# Patient Record
Sex: MALE | Race: WHITE | Employment: FULL TIME | URBAN - METROPOLITAN AREA
[De-identification: names, ages, dates, MRNs, and addresses within clinical notes are randomized per-mention and may not be internally consistent; named-entity substitution may affect disease eponyms.]

---

## 2017-10-18 ENCOUNTER — OFFICE VISIT (OUTPATIENT)
Dept: FAMILY MEDICINE CLINIC | Facility: CLINIC | Age: 57
End: 2017-10-18

## 2017-10-18 VITALS
SYSTOLIC BLOOD PRESSURE: 111 MMHG | DIASTOLIC BLOOD PRESSURE: 68 MMHG | HEART RATE: 76 BPM | TEMPERATURE: 98 F | HEIGHT: 68 IN | WEIGHT: 190 LBS | RESPIRATION RATE: 18 BRPM | BODY MASS INDEX: 28.79 KG/M2

## 2017-10-18 DIAGNOSIS — Z00.00 ROUTINE PHYSICAL EXAMINATION: ICD-10-CM

## 2017-10-18 DIAGNOSIS — K40.90 UNILATERAL INGUINAL HERNIA WITHOUT OBSTRUCTION OR GANGRENE, RECURRENCE NOT SPECIFIED: Primary | ICD-10-CM

## 2017-10-18 PROCEDURE — 99396 PREV VISIT EST AGE 40-64: CPT | Performed by: FAMILY MEDICINE

## 2017-10-18 RX ORDER — NAPROXEN 500 MG/1
500 TABLET ORAL 2 TIMES DAILY WITH MEALS
Qty: 30 TABLET | Refills: 2 | Status: SHIPPED | OUTPATIENT
Start: 2017-10-18 | End: 2018-10-30

## 2017-10-18 NOTE — PROGRESS NOTES
HPI:    Patient ID: Alejandro Hill is a 65year old male. Patient is here for routine physical exam. No acute issues. No significant chronic medical problems. Patient is requesting testing. Diet and exercise have been good.  Past medical history, family h normal.   Vitals reviewed. ASSESSMENT/PLAN:   Routine physical examination: colonoscopy done a few years ago:  - Exam is unremarkable. Screening tests were discussed, and after discussion, will check lab work as below.  Healthy diet, exercise, a

## 2017-10-20 ENCOUNTER — APPOINTMENT (OUTPATIENT)
Dept: LAB | Facility: HOSPITAL | Age: 57
End: 2017-10-20
Attending: FAMILY MEDICINE
Payer: COMMERCIAL

## 2017-10-20 DIAGNOSIS — Z00.00 ROUTINE PHYSICAL EXAMINATION: ICD-10-CM

## 2017-10-20 PROCEDURE — 85027 COMPLETE CBC AUTOMATED: CPT

## 2017-10-20 PROCEDURE — 80053 COMPREHEN METABOLIC PANEL: CPT

## 2017-10-20 PROCEDURE — 80061 LIPID PANEL: CPT

## 2017-10-20 PROCEDURE — 82306 VITAMIN D 25 HYDROXY: CPT

## 2017-10-20 PROCEDURE — 36415 COLL VENOUS BLD VENIPUNCTURE: CPT

## 2017-11-08 ENCOUNTER — TELEPHONE (OUTPATIENT)
Dept: FAMILY MEDICINE CLINIC | Facility: CLINIC | Age: 57
End: 2017-11-08

## 2017-11-08 NOTE — TELEPHONE ENCOUNTER
Patient needs health screening for healthy driven completed. Left form for dr. Monica Chavez at [facility] -red folder.  Needs asap

## 2017-11-20 ENCOUNTER — OFFICE VISIT (OUTPATIENT)
Dept: SURGERY | Facility: CLINIC | Age: 57
End: 2017-11-20

## 2017-11-20 VITALS — BODY MASS INDEX: 28.79 KG/M2 | WEIGHT: 190 LBS | HEIGHT: 68 IN

## 2017-11-20 DIAGNOSIS — K40.90 RIGHT INGUINAL HERNIA: Primary | ICD-10-CM

## 2017-11-20 PROCEDURE — 99244 OFF/OP CNSLTJ NEW/EST MOD 40: CPT | Performed by: SURGERY

## 2017-11-20 PROCEDURE — 99212 OFFICE O/P EST SF 10 MIN: CPT | Performed by: SURGERY

## 2017-11-20 NOTE — H&P
History and Physical      Mandi Silva is a 65year old male. HPI   Patient presents with:  Hernia: Pt referred by Dr. Cristiano Fowler regarding RIH x 1 month. Pt c/o intermittent pain to area with increased activity. Pt denies complications w/ b/m.       NSAID adenopathy  Respiratory: normal to inspection lungs are clear to auscultation bilaterally normal respiratory effort  Cardiovascular: regular rate and rhythm no murmurs, gallups, or rubs  Abdomen: soft non-tender non-distended no organomegaly noted no jolene

## 2017-11-24 ENCOUNTER — TELEPHONE (OUTPATIENT)
Dept: SURGERY | Facility: CLINIC | Age: 57
End: 2017-11-24

## 2017-11-24 NOTE — TELEPHONE ENCOUNTER
Spoke with patient's wife, patient scheduled for surgery on January 24, 2017. Verbal understanding received.

## 2017-12-29 ENCOUNTER — TELEPHONE (OUTPATIENT)
Dept: SURGERY | Facility: CLINIC | Age: 57
End: 2017-12-29

## 2017-12-29 NOTE — TELEPHONE ENCOUNTER
[place] documents to be completed. Please call when ready to be picked up at [facility] [employer] dept. Patient is employee. Stated that he will pay $25.00 fee when he picks up document.

## 2018-01-02 NOTE — TELEPHONE ENCOUNTER
Pt stopped@ [employer] - form not needed at this time, pt picked up form, scanned blank. No further action needed.  NK

## 2018-01-18 NOTE — TELEPHONE ENCOUNTER
Pt dropped off @[employer] - FMLA + Disab. forms for Dr. Carmen Metzger. Logged for processing. FCR+Signed release on file, pt paid $50, picked up receipt.  NK

## 2018-01-24 ENCOUNTER — ANESTHESIA (OUTPATIENT)
Dept: SURGERY | Facility: HOSPITAL | Age: 58
End: 2018-01-24

## 2018-01-24 ENCOUNTER — ANESTHESIA EVENT (OUTPATIENT)
Dept: SURGERY | Facility: HOSPITAL | Age: 58
End: 2018-01-24

## 2018-01-24 ENCOUNTER — SURGERY (OUTPATIENT)
Age: 58
End: 2018-01-24

## 2018-01-24 ENCOUNTER — HOSPITAL ENCOUNTER (OUTPATIENT)
Facility: HOSPITAL | Age: 58
Setting detail: HOSPITAL OUTPATIENT SURGERY
Discharge: HOME | End: 2018-01-24
Attending: SURGERY | Admitting: SURGERY
Payer: COMMERCIAL

## 2018-01-24 VITALS
DIASTOLIC BLOOD PRESSURE: 74 MMHG | BODY MASS INDEX: 27.4 KG/M2 | HEART RATE: 75 BPM | HEIGHT: 69 IN | OXYGEN SATURATION: 97 % | SYSTOLIC BLOOD PRESSURE: 130 MMHG | TEMPERATURE: 98 F | RESPIRATION RATE: 16 BRPM | WEIGHT: 185 LBS

## 2018-01-24 DIAGNOSIS — K40.90 RIGHT INGUINAL HERNIA: Primary | ICD-10-CM

## 2018-01-24 PROCEDURE — 0YU50JZ SUPPLEMENT RIGHT INGUINAL REGION WITH SYNTHETIC SUBSTITUTE, OPEN APPROACH: ICD-10-PCS | Performed by: SURGERY

## 2018-01-24 PROCEDURE — 49505 PRP I/HERN INIT REDUC >5 YR: CPT | Performed by: SURGERY

## 2018-01-24 DEVICE — POLYPROPLYLENE NONABSORBABLE SYNTHETIC SURGICAL MESH
Type: IMPLANTABLE DEVICE | Site: GROIN | Status: FUNCTIONAL
Brand: PROLENE

## 2018-01-24 RX ORDER — HYDROCODONE BITARTRATE AND ACETAMINOPHEN 5; 325 MG/1; MG/1
2 TABLET ORAL AS NEEDED
Status: DISCONTINUED | OUTPATIENT
Start: 2018-01-24 | End: 2018-01-24

## 2018-01-24 RX ORDER — BUPIVACAINE HYDROCHLORIDE AND EPINEPHRINE 5; 5 MG/ML; UG/ML
INJECTION, SOLUTION PERINEURAL AS NEEDED
Status: DISCONTINUED | OUTPATIENT
Start: 2018-01-24 | End: 2018-01-24 | Stop reason: HOSPADM

## 2018-01-24 RX ORDER — MORPHINE SULFATE 10 MG/ML
6 INJECTION, SOLUTION INTRAMUSCULAR; INTRAVENOUS EVERY 10 MIN PRN
Status: DISCONTINUED | OUTPATIENT
Start: 2018-01-24 | End: 2018-01-24

## 2018-01-24 RX ORDER — SODIUM CHLORIDE, SODIUM LACTATE, POTASSIUM CHLORIDE, CALCIUM CHLORIDE 600; 310; 30; 20 MG/100ML; MG/100ML; MG/100ML; MG/100ML
INJECTION, SOLUTION INTRAVENOUS CONTINUOUS
Status: DISCONTINUED | OUTPATIENT
Start: 2018-01-24 | End: 2018-01-24

## 2018-01-24 RX ORDER — NALOXONE HYDROCHLORIDE 0.4 MG/ML
80 INJECTION, SOLUTION INTRAMUSCULAR; INTRAVENOUS; SUBCUTANEOUS AS NEEDED
Status: DISCONTINUED | OUTPATIENT
Start: 2018-01-24 | End: 2018-01-24

## 2018-01-24 RX ORDER — MORPHINE SULFATE 4 MG/ML
4 INJECTION, SOLUTION INTRAMUSCULAR; INTRAVENOUS EVERY 10 MIN PRN
Status: DISCONTINUED | OUTPATIENT
Start: 2018-01-24 | End: 2018-01-24

## 2018-01-24 RX ORDER — ONDANSETRON 2 MG/ML
INJECTION INTRAMUSCULAR; INTRAVENOUS AS NEEDED
Status: DISCONTINUED | OUTPATIENT
Start: 2018-01-24 | End: 2018-01-24 | Stop reason: SURG

## 2018-01-24 RX ORDER — METOCLOPRAMIDE 10 MG/1
10 TABLET ORAL ONCE
Status: DISCONTINUED | OUTPATIENT
Start: 2018-01-24 | End: 2018-01-24 | Stop reason: HOSPADM

## 2018-01-24 RX ORDER — MORPHINE SULFATE 2 MG/ML
2 INJECTION, SOLUTION INTRAMUSCULAR; INTRAVENOUS EVERY 10 MIN PRN
Status: DISCONTINUED | OUTPATIENT
Start: 2018-01-24 | End: 2018-01-24

## 2018-01-24 RX ORDER — HYDROMORPHONE HYDROCHLORIDE 1 MG/ML
0.6 INJECTION, SOLUTION INTRAMUSCULAR; INTRAVENOUS; SUBCUTANEOUS EVERY 5 MIN PRN
Status: DISCONTINUED | OUTPATIENT
Start: 2018-01-24 | End: 2018-01-24

## 2018-01-24 RX ORDER — MIDAZOLAM HYDROCHLORIDE 1 MG/ML
INJECTION INTRAMUSCULAR; INTRAVENOUS AS NEEDED
Status: DISCONTINUED | OUTPATIENT
Start: 2018-01-24 | End: 2018-01-24 | Stop reason: SURG

## 2018-01-24 RX ORDER — HYDROCODONE BITARTRATE AND ACETAMINOPHEN 5; 325 MG/1; MG/1
1 TABLET ORAL AS NEEDED
Status: DISCONTINUED | OUTPATIENT
Start: 2018-01-24 | End: 2018-01-24

## 2018-01-24 RX ORDER — FAMOTIDINE 20 MG/1
20 TABLET ORAL ONCE
Status: DISCONTINUED | OUTPATIENT
Start: 2018-01-24 | End: 2018-01-24 | Stop reason: HOSPADM

## 2018-01-24 RX ORDER — HYDROMORPHONE HYDROCHLORIDE 1 MG/ML
0.4 INJECTION, SOLUTION INTRAMUSCULAR; INTRAVENOUS; SUBCUTANEOUS EVERY 5 MIN PRN
Status: DISCONTINUED | OUTPATIENT
Start: 2018-01-24 | End: 2018-01-24

## 2018-01-24 RX ORDER — ONDANSETRON 2 MG/ML
4 INJECTION INTRAMUSCULAR; INTRAVENOUS ONCE AS NEEDED
Status: DISCONTINUED | OUTPATIENT
Start: 2018-01-24 | End: 2018-01-24

## 2018-01-24 RX ORDER — HYDROCODONE BITARTRATE AND ACETAMINOPHEN 5; 325 MG/1; MG/1
1 TABLET ORAL EVERY 4 HOURS PRN
Qty: 20 TABLET | Refills: 0 | Status: SHIPPED | OUTPATIENT
Start: 2018-01-24 | End: 2018-10-30

## 2018-01-24 RX ORDER — LIDOCAINE HYDROCHLORIDE 10 MG/ML
INJECTION, SOLUTION EPIDURAL; INFILTRATION; INTRACAUDAL; PERINEURAL AS NEEDED
Status: DISCONTINUED | OUTPATIENT
Start: 2018-01-24 | End: 2018-01-24 | Stop reason: SURG

## 2018-01-24 RX ORDER — CEFAZOLIN SODIUM/WATER 2 G/20 ML
2 SYRINGE (ML) INTRAVENOUS ONCE
Status: COMPLETED | OUTPATIENT
Start: 2018-01-24 | End: 2018-01-24

## 2018-01-24 RX ORDER — HYDROMORPHONE HYDROCHLORIDE 1 MG/ML
0.2 INJECTION, SOLUTION INTRAMUSCULAR; INTRAVENOUS; SUBCUTANEOUS EVERY 5 MIN PRN
Status: DISCONTINUED | OUTPATIENT
Start: 2018-01-24 | End: 2018-01-24

## 2018-01-24 RX ORDER — ACETAMINOPHEN 500 MG
1000 TABLET ORAL ONCE
Status: COMPLETED | OUTPATIENT
Start: 2018-01-24 | End: 2018-01-24

## 2018-01-24 RX ORDER — HALOPERIDOL 5 MG/ML
0.25 INJECTION INTRAMUSCULAR ONCE AS NEEDED
Status: DISCONTINUED | OUTPATIENT
Start: 2018-01-24 | End: 2018-01-24

## 2018-01-24 RX ORDER — DEXAMETHASONE SODIUM PHOSPHATE 4 MG/ML
VIAL (ML) INJECTION AS NEEDED
Status: DISCONTINUED | OUTPATIENT
Start: 2018-01-24 | End: 2018-01-24 | Stop reason: SURG

## 2018-01-24 RX ADMIN — CEFAZOLIN SODIUM/WATER 2 G: 2 G/20 ML SYRINGE (ML) INTRAVENOUS at 07:48:00

## 2018-01-24 RX ADMIN — MIDAZOLAM HYDROCHLORIDE 2 MG: 1 INJECTION INTRAMUSCULAR; INTRAVENOUS at 07:35:00

## 2018-01-24 RX ADMIN — SODIUM CHLORIDE, SODIUM LACTATE, POTASSIUM CHLORIDE, CALCIUM CHLORIDE: 600; 310; 30; 20 INJECTION, SOLUTION INTRAVENOUS at 08:52:00

## 2018-01-24 RX ADMIN — LIDOCAINE HYDROCHLORIDE 50 MG: 10 INJECTION, SOLUTION EPIDURAL; INFILTRATION; INTRACAUDAL; PERINEURAL at 07:41:00

## 2018-01-24 RX ADMIN — SODIUM CHLORIDE, SODIUM LACTATE, POTASSIUM CHLORIDE, CALCIUM CHLORIDE: 600; 310; 30; 20 INJECTION, SOLUTION INTRAVENOUS at 07:37:00

## 2018-01-24 RX ADMIN — DEXAMETHASONE SODIUM PHOSPHATE 4 MG: 4 MG/ML VIAL (ML) INJECTION at 07:47:00

## 2018-01-24 RX ADMIN — ONDANSETRON 4 MG: 2 INJECTION INTRAMUSCULAR; INTRAVENOUS at 08:35:00

## 2018-01-24 NOTE — H&P
History and Physical        Janene Clinton is a 65year old male.        HPI   Patient presents with:  Hernia: Pt referred by Dr. Will Gordon regarding RIH x 1 month. Pt c/o intermittent pain to area with increased activity.   Pt denies complications w/ b/m.      without adenopathy  Respiratory: normal to inspection lungs are clear to auscultation bilaterally normal respiratory effort  Cardiovascular: regular rate and rhythm no murmurs, gallups, or rubs  Abdomen: soft non-tender non-distended no organomegaly noted

## 2018-01-24 NOTE — INTERVAL H&P NOTE
Pre-op Diagnosis: right inguinal hernia    The above referenced H&P was reviewed by Annabella Mcdonald MD on 1/24/2018, the patient was examined and no significant changes have occurred in the patient's condition since the H&P was performed.   I discussed with seng

## 2018-01-24 NOTE — ANESTHESIA POSTPROCEDURE EVALUATION
Patient: Alexandra Hale    Procedure Summary     Date:  01/24/18 Room / Location:  [address] [place] [address] MAIN OR 04 / [address] [facility] MAIN OR    Anesthesia Start:  1338 Anesthesia Stop:  1649    Procedure:   HERNIA INGUINAL REPAIR ADULT (Right Groin) Diagnosis:  (right inguinal hernia)

## 2018-01-24 NOTE — BRIEF OP NOTE
Pre-Operative Diagnosis: right inguinal hernia     Post-Operative Diagnosis: right inguinal hernia     Procedure Performed:   Procedure(s):  repair of right inguinal hernia with mesh    Surgeon(s) and Role:     Frederico Osgood, MD - Primary    Assistant

## 2018-01-24 NOTE — ANESTHESIA PREPROCEDURE EVALUATION
Anesthesia PreOp Note    HPI:     Nicole Matthew is a 63year old male who presents for preoperative consultation requested by: Arjun Villatoro MD    Date of Surgery: 1/24/2018    Procedure(s):   HERNIA INGUINAL REPAIR ADULT  Indication: right inguinal hernia is 133/83 and his pulse is 100. His respiration is 18 and oxygen saturation is 94%.     01/18/18  1647 01/24/18  0638   BP:  133/83   BP Location:  Right arm   Pulse:  100   Resp:  18   Temp:  98 °F (36.7 °C)   TempSrc:  Oral   SpO2:  94%   Weight: 81.6 kg

## 2018-01-25 NOTE — TELEPHONE ENCOUNTER
Dr. Irene Humphrey,    Please sign off on both forms:  -Highlight the patient and hit \"Chart\" button. -In Chart Review, w/in the Encounter tab - click 1 time on the Telephone call encounter for 12/29/17.  Scroll down the telephone encounter.  -Click \"scan on\"

## 2018-02-02 ENCOUNTER — TELEPHONE (OUTPATIENT)
Dept: SURGERY | Facility: CLINIC | Age: 58
End: 2018-02-02

## 2018-02-02 NOTE — TELEPHONE ENCOUNTER
\"No precert required\" per recording at [facility] for procedure on 01/24/2018, reference #[id number].

## 2018-02-08 ENCOUNTER — OFFICE VISIT (OUTPATIENT)
Dept: SURGERY | Facility: CLINIC | Age: 58
End: 2018-02-08

## 2018-02-08 DIAGNOSIS — K40.90 RIGHT INGUINAL HERNIA: Primary | ICD-10-CM

## 2018-02-08 PROCEDURE — 99212 OFFICE O/P EST SF 10 MIN: CPT | Performed by: SURGERY

## 2018-02-08 PROCEDURE — 99024 POSTOP FOLLOW-UP VISIT: CPT | Performed by: SURGERY

## 2018-02-13 NOTE — TELEPHONE ENCOUNTER
Pt dropped off RTW forms @ [employer] on 02/08/18. Logged for processing. MD letter faxed to [employer], scanned, forms to follow. Copy mailed to pt.  NK

## 2018-02-15 NOTE — TELEPHONE ENCOUNTER
Dr. Karen Dean,    Please sign off on both forms:  -Highlight the patient and hit \"Chart\" button. -In Chart Review, w/in the Encounter tab - click 1 time on the Telephone call encounter for 12/27/17.  Scroll down the telephone encounter.  -Click \"scan on\"

## 2018-02-21 NOTE — TELEPHONE ENCOUNTER
Forms faxed to [employer] 02/16, scanned X2, mailed to pt. Pt dropped off Mon. 02/19 FMLA+ Disab. Form- dupl? Iveth Camarena for processing.  NK

## 2018-03-15 NOTE — TELEPHONE ENCOUNTER
Isabella faxed revised form to [facility] 03/05, scanned.  NK

## 2018-07-02 ENCOUNTER — OFFICE VISIT (OUTPATIENT)
Dept: RHEUMATOLOGY | Facility: CLINIC | Age: 58
End: 2018-07-02

## 2018-07-02 VITALS
WEIGHT: 193 LBS | SYSTOLIC BLOOD PRESSURE: 122 MMHG | HEART RATE: 85 BPM | BODY MASS INDEX: 29.25 KG/M2 | HEIGHT: 68 IN | DIASTOLIC BLOOD PRESSURE: 81 MMHG

## 2018-07-02 DIAGNOSIS — M79.672 LEFT FOOT PAIN: Primary | ICD-10-CM

## 2018-07-02 PROCEDURE — 99212 OFFICE O/P EST SF 10 MIN: CPT | Performed by: INTERNAL MEDICINE

## 2018-07-02 PROCEDURE — 99214 OFFICE O/P EST MOD 30 MIN: CPT | Performed by: INTERNAL MEDICINE

## 2018-07-02 RX ORDER — IBUPROFEN 200 MG
200 TABLET ORAL EVERY 6 HOURS PRN
COMMUNITY
End: 2018-10-30

## 2018-07-02 RX ORDER — NAPROXEN 500 MG/1
500 TABLET ORAL 2 TIMES DAILY WITH MEALS
Qty: 60 TABLET | Refills: 1 | Status: SHIPPED | OUTPATIENT
Start: 2018-07-02 | End: 2018-11-05

## 2018-07-02 NOTE — PROGRESS NOTES
Kevin Kern is a 63year old male who presents for Patient presents with:  Joint Pain  Foot Pain: left  . HPI:     He has pain in his left figngers with swelling. This swelling is sthe same for 1 year. He gets numbness in his fingers at night.  He is he file.   Past Surgical History:  2013: COLONOSCOPY  01/24/2018: INGUINAL HERNIA REPAIR Right   Family History   Problem Relation Age of Onset   • Prostate Cancer Other    hx of [place] - 1 brother and 2 sisters - no joint pains in family  No hx of gout. , can close hands - but pips are tender with swelling   No other joints tender or swollen.    Tinels negative b/l wrists   No edema    Component      Latest Ref Rng 10/8/2015   Glucose      65 - 99 mg/dL 89   Sodium      136 - 144 mmol/L 140   Potassium Negative mg/dL Negative   GLUCOSE, URINE, QUAL. Negative mg/dL Negative   KETONES, URINE      Negative mg/dL Negative   BILIRUBIN, URINE, QUAL.       Negative Negative   BLOOD, URINE      Negative UL Negative   URINE NITRITE      Negative Negative   UR Epic

## 2018-07-03 ENCOUNTER — HOSPITAL ENCOUNTER (OUTPATIENT)
Dept: GENERAL RADIOLOGY | Facility: HOSPITAL | Age: 58
Discharge: HOME | End: 2018-07-03
Attending: INTERNAL MEDICINE
Payer: COMMERCIAL

## 2018-07-03 DIAGNOSIS — M79.672 LEFT FOOT PAIN: ICD-10-CM

## 2018-07-03 PROCEDURE — 73630 X-RAY EXAM OF FOOT: CPT | Performed by: INTERNAL MEDICINE

## 2018-10-30 ENCOUNTER — OFFICE VISIT (OUTPATIENT)
Dept: FAMILY MEDICINE CLINIC | Facility: CLINIC | Age: 58
End: 2018-10-30
Payer: COMMERCIAL

## 2018-10-30 VITALS
HEART RATE: 76 BPM | DIASTOLIC BLOOD PRESSURE: 70 MMHG | WEIGHT: 194 LBS | RESPIRATION RATE: 18 BRPM | BODY MASS INDEX: 29.4 KG/M2 | HEIGHT: 68 IN | SYSTOLIC BLOOD PRESSURE: 126 MMHG | TEMPERATURE: 98 F

## 2018-10-30 DIAGNOSIS — Z00.00 ROUTINE PHYSICAL EXAMINATION: ICD-10-CM

## 2018-10-30 PROCEDURE — 99396 PREV VISIT EST AGE 40-64: CPT | Performed by: FAMILY MEDICINE

## 2018-10-30 NOTE — PROGRESS NOTES
HPI:    Patient ID: Prosper Holguin is a 63year old male. Patient is here for routine physical exam and wellness exam. No acute issues. No significant chronic medical problems. Patient is requesting blood testing. Diet and exercise have been good.  Past m below. Healthy diet, exercise, and weight were discussed. To call if problems and follow up and further management after testing. Routine follow up. Will also fax wellness forms when labs received.         Orders Placed This Encounter      Lipid Panel [E]

## 2018-10-31 ENCOUNTER — TELEPHONE (OUTPATIENT)
Dept: FAMILY MEDICINE CLINIC | Facility: CLINIC | Age: 58
End: 2018-10-31

## 2018-10-31 ENCOUNTER — APPOINTMENT (OUTPATIENT)
Dept: LAB | Facility: HOSPITAL | Age: 58
End: 2018-10-31
Attending: FAMILY MEDICINE
Payer: COMMERCIAL

## 2018-10-31 DIAGNOSIS — Z00.00 ROUTINE PHYSICAL EXAMINATION: ICD-10-CM

## 2018-10-31 PROCEDURE — 80053 COMPREHEN METABOLIC PANEL: CPT

## 2018-10-31 PROCEDURE — 36415 COLL VENOUS BLD VENIPUNCTURE: CPT

## 2018-10-31 PROCEDURE — 80061 LIPID PANEL: CPT

## 2018-10-31 PROCEDURE — 85027 COMPLETE CBC AUTOMATED: CPT

## 2018-11-05 RX ORDER — NAPROXEN 500 MG/1
500 TABLET ORAL 2 TIMES DAILY WITH MEALS
Qty: 60 TABLET | Refills: 1 | Status: SHIPPED | OUTPATIENT
Start: 2018-11-05 | End: 2019-11-04

## 2018-11-05 NOTE — TELEPHONE ENCOUNTER
Per spouse medication Naproxen was not sen to the pharmacy. Requesting refill.    Refill Protocol Appointment Criteria  · Appointment scheduled in the past 6 months or in the next 3 months  Recent Outpatient Visits            6 days ago Routine physical exa

## 2019-02-22 ENCOUNTER — NURSE TRIAGE (OUTPATIENT)
Dept: OTHER | Age: 59
End: 2019-02-22

## 2019-02-22 NOTE — TELEPHONE ENCOUNTER
Action Requested: Summary for Provider     []  Critical Lab, Recommendations Needed  [] Need Additional Advice  []   FYI    []   Need Orders  [] Need Medications Sent to Pharmacy  []  Other     SUMMARY:   Appointment made for tomorrow 2/22/19 per patient r

## 2019-02-23 ENCOUNTER — OFFICE VISIT (OUTPATIENT)
Dept: FAMILY MEDICINE CLINIC | Facility: CLINIC | Age: 59
End: 2019-02-23
Payer: COMMERCIAL

## 2019-02-23 VITALS
BODY MASS INDEX: 29.1 KG/M2 | HEIGHT: 68 IN | SYSTOLIC BLOOD PRESSURE: 126 MMHG | WEIGHT: 192 LBS | DIASTOLIC BLOOD PRESSURE: 78 MMHG | TEMPERATURE: 98 F | HEART RATE: 71 BPM | RESPIRATION RATE: 18 BRPM

## 2019-02-23 DIAGNOSIS — M79.601 RIGHT ARM PAIN: ICD-10-CM

## 2019-02-23 PROCEDURE — 99212 OFFICE O/P EST SF 10 MIN: CPT | Performed by: FAMILY MEDICINE

## 2019-02-23 PROCEDURE — 99213 OFFICE O/P EST LOW 20 MIN: CPT | Performed by: FAMILY MEDICINE

## 2019-02-23 RX ORDER — TRAMADOL HYDROCHLORIDE 50 MG/1
50 TABLET ORAL EVERY 6 HOURS PRN
Qty: 45 TABLET | Refills: 0 | Status: SHIPPED | OUTPATIENT
Start: 2019-02-23 | End: 2019-11-04

## 2019-02-23 NOTE — PROGRESS NOTES
HPI:    Patient ID: Leena Jarvis is a 63year old male. Pt presents with intermittent pain of the right upper arm with some sharp pains at times. Pt has been taking naprosyn with some relief.  Pt denies any trauma or specific injury but may have slept o Referrals:  ORTHOPEDIC - INTERNAL       IJ#[id number]

## 2019-05-13 ENCOUNTER — OFFICE VISIT (OUTPATIENT)
Dept: FAMILY MEDICINE CLINIC | Facility: CLINIC | Age: 59
End: 2019-05-13
Payer: COMMERCIAL

## 2019-05-13 VITALS
WEIGHT: 181 LBS | HEART RATE: 78 BPM | SYSTOLIC BLOOD PRESSURE: 111 MMHG | HEIGHT: 68 IN | TEMPERATURE: 97 F | BODY MASS INDEX: 27.43 KG/M2 | DIASTOLIC BLOOD PRESSURE: 68 MMHG

## 2019-05-13 DIAGNOSIS — R05.9 COUGH: ICD-10-CM

## 2019-05-13 DIAGNOSIS — J06.9 ACUTE URI: ICD-10-CM

## 2019-05-13 PROCEDURE — 99212 OFFICE O/P EST SF 10 MIN: CPT | Performed by: FAMILY MEDICINE

## 2019-05-13 PROCEDURE — 99213 OFFICE O/P EST LOW 20 MIN: CPT | Performed by: FAMILY MEDICINE

## 2019-05-13 RX ORDER — CODEINE PHOSPHATE AND GUAIFENESIN 10; 100 MG/5ML; MG/5ML
5 SOLUTION ORAL EVERY 6 HOURS PRN
Qty: 140 ML | Refills: 0 | Status: SHIPPED | OUTPATIENT
Start: 2019-05-13 | End: 2019-11-04

## 2019-05-13 RX ORDER — AZITHROMYCIN 250 MG/1
TABLET, FILM COATED ORAL
Qty: 6 TABLET | Refills: 0 | Status: SHIPPED | OUTPATIENT
Start: 2019-05-13 | End: 2019-11-04

## 2019-05-13 NOTE — PROGRESS NOTES
HPI:    Patient ID: Natalia Sanches is a 63year old male. Pt presents with cold symptoms for 7 days. Pt has had cough, sore throat. Pt has tried otc remedies without consistent relief. Pt states no sick contacts. Pt has had chills and fevers.       Rev call if worse or not better; Follow up in one week if not resolved or as needed if worse. No orders of the defined types were placed in this encounter.       Meds This Visit:  Requested Prescriptions     Signed Prescriptions Disp Refills   • azithrom

## 2019-11-04 ENCOUNTER — OFFICE VISIT (OUTPATIENT)
Dept: FAMILY MEDICINE CLINIC | Facility: CLINIC | Age: 59
End: 2019-11-04
Payer: COMMERCIAL

## 2019-11-04 VITALS
DIASTOLIC BLOOD PRESSURE: 77 MMHG | HEIGHT: 68.6 IN | WEIGHT: 185 LBS | TEMPERATURE: 98 F | BODY MASS INDEX: 27.72 KG/M2 | RESPIRATION RATE: 20 BRPM | SYSTOLIC BLOOD PRESSURE: 131 MMHG | HEART RATE: 108 BPM

## 2019-11-04 DIAGNOSIS — Z00.00 ROUTINE PHYSICAL EXAMINATION: ICD-10-CM

## 2019-11-04 PROCEDURE — 99396 PREV VISIT EST AGE 40-64: CPT | Performed by: FAMILY MEDICINE

## 2019-11-04 NOTE — PROGRESS NOTES
HPI:    Patient ID: Janene Clinton is a 63year old male. Patient is here for routine physical exam and wellness exam for work. No acute issues. No significant chronic medical problems. Patient is requesting blood testing.  Diet and exercise have been pre check lab work as below. Healthy diet, exercise, and weight were discussed. To call if problems and follow up and further management after testing. Routine follow up. Will also fax wellness forms when labs received.         No orders of the defined types we

## 2019-11-05 ENCOUNTER — APPOINTMENT (OUTPATIENT)
Dept: LAB | Facility: HOSPITAL | Age: 59
End: 2019-11-05
Attending: FAMILY MEDICINE
Payer: COMMERCIAL

## 2019-11-05 DIAGNOSIS — Z00.00 ROUTINE PHYSICAL EXAMINATION: ICD-10-CM

## 2019-11-05 PROCEDURE — 80053 COMPREHEN METABOLIC PANEL: CPT

## 2019-11-05 PROCEDURE — 80061 LIPID PANEL: CPT

## 2019-11-05 PROCEDURE — 36415 COLL VENOUS BLD VENIPUNCTURE: CPT

## 2019-11-05 PROCEDURE — 85027 COMPLETE CBC AUTOMATED: CPT

## 2019-11-12 ENCOUNTER — TELEPHONE (OUTPATIENT)
Dept: OTHER | Age: 59
End: 2019-11-12

## 2019-11-12 NOTE — TELEPHONE ENCOUNTER
Pt notified that form was fax to [phone]. Per pt will  original copy. [facility]

## 2020-01-08 RX ORDER — NAPROXEN 500 MG/1
500 TABLET ORAL 2 TIMES DAILY WITH MEALS
Qty: 60 TABLET | Refills: 0 | Status: SHIPPED | OUTPATIENT
Start: 2020-01-08 | End: 2020-07-29

## 2020-01-08 NOTE — TELEPHONE ENCOUNTER
Patient requesting a refill of her Naproxen 500 mg for her shoulder pain. Med pended. Please advise.

## 2020-05-12 ENCOUNTER — TELEPHONE (OUTPATIENT)
Dept: FAMILY MEDICINE CLINIC | Facility: CLINIC | Age: 60
End: 2020-05-12

## 2020-05-12 NOTE — TELEPHONE ENCOUNTER
Patient is requesting an in office appointment. Patient states he has red/brown spots on his chest that come and go. Please confirm if we are able to schedule an appointment. Patient declined video/phone appointment.

## 2020-05-14 ENCOUNTER — OFFICE VISIT (OUTPATIENT)
Dept: FAMILY MEDICINE CLINIC | Facility: CLINIC | Age: 60
End: 2020-05-14
Payer: COMMERCIAL

## 2020-05-14 VITALS
TEMPERATURE: 97 F | SYSTOLIC BLOOD PRESSURE: 143 MMHG | HEIGHT: 68 IN | DIASTOLIC BLOOD PRESSURE: 83 MMHG | HEART RATE: 102 BPM | WEIGHT: 186 LBS | BODY MASS INDEX: 28.19 KG/M2

## 2020-05-14 DIAGNOSIS — R21 RASH AND NONSPECIFIC SKIN ERUPTION: Primary | ICD-10-CM

## 2020-05-14 DIAGNOSIS — L98.9 SKIN LESION: ICD-10-CM

## 2020-05-14 PROCEDURE — 99213 OFFICE O/P EST LOW 20 MIN: CPT | Performed by: FAMILY MEDICINE

## 2020-05-14 RX ORDER — CLOTRIMAZOLE AND BETAMETHASONE DIPROPIONATE 10; .64 MG/G; MG/G
CREAM TOPICAL
Qty: 45 G | Refills: 1 | Status: SHIPPED | OUTPATIENT
Start: 2020-05-14 | End: 2020-11-09

## 2020-05-14 NOTE — PROGRESS NOTES
HPI:    Patient ID: Alexandra Hale is a 62year old male. Pt presents with a rash that he has rash on his chest for 3-4 years. No change. Pt works in a hot/ [place] environment. No itching or pains/ symptoms.    Pt has had some aches of the arms but fine a External Cream 45 g 1     Sig: Apply a small dab to the affected area of the body up to twice per day       Imaging & Referrals:  DERM - INTERNAL       QA#[id number]

## 2020-07-29 ENCOUNTER — TELEPHONE (OUTPATIENT)
Dept: FAMILY MEDICINE CLINIC | Facility: CLINIC | Age: 60
End: 2020-07-29

## 2020-07-29 RX ORDER — NAPROXEN 500 MG/1
500 TABLET ORAL 2 TIMES DAILY WITH MEALS
Qty: 60 TABLET | Refills: 0 | Status: SHIPPED | OUTPATIENT
Start: 2020-07-29 | End: 2020-11-09

## 2020-07-29 NOTE — TELEPHONE ENCOUNTER
Dr. Del Sharpe , Pt wife as me to send a communication to you for her  for refill on his Naproxen 500 mg. Last Ov was 5/14/20.

## 2020-07-29 NOTE — TELEPHONE ENCOUNTER
Message noted: Chart reviewed and may refill medication as requested times one. Prescription sent to listed pharmacy. Pharmacy to notify patient.    Pt notified through [facility]

## 2020-11-09 ENCOUNTER — OFFICE VISIT (OUTPATIENT)
Dept: FAMILY MEDICINE CLINIC | Facility: CLINIC | Age: 60
End: 2020-11-09
Payer: COMMERCIAL

## 2020-11-09 ENCOUNTER — LAB ENCOUNTER (OUTPATIENT)
Dept: LAB | Age: 60
End: 2020-11-09
Attending: FAMILY MEDICINE
Payer: COMMERCIAL

## 2020-11-09 VITALS
SYSTOLIC BLOOD PRESSURE: 137 MMHG | HEART RATE: 92 BPM | TEMPERATURE: 97 F | WEIGHT: 192 LBS | DIASTOLIC BLOOD PRESSURE: 74 MMHG | HEIGHT: 68 IN | BODY MASS INDEX: 29.1 KG/M2

## 2020-11-09 DIAGNOSIS — Z00.00 ROUTINE PHYSICAL EXAMINATION: ICD-10-CM

## 2020-11-09 PROCEDURE — 80061 LIPID PANEL: CPT

## 2020-11-09 PROCEDURE — 3008F BODY MASS INDEX DOCD: CPT | Performed by: FAMILY MEDICINE

## 2020-11-09 PROCEDURE — 36415 COLL VENOUS BLD VENIPUNCTURE: CPT

## 2020-11-09 PROCEDURE — 84443 ASSAY THYROID STIM HORMONE: CPT

## 2020-11-09 PROCEDURE — 85027 COMPLETE CBC AUTOMATED: CPT

## 2020-11-09 PROCEDURE — 3075F SYST BP GE 130 - 139MM HG: CPT | Performed by: FAMILY MEDICINE

## 2020-11-09 PROCEDURE — 80053 COMPREHEN METABOLIC PANEL: CPT

## 2020-11-09 PROCEDURE — 99396 PREV VISIT EST AGE 40-64: CPT | Performed by: FAMILY MEDICINE

## 2020-11-09 PROCEDURE — 82306 VITAMIN D 25 HYDROXY: CPT

## 2020-11-09 PROCEDURE — 3078F DIAST BP <80 MM HG: CPT | Performed by: FAMILY MEDICINE

## 2020-11-09 RX ORDER — NAPROXEN 500 MG/1
500 TABLET ORAL 2 TIMES DAILY WITH MEALS
Qty: 60 TABLET | Refills: 0 | Status: SHIPPED | OUTPATIENT
Start: 2020-11-09 | End: 2021-05-24

## 2020-11-09 NOTE — PROGRESS NOTES
HPI:    Patient ID: Alejandro Hill is a 62year old male. Patient is here for routine physical exam and wellness exam. No acute issues. No significant chronic medical problems. Patient is requesting testing. Diet and exercise have been fair.  Pt is trying round, and reactive to light. Conjunctivae and EOM are normal.   Neck: Normal range of motion. Neck supple. No thyromegaly present. Cardiovascular: Normal rate, regular rhythm, normal heart sounds and intact distal pulses.    Pulmonary/Chest: Effort marcus

## 2020-11-10 ENCOUNTER — TELEPHONE (OUTPATIENT)
Dept: FAMILY MEDICINE CLINIC | Facility: CLINIC | Age: 60
End: 2020-11-10

## 2020-11-10 NOTE — TELEPHONE ENCOUNTER
Pt contacted and notified to  Health Provider Screening form is ready at the [employer].

## 2021-02-12 ENCOUNTER — IMMUNIZATION (OUTPATIENT)
Dept: LAB | Facility: HOSPITAL | Age: 61
End: 2021-02-12
Attending: EMERGENCY MEDICINE

## 2021-02-12 DIAGNOSIS — Z23 NEED FOR VACCINATION: Primary | ICD-10-CM

## 2021-02-12 PROCEDURE — 0011A SARSCOV2 VAC 100MCG/0.5ML IM: CPT

## 2021-03-12 ENCOUNTER — IMMUNIZATION (OUTPATIENT)
Dept: LAB | Facility: HOSPITAL | Age: 61
End: 2021-03-12
Attending: EMERGENCY MEDICINE

## 2021-03-12 DIAGNOSIS — Z23 NEED FOR VACCINATION: Primary | ICD-10-CM

## 2021-03-12 PROCEDURE — 0012A SARSCOV2 VAC 100MCG/0.5ML IM: CPT

## 2021-05-24 RX ORDER — NAPROXEN 500 MG/1
500 TABLET ORAL 2 TIMES DAILY WITH MEALS
Qty: 60 TABLET | Refills: 0 | Status: SHIPPED | OUTPATIENT
Start: 2021-05-24 | End: 2021-10-15

## 2021-05-24 NOTE — TELEPHONE ENCOUNTER
Message noted: Chart reviewed and may refill medication as requested times one. Prescription sent to listed pharmacy. Pharmacy to notify patient.    Pt notified through [facility]

## 2021-08-10 ENCOUNTER — OFFICE VISIT (OUTPATIENT)
Dept: FAMILY MEDICINE CLINIC | Facility: CLINIC | Age: 61
End: 2021-08-10
Payer: COMMERCIAL

## 2021-08-10 VITALS
HEART RATE: 106 BPM | SYSTOLIC BLOOD PRESSURE: 135 MMHG | HEIGHT: 68 IN | OXYGEN SATURATION: 96 % | WEIGHT: 180 LBS | DIASTOLIC BLOOD PRESSURE: 75 MMHG | BODY MASS INDEX: 27.28 KG/M2 | RESPIRATION RATE: 18 BRPM

## 2021-08-10 DIAGNOSIS — M54.50 ACUTE LEFT-SIDED LOW BACK PAIN WITHOUT SCIATICA: Primary | ICD-10-CM

## 2021-08-10 PROCEDURE — 99213 OFFICE O/P EST LOW 20 MIN: CPT | Performed by: PHYSICIAN ASSISTANT

## 2021-08-10 PROCEDURE — 3008F BODY MASS INDEX DOCD: CPT | Performed by: PHYSICIAN ASSISTANT

## 2021-08-10 PROCEDURE — 3075F SYST BP GE 130 - 139MM HG: CPT | Performed by: PHYSICIAN ASSISTANT

## 2021-08-10 PROCEDURE — 3078F DIAST BP <80 MM HG: CPT | Performed by: PHYSICIAN ASSISTANT

## 2021-08-10 RX ORDER — CYCLOBENZAPRINE HCL 10 MG
10 TABLET ORAL NIGHTLY
Qty: 15 TABLET | Refills: 0 | Status: SHIPPED | OUTPATIENT
Start: 2021-08-10 | End: 2021-11-10

## 2021-08-10 NOTE — PROGRESS NOTES
HPI:    Patient ID: Lisy Julien is a 62year old male. Patient presents for back pain for the past 6 days. Pain is located in the lower left side. He was lifting object up and down the stairs. Has trouble standing from sitting.  No numbness or weakness place, and time. ASSESSMENT/PLAN:   1.  Acute left-sided low back pain without sciatica  -After discussion with patient, advised the following:  -Told to take naprosyn   -Added flexeril  -Educated pt on how to take the medication  -not improv

## 2021-10-15 RX ORDER — NAPROXEN 500 MG/1
500 TABLET ORAL 2 TIMES DAILY WITH MEALS
Qty: 60 TABLET | Refills: 0 | Status: SHIPPED | OUTPATIENT
Start: 2021-10-15

## 2021-11-10 ENCOUNTER — TELEPHONE (OUTPATIENT)
Dept: FAMILY MEDICINE CLINIC | Facility: CLINIC | Age: 61
End: 2021-11-10

## 2021-11-10 ENCOUNTER — LAB ENCOUNTER (OUTPATIENT)
Dept: LAB | Age: 61
End: 2021-11-10
Attending: FAMILY MEDICINE
Payer: COMMERCIAL

## 2021-11-10 ENCOUNTER — OFFICE VISIT (OUTPATIENT)
Dept: FAMILY MEDICINE CLINIC | Facility: CLINIC | Age: 61
End: 2021-11-10
Payer: COMMERCIAL

## 2021-11-10 VITALS
BODY MASS INDEX: 28.73 KG/M2 | HEIGHT: 68.8 IN | WEIGHT: 194 LBS | RESPIRATION RATE: 18 BRPM | DIASTOLIC BLOOD PRESSURE: 77 MMHG | TEMPERATURE: 98 F | HEART RATE: 81 BPM | SYSTOLIC BLOOD PRESSURE: 128 MMHG

## 2021-11-10 DIAGNOSIS — Z00.00 ROUTINE PHYSICAL EXAMINATION: ICD-10-CM

## 2021-11-10 PROCEDURE — 99396 PREV VISIT EST AGE 40-64: CPT | Performed by: FAMILY MEDICINE

## 2021-11-10 PROCEDURE — 85027 COMPLETE CBC AUTOMATED: CPT

## 2021-11-10 PROCEDURE — 36415 COLL VENOUS BLD VENIPUNCTURE: CPT

## 2021-11-10 PROCEDURE — 3008F BODY MASS INDEX DOCD: CPT | Performed by: FAMILY MEDICINE

## 2021-11-10 PROCEDURE — 80053 COMPREHEN METABOLIC PANEL: CPT

## 2021-11-10 PROCEDURE — 82306 VITAMIN D 25 HYDROXY: CPT

## 2021-11-10 PROCEDURE — 3074F SYST BP LT 130 MM HG: CPT | Performed by: FAMILY MEDICINE

## 2021-11-10 PROCEDURE — 80061 LIPID PANEL: CPT

## 2021-11-10 PROCEDURE — 3078F DIAST BP <80 MM HG: CPT | Performed by: FAMILY MEDICINE

## 2021-11-10 NOTE — PROGRESS NOTES
Subjective:   Patient ID: Elvi Gray is a 62year old male. Patient is here for routine physical exam and wellness exam. No acute issues. No significant chronic medical problems. Patient is requesting testing. Diet and exercise have been fair.  Past m erythema. Eyes:      Conjunctiva/sclera: Conjunctivae normal.   Cardiovascular:      Rate and Rhythm: Normal rate and regular rhythm. Heart sounds: Normal heart sounds. Pulmonary:      Effort: Pulmonary effort is normal. No respiratory distress.

## 2022-01-15 ENCOUNTER — IMMUNIZATION (OUTPATIENT)
Dept: LAB | Facility: HOSPITAL | Age: 62
End: 2022-01-15
Attending: EMERGENCY MEDICINE
Payer: COMMERCIAL

## 2022-01-15 DIAGNOSIS — Z23 NEED FOR VACCINATION: Primary | ICD-10-CM

## 2022-01-15 PROCEDURE — 0064A SARSCOV2 VAC 50MCG/0.25ML IM: CPT

## 2022-02-16 RX ORDER — NAPROXEN 500 MG/1
500 TABLET ORAL 2 TIMES DAILY WITH MEALS
Qty: 60 TABLET | Refills: 0 | Status: SHIPPED | OUTPATIENT
Start: 2022-02-16

## 2022-02-16 NOTE — TELEPHONE ENCOUNTER
Message noted: Chart reviewed and may refill medication as requested. Prescription sent to listed pharmacy. Pharmacy to notify patient.  Pt notified through [facility]

## 2022-06-24 RX ORDER — NAPROXEN 500 MG/1
500 TABLET ORAL 2 TIMES DAILY WITH MEALS
Qty: 60 TABLET | Refills: 0 | Status: SHIPPED | OUTPATIENT
Start: 2022-06-24

## 2022-06-25 NOTE — TELEPHONE ENCOUNTER
Message noted: Chart reviewed and may refill medication as requested. Script sent to listed pharmacy by secure method.     Pt notified through [facility]

## 2022-08-12 NOTE — TELEPHONE ENCOUNTER
[address] is calling to advise PCP they faxed the following prescription refill for patient , patient at site requesting refill as he is leaving [facility].   Please advise    NAPROXEN

## 2022-08-13 RX ORDER — NAPROXEN 500 MG/1
500 TABLET ORAL 2 TIMES DAILY WITH MEALS
Qty: 60 TABLET | Refills: 0 | Status: SHIPPED | OUTPATIENT
Start: 2022-08-13

## 2022-08-13 NOTE — TELEPHONE ENCOUNTER
Message noted: Chart reviewed and may refill medication as requested. Prescription sent to listed pharmacy. Pharmacy to notify patient.  Pt notified through [facility]

## 2022-10-11 ENCOUNTER — OFFICE VISIT (OUTPATIENT)
Dept: FAMILY MEDICINE CLINIC | Facility: CLINIC | Age: 62
End: 2022-10-11
Payer: COMMERCIAL

## 2022-10-11 VITALS
BODY MASS INDEX: 28.58 KG/M2 | TEMPERATURE: 97 F | DIASTOLIC BLOOD PRESSURE: 86 MMHG | WEIGHT: 193 LBS | HEART RATE: 95 BPM | HEIGHT: 68.8 IN | SYSTOLIC BLOOD PRESSURE: 133 MMHG | OXYGEN SATURATION: 95 %

## 2022-10-11 DIAGNOSIS — J06.9 VIRAL UPPER RESPIRATORY TRACT INFECTION: Primary | ICD-10-CM

## 2022-10-11 DIAGNOSIS — R05.1 ACUTE COUGH: ICD-10-CM

## 2022-10-11 PROCEDURE — 3075F SYST BP GE 130 - 139MM HG: CPT | Performed by: FAMILY MEDICINE

## 2022-10-11 PROCEDURE — 3079F DIAST BP 80-89 MM HG: CPT | Performed by: FAMILY MEDICINE

## 2022-10-11 PROCEDURE — 3008F BODY MASS INDEX DOCD: CPT | Performed by: FAMILY MEDICINE

## 2022-10-11 PROCEDURE — 99213 OFFICE O/P EST LOW 20 MIN: CPT | Performed by: FAMILY MEDICINE

## 2022-10-11 RX ORDER — BENZONATATE 200 MG/1
200 CAPSULE ORAL 3 TIMES DAILY PRN
Qty: 30 CAPSULE | Refills: 0 | Status: SHIPPED | OUTPATIENT
Start: 2022-10-11

## 2022-10-11 RX ORDER — CODEINE PHOSPHATE AND GUAIFENESIN 10; 100 MG/5ML; MG/5ML
10 SOLUTION ORAL EVERY 6 HOURS PRN
Qty: 140 ML | Refills: 0 | Status: SHIPPED | OUTPATIENT
Start: 2022-10-11

## 2022-10-12 ENCOUNTER — TELEPHONE (OUTPATIENT)
Dept: FAMILY MEDICINE CLINIC | Facility: CLINIC | Age: 62
End: 2022-10-12

## 2022-10-12 NOTE — TELEPHONE ENCOUNTER
Advised patient that the test is still being processed. Results will post immediately to his [employer] account. Patient advised not to return to work if having symptoms. Patient thanked me for the call.

## 2022-10-12 NOTE — TELEPHONE ENCOUNTER
Spouse, would like a call back with the patient's Covid test results today. Spouse, stated that it was done yesterday.

## 2022-10-13 LAB
FLUAV + FLUBV RNA SPEC NAA+PROBE: NOT DETECTED
FLUAV + FLUBV RNA SPEC NAA+PROBE: NOT DETECTED
RSV RNA SPEC NAA+PROBE: NOT DETECTED
SARS-COV-2 RNA RESP QL NAA+PROBE: DETECTED

## 2022-11-12 ENCOUNTER — LAB ENCOUNTER (OUTPATIENT)
Dept: LAB | Age: 62
End: 2022-11-12
Attending: FAMILY MEDICINE
Payer: COMMERCIAL

## 2022-11-12 ENCOUNTER — OFFICE VISIT (OUTPATIENT)
Dept: FAMILY MEDICINE CLINIC | Facility: CLINIC | Age: 62
End: 2022-11-12
Payer: COMMERCIAL

## 2022-11-12 VITALS
DIASTOLIC BLOOD PRESSURE: 73 MMHG | TEMPERATURE: 98 F | HEIGHT: 68.5 IN | BODY MASS INDEX: 28.46 KG/M2 | RESPIRATION RATE: 16 BRPM | WEIGHT: 190 LBS | SYSTOLIC BLOOD PRESSURE: 119 MMHG | HEART RATE: 76 BPM

## 2022-11-12 DIAGNOSIS — Z00.00 ROUTINE PHYSICAL EXAMINATION: Primary | ICD-10-CM

## 2022-11-12 DIAGNOSIS — Z00.00 ROUTINE PHYSICAL EXAMINATION: ICD-10-CM

## 2022-11-12 LAB
ALBUMIN SERPL-MCNC: 3.8 G/DL (ref 3.4–5)
ALBUMIN/GLOB SERPL: 1.2 {RATIO} (ref 1–2)
ALP LIVER SERPL-CCNC: 95 U/L
ALT SERPL-CCNC: 21 U/L
ANION GAP SERPL CALC-SCNC: 4 MMOL/L (ref 0–18)
AST SERPL-CCNC: 12 U/L (ref 15–37)
BILIRUB SERPL-MCNC: 1 MG/DL (ref 0.1–2)
BUN BLD-MCNC: 13 MG/DL (ref 7–18)
BUN/CREAT SERPL: 14.4 (ref 10–20)
CALCIUM BLD-MCNC: 9 MG/DL (ref 8.5–10.1)
CHLORIDE SERPL-SCNC: 109 MMOL/L (ref 98–112)
CHOLEST SERPL-MCNC: 169 MG/DL (ref ?–200)
CO2 SERPL-SCNC: 28 MMOL/L (ref 21–32)
COMPLEXED PSA SERPL-MCNC: 0.87 NG/ML (ref ?–4)
CREAT BLD-MCNC: 0.9 MG/DL
DEPRECATED RDW RBC AUTO: 43.6 FL (ref 35.1–46.3)
ERYTHROCYTE [DISTWIDTH] IN BLOOD BY AUTOMATED COUNT: 13 % (ref 11–15)
FASTING PATIENT LIPID ANSWER: YES
FASTING STATUS PATIENT QL REPORTED: YES
GFR SERPLBLD BASED ON 1.73 SQ M-ARVRAT: 97 ML/MIN/1.73M2 (ref 60–?)
GLOBULIN PLAS-MCNC: 3.3 G/DL (ref 2.8–4.4)
GLUCOSE BLD-MCNC: 85 MG/DL (ref 70–99)
HCT VFR BLD AUTO: 42.7 %
HDLC SERPL-MCNC: 45 MG/DL (ref 40–59)
HGB BLD-MCNC: 13.9 G/DL
LDLC SERPL CALC-MCNC: 110 MG/DL (ref ?–100)
MCH RBC QN AUTO: 29.9 PG (ref 26–34)
MCHC RBC AUTO-ENTMCNC: 32.6 G/DL (ref 31–37)
MCV RBC AUTO: 91.8 FL
NONHDLC SERPL-MCNC: 124 MG/DL (ref ?–130)
OSMOLALITY SERPL CALC.SUM OF ELEC: 291 MOSM/KG (ref 275–295)
PLATELET # BLD AUTO: 207 10(3)UL (ref 150–450)
POTASSIUM SERPL-SCNC: 4.6 MMOL/L (ref 3.5–5.1)
PROT SERPL-MCNC: 7.1 G/DL (ref 6.4–8.2)
RBC # BLD AUTO: 4.65 X10(6)UL
SODIUM SERPL-SCNC: 141 MMOL/L (ref 136–145)
TRIGL SERPL-MCNC: 72 MG/DL (ref 30–149)
VLDLC SERPL CALC-MCNC: 12 MG/DL (ref 0–30)
WBC # BLD AUTO: 7.7 X10(3) UL (ref 4–11)

## 2022-11-12 PROCEDURE — 80061 LIPID PANEL: CPT

## 2022-11-12 PROCEDURE — 3078F DIAST BP <80 MM HG: CPT | Performed by: FAMILY MEDICINE

## 2022-11-12 PROCEDURE — 3074F SYST BP LT 130 MM HG: CPT | Performed by: FAMILY MEDICINE

## 2022-11-12 PROCEDURE — 36415 COLL VENOUS BLD VENIPUNCTURE: CPT

## 2022-11-12 PROCEDURE — 80053 COMPREHEN METABOLIC PANEL: CPT

## 2022-11-12 PROCEDURE — 3008F BODY MASS INDEX DOCD: CPT | Performed by: FAMILY MEDICINE

## 2022-11-12 PROCEDURE — 90471 IMMUNIZATION ADMIN: CPT | Performed by: FAMILY MEDICINE

## 2022-11-12 PROCEDURE — 85027 COMPLETE CBC AUTOMATED: CPT

## 2022-11-12 PROCEDURE — 99396 PREV VISIT EST AGE 40-64: CPT | Performed by: FAMILY MEDICINE

## 2022-11-12 PROCEDURE — 90686 IIV4 VACC NO PRSV 0.5 ML IM: CPT | Performed by: FAMILY MEDICINE

## 2022-11-14 ENCOUNTER — TELEPHONE (OUTPATIENT)
Dept: FAMILY MEDICINE CLINIC | Facility: CLINIC | Age: 62
End: 2022-11-14

## 2022-11-14 NOTE — TELEPHONE ENCOUNTER
Patient was notifed of that his Health physician form was fax to .  The original was placed at the [facility]  ready for

## 2023-01-06 RX ORDER — NAPROXEN 500 MG/1
500 TABLET ORAL 2 TIMES DAILY WITH MEALS
Qty: 60 TABLET | Refills: 0 | Status: SHIPPED | OUTPATIENT
Start: 2023-01-06

## 2023-01-06 NOTE — TELEPHONE ENCOUNTER
Refill passed per [facility] protocol   Requested Prescriptions   Pending Prescriptions Disp Refills    NAPROXEN 500 MG Oral Tab [Pharmacy Med Name: Naproxen 500 Mg Tab Brandon] 60 tablet 0     Sig: TAKE ONE TABLET BY MOUTH TWICE DAILY WITH MEALS       Non-Narcotic Pain Medication Protocol Passed - 1/6/2023 10:10 AM        Passed - In person appointment or virtual visit in the past 6 mos or appointment in next 3 mos     Recent Outpatient Visits              1 month ago Routine physical examination    Althea Iniguez MD    Office Visit    2 months ago Viral upper respiratory tract infection    [address], [address] [facility], [address] [facility] [address], [place],     Office Visit    1 year ago Routine physical examination    Althea Iniguez MD    Office Visit    1 year ago Acute left-sided low back pain without sciatica    [address], [address] [facility], [facility], [facility], [place] [employer]    Office Visit    2 years ago Routine physical examination    Althea Iniguez MD    Office Visit          Future Appointments         Provider Department Appt Notes    In 6 days Chaparrita Hurley MD [address], [address] [facility], [place] pain in upper right arm, possible arthiritis, appt made by wife

## 2023-01-12 ENCOUNTER — HOSPITAL ENCOUNTER (OUTPATIENT)
Dept: GENERAL RADIOLOGY | Age: 63
Discharge: HOME | End: 2023-01-12
Attending: INTERNAL MEDICINE
Payer: COMMERCIAL

## 2023-01-12 ENCOUNTER — OFFICE VISIT (OUTPATIENT)
Dept: RHEUMATOLOGY | Facility: CLINIC | Age: 63
End: 2023-01-12

## 2023-01-12 VITALS
DIASTOLIC BLOOD PRESSURE: 73 MMHG | SYSTOLIC BLOOD PRESSURE: 133 MMHG | WEIGHT: 193 LBS | HEART RATE: 88 BPM | HEIGHT: 68.5 IN | BODY MASS INDEX: 28.91 KG/M2 | RESPIRATION RATE: 16 BRPM

## 2023-01-12 DIAGNOSIS — M75.81 TENDINITIS OF RIGHT ROTATOR CUFF: Primary | ICD-10-CM

## 2023-01-12 DIAGNOSIS — M75.21 BICEPS TENDINITIS OF RIGHT UPPER EXTREMITY: ICD-10-CM

## 2023-01-12 DIAGNOSIS — M75.81 TENDINITIS OF RIGHT ROTATOR CUFF: ICD-10-CM

## 2023-01-12 PROCEDURE — 99244 OFF/OP CNSLTJ NEW/EST MOD 40: CPT | Performed by: INTERNAL MEDICINE

## 2023-01-12 PROCEDURE — 3075F SYST BP GE 130 - 139MM HG: CPT | Performed by: INTERNAL MEDICINE

## 2023-01-12 PROCEDURE — 3078F DIAST BP <80 MM HG: CPT | Performed by: INTERNAL MEDICINE

## 2023-01-12 PROCEDURE — 3008F BODY MASS INDEX DOCD: CPT | Performed by: INTERNAL MEDICINE

## 2023-01-12 PROCEDURE — 73030 X-RAY EXAM OF SHOULDER: CPT | Performed by: INTERNAL MEDICINE

## 2023-01-12 RX ORDER — NABUMETONE 750 MG/1
750 TABLET, FILM COATED ORAL 2 TIMES DAILY
Qty: 60 TABLET | Refills: 1 | Status: SHIPPED | OUTPATIENT
Start: 2023-01-12

## 2023-01-12 NOTE — PATIENT INSTRUCTIONS
Summary:  1. Check right shoulder xray   2. Try physical therapy exercises - go 1-2 times to learn exercises to do at home   3. Stop naproxen   4. Try nabumetone 750mg twice a day   5. Consider steroid injection in the future   6.  Info on rotator cuff tendonitis

## 2023-04-12 NOTE — PROGRESS NOTES
Consult note reviewed and noted.
Postoperative Patient Follow-up      2/8/2018    Bayronalexander Castrokipjade 63year old      HPI  Patient presents with:  Post-Op: First post op. S/P Right inguinal hernia repair with mesh on 01/24/2018. Patient experiencing some discomfort with movement. Denies fever.
n/a

## 2023-11-13 ENCOUNTER — LAB ENCOUNTER (OUTPATIENT)
Dept: LAB | Age: 63
End: 2023-11-13
Attending: FAMILY MEDICINE
Payer: COMMERCIAL

## 2023-11-13 ENCOUNTER — OFFICE VISIT (OUTPATIENT)
Dept: FAMILY MEDICINE CLINIC | Facility: CLINIC | Age: 63
End: 2023-11-13
Payer: COMMERCIAL

## 2023-11-13 VITALS — HEIGHT: 68.3 IN | BODY MASS INDEX: 29.4 KG/M2 | WEIGHT: 194 LBS

## 2023-11-13 DIAGNOSIS — Z00.00 ROUTINE PHYSICAL EXAMINATION: Primary | ICD-10-CM

## 2023-11-13 DIAGNOSIS — Z00.00 ROUTINE PHYSICAL EXAMINATION: ICD-10-CM

## 2023-11-13 DIAGNOSIS — K14.6 TONGUE BURNING SENSATION: ICD-10-CM

## 2023-11-13 DIAGNOSIS — Z12.11 COLON CANCER SCREENING: ICD-10-CM

## 2023-11-13 LAB
ALBUMIN SERPL-MCNC: 4.4 G/DL (ref 3.2–4.8)
ALBUMIN/GLOB SERPL: 1.4 {RATIO} (ref 1–2)
ALP LIVER SERPL-CCNC: 88 U/L
ALT SERPL-CCNC: 21 U/L
ANION GAP SERPL CALC-SCNC: 7 MMOL/L (ref 0–18)
AST SERPL-CCNC: 17 U/L (ref ?–34)
BILIRUB SERPL-MCNC: 0.9 MG/DL (ref 0.2–1.1)
BUN BLD-MCNC: 17 MG/DL (ref 9–23)
BUN/CREAT SERPL: 19.5 (ref 10–20)
CALCIUM BLD-MCNC: 9.4 MG/DL (ref 8.7–10.4)
CHLORIDE SERPL-SCNC: 107 MMOL/L (ref 98–112)
CHOLEST SERPL-MCNC: 155 MG/DL (ref ?–200)
CO2 SERPL-SCNC: 26 MMOL/L (ref 21–32)
COMPLEXED PSA SERPL-MCNC: 0.56 NG/ML (ref ?–4)
COMPLEXED PSA SERPL-MCNC: 0.87 NG/ML (ref ?–4)
CREAT BLD-MCNC: 0.87 MG/DL
EGFRCR SERPLBLD CKD-EPI 2021: 98 ML/MIN/1.73M2 (ref 60–?)
FASTING PATIENT LIPID ANSWER: YES
FASTING STATUS PATIENT QL REPORTED: YES
GLOBULIN PLAS-MCNC: 3.1 G/DL (ref 2.8–4.4)
GLUCOSE BLD-MCNC: 104 MG/DL (ref 70–99)
HDLC SERPL-MCNC: 45 MG/DL (ref 40–59)
LDLC SERPL CALC-MCNC: 99 MG/DL (ref ?–100)
NONHDLC SERPL-MCNC: 110 MG/DL (ref ?–130)
OSMOLALITY SERPL CALC.SUM OF ELEC: 292 MOSM/KG (ref 275–295)
POTASSIUM SERPL-SCNC: 4.4 MMOL/L (ref 3.5–5.1)
PROT SERPL-MCNC: 7.5 G/DL (ref 5.7–8.2)
SODIUM SERPL-SCNC: 140 MMOL/L (ref 136–145)
TRIGL SERPL-MCNC: 51 MG/DL (ref 30–149)
VIT B12 SERPL-MCNC: 543 PG/ML (ref 211–911)
VIT D+METAB SERPL-MCNC: 42.8 NG/ML (ref 30–100)
VLDLC SERPL CALC-MCNC: 8 MG/DL (ref 0–30)

## 2023-11-13 PROCEDURE — 85060 BLOOD SMEAR INTERPRETATION: CPT

## 2023-11-13 PROCEDURE — 82306 VITAMIN D 25 HYDROXY: CPT

## 2023-11-13 PROCEDURE — 82607 VITAMIN B-12: CPT

## 2023-11-13 PROCEDURE — 85027 COMPLETE CBC AUTOMATED: CPT

## 2023-11-13 PROCEDURE — 80061 LIPID PANEL: CPT

## 2023-11-13 PROCEDURE — 99396 PREV VISIT EST AGE 40-64: CPT | Performed by: FAMILY MEDICINE

## 2023-11-13 PROCEDURE — 80053 COMPREHEN METABOLIC PANEL: CPT

## 2023-11-13 PROCEDURE — 3008F BODY MASS INDEX DOCD: CPT | Performed by: FAMILY MEDICINE

## 2023-11-13 PROCEDURE — 36415 COLL VENOUS BLD VENIPUNCTURE: CPT

## 2023-11-13 NOTE — PROGRESS NOTES
Subjective:   Patient ID: Mariann Back is a 59year old male. Patient is here for routine physical exam. No significant chronic medical problems. Patient is requesting testing. Diet and exercise have been good. Past medical history, family history, and social history were reviewed. Pt states he feels like tongue burns when he eats foods especially spicy food over the last 3-4 months. No fevers  Has tried various things for this but not better. No COVID symptoms: had COVID last year but no residual symptoms at time. Has had intermittent dry cough. No fevers. History/Other:   Review of Systems   Constitutional: Negative. Negative for fever. HENT: Negative. Negative for congestion, dental problem, ear pain, postnasal drip, rhinorrhea and sore throat. Eyes: Negative. Respiratory: Negative. Negative for shortness of breath. Cough: occasional.   Cardiovascular: Negative. Negative for chest pain. Gastrointestinal: Negative. Negative for abdominal pain and blood in stool. Genitourinary: Negative. Negative for difficulty urinating and dysuria. Musculoskeletal: Negative. Arthralgias: stable. Skin: Negative. Allergic/Immunologic: Negative for environmental allergies. Neurological: Negative. Negative for dizziness and headaches. Psychiatric/Behavioral: Negative. Negative for dysphoric mood. The patient is not nervous/anxious. Current Outpatient Medications   Medication Sig Dispense Refill    naproxen 500 MG Oral Tab Take 1 tablet (500 mg total) by mouth 2 (two) times daily with meals. (Patient not taking: Reported on 11/13/2023) 180 tablet 3    nabumetone 750 MG Oral Tab Take 1 tablet (750 mg total) by mouth 2 (two) times daily. (Patient not taking: Reported on 11/13/2023) 60 tablet 1     Allergies:No Known Allergies    Objective:   Physical Exam  Constitutional:       Appearance: Normal appearance. He is well-developed. HENT:      Head: Normocephalic.       Right Ear: Unable to refill per your note on 09/13/2021 the patient was to [facility] in 1 month for a follow up, no pending appointment, refills were last given on 05/27/2022 Tympanic membrane, ear canal and external ear normal.      Left Ear: Tympanic membrane, ear canal and external ear normal.      Nose: Nose normal.      Mouth/Throat:      Mouth: Mucous membranes are moist.      Pharynx: No oropharyngeal exudate or posterior oropharyngeal erythema. Comments: Tongue/ no lesions or sores. Eyes:      Conjunctiva/sclera: Conjunctivae normal.   Cardiovascular:      Rate and Rhythm: Normal rate and regular rhythm. Pulses: Normal pulses. Heart sounds: Normal heart sounds. Pulmonary:      Effort: Pulmonary effort is normal. No respiratory distress. Breath sounds: Normal breath sounds. No wheezing or rales. Abdominal:      General: Abdomen is flat. There is no distension. Palpations: Abdomen is soft. There is no mass. Tenderness: There is no abdominal tenderness. Musculoskeletal:         General: Normal range of motion. Cervical back: Normal range of motion and neck supple. Skin:     General: Skin is warm. Neurological:      General: No focal deficit present. Mental Status: He is alert and oriented to person, place, and time. Sensory: No sensory deficit. Deep Tendon Reflexes: Reflexes are normal and symmetric. Reflexes normal.   Psychiatric:         Mood and Affect: Mood normal.         Behavior: Behavior normal.         Assessment & Plan:   1. Routine physical examination :  - Exam is unremarkable. Screening tests were discussed, and after discussion, will check lab work as below. Healthy diet, exercise, and weight were discussed. To call if problems and follow up and further management after testing. Routine follow up. 2. Tongue burning sensation :  - After discussion, will send to ENT for further evaluation and treatment; To call if any significant symptoms. Will also check lab work as discussed.      3. Colon cancer screening :  - After discussion, FIT testing was ordered, Follow up and further management after testing Orders Placed This Encounter   Procedures    Lipid Panel    PSA Total, Screen    CBC, Platelet;  No Differential    Comp Metabolic Panel (14)    Occult Blood, Fecal, FIT Immunoassay    Vitamin B12    Vitamin D       Meds This Visit:  Requested Prescriptions      No prescriptions requested or ordered in this encounter       Imaging & Referrals:  ENT - INTERNAL

## 2023-11-14 ENCOUNTER — MED REC SCAN ONLY (OUTPATIENT)
Dept: FAMILY MEDICINE CLINIC | Facility: CLINIC | Age: 63
End: 2023-11-14

## 2023-11-14 ENCOUNTER — TELEPHONE (OUTPATIENT)
Dept: FAMILY MEDICINE CLINIC | Facility: CLINIC | Age: 63
End: 2023-11-14

## 2023-11-14 LAB
DEPRECATED RDW RBC AUTO: 41.2 FL (ref 35.1–46.3)
ERYTHROCYTE [DISTWIDTH] IN BLOOD BY AUTOMATED COUNT: 16.7 % (ref 11–15)
HCT VFR BLD AUTO: 30.6 %
HGB BLD-MCNC: 8.8 G/DL
MCH RBC QN AUTO: 20 PG (ref 26–34)
MCHC RBC AUTO-ENTMCNC: 28.8 G/DL (ref 31–37)
MCV RBC AUTO: 69.4 FL
PLATELET # BLD AUTO: 260 10(3)UL (ref 150–450)
RBC # BLD AUTO: 4.41 X10(6)UL
WBC # BLD AUTO: 6.7 X10(3) UL (ref 4–11)

## 2023-11-14 NOTE — TELEPHONE ENCOUNTER
Patient wife notified that we fax over patient Health Provider Screening form to . original were sent  for scanning.

## 2023-11-20 ENCOUNTER — TELEPHONE (OUTPATIENT)
Dept: HEMATOLOGY/ONCOLOGY | Facility: HOSPITAL | Age: 63
End: 2023-11-20

## 2023-11-20 NOTE — TELEPHONE ENCOUNTER
Please call the patient's wife to schedule a consult with Dr Brigida Willis. Referred by Dr Naidr See. DX-Low hemoglobin. Thank you.

## 2023-11-24 ENCOUNTER — NURSE ONLY (OUTPATIENT)
Dept: HEMATOLOGY/ONCOLOGY | Facility: HOSPITAL | Age: 63
End: 2023-11-24
Attending: INTERNAL MEDICINE
Payer: COMMERCIAL

## 2023-11-24 VITALS
OXYGEN SATURATION: 98 % | TEMPERATURE: 98 F | HEIGHT: 69 IN | SYSTOLIC BLOOD PRESSURE: 150 MMHG | RESPIRATION RATE: 18 BRPM | WEIGHT: 196 LBS | BODY MASS INDEX: 29.03 KG/M2 | DIASTOLIC BLOOD PRESSURE: 91 MMHG | HEART RATE: 107 BPM

## 2023-11-24 DIAGNOSIS — D64.9 ANEMIA, UNSPECIFIED TYPE: Primary | ICD-10-CM

## 2023-11-24 LAB
BASOPHILS # BLD AUTO: 0.02 X10(3) UL (ref 0–0.2)
BASOPHILS NFR BLD AUTO: 0.2 %
DEPRECATED HBV CORE AB SER IA-ACNC: 7.2 NG/ML
DEPRECATED RDW RBC AUTO: 43.4 FL (ref 35.1–46.3)
EOSINOPHIL # BLD AUTO: 0.03 X10(3) UL (ref 0–0.7)
EOSINOPHIL NFR BLD AUTO: 0.4 %
ERYTHROCYTE [DISTWIDTH] IN BLOOD BY AUTOMATED COUNT: 20.2 % (ref 11–15)
HCT VFR BLD AUTO: 32.4 %
HGB BLD-MCNC: 9.4 G/DL
IMM GRANULOCYTES # BLD AUTO: 0.01 X10(3) UL (ref 0–1)
IMM GRANULOCYTES NFR BLD: 0.1 %
IRON SATN MFR SERPL: 65 %
IRON SERPL-MCNC: 306 UG/DL
LYMPHOCYTES # BLD AUTO: 1.61 X10(3) UL (ref 1–4)
LYMPHOCYTES NFR BLD AUTO: 18.8 %
MCH RBC QN AUTO: 20 PG (ref 26–34)
MCHC RBC AUTO-ENTMCNC: 29 G/DL (ref 31–37)
MCV RBC AUTO: 69.1 FL
MONOCYTES # BLD AUTO: 0.69 X10(3) UL (ref 0.1–1)
MONOCYTES NFR BLD AUTO: 8.1 %
NEUTROPHILS # BLD AUTO: 6.19 X10 (3) UL (ref 1.5–7.7)
NEUTROPHILS # BLD AUTO: 6.19 X10(3) UL (ref 1.5–7.7)
NEUTROPHILS NFR BLD AUTO: 72.4 %
PLATELET # BLD AUTO: 275 10(3)UL (ref 150–450)
RBC # BLD AUTO: 4.69 X10(6)UL
TIBC SERPL-MCNC: 469 UG/DL (ref 250–425)
TRANSFERRIN SERPL-MCNC: 315 MG/DL (ref 215–365)
WBC # BLD AUTO: 8.6 X10(3) UL (ref 4–11)

## 2023-11-24 PROCEDURE — 99244 OFF/OP CNSLTJ NEW/EST MOD 40: CPT | Performed by: INTERNAL MEDICINE

## 2023-11-24 PROCEDURE — 36415 COLL VENOUS BLD VENIPUNCTURE: CPT

## 2023-11-24 RX ORDER — MELATONIN
325
COMMUNITY

## 2023-12-07 ENCOUNTER — TELEPHONE (OUTPATIENT)
Facility: CLINIC | Age: 63
End: 2023-12-07

## 2023-12-07 NOTE — TELEPHONE ENCOUNTER
I called the pt to offer him a sooner appt with [facility] but the pt declined     I placed the pt on Dr Christy Holder wait list

## 2023-12-07 NOTE — TELEPHONE ENCOUNTER
Pt looking for sooner appt due to his work schedule.    Please advise   Willing to see any of the providers

## 2023-12-12 ENCOUNTER — TELEPHONE (OUTPATIENT)
Facility: CLINIC | Age: 63
End: 2023-12-12

## 2023-12-12 ENCOUNTER — OFFICE VISIT (OUTPATIENT)
Facility: CLINIC | Age: 63
End: 2023-12-12

## 2023-12-12 VITALS
SYSTOLIC BLOOD PRESSURE: 142 MMHG | BODY MASS INDEX: 28.39 KG/M2 | HEIGHT: 69 IN | HEART RATE: 98 BPM | WEIGHT: 191.69 LBS | DIASTOLIC BLOOD PRESSURE: 76 MMHG

## 2023-12-12 DIAGNOSIS — Z12.11 COLON CANCER SCREENING: Primary | ICD-10-CM

## 2023-12-12 DIAGNOSIS — D50.9 IRON DEFICIENCY ANEMIA, UNSPECIFIED IRON DEFICIENCY ANEMIA TYPE: Primary | ICD-10-CM

## 2023-12-12 PROCEDURE — 99244 OFF/OP CNSLTJ NEW/EST MOD 40: CPT | Performed by: STUDENT IN AN ORGANIZED HEALTH CARE EDUCATION/TRAINING PROGRAM

## 2023-12-12 PROCEDURE — 3077F SYST BP >= 140 MM HG: CPT | Performed by: STUDENT IN AN ORGANIZED HEALTH CARE EDUCATION/TRAINING PROGRAM

## 2023-12-12 PROCEDURE — 3078F DIAST BP <80 MM HG: CPT | Performed by: STUDENT IN AN ORGANIZED HEALTH CARE EDUCATION/TRAINING PROGRAM

## 2023-12-12 PROCEDURE — 3008F BODY MASS INDEX DOCD: CPT | Performed by: STUDENT IN AN ORGANIZED HEALTH CARE EDUCATION/TRAINING PROGRAM

## 2023-12-12 RX ORDER — SODIUM, POTASSIUM,MAG SULFATES 17.5-3.13G
SOLUTION, RECONSTITUTED, ORAL ORAL
Qty: 1 EACH | Refills: 0 | Status: SHIPPED | OUTPATIENT
Start: 2023-12-12 | End: 2023-12-18

## 2023-12-12 NOTE — H&P
[facility], [facility] - Gastroenterology                                                                                                               Reason for consult: Iron deficiency anemia    Requesting physician or provider: Esther Geller MD    No chief complaint on file. HPI:   Mel Harris is a 59year old year-old man with history of iron deficiency anemia who presents to GI clinic to discuss anemia. Denies abdominal pain, nausea. Denies blood in stool. Denies weight loss or loss of appetite. No family hx of colon cancer. Colonoscopy 2013 -- normal.    Soc:  -no smoking  -no Etoh    Wt Readings from Last 6 Encounters:   11/24/23 196 lb (88.9 kg)   11/13/23 194 lb (88 kg)   01/12/23 193 lb (87.5 kg)   11/12/22 190 lb (86.2 kg)   10/11/22 193 lb (87.5 kg)   11/10/21 194 lb (88 kg)        History, Medications, Allergies, ROS:      No past medical history on file. Past Surgical History:   Procedure Laterality Date    COLONOSCOPY  2013    INGUINAL HERNIA REPAIR Right 01/24/2018      Family Hx:   Family History   Problem Relation Age of Onset    Prostate Cancer Other       Social History:   Social History     Socioeconomic History    Marital status:     Number of children: 2   Occupational History    Occupation: Food service     Employer: [employer]   Tobacco Use    Smoking status: Never    Smokeless tobacco: Never   Vaping Use    Vaping Use: Never used   Substance and Sexual Activity    Alcohol use: No     Alcohol/week: 0.0 standard drinks of alcohol    Drug use: No    Sexual activity: Yes   Other Topics Concern    Caffeine Concern Yes     Comment: coffee, 1 cup daily        Medications (Active prior to today's visit):  Current Outpatient Medications   Medication Sig Dispense Refill    Multiple Vitamins-Minerals (CENTRUM SILVER 50+MEN OR) Take by mouth daily.       ferrous sulfate 325 (65 FE) MG Oral Tab EC Take 1 tablet (325 mg total) by mouth daily with breakfast.      naproxen 500 MG Oral Tab Take 1 tablet (500 mg total) by mouth 2 (two) times daily with meals. (Patient not taking: Reported on 11/13/2023) 180 tablet 3    nabumetone 750 MG Oral Tab Take 1 tablet (750 mg total) by mouth 2 (two) times daily. (Patient not taking: Reported on 11/13/2023) 60 tablet 1       Allergies:  No Known Allergies    ROS:   CONSTITUTIONAL:  negative for fevers, rigors  EYES:  negative for diplopia   RESPIRATORY:  negative for severe shortness of breath  CARDIOVASCULAR:  negative for crushing sub-sternal chest pain  GASTROINTESTINAL:  see HPI  GENITOURINARY:  negative for dysuria or gross hematuria  INTEGUMENT/BREAST:  SKIN:  negative for jaundice   ALLERGIC/IMMUNOLOGIC:  negative for hay fever  ENDOCRINE:  negative for cold intolerance and heat intolerance  MUSCULOSKELETAL:  negative for joint effusion/severe erythema  BEHAVIOR/PSYCH:  negative for psychotic behavior      PHYSICAL EXAM:   There were no vitals taken for this visit. Gen- Patient appears comfortable and in no acute discomfort  HEENT: the sclera appears anicteric, oropharynx clear, mucus membranes appear moist  CV- regular rate and rhythm, the extremities are warm and well perfused   Lung- Moves air well; No labored breathing  Abdomen- soft, non-tender exam in all quadrants without rigidity or guarding, non-distended  Skin- No jaundice  Ext: no edema is evident. Neuro- Alert and interactive, and gross movements of extremities normal  Psych - appropriate, non-agitated    Labs/Imaging:     Patient's pertinent labs and imaging were reviewed and discussed with patient today. ASSESSMENT/PLAN:   Nayana Kent is a 59year old year-old man with history of iron deficiency anemia who presents to GI clinic to discuss anemia.     #Iron deficiency anemia  -Unclear etiology.  -No overt GI bleeding.  -Colonoscopy 2013 was negative/normal.  -Given iron deficiency we should perform EGD and colonoscopy for further evaulation    Recommendations:  1. Schedule colonoscopy with MAC [Diagnosis: iron deficiency anemia] AND EGD with MAC [Diagnosis: iron deficiency anemia]    2.  bowel prep from pharmacy (split dose suprep)    3. HOLD iron for 7 days prior to the procedure    4. Continue iron supplementation until outlined above      Orders This Visit:  No orders of the defined types were placed in this encounter. Meds This Visit:  Requested Prescriptions      No prescriptions requested or ordered in this encounter       Imaging & Referrals:  None         Fifi Washington MD  [facility], [facility] Gastroenterology  12/12/2023      This note was partially prepared using [employer] [place] [employer] voice recognition dictation software. As a result, errors may occur. When identified, these errors have been corrected.  While every attempt is made to correct errors during dictation, discrepancies may still exist.

## 2023-12-12 NOTE — TELEPHONE ENCOUNTER
Scheduled for:  Colonoscopy [id number] ,[address]   Provider Name:  Dr. Johanna Mayes   Date:  3/14/2024   Location:[facility]  Sedation:  MAC  Time:  0815 Am (pt is aware that [facility] will call the day before to confirm arrival time)   Prep: Suprep  Meds/Allergies Reconciled?:  Physician Reviewed   Diagnosis with codes:  Iron deficiency anemia D50.9  Was patient informed to call insurance with codes (Y/N):  Yes  Referral sent?:  Referral was sent at the time of electronic surgical scheduling. [address] [facility] or [address] notified?:  I sent an electronic request to Endo Scheduling and received a confirmation today. Medication Orders:  Pt is aware to NOT take iron pills, herbal meds and diet supplements for 7 days before exam. Also to NOT take any form of alcohol, recreational drugs and any forms of ED meds 24 hours before exam.   HOLD iron for 7 days prior to the procedure   Misc Orders:       Further instructions given by staff:  I provide prep instructions to patient at the time of the appointment and reviewed date, time and location, patient verbalized that he understood and is aware to call if he has any questions. Patient was informed about the new cancellation policy for his/her procedure. Patient was also given a copy of the cancellation policy at the time of the appointment and verbalized understanding.

## 2023-12-12 NOTE — PATIENT INSTRUCTIONS
1. Schedule colonoscopy with MAC [Diagnosis: iron deficiency anemia] AND EGD with MAC [Diagnosis: iron deficiency anemia]    2.  bowel prep from pharmacy (split dose suprep)    3. HOLD iron for 7 days prior to the procedure    4. Read all bowel prep instructions carefully    5. AVOID seeds, nuts, popcorn, raw fruits and vegetables (cooked is okay) for 2-3 days before procedure    6. If you start any NEW medication after your visit today, please notify us. Certain medications will need to be held before the procedure, or the procedure cannot be performed.

## 2023-12-15 ENCOUNTER — TELEPHONE (OUTPATIENT)
Facility: CLINIC | Age: 63
End: 2023-12-15

## 2023-12-15 DIAGNOSIS — D50.9 IRON DEFICIENCY ANEMIA, UNSPECIFIED IRON DEFICIENCY ANEMIA TYPE: Primary | ICD-10-CM

## 2023-12-15 NOTE — TELEPHONE ENCOUNTER
Rescheduled for:  Colonoscopy [id number] ,[address]   Provider Name:  Dr. Sunshine Gaxiola   Date:  3/14/2024 TO 12/18/2023  Location:[facility]  TO [facility]  Sedation:  MAC  Time:  0815 Am TO 12:30 (patient is aware arrival time is 11:30 AM)  Prep: Suprep  Meds/Allergies Reconciled?:  Physician Reviewed   Diagnosis with codes:  Iron deficiency anemia D50.9  Was patient informed to call insurance with codes (Y/N):  Yes  Referral sent?:  Referral was sent at the time of electronic surgical scheduling. [address] [facility] or [address]  notified?:  I sent an electronic request to Endo Scheduling and received a confirmation today. Medication Orders:  Pt is aware to NOT take iron pills, herbal meds and diet supplements for 7 days before exam. Also to NOT take any form of alcohol, recreational drugs and any forms of ED meds 24 hours before exam.   HOLD iron for 7 days prior to the procedure   Misc Orders:  I discussed the prep instructions with the patient which his wife verbally understood and is aware that I will mychart the instructions today.        Further instructions given by staff:

## 2023-12-15 NOTE — TELEPHONE ENCOUNTER
Dr. Kiarra Garrido-  Patient asking for a sooner Pt and you have the time on 12/18/23. Patient taking iron and will only have it held for 4 days. Please advise. Thank you!

## 2023-12-15 NOTE — TELEPHONE ENCOUNTER
PPD-   Patient is scheduled for colonoscopy [id number] and EGD 18510 on 12/18/23 at [address] [facility] with Dr. Duke Maddox   Thank you!

## 2023-12-18 ENCOUNTER — HOSPITAL ENCOUNTER (OUTPATIENT)
Age: 63
Setting detail: HOSPITAL OUTPATIENT SURGERY
Discharge: HOME | End: 2023-12-18
Attending: STUDENT IN AN ORGANIZED HEALTH CARE EDUCATION/TRAINING PROGRAM | Admitting: STUDENT IN AN ORGANIZED HEALTH CARE EDUCATION/TRAINING PROGRAM
Payer: COMMERCIAL

## 2023-12-18 ENCOUNTER — ANESTHESIA (OUTPATIENT)
Dept: ENDOSCOPY | Age: 63
End: 2023-12-18
Payer: COMMERCIAL

## 2023-12-18 ENCOUNTER — TELEPHONE (OUTPATIENT)
Facility: CLINIC | Age: 63
End: 2023-12-18

## 2023-12-18 ENCOUNTER — ANESTHESIA EVENT (OUTPATIENT)
Dept: ENDOSCOPY | Age: 63
End: 2023-12-18
Payer: COMMERCIAL

## 2023-12-18 VITALS
BODY MASS INDEX: 28.29 KG/M2 | RESPIRATION RATE: 18 BRPM | HEART RATE: 77 BPM | DIASTOLIC BLOOD PRESSURE: 74 MMHG | TEMPERATURE: 98 F | WEIGHT: 191 LBS | OXYGEN SATURATION: 96 % | HEIGHT: 69 IN | SYSTOLIC BLOOD PRESSURE: 112 MMHG

## 2023-12-18 DIAGNOSIS — D50.9 IRON DEFICIENCY ANEMIA, UNSPECIFIED IRON DEFICIENCY ANEMIA TYPE: ICD-10-CM

## 2023-12-18 PROCEDURE — 88312 SPECIAL STAINS GROUP 1: CPT | Performed by: STUDENT IN AN ORGANIZED HEALTH CARE EDUCATION/TRAINING PROGRAM

## 2023-12-18 PROCEDURE — 88360 TUMOR IMMUNOHISTOCHEM/MANUAL: CPT | Performed by: STUDENT IN AN ORGANIZED HEALTH CARE EDUCATION/TRAINING PROGRAM

## 2023-12-18 PROCEDURE — 88342 IMHCHEM/IMCYTCHM 1ST ANTB: CPT | Performed by: STUDENT IN AN ORGANIZED HEALTH CARE EDUCATION/TRAINING PROGRAM

## 2023-12-18 PROCEDURE — 36415 COLL VENOUS BLD VENIPUNCTURE: CPT | Performed by: INTERNAL MEDICINE

## 2023-12-18 PROCEDURE — 88360 TUMOR IMMUNOHISTOCHEM/MANUAL: CPT | Performed by: INTERNAL MEDICINE

## 2023-12-18 PROCEDURE — 88341 IMHCHEM/IMCYTCHM EA ADD ANTB: CPT | Performed by: STUDENT IN AN ORGANIZED HEALTH CARE EDUCATION/TRAINING PROGRAM

## 2023-12-18 PROCEDURE — 88305 TISSUE EXAM BY PATHOLOGIST: CPT | Performed by: STUDENT IN AN ORGANIZED HEALTH CARE EDUCATION/TRAINING PROGRAM

## 2023-12-18 RX ORDER — OMEPRAZOLE 40 MG/1
40 CAPSULE, DELAYED RELEASE ORAL
Qty: 90 CAPSULE | Refills: 0 | Status: SHIPPED | OUTPATIENT
Start: 2023-12-18 | End: 2024-03-17

## 2023-12-18 RX ORDER — LIDOCAINE HYDROCHLORIDE 10 MG/ML
INJECTION, SOLUTION EPIDURAL; INFILTRATION; INTRACAUDAL; PERINEURAL AS NEEDED
Status: DISCONTINUED | OUTPATIENT
Start: 2023-12-18 | End: 2023-12-18 | Stop reason: SURG

## 2023-12-18 RX ORDER — SODIUM CHLORIDE, SODIUM LACTATE, POTASSIUM CHLORIDE, CALCIUM CHLORIDE 600; 310; 30; 20 MG/100ML; MG/100ML; MG/100ML; MG/100ML
INJECTION, SOLUTION INTRAVENOUS CONTINUOUS
Status: DISCONTINUED | OUTPATIENT
Start: 2023-12-18 | End: 2023-12-18

## 2023-12-18 RX ADMIN — LIDOCAINE HYDROCHLORIDE 25 MG: 10 INJECTION, SOLUTION EPIDURAL; INFILTRATION; INTRACAUDAL; PERINEURAL at 12:23:00

## 2023-12-18 RX ADMIN — SODIUM CHLORIDE, SODIUM LACTATE, POTASSIUM CHLORIDE, CALCIUM CHLORIDE: 600; 310; 30; 20 INJECTION, SOLUTION INTRAVENOUS at 13:03:00

## 2023-12-18 RX ADMIN — SODIUM CHLORIDE, SODIUM LACTATE, POTASSIUM CHLORIDE, CALCIUM CHLORIDE: 600; 310; 30; 20 INJECTION, SOLUTION INTRAVENOUS at 12:20:00

## 2023-12-18 NOTE — DISCHARGE INSTRUCTIONS
Home Care Instructions for Colonoscopy and/or Gastroscopy with Sedation    Diet:  - Resume your regular diet as tolerated unless otherwise instructed. - Start with light meals to minimize bloating.  - Do not drink alcohol today. Medication:  - If you have questions about resuming your normal medications, please contact your Primary Care Physician. Activities:  - Take it easy today. Do not return to work today. - Do not drive today. - Do not operate any machinery today (including kitchen equipment). -     Don't sign any legal documents or make critical decisions. Colonoscopy:  - You may notice some rectal \"spotting\" (a little blood on the toilet tissue) for a day or two after the exam. This is normal.  - If you experience any rectal bleeding (not spotting), persistent tenderness or sharp severe abdominal pains, oral temperature over 100 degrees Fahrenheit, light-headedness or dizziness, or any other problems, contact your doctor. Gastroscopy:  - You may have a sore throat for 2-3 days following the exam. This is normal. Gargling with warm salt water (1/2 tsp salt to 1 glass warm water) or using throat lozenges will help. - If you experience any sharp pain in your neck, abdomen or chest, vomiting of blood, oral temperature over 100 degrees Fahrenheit, light-headedness or dizziness, or any other problems, contact your doctor. **If unable to reach your doctor, please go to the [facility] Emergency Room**    - Your referring physician will receive a full report of your examination.  - If you do not hear from your doctor's office within two weeks of your biopsy, please call them for your results.

## 2023-12-18 NOTE — OPERATIVE REPORT
ESOPHAGOGASTRODUODENOSCOPY (EGD) & COLONOSCOPY REPORT    Wallace Garzon     1960 Age 59year old   PCP Leelee Linder MD Endoscopist Edita Carranza MD       Date of procedure: 23    Procedure: EGD w/ duodenal, gastric biopsies & Colonoscopy w/possible polypectomy    Pre-operative diagnosis: iron deficiency    Post-operative diagnosis: see impression    Medications: MAC    Colonoscopy Withdrawal time: 9 minutes    Complications: none    Procedure: Informed consent was obtained from the patient after the risks of the procedure were discussed, including but not limited to bleeding, perforation, aspiration, infection, or possibility of a missed lesion. We discussed the risks/benefits and alternatives to this procedure, as well as the planned sedation. EGD procedure: The patient was placed in the left lateral decubitus position and begun on continuous blood pressure pulse oximetry and EKG monitoring and this was maintained throughout the procedure. Once an adequate level of sedation was obtained a bite block was placed. Then the lubricated tip of the Jtyuyxv-[id number] diagnostic video upper endoscope was inserted and advanced using direct visualization into the posterior pharynx and ultimately into the esophagus with  distal extent of the second portion of the duodenum. Colonoscopy procedure: Once an adequate level of sedation was obtained a digital rectal exam was completed. Then the lubricated tip of the Pvwykxq-[id number] diagnostic video colonoscope was inserted and advanced without difficulty to the cecum using the CO2 insufflation technique. The cecum was identified by localizing the trifold, the appendix and the ileocecal valve. A routine second examination of the cecum/ascending colon was performed. Withdrawal was begun with thorough washing and careful examination of the colonic walls and folds. Photodocumentation was obtained. The bowel prep was good.  Views of the colon were good with washing. I then carefully withdrew the instrument from the patient who tolerated the procedure well. Complications: None    EGD findings:      1. Esophagus: The squamocolumnar junction was noted at 44 cm and appeared regular. The diaphragmatic pinch was noted noted at 45 cm from the incisors. Small, sliding 1 cm hiatal hernia. The esophageal mucosa appeared healthy and normal. There was no evidence of esophagitis, stricture or endoscopic evidence of Charles's esophagus. 2. Stomach: The stomach distended normally. Rugal folds were seen. Along the greater curvature of body of the stomach (toward the antrum) there was a large mass-like lesion seen with a central, cratered ulcer. The ulcer itself was clean based but deep. The entire mass-like lesion was friable and there was bleeding with scope contact. The rest of the stomach appeared normal. Biopsies were taken of the normal stomach with a cold forceps for histology. Multiple passes, including bite on bite biopsies were taken from the mass-like lesion in the gastric body. The pylorus was patent. Retroflexion revealed a normal fundus. 3. Duodenum: The duodenal mucosa appeared normal in the bulb and 2nd portion of the duodenum. Biopsied. EGD Impression:  -Esophagus: Small hiatal hernia, otherwise unremarkable. -Stomach: Large, ulcerated lesion in the body of the stomach is concerning for a primary gastric malignancy. Biopsied. Random gastric biopsies also taken to rule out h. Pylori. -Duodenum: Normal. Biopsied.  -Gastric lesion is concerning malignancy. This would explain iron deficiency. Colonoscopy findings:    1. No polyps detected. 2. Diverticulosis: None appreciated. 3. Ileocecal valve appeared normal. Terminal ileum appeared normal.  4. The colonic mucosa throughout the colon showed normal vascular pattern, without evidence of angioectasias or inflammation. 5. A retroflexed view of the rectum revealed small internal hemorrhoids.   6. PONCHO: normal rectal tone, no masses palpated. Colonoscopy Impression:  No polyps. Normal terminal ileum. Internal hemorrhoids. Colon was otherwise normal with glistening mucosa and intact vascular pattern throughout. Recommend:  Await pathology. High fiber diet. Monitor for blood in the stool. If having more than just tinge of blood, call office or go to the ER. Will obtain CT scan of the chest/abdomen/pelvis with contrast.  Continue iron therapy. Soft diet today.    >>>If tissue was obtained and you have not received your pathology results either by phone or letter within 2 weeks, please call our office at [phone].     Specimens: duodenum, random gastric, gastric lesion    Blood loss: <1 ml

## 2023-12-18 NOTE — H&P
History & Physical Examination    Patient Name: Cassandra Chacon  MRN: [id number]  CSN: [id number]  YOB: 1960    Diagnosis: Iron deficiency anemia -- EGD and Colonoscopy today    Medications Prior to Admission   Medication Sig Dispense Refill Last Dose    Multiple Vitamins-Minerals (CENTRUM SILVER 50+MEN OR) Take by mouth daily. ferrous sulfate 325 (65 FE) MG Oral Tab EC Take 1 tablet (325 mg total) by mouth daily with breakfast.       Na Sulfate-K Sulfate-Mg Sulf (SUPREP BOWEL PREP KIT) 17.5-3.13-1.6 GM/177ML Oral Solution Take as discussed in clinic 1 each 0      Current Facility-Administered Medications   Medication Dose Route Frequency    lactated ringers infusion   Intravenous Continuous       Allergies: No Known Allergies    History reviewed. No pertinent past medical history. Past Surgical History:   Procedure Laterality Date    COLONOSCOPY  2013    INGUINAL HERNIA REPAIR Right 01/24/2018     Family History   Problem Relation Age of Onset    Prostate Cancer Other      Social History     Tobacco Use    Smoking status: Never    Smokeless tobacco: Never   Substance Use Topics    Alcohol use: No     Alcohol/week: 0.0 standard drinks of alcohol         SYSTEM Check if Review is Normal Check if Physical Exam is Normal If not normal, please explain:   NIR Hypatia.Pruitt ] [ Ronnie Will  Hypatia.Pruitt ] [ Nick Tinoco Hypatia.Pruitt ] [ Amy Perez Hypatia.Pruitt ] [ Fady Harrington Hypatia.Pruitt ] [ Jamel Runner Hypatia.Pruitt ] [ X]    OTHER        I have discussed the risks and benefits and alternatives of the procedure with the patient/family. They understand and agree to proceed with plan of care. I have reviewed the History and Physical done within the last 30 days. Any changes noted above.     Ulises Vasquez MD  [facility]3], [facility] Gastroenterology

## 2023-12-19 DIAGNOSIS — K31.89 GASTRIC MASS: Primary | ICD-10-CM

## 2023-12-19 NOTE — TELEPHONE ENCOUNTER
Dr Alex Stiles,    I let the pt's wife know you are waiting for the biopsy results before you order the CT scan.  She will await a call back

## 2023-12-20 ENCOUNTER — TELEPHONE (OUTPATIENT)
Facility: CLINIC | Age: 63
End: 2023-12-20

## 2023-12-20 ENCOUNTER — HOSPITAL ENCOUNTER (OUTPATIENT)
Dept: CT IMAGING | Facility: HOSPITAL | Age: 63
Discharge: HOME | End: 2023-12-20
Attending: STUDENT IN AN ORGANIZED HEALTH CARE EDUCATION/TRAINING PROGRAM
Payer: COMMERCIAL

## 2023-12-20 DIAGNOSIS — K31.89 GASTRIC MASS: ICD-10-CM

## 2023-12-20 LAB
CREAT BLD-MCNC: 1 MG/DL
EGFRCR SERPLBLD CKD-EPI 2021: 85 ML/MIN/1.73M2 (ref 60–?)

## 2023-12-20 PROCEDURE — 71260 CT THORAX DX C+: CPT | Performed by: STUDENT IN AN ORGANIZED HEALTH CARE EDUCATION/TRAINING PROGRAM

## 2023-12-20 PROCEDURE — 74177 CT ABD & PELVIS W/CONTRAST: CPT | Performed by: STUDENT IN AN ORGANIZED HEALTH CARE EDUCATION/TRAINING PROGRAM

## 2023-12-20 PROCEDURE — 82565 ASSAY OF CREATININE: CPT

## 2023-12-20 NOTE — TELEPHONE ENCOUNTER
I spoke to the patient and his wife regarding recent pathology results. An EGD and colonoscopy were done earlier this week for iron deficiency anemia. Colonoscopy was unremarkable but EGD was notable for a large ulcerated gastric mass in the body of the stomach. Based on appearance this was concerning for primary gastric cancer. I spoke to the pathologist last night and additional stains are pending but this is adenocarcinoma of the stomach. I called and let the patient know this information, they expressed understanding. We will obtain CT chest abdomen pelvis with contrast.  Will also need referral to tumor board once the official pathology results are released.

## 2023-12-21 ENCOUNTER — TELEPHONE (OUTPATIENT)
Age: 63
End: 2023-12-21

## 2023-12-21 ENCOUNTER — TELEPHONE (OUTPATIENT)
Dept: HEMATOLOGY/ONCOLOGY | Facility: HOSPITAL | Age: 63
End: 2023-12-21

## 2023-12-21 NOTE — TELEPHONE ENCOUNTER
Dr. Efra Villa,    This patient of yours was just diagnosed with a gastric mass adenocarcinoma following EGD with Dr. Fidelia Diamond. Patient recommended to see Med Onc for consult for further evaluation and I just wanted to know if you had any preference as to a physician in our group, or would first available work for you? We'd like to get him in as soon as possible and would appreciate any input you might have. Thanks.

## 2023-12-21 NOTE — TELEPHONE ENCOUNTER
Eh,    This patient presented to me with iron deficiency anemia.    We did an EGD and Colonoscopy.    EGD was notable for a gastric mass, pathology is now back and confirms adenocarcinoma.    CT chest/abdomen/pelvis with contrast has been completed.    The patient should be evaluated by oncology and discussed at tumor board.    Can you please help with this, thank you

## 2023-12-22 NOTE — TELEPHONE ENCOUNTER
I called the patient to discuss CT imaging, he did not answer. I left a message.    He has an appointment with oncology on 12/26.

## 2023-12-22 NOTE — PROGRESS NOTES
Patient with appointment to see oncology on 12/26. I attempted to call the patient to discuss this image but he did not answer.

## 2023-12-26 ENCOUNTER — APPOINTMENT (OUTPATIENT)
Dept: HEMATOLOGY/ONCOLOGY | Facility: HOSPITAL | Age: 63
End: 2023-12-26
Attending: INTERNAL MEDICINE
Payer: COMMERCIAL

## 2023-12-27 ENCOUNTER — OFFICE VISIT (OUTPATIENT)
Dept: HEMATOLOGY/ONCOLOGY | Facility: HOSPITAL | Age: 63
End: 2023-12-27
Attending: INTERNAL MEDICINE
Payer: COMMERCIAL

## 2023-12-27 VITALS
OXYGEN SATURATION: 97 % | HEART RATE: 88 BPM | HEIGHT: 69 IN | RESPIRATION RATE: 16 BRPM | BODY MASS INDEX: 28.02 KG/M2 | TEMPERATURE: 98 F | WEIGHT: 189.19 LBS | SYSTOLIC BLOOD PRESSURE: 143 MMHG | DIASTOLIC BLOOD PRESSURE: 80 MMHG

## 2023-12-27 DIAGNOSIS — D64.9 ANEMIA, UNSPECIFIED TYPE: Primary | ICD-10-CM

## 2023-12-27 DIAGNOSIS — C16.9 MALIGNANT NEOPLASM OF STOMACH, UNSPECIFIED LOCATION (HCC): ICD-10-CM

## 2023-12-27 PROCEDURE — 99215 OFFICE O/P EST HI 40 MIN: CPT | Performed by: INTERNAL MEDICINE

## 2023-12-27 NOTE — PROGRESS NOTES
Will recheck iron levels and patient instructed to hold oral iron to evaluate levels. IV iron infusion explained including test dose, administration time, side effects. Discussed low possibility of infusion/allergic reaction, risk of infiltration with staining of skin possible. Instructed to eat prior to appointment and hydrate well before and after infusion. All questions answered and patient confirms understanding. Met with Patient and spouse after initial visit with Dr Dago Kim. Patient and spouse confirm understanding of diagnosis, stage and treatment plan. Asking appropriate questions, coping effectively. Will assist with completion of work up with PET scan and follow up appointment with Dr Dago Kim for next steps. Port explained as patient will require chemotherapy with flouracil, orders placed IR contacted to call patient to schedule.

## 2024-01-04 ENCOUNTER — HOSPITAL ENCOUNTER (OUTPATIENT)
Dept: NUCLEAR MEDICINE | Facility: HOSPITAL | Age: 64
Discharge: HOME | End: 2024-01-04
Attending: INTERNAL MEDICINE
Payer: COMMERCIAL

## 2024-01-04 DIAGNOSIS — C16.9 MALIGNANT NEOPLASM OF STOMACH, UNSPECIFIED LOCATION (HCC): ICD-10-CM

## 2024-01-04 LAB — GLUCOSE BLD-MCNC: 83 MG/DL (ref 70–99)

## 2024-01-04 PROCEDURE — 78815 PET IMAGE W/CT SKULL-THIGH: CPT | Performed by: INTERNAL MEDICINE

## 2024-01-04 PROCEDURE — 82962 GLUCOSE BLOOD TEST: CPT

## 2024-01-05 ENCOUNTER — LAB ENCOUNTER (OUTPATIENT)
Dept: LAB | Facility: HOSPITAL | Age: 64
End: 2024-01-05
Attending: INTERNAL MEDICINE
Payer: COMMERCIAL

## 2024-01-05 DIAGNOSIS — C16.9 MALIGNANT NEOPLASM OF STOMACH, UNSPECIFIED LOCATION (HCC): ICD-10-CM

## 2024-01-05 DIAGNOSIS — D64.9 ANEMIA, UNSPECIFIED TYPE: ICD-10-CM

## 2024-01-05 LAB
BASOPHILS # BLD AUTO: 0.02 X10(3) UL (ref 0–0.2)
BASOPHILS NFR BLD AUTO: 0.2 %
DEPRECATED HBV CORE AB SER IA-ACNC: 7.7 NG/ML
DEPRECATED RDW RBC AUTO: 64.1 FL (ref 35.1–46.3)
EOSINOPHIL # BLD AUTO: 0.07 X10(3) UL (ref 0–0.7)
EOSINOPHIL NFR BLD AUTO: 0.9 %
ERYTHROCYTE [DISTWIDTH] IN BLOOD BY AUTOMATED COUNT: 24.8 % (ref 11–15)
HCT VFR BLD AUTO: 41.1 %
HGB BLD-MCNC: 12.7 G/DL
IMM GRANULOCYTES # BLD AUTO: 0.03 X10(3) UL (ref 0–1)
IMM GRANULOCYTES NFR BLD: 0.4 %
IRON SATN MFR SERPL: 8 %
IRON SERPL-MCNC: 30 UG/DL
LYMPHOCYTES # BLD AUTO: 1.81 X10(3) UL (ref 1–4)
LYMPHOCYTES NFR BLD AUTO: 22.5 %
MCH RBC QN AUTO: 23.4 PG (ref 26–34)
MCHC RBC AUTO-ENTMCNC: 30.9 G/DL (ref 31–37)
MCV RBC AUTO: 75.7 FL
MONOCYTES # BLD AUTO: 0.46 X10(3) UL (ref 0.1–1)
MONOCYTES NFR BLD AUTO: 5.7 %
NEUTROPHILS # BLD AUTO: 5.65 X10 (3) UL (ref 1.5–7.7)
NEUTROPHILS # BLD AUTO: 5.65 X10(3) UL (ref 1.5–7.7)
NEUTROPHILS NFR BLD AUTO: 70.3 %
PLATELET # BLD AUTO: 219 10(3)UL (ref 150–450)
PLATELET MORPHOLOGY: NORMAL
RBC # BLD AUTO: 5.43 X10(6)UL
TIBC SERPL-MCNC: 395 UG/DL (ref 250–425)
TRANSFERRIN SERPL-MCNC: 265 MG/DL (ref 215–365)
WBC # BLD AUTO: 8 X10(3) UL (ref 4–11)

## 2024-01-05 PROCEDURE — 83540 ASSAY OF IRON: CPT

## 2024-01-05 PROCEDURE — 82728 ASSAY OF FERRITIN: CPT

## 2024-01-05 PROCEDURE — 85025 COMPLETE CBC W/AUTO DIFF WBC: CPT

## 2024-01-05 PROCEDURE — 84466 ASSAY OF TRANSFERRIN: CPT

## 2024-01-05 PROCEDURE — 36415 COLL VENOUS BLD VENIPUNCTURE: CPT

## 2024-01-08 ENCOUNTER — OFFICE VISIT (OUTPATIENT)
Dept: HEMATOLOGY/ONCOLOGY | Facility: HOSPITAL | Age: 64
End: 2024-01-08
Attending: INTERNAL MEDICINE
Payer: COMMERCIAL

## 2024-01-08 VITALS
HEIGHT: 69 IN | DIASTOLIC BLOOD PRESSURE: 70 MMHG | OXYGEN SATURATION: 96 % | SYSTOLIC BLOOD PRESSURE: 141 MMHG | WEIGHT: 186 LBS | RESPIRATION RATE: 18 BRPM | TEMPERATURE: 97 F | HEART RATE: 90 BPM | BODY MASS INDEX: 27.55 KG/M2

## 2024-01-08 DIAGNOSIS — Z51.11 CHEMOTHERAPY MANAGEMENT, ENCOUNTER FOR: ICD-10-CM

## 2024-01-08 DIAGNOSIS — C16.9 MALIGNANT NEOPLASM OF STOMACH, UNSPECIFIED LOCATION (HCC): Primary | ICD-10-CM

## 2024-01-08 DIAGNOSIS — E61.1 IRON DEFICIENCY: ICD-10-CM

## 2024-01-08 DIAGNOSIS — D64.9 ANEMIA, UNSPECIFIED TYPE: ICD-10-CM

## 2024-01-08 PROCEDURE — 99215 OFFICE O/P EST HI 40 MIN: CPT | Performed by: INTERNAL MEDICINE

## 2024-01-08 NOTE — PROGRESS NOTES
Hematology note    Requesting: Dr. Higgins    Anemia gastric cancer    HPI:  63 year old  With gastric adenocarcinoma diagnosed 12/18/23 in the setting of iron def anemia.     See below for staging workup    The patient feels reasonly well denies any melena hematochezia      Pmh:  Arthritis    Social History     Socioeconomic History    Marital status:     Number of children: 2   Occupational History    Occupation:      Employer: [employer]   Tobacco Use    Smoking status: Never    Smokeless tobacco: Never   Vaping Use    Vaping Use: Never used   Substance and Sexual Activity    Alcohol use: No     Alcohol/week: 0.0 standard drinks of alcohol    Drug use: No    Sexual activity: Yes   Other Topics Concern    Caffeine Concern Yes     Comment: coffee, 1 cup daily     Family History   Problem Relation Age of Onset    Prostate Cancer Other      [id number]    Nka  Current Outpatient Medications on File Prior to Visit   Medication Sig Dispense Refill    Omeprazole 40 MG Oral Capsule Delayed Release Take 1 capsule (40 mg total) by mouth every morning before breakfast. 90 capsule 0    ferrous sulfate 325 (65 FE) MG Oral Tab EC Take 1 tablet (325 mg total) by mouth daily with breakfast. (Patient not taking: Reported on 1/8/2024)       Current Facility-Administered Medications on File Prior to Visit   Medication Dose Route Frequency Provider Last Rate Last Admin    [COMPLETED] iopamidol 76% (ISOVUE-370) injection for power injector  80 mL Intravenous ONCE PRN Adithya Lantigua MD   80 mL at 12/20/23 1035     Vitals:    01/08/24 1439   BP: 141/70   Pulse: 90   Resp: 18   Temp: 97.4 °F (36.3 °C)     Nad, rr,nd, no edema, kenny, cn intact      63 year old  With gastric adenocarcinoma diagnosed 12/18/23 in the setting of iron def anemia. 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

## 2024-01-09 ENCOUNTER — TELEPHONE (OUTPATIENT)
Dept: HEMATOLOGY/ONCOLOGY | Facility: HOSPITAL | Age: 64
End: 2024-01-09

## 2024-01-09 ENCOUNTER — HOSPITAL ENCOUNTER (OUTPATIENT)
Dept: INTERVENTIONAL RADIOLOGY/VASCULAR | Facility: HOSPITAL | Age: 64
Discharge: HOME | End: 2024-01-09
Attending: INTERNAL MEDICINE | Admitting: STUDENT IN AN ORGANIZED HEALTH CARE EDUCATION/TRAINING PROGRAM
Payer: COMMERCIAL

## 2024-01-09 VITALS
DIASTOLIC BLOOD PRESSURE: 68 MMHG | HEART RATE: 72 BPM | WEIGHT: 189 LBS | SYSTOLIC BLOOD PRESSURE: 106 MMHG | TEMPERATURE: 98 F | OXYGEN SATURATION: 94 % | HEIGHT: 69 IN | RESPIRATION RATE: 15 BRPM | BODY MASS INDEX: 27.99 KG/M2

## 2024-01-09 DIAGNOSIS — C16.9 GASTRIC CANCER (HCC): Primary | ICD-10-CM

## 2024-01-09 DIAGNOSIS — C16.9 MALIGNANT NEOPLASM OF STOMACH, UNSPECIFIED LOCATION (HCC): ICD-10-CM

## 2024-01-09 DIAGNOSIS — Z01.818 PRE-OP TESTING: Primary | ICD-10-CM

## 2024-01-09 PROCEDURE — 77001 FLUOROGUIDE FOR VEIN DEVICE: CPT | Performed by: RADIOLOGY

## 2024-01-09 PROCEDURE — 02HV33Z INSERTION OF INFUSION DEVICE INTO SUPERIOR VENA CAVA, PERCUTANEOUS APPROACH: ICD-10-PCS | Performed by: RADIOLOGY

## 2024-01-09 PROCEDURE — 99153 MOD SED SAME PHYS/QHP EA: CPT | Performed by: RADIOLOGY

## 2024-01-09 PROCEDURE — 99152 MOD SED SAME PHYS/QHP 5/>YRS: CPT | Performed by: RADIOLOGY

## 2024-01-09 PROCEDURE — 36561 INSERT TUNNELED CV CATH: CPT | Performed by: RADIOLOGY

## 2024-01-09 PROCEDURE — 36415 COLL VENOUS BLD VENIPUNCTURE: CPT

## 2024-01-09 PROCEDURE — 0JH60XZ INSERTION OF TUNNELED VASCULAR ACCESS DEVICE INTO CHEST SUBCUTANEOUS TISSUE AND FASCIA, OPEN APPROACH: ICD-10-PCS | Performed by: RADIOLOGY

## 2024-01-09 RX ORDER — MIDAZOLAM HYDROCHLORIDE 1 MG/ML
INJECTION INTRAMUSCULAR; INTRAVENOUS
Status: COMPLETED
Start: 2024-01-09 | End: 2024-01-09

## 2024-01-09 RX ORDER — LIDOCAINE HYDROCHLORIDE 20 MG/ML
INJECTION, SOLUTION INFILTRATION; PERINEURAL
Status: COMPLETED
Start: 2024-01-09 | End: 2024-01-09

## 2024-01-09 RX ORDER — HEPARIN SODIUM (PORCINE) LOCK FLUSH IV SOLN 100 UNIT/ML 100 UNIT/ML
SOLUTION INTRAVENOUS
Status: COMPLETED
Start: 2024-01-09 | End: 2024-01-09

## 2024-01-09 RX ORDER — CEFAZOLIN SODIUM/WATER 2 G/20 ML
SYRINGE (ML) INTRAVENOUS
Status: COMPLETED
Start: 2024-01-09 | End: 2024-01-09

## 2024-01-09 RX ORDER — SODIUM CHLORIDE 9 MG/ML
INJECTION, SOLUTION INTRAVENOUS CONTINUOUS
Status: DISCONTINUED | OUTPATIENT
Start: 2024-01-09 | End: 2024-01-09

## 2024-01-09 RX ADMIN — SODIUM CHLORIDE: 9 INJECTION, SOLUTION INTRAVENOUS at 07:30:00

## 2024-01-09 NOTE — TELEPHONE ENCOUNTER
Patient called to schedule infusion.  Checked with infusion and there are no orders yet.  Please confirm with the patient when the orders are in.  Thank you.

## 2024-01-09 NOTE — DISCHARGE INSTRUCTIONS
INTERVENTIONAL RADIOLOGY  [facility]  [phone]     Patient Name:  Wallace Garzon    Procedure:  Chest Port Insertion    Site Care: Dermabond (skin glue) has been applied to your incision.  Do not scrub the area.  Allow the Dermabond to flake off on its own.  You may shower in 48 hours.  Gently wash area with soap and water.                                       Activity/Diet  No heavy lifting or strenuous activity for 48 hours.  Drink plenty of fluids, unless you have otherwise been told to restrict your fluid intake.  Do not drink alcohol for 24 hours.  Do not drive,  operate heavy machinery, make important decisions or sign legal documents today.    Medications:  Take acetaminophen if needed for pain. Do not exceed 4000mg of acetaminophen in a 24-hour period., Do not take blood thinners, aspirin, or Plavix for 24 hours., and Make no changes to your existing medications.    Contact Interventional Radiology at [phone] if you have severe/unrelieved pain, fever, chills, dizziness/lightheadedness, or drainage/bleeding from your incision site.

## 2024-01-09 NOTE — IVS NOTE
DISCHARGE NOTE     Pt is able to sit up and ambulate without difficulty.   Pt voided and tolerated fluids and food.   Procedural site remains dry and intact with good circulation, motion and sensation.   No signs or symptoms of bleeding/hematoma noted.   Pt denies any pain or discomfort at this time.  IV access removed  Instructions provided, patient/family verbalizes understanding.   Dr. Barrow spoke with patient/family post procedure.     Pt discharge via wheelchair to [facility]     Follow up Appointment: n/a    New Prescription: n/a

## 2024-01-09 NOTE — INTERVAL H&P NOTE
The above referenced H&P was reviewed by Jose Elias Barrow MD on 1/9/2024, the patient was examined and no significant changes have occurred in the patient's condition since the H&P was performed.  Risks, benefits, alternative treatments and consequences of no treatment were discussed.  We will proceed with procedure as planned.      Jose Elias Barrow MD  1/9/2024  7:45 AM

## 2024-01-10 ENCOUNTER — OFFICE VISIT (OUTPATIENT)
Dept: HEMATOLOGY/ONCOLOGY | Facility: HOSPITAL | Age: 64
End: 2024-01-10
Attending: INTERNAL MEDICINE
Payer: COMMERCIAL

## 2024-01-10 DIAGNOSIS — C16.9 MALIGNANT NEOPLASM OF STOMACH, UNSPECIFIED LOCATION (HCC): Primary | ICD-10-CM

## 2024-01-10 DIAGNOSIS — Z71.9 ENCOUNTER FOR HEALTH EDUCATION: ICD-10-CM

## 2024-01-10 PROCEDURE — 99215 OFFICE O/P EST HI 40 MIN: CPT | Performed by: NURSE PRACTITIONER

## 2024-01-10 RX ORDER — PROCHLORPERAZINE MALEATE 10 MG
10 TABLET ORAL EVERY 6 HOURS PRN
Qty: 30 TABLET | Refills: 3 | Status: SHIPPED | OUTPATIENT
Start: 2024-01-10

## 2024-01-10 RX ORDER — ONDANSETRON HYDROCHLORIDE 8 MG/1
8 TABLET, FILM COATED ORAL EVERY 8 HOURS PRN
Qty: 30 TABLET | Refills: 3 | Status: SHIPPED | OUTPATIENT
Start: 2024-01-10

## 2024-01-10 NOTE — PATIENT INSTRUCTIONS
Medication Education Record: IV Therapy    Learner:  Patient and Family Member    Barriers / Limitations:  None    Psychosocial Assessment:  patient psychosocial response appropriate    Diagnosis:   gastric adenocarcinoma   M1  (stage 4)    IV Cancer Treatment Name(s): FOLFOX + Trastuzumab  IV Cancer Treatment Frequency every 2 weeks    Number of cycles planned based on tolerance and respons  Plan for appointments and lab testing prior to each cycle  Verified Consent to Chemotherapy/Biotherapy Cancer Treatment form signed by patient and provider:  Yes    Cancer Treatment Side Effects (refer to Chemo Care Handouts for further information):  Allergic reactions  Constipation  Diarrhea  Eye disorders  Fatigue  Forgetfulness  Hair loss  Heart effects  Hormone gland changes   Kidney / Bladder effects  Liver effects  Loss of appetite  Low red blood cell count / Anemia  Low White Blood Cell Count/Risk of infection  Low Platelet Count/Risk of Bleeding  Lung Effects  Mouth or Throat Sores  Muscle / Bone Effects  Nausea / Vomiting  Nerve Effects  Reproductive / Fertility Effects  Skin Effects  Taste Changes    IV administration risks:  Potential leaking of drug outside of vein during administration   Signs/symptoms include redness, swelling, pain, burning at the site of administration  Notify Infusion Nurse immediately if any of these symptoms occur during or after the infusion  Allergic reaction: there is a chance for allergic reaction with some medications.  If your prescribed therapy has a higher risk for this, steps will be taken to prevent and minimize this from occurring.    Recommended Anti-nausea medications (as directed by your provider):  Prochloperazine (Compazine) 10 mg every 6 hours and Ondansetron (Zofran) 8 mg every 8 hours  Take as needed      Helpful hints during cancer treatment:    Diet:  Avoid greasy or spicy foods on days surrounding chemotherapy  Eat small frequent meals per day (6-7 meals) rather than 3  large meals  Choose high calorie/high protein foods (chicken, hard cooked eggs, peanut butter, cheese)  If nauseated, try dry foods, such as toast, crackers or pretzels; light or bland foods, such as applesauce or oatmeal.    Fluid intake:  Drink 8-10 cups of liquid a day and take a water bottle wherever you go.  Any fluid is acceptable, but caffeinated products do not count towards your intake and should be limited to 1-2 drinks/day.    Physical Activity    If your doctor approves, be as physically active as you can, but start out slowly, and increase your activity over time as you feel stronger.  Listen to your body and rest when you need to.  Do what you can when you feel up to it.      Oral Care  Keep your mouth clean.  Rinse your mouth before and after meals with plain water or with a mild mouth rinse (made with 1 quart water, 1 teaspoon salt, and 1 teaspoon baking soda, shake before using)   Avoid commercial mouthwashes that contain alcohol, alcoholic or acidic drinks or tobacco  An acceptable mouthwash is Biotene®   If soreness or sores develop, contact the office.    Diarrhea or Constipation    Imodium A-D use for diarrhea:  Take 2 tabs (4mg) at the first sign of diarrhea; then take 1 tab (2mg) every 2 hours until you have had no diarrhea for at least 12 hours; during the night take 2 tabs (4mg) every 4 hours as needed.  Maximum of 8 tablets in 24 hours.   Constipation:  2 tabs Senna-S at bedtime; add 1-2 tabs in AM if needed;  miralax powder daily in 8 oz of any fluid if no BM in 2 days  If you have persistent diarrhea or constipation, please contact the triage nurse for further instructions.  Skin Care  Avoid direct sunlight.  Wear a broad-spectrum sunscreen with an SPF of 30 or higher on any skin exposed to the sun.  Re-apply every 2 hours if in the sun and after bathing or sweating.  For dry skin, use an alcohol-free lotion twice per day, especially after baths.  If scalp hair loss has occurred, protect  the scalp from the sun by wearing a hat and use sunscreen.  Apply alcohol-free moisturizer as needed.    When to call the doctor:  Fever of 100.5 or greater or shaking chills  Nausea/vomiting not controlled with anti-nausea medications: Unable to drink for 24 hours or have signs of dehydration: tiredness, thirst, dry mouth, dark and decreased amount of urine  Diarrhea - not controlled with Imodium AD or more than 6 episodes in 24 hours  Constipation -no bowel movement x 3 days, no response to stool softeners or laxatives  Mouth sores, sore throat or blisters on the lips affecting oral intake  Difficulty breathing, chest pain, or new cough  Excessive tiredness or weakness, confusion or loss of balance  New rash  Tingling or burning, redness, swelling of the palms of hands or soles of feet  Sudden new unexpected symptom -such as change in vision, swelling in arm or leg  Increase in numbness and tingling of hands or feet  Unusual bleeding (nose bleeds, blood in urine, stool or phlegm)  Pain with urination  Persistent mood changes, depression, nervousness, difficulty sleeping   Pain, redness, swelling or blistering at the IV site  If you go to Emergency Room for any reason or seek medical attention elsewhere  If you should need to cancel or reschedule any visit, it is important that you contact the office    What Phone Number to Call:  Cancer Center [phone] / Triage Nurse    Teaching Materials Provided:   Chemo Care chemotherapy information sheets     Please refer to the following link if you are interested in additional information about chemotherapy for yourself or family members: [email or web address]    Safety and Handling of Chemotherapy  While you or your family member is receiving chemotherapy, whether in the clinic or at home, the following precautions must be taken to lessen any exposure to the medication.     Home Intravenous (IV) Medication:  Make sure the connections of  your IV tubing are tight and not leaking.  You should do this twice a day.    If the IV connection is leaking:  Put on disposable gloves  Stop the pump by clamping the tubing and turning it off.  Cover the connection with a paper towel and a plastic bag.  Refer to the Chemotherapy Spill Kit given to you.  Call the infusion pump company for further instructions.  Contact your doctor’s office with further questions if needed.  Handling Body Waste:   Caregivers must wear gloves if exposed to the patient’s blood, urine, stool or vomit.  Dispose of the used gloves after each use and wash hands with soap and water.  Any sheets or clothes soiled with the bodily fluids should be machine washed twice in hot water with regular laundry detergent.  Do not wash soiled garments with hands.  If the soiled garments cannot be washed right away, place them in a sealed plastic bag until they can be washed.   Absorbable undergarments, or any other items contaminated with chemotherapy, should be placed in a sealed plastic bag for disposal, separate from other trash.  Toilets should be flushed twice with the lid closed while taking this medication and for 48 hours after the last dose.  Wash your hands after using the toilet.  Wash any skin area that has come in contact with urine or stool.      Safety for my family and friends:  Due to safety concerns and the nature of this treatment environment, children are not permitted in the infusion center.  Please make appropriate arrangements.   Hugging and kissing is safe for you and your partner or family members.  You can visit, sit with, hug and kiss the children in your life.    You can be around pregnant women, though (if possible) they should not clean up any of your body fluids after you have treatment.   Sexual activity is safe while throughout treatment.  It is possible that traces of the oral chemotherapy may be present in vaginal fluid or semen for 48 hours after taking.  A condom  should be used during this time.  Effective birth control should be used throughout treatment to prevent pregnancy while on these medications and for several months or years after therapy.  Chemotherapy can have harmful side effects to the fetus, especially in the first trimester.  In addition, menstrual cycles can become irregular during and after treatment, so you may not know if you are at a time in your cycle when you could become pregnant or if you are actually pregnant.

## 2024-01-10 NOTE — PROGRESS NOTES
Medication Education Record: IV Therapy    Learner:  Patient and Family Member    Barriers / Limitations:  None    Psychosocial Assessment:  patient psychosocial response appropriate    Diagnosis:   gastric adenocarcinoma   M1  (stage 4)    IV Cancer Treatment Name(s): FOLFOX + Trastuzumab  IV Cancer Treatment Frequency every 2 weeks    Number of cycles planned based on tolerance and respons  Plan for appointments and lab testing prior to each cycle  Verified Consent to Chemotherapy/Biotherapy Cancer Treatment form signed by patient and provider:  Yes    Cancer Treatment Side Effects (refer to Chemo Care Handouts for further information):  Allergic reactions  Constipation  Diarrhea  Eye disorders  Fatigue  Forgetfulness  Hair loss  Heart effects  Hormone gland changes   Kidney / Bladder effects  Liver effects  Loss of appetite  Low red blood cell count / Anemia  Low White Blood Cell Count/Risk of infection  Low Platelet Count/Risk of Bleeding  Lung Effects  Mouth or Throat Sores  Muscle / Bone Effects  Nausea / Vomiting  Nerve Effects  Reproductive / Fertility Effects  Skin Effects  Taste Changes    IV administration risks:  Potential leaking of drug outside of vein during administration   Signs/symptoms include redness, swelling, pain, burning at the site of administration  Notify Infusion Nurse immediately if any of these symptoms occur during or after the infusion  Allergic reaction: there is a chance for allergic reaction with some medications.  If your prescribed therapy has a higher risk for this, steps will be taken to prevent and minimize this from occurring.    Recommended Anti-nausea medications (as directed by your provider):  Prochloperazine (Compazine) 10 mg every 6 hours and Ondansetron (Zofran) 8 mg every 8 hours  Take as needed      Helpful hints during cancer treatment:    Diet:  Avoid greasy or spicy foods on days surrounding chemotherapy  Eat small frequent meals per day (6-7 meals) rather than 3  large meals  Choose high calorie/high protein foods (chicken, hard cooked eggs, peanut butter, cheese)  If nauseated, try dry foods, such as toast, crackers or pretzels; light or bland foods, such as applesauce or oatmeal.    Fluid intake:  Drink 8-10 cups of liquid a day and take a water bottle wherever you go.  Any fluid is acceptable, but caffeinated products do not count towards your intake and should be limited to 1-2 drinks/day.    Physical Activity    If your doctor approves, be as physically active as you can, but start out slowly, and increase your activity over time as you feel stronger.  Listen to your body and rest when you need to.  Do what you can when you feel up to it.      Oral Care  Keep your mouth clean.  Rinse your mouth before and after meals with plain water or with a mild mouth rinse (made with 1 quart water, 1 teaspoon salt, and 1 teaspoon baking soda, shake before using)   Avoid commercial mouthwashes that contain alcohol, alcoholic or acidic drinks or tobacco  An acceptable mouthwash is Biotene®   If soreness or sores develop, contact the office.    Diarrhea or Constipation    Imodium A-D use for diarrhea:  Take 2 tabs (4mg) at the first sign of diarrhea; then take 1 tab (2mg) every 2 hours until you have had no diarrhea for at least 12 hours; during the night take 2 tabs (4mg) every 4 hours as needed.  Maximum of 8 tablets in 24 hours.   Constipation:  2 tabs Senna-S at bedtime; add 1-2 tabs in AM if needed;  miralax powder daily in 8 oz of any fluid if no BM in 2 days  If you have persistent diarrhea or constipation, please contact the triage nurse for further instructions.  Skin Care  Avoid direct sunlight.  Wear a broad-spectrum sunscreen with an SPF of 30 or higher on any skin exposed to the sun.  Re-apply every 2 hours if in the sun and after bathing or sweating.  For dry skin, use an alcohol-free lotion twice per day, especially after baths.  If scalp hair loss has occurred, protect  the scalp from the sun by wearing a hat and use sunscreen.  Apply alcohol-free moisturizer as needed.    When to call the doctor:  Fever of 100.5 or greater or shaking chills  Nausea/vomiting not controlled with anti-nausea medications: Unable to drink for 24 hours or have signs of dehydration: tiredness, thirst, dry mouth, dark and decreased amount of urine  Diarrhea - not controlled with Imodium AD or more than 6 episodes in 24 hours  Constipation -no bowel movement x 3 days, no response to stool softeners or laxatives  Mouth sores, sore throat or blisters on the lips affecting oral intake  Difficulty breathing, chest pain, or new cough  Excessive tiredness or weakness, confusion or loss of balance  New rash  Tingling or burning, redness, swelling of the palms of hands or soles of feet  Sudden new unexpected symptom -such as change in vision, swelling in arm or leg  Increase in numbness and tingling of hands or feet  Unusual bleeding (nose bleeds, blood in urine, stool or phlegm)  Pain with urination  Persistent mood changes, depression, nervousness, difficulty sleeping   Pain, redness, swelling or blistering at the IV site  If you go to Emergency Room for any reason or seek medical attention elsewhere  If you should need to cancel or reschedule any visit, it is important that you contact the office    What Phone Number to Call:  Cancer Center [phone] / Triage Nurse    Teaching Materials Provided:   Chemo Care chemotherapy information sheets     Please refer to the following link if you are interested in additional information about chemotherapy for yourself or family members: [email or web address]    Safety and Handling of Chemotherapy  While you or your family member is receiving chemotherapy, whether in the clinic or at home, the following precautions must be taken to lessen any exposure to the medication.     Home Intravenous (IV) Medication:  Make sure the connections of  your IV tubing are tight and not leaking.  You should do this twice a day.    If the IV connection is leaking:  Put on disposable gloves  Stop the pump by clamping the tubing and turning it off.  Cover the connection with a paper towel and a plastic bag.  Refer to the Chemotherapy Spill Kit given to you.  Call the infusion pump company for further instructions.  Contact your doctor’s office with further questions if needed.  Handling Body Waste:   Caregivers must wear gloves if exposed to the patient’s blood, urine, stool or vomit.  Dispose of the used gloves after each use and wash hands with soap and water.  Any sheets or clothes soiled with the bodily fluids should be machine washed twice in hot water with regular laundry detergent.  Do not wash soiled garments with hands.  If the soiled garments cannot be washed right away, place them in a sealed plastic bag until they can be washed.   Absorbable undergarments, or any other items contaminated with chemotherapy, should be placed in a sealed plastic bag for disposal, separate from other trash.  Toilets should be flushed twice with the lid closed while taking this medication and for 48 hours after the last dose.  Wash your hands after using the toilet.  Wash any skin area that has come in contact with urine or stool.      Safety for my family and friends:  Due to safety concerns and the nature of this treatment environment, children are not permitted in the infusion center.  Please make appropriate arrangements.   Hugging and kissing is safe for you and your partner or family members.  You can visit, sit with, hug and kiss the children in your life.    You can be around pregnant women, though (if possible) they should not clean up any of your body fluids after you have treatment.   Sexual activity is safe while throughout treatment.  It is possible that traces of the oral chemotherapy may be present in vaginal fluid or semen for 48 hours after taking.  A condom  should be used during this time.  Effective birth control should be used throughout treatment to prevent pregnancy while on these medications and for several months or years after therapy.  Chemotherapy can have harmful side effects to the fetus, especially in the first trimester.  In addition, menstrual cycles can become irregular during and after treatment, so you may not know if you are at a time in your cycle when you could become pregnant or if you are actually pregnant.      Educated and counseled on above treatment plan and supportive care for 45 minutes  XOCHITL Allen

## 2024-01-11 ENCOUNTER — OFFICE VISIT (OUTPATIENT)
Dept: HEMATOLOGY/ONCOLOGY | Facility: HOSPITAL | Age: 64
End: 2024-01-11
Attending: INTERNAL MEDICINE
Payer: COMMERCIAL

## 2024-01-11 VITALS
DIASTOLIC BLOOD PRESSURE: 67 MMHG | HEART RATE: 91 BPM | RESPIRATION RATE: 20 BRPM | WEIGHT: 184.63 LBS | TEMPERATURE: 99 F | SYSTOLIC BLOOD PRESSURE: 129 MMHG | OXYGEN SATURATION: 95 % | BODY MASS INDEX: 27 KG/M2

## 2024-01-11 DIAGNOSIS — C16.9 GASTRIC CANCER (HCC): ICD-10-CM

## 2024-01-11 DIAGNOSIS — D50.9 IRON DEFICIENCY ANEMIA, UNSPECIFIED IRON DEFICIENCY ANEMIA TYPE: ICD-10-CM

## 2024-01-11 DIAGNOSIS — D50.9 IRON DEFICIENCY ANEMIA: Primary | ICD-10-CM

## 2024-01-11 PROCEDURE — 96374 THER/PROPH/DIAG INJ IV PUSH: CPT

## 2024-01-11 RX ORDER — HEPARIN SODIUM (PORCINE) LOCK FLUSH IV SOLN 100 UNIT/ML 100 UNIT/ML
5 SOLUTION INTRAVENOUS ONCE
Status: COMPLETED | OUTPATIENT
Start: 2024-01-11 | End: 2024-01-11

## 2024-01-11 RX ORDER — HEPARIN SODIUM (PORCINE) LOCK FLUSH IV SOLN 100 UNIT/ML 100 UNIT/ML
5 SOLUTION INTRAVENOUS ONCE
OUTPATIENT
Start: 2024-01-11

## 2024-01-11 RX ORDER — SODIUM CHLORIDE 9 MG/ML
10 INJECTION INTRAVENOUS ONCE
OUTPATIENT
Start: 2024-01-11

## 2024-01-11 RX ADMIN — HEPARIN SODIUM (PORCINE) LOCK FLUSH IV SOLN 100 UNIT/ML 500 UNITS: 100 SOLUTION INTRAVENOUS at 15:08:00

## 2024-01-11 NOTE — PROGRESS NOTES
Pt here for Iron Dextran. Pt denies any issues or concerns.   Discussed process of Iron dextran including test dose and observation time.      Ordering MD: Prem    During test dose infusion, patient turned bright red in face and chest and complained of feeling hot. Immediately stopped infed test dose. Saline started and VS obtained. Vitals stable. Contacted RASHID Ledezma.   Patient symptoms resolved almost instantly - no more redness/flushing.   Per APN to hold infusion of Infed.   Patient observed to see if symptoms returned - no return of symptoms and was stable upon discharge.   No current appts - clinical team will reach out regarding next steps for starting chemotherapy. Verbalized understanding.         Education Record  Learner:  Patient  Disease / Diagnosis: iron deficiency anemia, gastric ca  Barriers / Limitations:  None  Method:  Discussion  General Topics:  Medication, Side effects and symptom management, and Plan of care reviewed  Outcome:  Shows understanding

## 2024-01-12 ENCOUNTER — TELEPHONE (OUTPATIENT)
Dept: HEMATOLOGY/ONCOLOGY | Facility: HOSPITAL | Age: 64
End: 2024-01-12

## 2024-01-12 ENCOUNTER — HOSPITAL ENCOUNTER (OUTPATIENT)
Dept: CV DIAGNOSTICS | Facility: HOSPITAL | Age: 64
Discharge: HOME | End: 2024-01-12
Attending: INTERNAL MEDICINE
Payer: COMMERCIAL

## 2024-01-12 DIAGNOSIS — C16.9 GASTRIC CANCER (HCC): ICD-10-CM

## 2024-01-12 PROCEDURE — 93306 TTE W/DOPPLER COMPLETE: CPT | Performed by: INTERNAL MEDICINE

## 2024-01-12 NOTE — TELEPHONE ENCOUNTER
Patient's daughter called and would like to speak to Dr Amaro regarding the patient's condition and treatment.  Please call back.  Thank you.

## 2024-01-15 ENCOUNTER — TELEPHONE (OUTPATIENT)
Dept: HEMATOLOGY/ONCOLOGY | Facility: HOSPITAL | Age: 64
End: 2024-01-15

## 2024-01-15 NOTE — TELEPHONE ENCOUNTER
Patient's wife called and would like to speak to Dr Amaro regarding the treatment plan for the patient.  Please call back.   Thank you.

## 2024-01-16 ENCOUNTER — NURSE ONLY (OUTPATIENT)
Dept: HEMATOLOGY/ONCOLOGY | Facility: HOSPITAL | Age: 64
End: 2024-01-16
Attending: INTERNAL MEDICINE
Payer: COMMERCIAL

## 2024-01-16 DIAGNOSIS — C16.9 MALIGNANT NEOPLASM OF STOMACH, UNSPECIFIED LOCATION (HCC): Primary | ICD-10-CM

## 2024-01-16 DIAGNOSIS — C16.9 GASTRIC CANCER (HCC): ICD-10-CM

## 2024-01-16 LAB
ALBUMIN SERPL-MCNC: 4.5 G/DL (ref 3.2–4.8)
ALBUMIN/GLOB SERPL: 1.4 {RATIO} (ref 1–2)
ALP LIVER SERPL-CCNC: 91 U/L
ALT SERPL-CCNC: 13 U/L
ANION GAP SERPL CALC-SCNC: 2 MMOL/L (ref 0–18)
AST SERPL-CCNC: 20 U/L (ref ?–34)
BASOPHILS # BLD AUTO: 0.04 X10(3) UL (ref 0–0.2)
BASOPHILS NFR BLD AUTO: 0.4 %
BILIRUB SERPL-MCNC: 1.2 MG/DL (ref 0.2–1.1)
BUN BLD-MCNC: 17 MG/DL (ref 9–23)
BUN/CREAT SERPL: 18.9 (ref 10–20)
CALCIUM BLD-MCNC: 9.5 MG/DL (ref 8.7–10.4)
CHLORIDE SERPL-SCNC: 108 MMOL/L (ref 98–112)
CO2 SERPL-SCNC: 27 MMOL/L (ref 21–32)
CREAT BLD-MCNC: 0.9 MG/DL
DEPRECATED RDW RBC AUTO: 61.2 FL (ref 35.1–46.3)
EGFRCR SERPLBLD CKD-EPI 2021: 97 ML/MIN/1.73M2 (ref 60–?)
EOSINOPHIL # BLD AUTO: 0.07 X10(3) UL (ref 0–0.7)
EOSINOPHIL NFR BLD AUTO: 0.7 %
ERYTHROCYTE [DISTWIDTH] IN BLOOD BY AUTOMATED COUNT: 22.7 % (ref 11–15)
GLOBULIN PLAS-MCNC: 3.2 G/DL (ref 2.8–4.4)
GLUCOSE BLD-MCNC: 94 MG/DL (ref 70–99)
HCT VFR BLD AUTO: 39.5 %
HGB BLD-MCNC: 12 G/DL
IMM GRANULOCYTES # BLD AUTO: 0.02 X10(3) UL (ref 0–1)
IMM GRANULOCYTES NFR BLD: 0.2 %
LYMPHOCYTES # BLD AUTO: 1.92 X10(3) UL (ref 1–4)
LYMPHOCYTES NFR BLD AUTO: 20.4 %
MCH RBC QN AUTO: 23.6 PG (ref 26–34)
MCHC RBC AUTO-ENTMCNC: 30.4 G/DL (ref 31–37)
MCV RBC AUTO: 77.8 FL
MONOCYTES # BLD AUTO: 0.68 X10(3) UL (ref 0.1–1)
MONOCYTES NFR BLD AUTO: 7.2 %
NEUTROPHILS # BLD AUTO: 6.67 X10 (3) UL (ref 1.5–7.7)
NEUTROPHILS # BLD AUTO: 6.67 X10(3) UL (ref 1.5–7.7)
NEUTROPHILS NFR BLD AUTO: 71.1 %
OSMOLALITY SERPL CALC.SUM OF ELEC: 285 MOSM/KG (ref 275–295)
PLATELET # BLD AUTO: 212 10(3)UL (ref 150–450)
PLATELET MORPHOLOGY: NORMAL
POTASSIUM SERPL-SCNC: 4.3 MMOL/L (ref 3.5–5.1)
PROT SERPL-MCNC: 7.7 G/DL (ref 5.7–8.2)
RBC # BLD AUTO: 5.08 X10(6)UL
SODIUM SERPL-SCNC: 137 MMOL/L (ref 136–145)
WBC # BLD AUTO: 9.4 X10(3) UL (ref 4–11)

## 2024-01-16 PROCEDURE — 85025 COMPLETE CBC W/AUTO DIFF WBC: CPT

## 2024-01-16 PROCEDURE — 80053 COMPREHEN METABOLIC PANEL: CPT

## 2024-01-16 PROCEDURE — 36415 COLL VENOUS BLD VENIPUNCTURE: CPT

## 2024-01-16 RX ORDER — HEPARIN SODIUM (PORCINE) LOCK FLUSH IV SOLN 100 UNIT/ML 100 UNIT/ML
5 SOLUTION INTRAVENOUS ONCE
Status: DISCONTINUED | OUTPATIENT
Start: 2024-01-16 | End: 2024-01-16

## 2024-01-16 RX ORDER — HEPARIN SODIUM (PORCINE) LOCK FLUSH IV SOLN 100 UNIT/ML 100 UNIT/ML
5 SOLUTION INTRAVENOUS ONCE
Status: CANCELLED | OUTPATIENT
Start: 2024-01-23

## 2024-01-16 RX ORDER — SODIUM CHLORIDE 9 MG/ML
10 INJECTION INTRAVENOUS ONCE
Status: CANCELLED | OUTPATIENT
Start: 2024-01-23

## 2024-01-18 ENCOUNTER — NURSE ONLY (OUTPATIENT)
Dept: HEMATOLOGY/ONCOLOGY | Facility: HOSPITAL | Age: 64
End: 2024-01-18
Attending: INTERNAL MEDICINE
Payer: COMMERCIAL

## 2024-01-18 ENCOUNTER — SOCIAL WORK SERVICES (OUTPATIENT)
Dept: HEMATOLOGY/ONCOLOGY | Facility: HOSPITAL | Age: 64
End: 2024-01-18

## 2024-01-18 VITALS
HEART RATE: 82 BPM | WEIGHT: 185 LBS | SYSTOLIC BLOOD PRESSURE: 125 MMHG | OXYGEN SATURATION: 96 % | BODY MASS INDEX: 27 KG/M2 | RESPIRATION RATE: 18 BRPM | DIASTOLIC BLOOD PRESSURE: 68 MMHG | TEMPERATURE: 98 F

## 2024-01-18 DIAGNOSIS — C16.9 MALIGNANT NEOPLASM OF STOMACH, UNSPECIFIED LOCATION (HCC): Primary | ICD-10-CM

## 2024-01-18 DIAGNOSIS — D50.0 IRON DEFICIENCY ANEMIA DUE TO CHRONIC BLOOD LOSS: ICD-10-CM

## 2024-01-18 DIAGNOSIS — C16.9 GASTRIC CANCER (HCC): ICD-10-CM

## 2024-01-18 PROCEDURE — 96417 CHEMO IV INFUS EACH ADDL SEQ: CPT

## 2024-01-18 PROCEDURE — 96415 CHEMO IV INFUSION ADDL HR: CPT

## 2024-01-18 PROCEDURE — 96413 CHEMO IV INFUSION 1 HR: CPT

## 2024-01-18 PROCEDURE — 96368 THER/DIAG CONCURRENT INF: CPT

## 2024-01-18 PROCEDURE — 96375 TX/PRO/DX INJ NEW DRUG ADDON: CPT

## 2024-01-18 PROCEDURE — 96367 TX/PROPH/DG ADDL SEQ IV INF: CPT

## 2024-01-18 PROCEDURE — 96411 CHEMO IV PUSH ADDL DRUG: CPT

## 2024-01-18 RX ORDER — SODIUM CHLORIDE 9 MG/ML
10 INJECTION INTRAVENOUS ONCE
Status: DISCONTINUED | OUTPATIENT
Start: 2024-01-18 | End: 2024-01-18

## 2024-01-18 RX ORDER — ACETAMINOPHEN 325 MG/1
TABLET ORAL
Status: COMPLETED
Start: 2024-01-18 | End: 2024-01-18

## 2024-01-18 RX ORDER — HEPARIN SODIUM (PORCINE) LOCK FLUSH IV SOLN 100 UNIT/ML 100 UNIT/ML
5 SOLUTION INTRAVENOUS ONCE
Status: DISCONTINUED | OUTPATIENT
Start: 2024-01-18 | End: 2024-01-18

## 2024-01-18 RX ORDER — FLUOROURACIL 50 MG/ML
2400 INJECTION, SOLUTION INTRAVENOUS CONTINUOUS
Status: DISCONTINUED | OUTPATIENT
Start: 2024-01-18 | End: 2024-01-18

## 2024-01-18 RX ORDER — HEPARIN SODIUM (PORCINE) LOCK FLUSH IV SOLN 100 UNIT/ML 100 UNIT/ML
5 SOLUTION INTRAVENOUS ONCE
Status: CANCELLED | OUTPATIENT
Start: 2024-01-25

## 2024-01-18 RX ORDER — DIPHENHYDRAMINE HCL 25 MG
CAPSULE ORAL
Status: COMPLETED
Start: 2024-01-18 | End: 2024-01-18

## 2024-01-18 RX ORDER — FLUOROURACIL 50 MG/ML
400 INJECTION, SOLUTION INTRAVENOUS ONCE
Status: COMPLETED | OUTPATIENT
Start: 2024-01-18 | End: 2024-01-18

## 2024-01-18 RX ORDER — ACETAMINOPHEN 325 MG/1
650 TABLET ORAL ONCE
Status: COMPLETED | OUTPATIENT
Start: 2024-01-18 | End: 2024-01-18

## 2024-01-18 RX ORDER — DIPHENHYDRAMINE HCL 25 MG
25 CAPSULE ORAL ONCE
Status: COMPLETED | OUTPATIENT
Start: 2024-01-18 | End: 2024-01-18

## 2024-01-18 RX ORDER — SODIUM CHLORIDE 9 MG/ML
10 INJECTION INTRAVENOUS ONCE
OUTPATIENT
Start: 2024-01-25

## 2024-01-18 RX ADMIN — ACETAMINOPHEN 650 MG: 325 TABLET ORAL at 08:01:00

## 2024-01-18 RX ADMIN — DIPHENHYDRAMINE HCL 25 MG: 25 MG CAPSULE ORAL at 08:01:00

## 2024-01-18 RX ADMIN — FLUOROURACIL 800 MG: 50 INJECTION, SOLUTION INTRAVENOUS at 13:15:00

## 2024-01-18 RX ADMIN — FLUOROURACIL 4800 MG: 50 INJECTION, SOLUTION INTRAVENOUS at 13:23:00

## 2024-01-18 NOTE — PROGRESS NOTES
SW completed an assessment with pt to provide support and encouragement due to new chemo starting today.     Pt is a 64 year old man who lives in [place], [place] with his spouse.    Patient has two daughters    Patient is employed here in food services.    Social determinants of health assessment completed with pt and no needs identified at this time.    Pt presents with  appropriate affect and mood. Patient did not identify any feelings ofanxiety about starting chemo today.     SW reviewed cancer support resources provided by [employer]. SW provided a pack of resources including information on transportation assistance, homecare/home health agencies and counseling resources. SW reviewed Advance Directives and importance of SW explained role of SW in Cancer Center and provided Bringing [employer] gift bag.SW contact information given. Coping skills reviewed and support services encouraged due to new cancer dx.

## 2024-01-18 NOTE — PROGRESS NOTES
Pt here for C1D1 FOLFOX, TRASTUZUMAB and Feraheme.  Arrives Ambulating independently, accompanied by Spouse     Patient was evaluated today by Treatment Nurse.    Oral medications included in this regimen:  no    Patient confirms comprehension of cancer treatment schedule:  yes    Feraheme given without incident.    CADD pump connected and running. All connections reinforced with tape. Port access is clean and dry, secured with steri strips and tegaderm dressing. Patient verbalized understanding of CADD pump instructions, including troubleshooting.  Pts wife return demonstrated turning cadd pump on/off and stop/start.    Pregnancy screening:  Not applicable    Modifications in dose or schedule:  No    Medications appearance and physical integrity checked by RN: yes.    Chemotherapy IV pump settings verified by 2 RNs:  Yes.  Frequency of blood return and site check throughout administration: Prior to administration, Prior to each drug, and At completion of therapy     Infusion/treatment outcome:  patient tolerated treatment without incident    Education Record    Learner:  Patient and Spouse  Barriers / Limitations:  None  Method:  Discussion and Video CADD pump  Education / instructions given:  Reinforced chemo education.  Pt/wife demonstrated turning cadd pump on and off.  Outcome:  Shows understanding    Discharged Home, Ambulating independently, accompanied by:Spouse    Patient/family verbalized understanding of future appointments: by printed AVS

## 2024-01-19 ENCOUNTER — TELEPHONE (OUTPATIENT)
Dept: HEMATOLOGY/ONCOLOGY | Facility: HOSPITAL | Age: 64
End: 2024-01-19

## 2024-01-19 NOTE — TELEPHONE ENCOUNTER
Toxicities: C1 D1 Fluorouracil/Leucovorin/Oxaliplatin/Trastuzumab-qyyp on 1/18/2024    I attempted to reach Wallace to see how he is feeling after her first treatment. I left a voice mail message asking him to please return my call at her earliest convenience.

## 2024-01-19 NOTE — TELEPHONE ENCOUNTER
Bhumika reports Nasi is feeling \"pretty good.\" No side effects at this time. I encouraged her to please call the office if Nasi is not feeling well or they have any questions or concerns. She agreed and thanked me for checking on him.

## 2024-01-20 ENCOUNTER — NURSE ONLY (OUTPATIENT)
Dept: HEMATOLOGY/ONCOLOGY | Facility: HOSPITAL | Age: 64
End: 2024-01-20
Attending: INTERNAL MEDICINE
Payer: COMMERCIAL

## 2024-01-20 VITALS
OXYGEN SATURATION: 97 % | SYSTOLIC BLOOD PRESSURE: 129 MMHG | HEART RATE: 92 BPM | TEMPERATURE: 98 F | RESPIRATION RATE: 18 BRPM | DIASTOLIC BLOOD PRESSURE: 66 MMHG

## 2024-01-20 DIAGNOSIS — C16.9 GASTRIC CANCER (HCC): Primary | ICD-10-CM

## 2024-01-20 PROCEDURE — 96523 IRRIG DRUG DELIVERY DEVICE: CPT

## 2024-01-20 RX ORDER — HEPARIN SODIUM (PORCINE) LOCK FLUSH IV SOLN 100 UNIT/ML 100 UNIT/ML
5 SOLUTION INTRAVENOUS ONCE
OUTPATIENT
Start: 2024-01-25

## 2024-01-20 RX ORDER — HEPARIN SODIUM (PORCINE) LOCK FLUSH IV SOLN 100 UNIT/ML 100 UNIT/ML
SOLUTION INTRAVENOUS
Status: COMPLETED
Start: 2024-01-20 | End: 2024-01-20

## 2024-01-20 RX ORDER — SODIUM CHLORIDE 9 MG/ML
10 INJECTION INTRAVENOUS ONCE
OUTPATIENT
Start: 2024-01-25

## 2024-01-20 RX ORDER — HEPARIN SODIUM (PORCINE) LOCK FLUSH IV SOLN 100 UNIT/ML 100 UNIT/ML
5 SOLUTION INTRAVENOUS ONCE
Status: COMPLETED | OUTPATIENT
Start: 2024-01-20 | End: 2024-01-20

## 2024-01-20 RX ADMIN — HEPARIN SODIUM (PORCINE) LOCK FLUSH IV SOLN 100 UNIT/ML 500 UNITS: 100 SOLUTION INTRAVENOUS at 08:44:00

## 2024-01-20 NOTE — PROGRESS NOTES
Patient presented with CADD pump connected. Pt denies issues or concerns. Reservoir with 5.2mL remaining. Disconnected CADD pump, flushed port with 0.9% Normal Saline and established blood return in port. Flushed with 500 units of Heparin and de-accessed port. Gauze and paper tape applied to port site. Patient and spouse discharged from infusion center, aware of upcoming appts.

## 2024-01-25 ENCOUNTER — APPOINTMENT (OUTPATIENT)
Dept: HEMATOLOGY/ONCOLOGY | Facility: HOSPITAL | Age: 64
End: 2024-01-25
Attending: INTERNAL MEDICINE
Payer: COMMERCIAL

## 2024-01-30 ENCOUNTER — MED REC SCAN ONLY (OUTPATIENT)
Facility: CLINIC | Age: 64
End: 2024-01-30

## 2024-01-30 ENCOUNTER — SOCIAL WORK SERVICES (OUTPATIENT)
Dept: HEMATOLOGY/ONCOLOGY | Facility: HOSPITAL | Age: 64
End: 2024-01-30

## 2024-01-30 NOTE — PROGRESS NOTES
JOAQUIN assisted patient with her FMLA paperwork. Paperwork has been faxed to [employer] at [phone]. JOAQUIN sent copy to [employer].

## 2024-01-31 ENCOUNTER — NURSE ONLY (OUTPATIENT)
Dept: HEMATOLOGY/ONCOLOGY | Facility: HOSPITAL | Age: 64
End: 2024-01-31
Attending: INTERNAL MEDICINE
Payer: COMMERCIAL

## 2024-01-31 VITALS
RESPIRATION RATE: 16 BRPM | HEART RATE: 94 BPM | OXYGEN SATURATION: 96 % | HEIGHT: 69.02 IN | TEMPERATURE: 98 F | WEIGHT: 185.19 LBS | DIASTOLIC BLOOD PRESSURE: 72 MMHG | SYSTOLIC BLOOD PRESSURE: 121 MMHG | BODY MASS INDEX: 27.43 KG/M2

## 2024-01-31 DIAGNOSIS — Z51.11 CHEMOTHERAPY MANAGEMENT, ENCOUNTER FOR: ICD-10-CM

## 2024-01-31 DIAGNOSIS — C16.9 MALIGNANT NEOPLASM OF STOMACH, UNSPECIFIED LOCATION (HCC): Primary | ICD-10-CM

## 2024-01-31 DIAGNOSIS — D50.0 IRON DEFICIENCY ANEMIA DUE TO CHRONIC BLOOD LOSS: ICD-10-CM

## 2024-01-31 LAB
ALBUMIN SERPL-MCNC: 4.5 G/DL (ref 3.2–4.8)
ALBUMIN/GLOB SERPL: 1.5 {RATIO} (ref 1–2)
ALP LIVER SERPL-CCNC: 102 U/L
ALT SERPL-CCNC: 52 U/L
ANION GAP SERPL CALC-SCNC: 6 MMOL/L (ref 0–18)
AST SERPL-CCNC: 31 U/L (ref ?–34)
BASOPHILS # BLD AUTO: 0.01 X10(3) UL (ref 0–0.2)
BASOPHILS NFR BLD AUTO: 0.2 %
BILIRUB SERPL-MCNC: 1.1 MG/DL (ref 0.2–1.1)
BUN BLD-MCNC: 15 MG/DL (ref 9–23)
BUN/CREAT SERPL: 15 (ref 10–20)
CALCIUM BLD-MCNC: 9.5 MG/DL (ref 8.7–10.4)
CHLORIDE SERPL-SCNC: 107 MMOL/L (ref 98–112)
CO2 SERPL-SCNC: 28 MMOL/L (ref 21–32)
CREAT BLD-MCNC: 1 MG/DL
DEPRECATED RDW RBC AUTO: 60.2 FL (ref 35.1–46.3)
EGFRCR SERPLBLD CKD-EPI 2021: 85 ML/MIN/1.73M2 (ref 60–?)
EOSINOPHIL # BLD AUTO: 0.06 X10(3) UL (ref 0–0.7)
EOSINOPHIL NFR BLD AUTO: 1.4 %
ERYTHROCYTE [DISTWIDTH] IN BLOOD BY AUTOMATED COUNT: 22.3 % (ref 11–15)
GLOBULIN PLAS-MCNC: 3.1 G/DL (ref 2.8–4.4)
GLUCOSE BLD-MCNC: 104 MG/DL (ref 70–99)
HCT VFR BLD AUTO: 37.4 %
HGB BLD-MCNC: 12.6 G/DL
IMM GRANULOCYTES # BLD AUTO: 0.01 X10(3) UL (ref 0–1)
IMM GRANULOCYTES NFR BLD: 0.2 %
LYMPHOCYTES # BLD AUTO: 1.32 X10(3) UL (ref 1–4)
LYMPHOCYTES NFR BLD AUTO: 31.1 %
MCH RBC QN AUTO: 26.2 PG (ref 26–34)
MCHC RBC AUTO-ENTMCNC: 33.7 G/DL (ref 31–37)
MCV RBC AUTO: 77.8 FL
MONOCYTES # BLD AUTO: 0.47 X10(3) UL (ref 0.1–1)
MONOCYTES NFR BLD AUTO: 11.1 %
NEUTROPHILS # BLD AUTO: 2.37 X10 (3) UL (ref 1.5–7.7)
NEUTROPHILS # BLD AUTO: 2.37 X10(3) UL (ref 1.5–7.7)
NEUTROPHILS NFR BLD AUTO: 56 %
OSMOLALITY SERPL CALC.SUM OF ELEC: 293 MOSM/KG (ref 275–295)
PLATELET # BLD AUTO: 158 10(3)UL (ref 150–450)
PLATELET MORPHOLOGY: NORMAL
POTASSIUM SERPL-SCNC: 4.6 MMOL/L (ref 3.5–5.1)
PROT SERPL-MCNC: 7.6 G/DL (ref 5.7–8.2)
RBC # BLD AUTO: 4.81 X10(6)UL
SODIUM SERPL-SCNC: 141 MMOL/L (ref 136–145)
WBC # BLD AUTO: 4.2 X10(3) UL (ref 4–11)

## 2024-01-31 PROCEDURE — 80053 COMPREHEN METABOLIC PANEL: CPT

## 2024-01-31 PROCEDURE — 99215 OFFICE O/P EST HI 40 MIN: CPT | Performed by: INTERNAL MEDICINE

## 2024-01-31 PROCEDURE — 85025 COMPLETE CBC W/AUTO DIFF WBC: CPT

## 2024-01-31 PROCEDURE — 36415 COLL VENOUS BLD VENIPUNCTURE: CPT

## 2024-01-31 RX ORDER — FLUOROURACIL 50 MG/ML
400 INJECTION, SOLUTION INTRAVENOUS ONCE
Status: CANCELLED | OUTPATIENT
Start: 2024-02-01

## 2024-01-31 RX ORDER — FLUOROURACIL 50 MG/ML
2400 INJECTION, SOLUTION INTRAVENOUS CONTINUOUS
Status: CANCELLED | OUTPATIENT
Start: 2024-02-01

## 2024-01-31 NOTE — PROGRESS NOTES
Hematology note    Requesting: Dr. Higgins    Anemia gastric cancer    HPI:  64 year old  With gastric adenocarcinoma diagnosed 12/18/23 in the setting of iron def anemia.     See below for staging workup    Initiated FOLFOX +Trastuzumab 1/18/24.        Interval History:  Returns for consideration of Cycle #2 FOLFOX +Trastuzumab.  The patient feels reasonly well denies any melena hematochezia. Tolerating first cycle relatively well. No new pain. No changes in bowel or bladder habits. No changes in breathing, no fevers/chills. New skin rash to middle chest, feels may be heat-related from work. Non-pruritic, no blistering or discharge.      Pmh:  Arthritis      Social History     Socioeconomic History    Marital status:     Number of children: 2   Occupational History    Occupation:      Employer: [employer]   Tobacco Use    Smoking status: Never    Smokeless tobacco: Never   Vaping Use    Vaping Use: Never used   Substance and Sexual Activity    Alcohol use: No     Alcohol/week: 0.0 standard drinks of alcohol    Drug use: No    Sexual activity: Yes   Other Topics Concern    Caffeine Concern Yes     Comment: coffee, 1 cup daily     Family History   Problem Relation Age of Onset    Prostate Cancer Other      [id number]    Nka  Current Outpatient Medications on File Prior to Visit   Medication Sig Dispense Refill    prochlorperazine (COMPAZINE) 10 mg tablet Take 1 tablet (10 mg total) by mouth every 6 (six) hours as needed for Nausea. 30 tablet 3    ondansetron (ZOFRAN) 8 MG tablet Take 1 tablet (8 mg total) by mouth every 8 (eight) hours as needed for Nausea. 30 tablet 3    Omeprazole 40 MG Oral Capsule Delayed Release Take 1 capsule (40 mg total) by mouth every morning before breakfast. 90 capsule 0    ferrous sulfate 325 (65 FE) MG Oral Tab EC Take 1 tablet (325 mg total) by mouth daily with breakfast. (Patient not taking: Reported on 1/8/2024)       Current  Facility-Administered Medications on File Prior to Visit   Medication Dose Route Frequency Provider Last Rate Last Admin    [COMPLETED] heparin sodium lock flush 100 UNIT/ML injection 500 Units  5 mL Intracatheter Once Marco Amaro MD   500 Units at 01/20/24 0844    [COMPLETED] acetaminophen (Tylenol) tab 650 mg  650 mg Oral Once Marco Amaro MD   650 mg at 01/18/24 0801    [COMPLETED] diphenhydrAMINE (Benadryl) cap/tab 25 mg  25 mg Oral Once Marco Amaro MD   25 mg at 01/18/24 0801    [COMPLETED] ondansetron (Zofran) 16 mg, dexAMETHasone (Decadron) 20 mg in sodium chloride 0.9% 110 mL IVPB   Intravenous Once Marco Amaro MD   Stopped at 01/18/24 0819    [COMPLETED] trastuzumab-qyyp (Trazimera) 504 mg in sodium chloride 0.9% 274 mL infusion  6 mg/kg (Treatment Plan Recorded) Intravenous Once Marco Amaro MD   Stopped at 01/18/24 1018    [COMPLETED] oxaliplatin (Eloxatin) 170 mg in dextrose 5% 284 mL infusion  85 mg/m2 (Treatment Plan Recorded) Intravenous Once Marco Amaro MD   Stopped at 01/18/24 1235    [COMPLETED] leucovorin 800 mg in dextrose 5% 250 mL infusion  400 mg/m2 (Treatment Plan Recorded) Intravenous Once Marco Amaro MD   Stopped at 01/18/24 1231    [COMPLETED] fluorouracil (Adrucil) 50 mg/mL IV push 800 mg 16 mL  400 mg/m2 (Treatment Plan Recorded) Intravenous Once Marco Amaro MD   800 mg at 01/18/24 1315    [COMPLETED] ferumoxytol (Feraheme) 510 mg in sodium chloride 0.9% 117 mL IVPB  510 mg Intravenous Once Lily Herrera APRN   Stopped at 01/18/24 1258    [COMPLETED] iron dextran (Infed) 25 mg in sodium chloride 0.9% PF 10 mL IV syringe (test dose)  25 mg Intravenous Once Marco Amaro MD   25 mg at 01/11/24 1438    [COMPLETED] heparin sodium lock flush 100 UNIT/ML injection 500 Units  5 mL Intracatheter Once Marco Amaro MD   500 Units at 01/11/24 1508    [COMPLETED] fentaNYL (Sublimaze) 50 mcg/mL injection             [COMPLETED] midazolam (Versed) 2 MG/2ML injection              [COMPLETED] heparin sodium lock flush 100 UNIT/ML injection             [COMPLETED] heparin in sodium chloride 0.9% (Porcine) 2 Units/mL flush bag premix             [COMPLETED] lidocaine (Xylocaine) 2 % injection             [COMPLETED] ceFAZolin (Ancef) 2 g/20mL IV syringe premix              There were no vitals filed for this visit.    Nad, rr,nd, no edema, kenny, cn intact      64 year old  With gastric adenocarcinoma diagnosed 12/18/23 in the setting of iron def anemia. 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 Ok to continue with Cycle #2 tomorrow.  PDL1 10% 1/2024. Will add Pembrolizumab pending auth, as >1% per guidelines. Likely q6week scheduling.    --anemia iv iron for tu. Improved. Feraheme #2/2 tomorrow.    --skin rash. Supportive care with topical creams.     we will continue to be focal point of care in setting of malignancy undergoing ongoing treatment.     XOCHITL Allen    Seen and examined agree with note per RASHID Herrera agrees and as above proceed with next dose of FOLFOX with trastuzumab pembrolizumab for HER2 positive metastatic gastric adenocarcinoma PD-L1 10% tolerating treatment well exam comfortable labored respirations blood loss iron deficiency anemia replace as needed

## 2024-02-01 ENCOUNTER — SOCIAL WORK SERVICES (OUTPATIENT)
Dept: HEMATOLOGY/ONCOLOGY | Facility: HOSPITAL | Age: 64
End: 2024-02-01

## 2024-02-01 ENCOUNTER — OFFICE VISIT (OUTPATIENT)
Dept: HEMATOLOGY/ONCOLOGY | Facility: HOSPITAL | Age: 64
End: 2024-02-01
Attending: INTERNAL MEDICINE
Payer: COMMERCIAL

## 2024-02-01 VITALS
DIASTOLIC BLOOD PRESSURE: 63 MMHG | OXYGEN SATURATION: 96 % | SYSTOLIC BLOOD PRESSURE: 121 MMHG | TEMPERATURE: 98 F | RESPIRATION RATE: 18 BRPM | HEART RATE: 81 BPM

## 2024-02-01 DIAGNOSIS — C16.9 MALIGNANT NEOPLASM OF STOMACH, UNSPECIFIED LOCATION (HCC): ICD-10-CM

## 2024-02-01 DIAGNOSIS — D50.0 IRON DEFICIENCY ANEMIA DUE TO CHRONIC BLOOD LOSS: Primary | ICD-10-CM

## 2024-02-01 PROCEDURE — 96415 CHEMO IV INFUSION ADDL HR: CPT

## 2024-02-01 PROCEDURE — 96413 CHEMO IV INFUSION 1 HR: CPT

## 2024-02-01 PROCEDURE — 96417 CHEMO IV INFUS EACH ADDL SEQ: CPT

## 2024-02-01 PROCEDURE — 96367 TX/PROPH/DG ADDL SEQ IV INF: CPT

## 2024-02-01 PROCEDURE — 96411 CHEMO IV PUSH ADDL DRUG: CPT

## 2024-02-01 PROCEDURE — 96368 THER/DIAG CONCURRENT INF: CPT

## 2024-02-01 RX ORDER — FLUOROURACIL 50 MG/ML
400 INJECTION, SOLUTION INTRAVENOUS ONCE
Status: COMPLETED | OUTPATIENT
Start: 2024-02-01 | End: 2024-02-01

## 2024-02-01 RX ORDER — SODIUM CHLORIDE 9 MG/ML
10 INJECTION, SOLUTION INTRAMUSCULAR; INTRAVENOUS; SUBCUTANEOUS ONCE
OUTPATIENT
Start: 2024-02-01

## 2024-02-01 RX ORDER — HEPARIN SODIUM (PORCINE) LOCK FLUSH IV SOLN 100 UNIT/ML 100 UNIT/ML
5 SOLUTION INTRAVENOUS ONCE
Status: CANCELLED | OUTPATIENT
Start: 2024-02-01

## 2024-02-01 RX ORDER — FLUOROURACIL 50 MG/ML
2400 INJECTION, SOLUTION INTRAVENOUS CONTINUOUS
Status: DISCONTINUED | OUTPATIENT
Start: 2024-02-01 | End: 2024-02-01

## 2024-02-01 RX ADMIN — FLUOROURACIL 4800 MG: 50 INJECTION, SOLUTION INTRAVENOUS at 12:12:00

## 2024-02-01 RX ADMIN — FLUOROURACIL 800 MG: 50 INJECTION, SOLUTION INTRAVENOUS at 12:04:00

## 2024-02-01 NOTE — PROGRESS NOTES
JOAQUIN met with patient and spouse in infusion room 5 to discuss [place] paperwork. Patient is requesting additional days of for leave for episode flare ups. Patient would like to allot for times he is not feeling well after treatment. JOAQUIN updated the [facility] paperwork and faxed them over to [facility] at [phone]

## 2024-02-01 NOTE — PROGRESS NOTES
Pt here for C2D1 Drug(s)FOLFOX + FERAHEME 2 of 2.  Arrives Ambulating independently, accompanied by Spouse     Patient was evaluated today by Treatment Nurse.    Oral medications included in this regimen:  no    Patient confirms comprehension of cancer treatment schedule:  yes    Pregnancy screening:  Not applicable    Modifications in dose or schedule:  No    Medications appearance and physical integrity checked by RN: yes.    Chemotherapy IV pump settings verified by 2 RNs:  Yes.  Frequency of blood return and site check throughout administration: Prior to administration, Prior to each drug, Every 2-3 ml IVP, and At completion of therapy     Infusion/treatment outcome:  patient tolerated treatment without incident    CADD PUMP connected. All lines secured with tape. PAC accessed with biopatch and steristrips.    Education Record    Learner:  Patient and Spouse  Barriers / Limitations:  None  Method:  Discussion  Education / instructions given:  Patient c/o insomnia - notified XOCHITL Kee. Instructed patient to purchase OTC doxylamine (unisom) half tablet at bedtime, full tablet if half tab does not help per Vidhya.  Outcome:  Shows understanding    Discharged Home, Ambulating independently, accompanied by:Spouse    Patient/family verbalized understanding of future appointments: by printed AVS

## 2024-02-03 ENCOUNTER — NURSE ONLY (OUTPATIENT)
Dept: HEMATOLOGY/ONCOLOGY | Facility: HOSPITAL | Age: 64
End: 2024-02-03
Attending: INTERNAL MEDICINE
Payer: COMMERCIAL

## 2024-02-03 VITALS
HEART RATE: 81 BPM | TEMPERATURE: 98 F | RESPIRATION RATE: 18 BRPM | SYSTOLIC BLOOD PRESSURE: 133 MMHG | DIASTOLIC BLOOD PRESSURE: 65 MMHG | OXYGEN SATURATION: 95 %

## 2024-02-03 DIAGNOSIS — C16.9 GASTRIC CANCER (HCC): Primary | ICD-10-CM

## 2024-02-03 PROCEDURE — 96523 IRRIG DRUG DELIVERY DEVICE: CPT

## 2024-02-03 RX ORDER — HEPARIN SODIUM (PORCINE) LOCK FLUSH IV SOLN 100 UNIT/ML 100 UNIT/ML
5 SOLUTION INTRAVENOUS ONCE
Status: COMPLETED | OUTPATIENT
Start: 2024-02-03 | End: 2024-02-03

## 2024-02-03 RX ORDER — HEPARIN SODIUM (PORCINE) LOCK FLUSH IV SOLN 100 UNIT/ML 100 UNIT/ML
5 SOLUTION INTRAVENOUS ONCE
OUTPATIENT
Start: 2024-02-03

## 2024-02-03 RX ORDER — HEPARIN SODIUM (PORCINE) LOCK FLUSH IV SOLN 100 UNIT/ML 100 UNIT/ML
SOLUTION INTRAVENOUS
Status: COMPLETED
Start: 2024-02-03 | End: 2024-02-03

## 2024-02-03 RX ORDER — SODIUM CHLORIDE 9 MG/ML
10 INJECTION, SOLUTION INTRAMUSCULAR; INTRAVENOUS; SUBCUTANEOUS ONCE
OUTPATIENT
Start: 2024-02-03

## 2024-02-03 RX ADMIN — HEPARIN SODIUM (PORCINE) LOCK FLUSH IV SOLN 100 UNIT/ML 500 UNITS: 100 SOLUTION INTRAVENOUS at 08:34:00

## 2024-02-03 NOTE — PROGRESS NOTES
Patient presented with CADD pump connected. [place] with 3 mL. Disconnected CADD pump, flushed port with 0.9% Normal Saline and established blood return in port. Flushed with 500 units of Heparin and de-accessed port. Gauze and paper tape applied to port site.

## 2024-02-14 ENCOUNTER — NURSE ONLY (OUTPATIENT)
Dept: HEMATOLOGY/ONCOLOGY | Facility: HOSPITAL | Age: 64
End: 2024-02-14
Attending: INTERNAL MEDICINE
Payer: COMMERCIAL

## 2024-02-14 ENCOUNTER — OFFICE VISIT (OUTPATIENT)
Dept: HEMATOLOGY/ONCOLOGY | Facility: HOSPITAL | Age: 64
End: 2024-02-14
Attending: NURSE PRACTITIONER
Payer: COMMERCIAL

## 2024-02-14 VITALS
HEIGHT: 69 IN | BODY MASS INDEX: 27.25 KG/M2 | DIASTOLIC BLOOD PRESSURE: 75 MMHG | SYSTOLIC BLOOD PRESSURE: 136 MMHG | RESPIRATION RATE: 16 BRPM | HEART RATE: 76 BPM | OXYGEN SATURATION: 97 % | TEMPERATURE: 97 F | WEIGHT: 184 LBS

## 2024-02-14 DIAGNOSIS — C16.9 MALIGNANT NEOPLASM OF STOMACH, UNSPECIFIED LOCATION (HCC): Primary | ICD-10-CM

## 2024-02-14 DIAGNOSIS — D50.0 IRON DEFICIENCY ANEMIA DUE TO CHRONIC BLOOD LOSS: ICD-10-CM

## 2024-02-14 DIAGNOSIS — Z51.11 CHEMOTHERAPY MANAGEMENT, ENCOUNTER FOR: ICD-10-CM

## 2024-02-14 LAB
ALBUMIN SERPL-MCNC: 4.2 G/DL (ref 3.2–4.8)
ALBUMIN/GLOB SERPL: 1.5 {RATIO} (ref 1–2)
ALP LIVER SERPL-CCNC: 110 U/L
ALT SERPL-CCNC: 41 U/L
ANION GAP SERPL CALC-SCNC: 8 MMOL/L (ref 0–18)
AST SERPL-CCNC: 30 U/L (ref ?–34)
BASOPHILS # BLD AUTO: 0.02 X10(3) UL (ref 0–0.2)
BASOPHILS NFR BLD AUTO: 0.3 %
BILIRUB SERPL-MCNC: 1.2 MG/DL (ref 0.2–1.1)
BUN BLD-MCNC: 20 MG/DL (ref 9–23)
BUN/CREAT SERPL: 20.4 (ref 10–20)
CALCIUM BLD-MCNC: 9.2 MG/DL (ref 8.7–10.4)
CHLORIDE SERPL-SCNC: 106 MMOL/L (ref 98–112)
CO2 SERPL-SCNC: 26 MMOL/L (ref 21–32)
CREAT BLD-MCNC: 0.98 MG/DL
DEPRECATED RDW RBC AUTO: 60.2 FL (ref 35.1–46.3)
EGFRCR SERPLBLD CKD-EPI 2021: 87 ML/MIN/1.73M2 (ref 60–?)
EOSINOPHIL # BLD AUTO: 0.06 X10(3) UL (ref 0–0.7)
EOSINOPHIL NFR BLD AUTO: 0.9 %
ERYTHROCYTE [DISTWIDTH] IN BLOOD BY AUTOMATED COUNT: 21.1 % (ref 11–15)
GLOBULIN PLAS-MCNC: 2.8 G/DL (ref 2.8–4.4)
GLUCOSE BLD-MCNC: 99 MG/DL (ref 70–99)
HCT VFR BLD AUTO: 37.5 %
HGB BLD-MCNC: 12.7 G/DL
IMM GRANULOCYTES # BLD AUTO: 0.01 X10(3) UL (ref 0–1)
IMM GRANULOCYTES NFR BLD: 0.2 %
LYMPHOCYTES # BLD AUTO: 1.58 X10(3) UL (ref 1–4)
LYMPHOCYTES NFR BLD AUTO: 24.3 %
MCH RBC QN AUTO: 27 PG (ref 26–34)
MCHC RBC AUTO-ENTMCNC: 33.9 G/DL (ref 31–37)
MCV RBC AUTO: 79.6 FL
MONOCYTES # BLD AUTO: 0.94 X10(3) UL (ref 0.1–1)
MONOCYTES NFR BLD AUTO: 14.4 %
NEUTROPHILS # BLD AUTO: 3.9 X10 (3) UL (ref 1.5–7.7)
NEUTROPHILS # BLD AUTO: 3.9 X10(3) UL (ref 1.5–7.7)
NEUTROPHILS NFR BLD AUTO: 59.9 %
OSMOLALITY SERPL CALC.SUM OF ELEC: 293 MOSM/KG (ref 275–295)
PLATELET # BLD AUTO: 130 10(3)UL (ref 150–450)
POTASSIUM SERPL-SCNC: 4.2 MMOL/L (ref 3.5–5.1)
PROT SERPL-MCNC: 7 G/DL (ref 5.7–8.2)
RBC # BLD AUTO: 4.71 X10(6)UL
SODIUM SERPL-SCNC: 140 MMOL/L (ref 136–145)
WBC # BLD AUTO: 6.5 X10(3) UL (ref 4–11)

## 2024-02-14 PROCEDURE — 36415 COLL VENOUS BLD VENIPUNCTURE: CPT

## 2024-02-14 PROCEDURE — 85025 COMPLETE CBC W/AUTO DIFF WBC: CPT

## 2024-02-14 PROCEDURE — 99215 OFFICE O/P EST HI 40 MIN: CPT | Performed by: INTERNAL MEDICINE

## 2024-02-14 PROCEDURE — 80053 COMPREHEN METABOLIC PANEL: CPT

## 2024-02-14 RX ORDER — FLUOROURACIL 50 MG/ML
400 INJECTION, SOLUTION INTRAVENOUS ONCE
Status: CANCELLED | OUTPATIENT
Start: 2024-02-15

## 2024-02-14 RX ORDER — FLUOROURACIL 50 MG/ML
2400 INJECTION, SOLUTION INTRAVENOUS CONTINUOUS
Status: CANCELLED | OUTPATIENT
Start: 2024-02-15

## 2024-02-14 NOTE — PROGRESS NOTES
Hematology note    Requesting: Dr. Higgins    Anemia gastric cancer    HPI:  64 year old  With gastric adenocarcinoma diagnosed 12/18/23 in the setting of iron def anemia.     See below for staging workup    Initiated FOLFOX +Trastuzumab 1/18/24.        Interval History:  Returns for consideration of Cycle #3 FOLFOX +Trastuzumab.  The patient feels reasonly well denies any melena hematochezia. Tolerating first cycle relatively well. No new pain. Occasional HA discomfort over weekend, did not try any interventions. No other focal neurological deficits.  No changes in bowel or bladder habits. No changes in breathing, no fevers/chills.  No skin rash or pruritus.      Pmh:  Arthritis      Social History     Socioeconomic History    Marital status:     Number of children: 2   Occupational History    Occupation: Food service     Employer: [employer]   Tobacco Use    Smoking status: Never    Smokeless tobacco: Never   Vaping Use    Vaping Use: Never used   Substance and Sexual Activity    Alcohol use: No     Alcohol/week: 0.0 standard drinks of alcohol    Drug use: No    Sexual activity: Yes   Other Topics Concern    Caffeine Concern Yes     Comment: coffee, 1 cup daily     Family History   Problem Relation Age of Onset    Prostate Cancer Other      [id number]    Nka  Current Outpatient Medications on File Prior to Visit   Medication Sig Dispense Refill    prochlorperazine (COMPAZINE) 10 mg tablet Take 1 tablet (10 mg total) by mouth every 6 (six) hours as needed for Nausea. (Patient not taking: Reported on 1/31/2024) 30 tablet 3    ondansetron (ZOFRAN) 8 MG tablet Take 1 tablet (8 mg total) by mouth every 8 (eight) hours as needed for Nausea. (Patient not taking: Reported on 1/31/2024) 30 tablet 3    Omeprazole 40 MG Oral Capsule Delayed Release Take 1 capsule (40 mg total) by mouth every morning before breakfast. 90 capsule 0    ferrous sulfate 325 (65 FE) MG Oral Tab EC Take 1 tablet (325 mg  total) by mouth daily with breakfast. (Patient not taking: Reported on 1/8/2024)       Current Facility-Administered Medications on File Prior to Visit   Medication Dose Route Frequency Provider Last Rate Last Admin    [COMPLETED] heparin sodium lock flush 100 UNIT/ML injection 500 Units  5 mL Intracatheter Once Marco Amaro MD   500 Units at 02/03/24 0834    [COMPLETED] ferumoxytol (Feraheme) 510 mg in sodium chloride 0.9% 117 mL IVPB  510 mg Intravenous Once Lily Herrera APRN   Stopped at 02/01/24 1156    [COMPLETED] ondansetron (Zofran) 16 mg, dexAMETHasone (Decadron) 20 mg in sodium chloride 0.9% 110 mL IVPB   Intravenous Once Lily Herrera APRN   Stopped at 02/01/24 0840    [COMPLETED] trastuzumab-qyyp (Trazimera) 336 mg in sodium chloride 0.9% 266 mL infusion  4 mg/kg (Treatment Plan Recorded) Intravenous Once Lily Herrera APRN   Stopped at 02/01/24 0916    [COMPLETED] oxaliplatin (Eloxatin) 170 mg in dextrose 5% 284 mL infusion  85 mg/m2 (Treatment Plan Recorded) Intravenous Once Lily Herrera APRN   Stopped at 02/01/24 1130    [COMPLETED] leucovorin 800 mg in dextrose 5% 250 mL infusion  400 mg/m2 (Treatment Plan Recorded) Intravenous Once Lily Herrera APRN   Stopped at 02/01/24 1130    [COMPLETED] fluorouracil (Adrucil) 50 mg/mL IV push 800 mg 16 mL  400 mg/m2 (Treatment Plan Recorded) Intravenous Once Lily Herrera APRN   800 mg at 02/01/24 1204    [COMPLETED] heparin sodium lock flush 100 UNIT/ML injection 500 Units  5 mL Intracatheter Once Marco Amaro MD   500 Units at 01/20/24 0844    [COMPLETED] acetaminophen (Tylenol) tab 650 mg  650 mg Oral Once Maroc Amaro MD   650 mg at 01/18/24 0801    [COMPLETED] diphenhydrAMINE (Benadryl) cap/tab 25 mg  25 mg Oral Once Marco Amaro MD   25 mg at 01/18/24 0801    [COMPLETED] ondansetron (Zofran) 16 mg, dexAMETHasone (Decadron) 20 mg in sodium chloride 0.9% 110 mL IVPB   Intravenous Once Marco Amaro MD    Stopped at 01/18/24 0819    [COMPLETED] trastuzumab-qyyp (Trazimera) 504 mg in sodium chloride 0.9% 274 mL infusion  6 mg/kg (Treatment Plan Recorded) Intravenous Once Marco Amaro MD   Stopped at 01/18/24 1018    [COMPLETED] oxaliplatin (Eloxatin) 170 mg in dextrose 5% 284 mL infusion  85 mg/m2 (Treatment Plan Recorded) Intravenous Once Marco Amaro MD   Stopped at 01/18/24 1235    [COMPLETED] leucovorin 800 mg in dextrose 5% 250 mL infusion  400 mg/m2 (Treatment Plan Recorded) Intravenous Once Marco Amaro MD   Stopped at 01/18/24 1231    [COMPLETED] fluorouracil (Adrucil) 50 mg/mL IV push 800 mg 16 mL  400 mg/m2 (Treatment Plan Recorded) Intravenous Once Marco Amaro MD   800 mg at 01/18/24 1315    [COMPLETED] ferumoxytol (Feraheme) 510 mg in sodium chloride 0.9% 117 mL IVPB  510 mg Intravenous Once Lily Herrera APRN   Stopped at 01/18/24 1258     Vitals:    02/14/24 1500   BP: 136/75   Pulse: 76   Resp: 16   Temp: 97.4 °F (36.3 °C)       Nad, rr,nd, no edema, kenny, cn intact      64 year old  With gastric adenocarcinoma diagnosed 12/18/23 in the setting of iron def anemia. 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 Ok to continue with Cycle #3 tomorrow.  PDL1 10% 1/2024. Will add Pembrolizumab pending auth, as >1% per guidelines. q6week scheduling-initiate with C3 above, education and consent obtained.    --anemia iv iron for tu. Improved. Feraheme #2/2 tomorrow.    --skin rash. Resolved. Supportive care with topical creams.    -HA discomfort mild, none today. Prn tylenol trial. Monitor.       we will continue to be focal point of care in setting of malignancy undergoing ongoing treatment.     XOCHITL Allen    Seen and examined agree with note  per APN Sharon agrees and as above proceed with next dose of FOLFOX with trastuzumab pembrolizumab for HER2 positive metastatic gastric adenocarcinoma PD-L1 10% tolerating treatment well exam comfortable labored respirations blood loss iron deficiency anemia replace as needed

## 2024-02-15 ENCOUNTER — OFFICE VISIT (OUTPATIENT)
Dept: HEMATOLOGY/ONCOLOGY | Facility: HOSPITAL | Age: 64
End: 2024-02-15
Attending: INTERNAL MEDICINE
Payer: COMMERCIAL

## 2024-02-15 VITALS
SYSTOLIC BLOOD PRESSURE: 133 MMHG | DIASTOLIC BLOOD PRESSURE: 72 MMHG | RESPIRATION RATE: 18 BRPM | OXYGEN SATURATION: 96 % | TEMPERATURE: 98 F | HEART RATE: 70 BPM

## 2024-02-15 DIAGNOSIS — C16.9 MALIGNANT NEOPLASM OF STOMACH, UNSPECIFIED LOCATION (HCC): Primary | ICD-10-CM

## 2024-02-15 PROCEDURE — 96411 CHEMO IV PUSH ADDL DRUG: CPT

## 2024-02-15 PROCEDURE — 96417 CHEMO IV INFUS EACH ADDL SEQ: CPT

## 2024-02-15 PROCEDURE — 96413 CHEMO IV INFUSION 1 HR: CPT

## 2024-02-15 PROCEDURE — 96415 CHEMO IV INFUSION ADDL HR: CPT

## 2024-02-15 PROCEDURE — 96368 THER/DIAG CONCURRENT INF: CPT

## 2024-02-15 PROCEDURE — 96549 UNLISTED CHEMOTHERAPY PX: CPT

## 2024-02-15 PROCEDURE — 96375 TX/PRO/DX INJ NEW DRUG ADDON: CPT

## 2024-02-15 RX ORDER — FLUOROURACIL 50 MG/ML
2400 INJECTION, SOLUTION INTRAVENOUS CONTINUOUS
Status: DISCONTINUED | OUTPATIENT
Start: 2024-02-15 | End: 2024-02-15

## 2024-02-15 RX ORDER — FLUOROURACIL 50 MG/ML
400 INJECTION, SOLUTION INTRAVENOUS ONCE
Status: COMPLETED | OUTPATIENT
Start: 2024-02-15 | End: 2024-02-15

## 2024-02-15 RX ADMIN — FLUOROURACIL 4800 MG: 50 INJECTION, SOLUTION INTRAVENOUS at 12:17:00

## 2024-02-15 RX ADMIN — FLUOROURACIL 800 MG: 50 INJECTION, SOLUTION INTRAVENOUS at 12:11:00

## 2024-02-15 NOTE — PROGRESS NOTES
Pt here for C3D1 Drug(s)FOLFOX + Keytruda  Arrives Ambulating independently, accompanied by Spouse     Patient was evaluated today by Treatment Nurse.    Oral medications included in this regimen:  no    Patient confirms comprehension of cancer treatment schedule:  yes    Pregnancy screening:  Not applicable    Modifications in dose or schedule:  No    Medications appearance and physical integrity checked by RN: yes.    Chemotherapy IV pump settings verified by 2 RNs:  Yes.  Frequency of blood return and site check throughout administration: Prior to administration, Prior to each drug, Every 2-3 ml IVP, and At completion of therapy     Infusion/treatment outcome:  patient tolerated treatment without incident    CADD pump connected and running. All connections reinforced with tape. Port access is clean and dry, secured with steri strips and tegaderm dressing. Patient verbalized understanding of CADD pump instructions, including troubleshooting.      Education Record    Learner:  Patient and Spouse  Barriers / Limitations:  None  Method:  Discussion  Education / instructions given:  Reviewed tx plan with added Keytruda  Outcome:  Shows understanding    Discharged Home, Ambulating independently, accompanied by:Spouse    Patient/family verbalized understanding of future appointments: by printed AVS

## 2024-02-17 ENCOUNTER — NURSE ONLY (OUTPATIENT)
Dept: HEMATOLOGY/ONCOLOGY | Facility: HOSPITAL | Age: 64
End: 2024-02-17
Attending: INTERNAL MEDICINE
Payer: COMMERCIAL

## 2024-02-17 VITALS
DIASTOLIC BLOOD PRESSURE: 79 MMHG | RESPIRATION RATE: 16 BRPM | HEART RATE: 75 BPM | TEMPERATURE: 98 F | SYSTOLIC BLOOD PRESSURE: 131 MMHG | OXYGEN SATURATION: 98 %

## 2024-02-17 DIAGNOSIS — C16.9 GASTRIC CANCER (HCC): Primary | ICD-10-CM

## 2024-02-17 PROCEDURE — 96523 IRRIG DRUG DELIVERY DEVICE: CPT

## 2024-02-17 RX ORDER — HEPARIN SODIUM (PORCINE) LOCK FLUSH IV SOLN 100 UNIT/ML 100 UNIT/ML
SOLUTION INTRAVENOUS
Status: DISCONTINUED
Start: 2024-02-17 | End: 2024-02-17

## 2024-02-17 RX ORDER — SODIUM CHLORIDE 9 MG/ML
10 INJECTION, SOLUTION INTRAMUSCULAR; INTRAVENOUS; SUBCUTANEOUS ONCE
OUTPATIENT
Start: 2024-02-17

## 2024-02-17 RX ORDER — HEPARIN SODIUM (PORCINE) LOCK FLUSH IV SOLN 100 UNIT/ML 100 UNIT/ML
5 SOLUTION INTRAVENOUS ONCE
Status: DISCONTINUED | OUTPATIENT
Start: 2024-02-17 | End: 2024-02-17

## 2024-02-17 RX ORDER — HEPARIN SODIUM (PORCINE) LOCK FLUSH IV SOLN 100 UNIT/ML 100 UNIT/ML
5 SOLUTION INTRAVENOUS ONCE
OUTPATIENT
Start: 2024-02-17

## 2024-02-17 NOTE — PROGRESS NOTES
Patient presented with CADD pump connected.  Denies complaints-feels well.  Reservoir with 2.9ml. Disconnected CADD pump, flushed port with 0.9% Normal Saline and established blood return in port. Flushed with 500 units of Heparin and de-accessed port. Gauze and paper tape applied to port site.

## 2024-02-28 ENCOUNTER — OFFICE VISIT (OUTPATIENT)
Dept: HEMATOLOGY/ONCOLOGY | Facility: HOSPITAL | Age: 64
End: 2024-02-28
Attending: NURSE PRACTITIONER
Payer: COMMERCIAL

## 2024-02-28 ENCOUNTER — NURSE ONLY (OUTPATIENT)
Dept: HEMATOLOGY/ONCOLOGY | Facility: HOSPITAL | Age: 64
End: 2024-02-28
Attending: INTERNAL MEDICINE
Payer: COMMERCIAL

## 2024-02-28 VITALS
RESPIRATION RATE: 18 BRPM | HEART RATE: 75 BPM | HEIGHT: 69 IN | BODY MASS INDEX: 27.25 KG/M2 | WEIGHT: 184 LBS | DIASTOLIC BLOOD PRESSURE: 86 MMHG | TEMPERATURE: 98 F | OXYGEN SATURATION: 97 % | SYSTOLIC BLOOD PRESSURE: 138 MMHG

## 2024-02-28 DIAGNOSIS — Z51.11 CHEMOTHERAPY MANAGEMENT, ENCOUNTER FOR: ICD-10-CM

## 2024-02-28 DIAGNOSIS — R74.01 TRANSAMINITIS: ICD-10-CM

## 2024-02-28 DIAGNOSIS — D69.6 THROMBOCYTOPENIA (HCC): ICD-10-CM

## 2024-02-28 DIAGNOSIS — C16.9 MALIGNANT NEOPLASM OF STOMACH, UNSPECIFIED LOCATION (HCC): Primary | ICD-10-CM

## 2024-02-28 DIAGNOSIS — D50.0 IRON DEFICIENCY ANEMIA DUE TO CHRONIC BLOOD LOSS: ICD-10-CM

## 2024-02-28 LAB
ALBUMIN SERPL-MCNC: 4.2 G/DL (ref 3.2–4.8)
ALBUMIN/GLOB SERPL: 1.4 {RATIO} (ref 1–2)
ALP LIVER SERPL-CCNC: 122 U/L
ALT SERPL-CCNC: 64 U/L
ANION GAP SERPL CALC-SCNC: 7 MMOL/L (ref 0–18)
AST SERPL-CCNC: 39 U/L (ref ?–34)
BASOPHILS # BLD AUTO: 0.01 X10(3) UL (ref 0–0.2)
BASOPHILS NFR BLD AUTO: 0.2 %
BILIRUB SERPL-MCNC: 1.2 MG/DL (ref 0.2–1.1)
BUN BLD-MCNC: 20 MG/DL (ref 9–23)
BUN/CREAT SERPL: 20.6 (ref 10–20)
CALCIUM BLD-MCNC: 9.6 MG/DL (ref 8.7–10.4)
CHLORIDE SERPL-SCNC: 107 MMOL/L (ref 98–112)
CO2 SERPL-SCNC: 25 MMOL/L (ref 21–32)
CREAT BLD-MCNC: 0.97 MG/DL
DEPRECATED RDW RBC AUTO: 60.5 FL (ref 35.1–46.3)
EGFRCR SERPLBLD CKD-EPI 2021: 88 ML/MIN/1.73M2 (ref 60–?)
EOSINOPHIL # BLD AUTO: 0.11 X10(3) UL (ref 0–0.7)
EOSINOPHIL NFR BLD AUTO: 1.8 %
ERYTHROCYTE [DISTWIDTH] IN BLOOD BY AUTOMATED COUNT: 20.6 % (ref 11–15)
FASTING STATUS PATIENT QL REPORTED: NO
GLOBULIN PLAS-MCNC: 3 G/DL (ref 2.8–4.4)
GLUCOSE BLD-MCNC: 117 MG/DL (ref 70–99)
HCT VFR BLD AUTO: 39.1 %
HGB BLD-MCNC: 13.1 G/DL
IMM GRANULOCYTES # BLD AUTO: 0.02 X10(3) UL (ref 0–1)
IMM GRANULOCYTES NFR BLD: 0.3 %
LYMPHOCYTES # BLD AUTO: 1.4 X10(3) UL (ref 1–4)
LYMPHOCYTES NFR BLD AUTO: 22.5 %
MCH RBC QN AUTO: 27.2 PG (ref 26–34)
MCHC RBC AUTO-ENTMCNC: 33.5 G/DL (ref 31–37)
MCV RBC AUTO: 81.3 FL
MONOCYTES # BLD AUTO: 0.66 X10(3) UL (ref 0.1–1)
MONOCYTES NFR BLD AUTO: 10.6 %
NEUTROPHILS # BLD AUTO: 4.02 X10 (3) UL (ref 1.5–7.7)
NEUTROPHILS # BLD AUTO: 4.02 X10(3) UL (ref 1.5–7.7)
NEUTROPHILS NFR BLD AUTO: 64.6 %
OSMOLALITY SERPL CALC.SUM OF ELEC: 292 MOSM/KG (ref 275–295)
PLATELET # BLD AUTO: 126 10(3)UL (ref 150–450)
POTASSIUM SERPL-SCNC: 4.2 MMOL/L (ref 3.5–5.1)
PROT SERPL-MCNC: 7.2 G/DL (ref 5.7–8.2)
RBC # BLD AUTO: 4.81 X10(6)UL
SODIUM SERPL-SCNC: 139 MMOL/L (ref 136–145)
WBC # BLD AUTO: 6.2 X10(3) UL (ref 4–11)

## 2024-02-28 PROCEDURE — 99215 OFFICE O/P EST HI 40 MIN: CPT | Performed by: INTERNAL MEDICINE

## 2024-02-28 PROCEDURE — 85025 COMPLETE CBC W/AUTO DIFF WBC: CPT

## 2024-02-28 PROCEDURE — 80053 COMPREHEN METABOLIC PANEL: CPT

## 2024-02-28 PROCEDURE — 36415 COLL VENOUS BLD VENIPUNCTURE: CPT

## 2024-02-28 RX ORDER — FLUOROURACIL 50 MG/ML
400 INJECTION, SOLUTION INTRAVENOUS ONCE
Status: CANCELLED | OUTPATIENT
Start: 2024-02-29

## 2024-02-28 RX ORDER — FLUOROURACIL 50 MG/ML
2400 INJECTION, SOLUTION INTRAVENOUS CONTINUOUS
Status: CANCELLED | OUTPATIENT
Start: 2024-02-29

## 2024-02-28 NOTE — PROGRESS NOTES
Pt here for C4D FOLFOX + trastuzumab + pembro(q6w).  Arrives Ambulating independently, accompanied by Spouse     Patient was evaluated today by Treatment Nurse, seen by MD yesterday.  Oral medications included in this regimen:  no  Patient confirms comprehension of cancer treatment schedule:  yes  Pregnancy screening:  Not applicable    Modifications in dose or schedule:  No    Medications appearance and physical integrity checked by RN: yes. Chemotherapy IV pump settings verified by 2 RNs:  Yes.    Frequency of blood return and site check throughout administration: Prior to each drug, Every 2-3 ml IVP, and At completion of therapy     Infusion/treatment outcome:  patient tolerated treatment without incident    Education Record    Learner:  Patient  Barriers / Limitations:  None  Method:  Reinforcement  Education / instructions given:  Plan of care   Outcome:  Shows understanding    Discharged Home, Ambulating independently. Patient/family verbalized understanding of future appointments: by [employer] messaging

## 2024-02-28 NOTE — PROGRESS NOTES
Hematology note    Requesting: Dr. Higgins    Anemia gastric cancer    HPI:  64 year old  With gastric adenocarcinoma diagnosed 12/18/23 in the setting of iron def anemia.     See below for staging workup    Initiated FOLFOX +Trastuzumab 1/18/24.        Interval History:  Returns for consideration of Cycle #4 FOLFOX +Trastuzumab.  The patient feels reasonly well denies any melena hematochezia. Tolerating relatively well. No new pain.  No focal neurological deficits.  No changes in bowel or bladder habits. No changes in breathing, no fevers/chills.  No skin rash or pruritus.      Pmh:  Arthritis      Social History     Socioeconomic History    Marital status:     Number of children: 2   Occupational History    Occupation: Food service     Employer: [employer]   Tobacco Use    Smoking status: Never    Smokeless tobacco: Never   Vaping Use    Vaping Use: Never used   Substance and Sexual Activity    Alcohol use: No     Alcohol/week: 0.0 standard drinks of alcohol    Drug use: No    Sexual activity: Yes   Other Topics Concern    Caffeine Concern Yes     Comment: coffee, 1 cup daily     Family History   Problem Relation Age of Onset    Prostate Cancer Other      [id number]    Nka  Current Outpatient Medications on File Prior to Visit   Medication Sig Dispense Refill    prochlorperazine (COMPAZINE) 10 mg tablet Take 1 tablet (10 mg total) by mouth every 6 (six) hours as needed for Nausea. (Patient not taking: Reported on 1/31/2024) 30 tablet 3    ondansetron (ZOFRAN) 8 MG tablet Take 1 tablet (8 mg total) by mouth every 8 (eight) hours as needed for Nausea. (Patient not taking: Reported on 1/31/2024) 30 tablet 3    Omeprazole 40 MG Oral Capsule Delayed Release Take 1 capsule (40 mg total) by mouth every morning before breakfast. 90 capsule 0    ferrous sulfate 325 (65 FE) MG Oral Tab EC Take 1 tablet (325 mg total) by mouth daily with breakfast. (Patient not taking: Reported on 1/8/2024)        Current Facility-Administered Medications on File Prior to Visit   Medication Dose Route Frequency Provider Last Rate Last Admin    [COMPLETED] ondansetron (Zofran) 16 mg, dexAMETHasone (Decadron) 20 mg in sodium chloride 0.9% 110 mL IVPB   Intravenous Once Lily Herrera APRN   Stopped at 02/15/24 0920    [COMPLETED] pembrolizumab (Keytruda) 400 mg in sodium chloride 0.9% 116 mL IVPB  400 mg Intravenous Once Lily Herrera APRN   Stopped at 02/15/24 0852    [COMPLETED] trastuzumab-qyyp (Trazimera) 336 mg in sodium chloride 0.9% 266 mL infusion  4 mg/kg (Treatment Plan Recorded) Intravenous Once Lily Herrera APRN   Stopped at 02/15/24 0951    [COMPLETED] oxaliplatin (Eloxatin) 170 mg in dextrose 5% 284 mL infusion  85 mg/m2 (Treatment Plan Recorded) Intravenous Once Lily Herrera APRN   Stopped at 02/15/24 1202    [COMPLETED] leucovorin 800 mg in dextrose 5% 250 mL infusion  400 mg/m2 (Treatment Plan Recorded) Intravenous Once Lily Herrera APRN   Stopped at 02/15/24 1202    [COMPLETED] fluorouracil (Adrucil) 50 mg/mL IV push 800 mg 16 mL  400 mg/m2 (Treatment Plan Recorded) Intravenous Once Lily Herrera APRN   800 mg at 02/15/24 1211    [COMPLETED] heparin sodium lock flush 100 UNIT/ML injection 500 Units  5 mL Intracatheter Once Marco Amaro MD   500 Units at 02/03/24 0834    [COMPLETED] ferumoxytol (Feraheme) 510 mg in sodium chloride 0.9% 117 mL IVPB  510 mg Intravenous Once Lily Herrera APRN   Stopped at 02/01/24 1156    [COMPLETED] ondansetron (Zofran) 16 mg, dexAMETHasone (Decadron) 20 mg in sodium chloride 0.9% 110 mL IVPB   Intravenous Once Lily Herrera APRN   Stopped at 02/01/24 0840    [COMPLETED] trastuzumab-qyyp (Trazimera) 336 mg in sodium chloride 0.9% 266 mL infusion  4 mg/kg (Treatment Plan Recorded) Intravenous Once Lily Herrera APRN   Stopped at 02/01/24 0916    [COMPLETED] oxaliplatin (Eloxatin) 170 mg in dextrose 5% 284 mL  infusion  85 mg/m2 (Treatment Plan Recorded) Intravenous Once Lily Herrera APRN   Stopped at 02/01/24 1130    [COMPLETED] leucovorin 800 mg in dextrose 5% 250 mL infusion  400 mg/m2 (Treatment Plan Recorded) Intravenous Once Lily Herrera APRN   Stopped at 02/01/24 1130    [COMPLETED] fluorouracil (Adrucil) 50 mg/mL IV push 800 mg 16 mL  400 mg/m2 (Treatment Plan Recorded) Intravenous Once Lily Herrera APRN   800 mg at 02/01/24 1204     Vitals:    02/28/24 1425   BP: 138/86   Pulse: 75   Resp: 18   Temp: 98 °F (36.7 °C)         Nad, rr,nd, no edema, kenny, cn intact      64 year old  With gastric adenocarcinoma diagnosed 12/18/23 in the setting of iron def anemia. 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 Ok to continue with Cycle #4 tomorrow. CT imaging after C4-order placed.  PDL1 10% 1/2024. Will add Pembrolizumab  as >1% per guidelines. q6week scheduling-initiated with C3 above.      --anemia iv iron for tu. Resolved.  Feraheme #2/2 s/p 1/2024.  -TCP mild with chemo. Monitor. Ok to tx.  -Gr 1 transaminitis and tbili elevation. No abd pain. Likely r/t tx. Monitor, ok to tx.    --skin rash. Resolved. Supportive care with topical creams.         we will continue to be focal point of care in setting of malignancy undergoing ongoing treatment.     XOCHITL Allen

## 2024-02-28 NOTE — PROGRESS NOTES
Seen and examined agree with note per APN Sharon agrees and as above proceed with next dose of FOLFOX with trastuzumab pembrolizumab for HER2 positive metastatic gastric adenocarcinoma PD-L1 10% tolerating treatment well exam comfortable labored respirations blood loss iron deficiency anemia replace as needed transaminitis and thrombocytopenia monitor

## 2024-02-29 ENCOUNTER — OFFICE VISIT (OUTPATIENT)
Dept: HEMATOLOGY/ONCOLOGY | Facility: HOSPITAL | Age: 64
End: 2024-02-29
Attending: INTERNAL MEDICINE
Payer: COMMERCIAL

## 2024-02-29 VITALS
HEART RATE: 78 BPM | TEMPERATURE: 98 F | RESPIRATION RATE: 18 BRPM | DIASTOLIC BLOOD PRESSURE: 71 MMHG | OXYGEN SATURATION: 96 % | SYSTOLIC BLOOD PRESSURE: 123 MMHG

## 2024-02-29 DIAGNOSIS — C16.9 MALIGNANT NEOPLASM OF STOMACH, UNSPECIFIED LOCATION (HCC): Primary | ICD-10-CM

## 2024-02-29 PROCEDURE — 96417 CHEMO IV INFUS EACH ADDL SEQ: CPT

## 2024-02-29 PROCEDURE — 96413 CHEMO IV INFUSION 1 HR: CPT

## 2024-02-29 PROCEDURE — 96411 CHEMO IV PUSH ADDL DRUG: CPT

## 2024-02-29 PROCEDURE — 96368 THER/DIAG CONCURRENT INF: CPT

## 2024-02-29 PROCEDURE — 96375 TX/PRO/DX INJ NEW DRUG ADDON: CPT

## 2024-02-29 PROCEDURE — 96415 CHEMO IV INFUSION ADDL HR: CPT

## 2024-02-29 RX ORDER — FLUOROURACIL 50 MG/ML
400 INJECTION, SOLUTION INTRAVENOUS ONCE
Status: COMPLETED | OUTPATIENT
Start: 2024-02-29 | End: 2024-02-29

## 2024-02-29 RX ORDER — FLUOROURACIL 50 MG/ML
2400 INJECTION, SOLUTION INTRAVENOUS CONTINUOUS
Status: DISCONTINUED | OUTPATIENT
Start: 2024-02-29 | End: 2024-02-29

## 2024-02-29 RX ADMIN — FLUOROURACIL 4800 MG: 50 INJECTION, SOLUTION INTRAVENOUS at 11:47:00

## 2024-02-29 RX ADMIN — FLUOROURACIL 800 MG: 50 INJECTION, SOLUTION INTRAVENOUS at 11:41:00

## 2024-03-02 ENCOUNTER — NURSE ONLY (OUTPATIENT)
Dept: HEMATOLOGY/ONCOLOGY | Facility: HOSPITAL | Age: 64
End: 2024-03-02
Attending: INTERNAL MEDICINE
Payer: COMMERCIAL

## 2024-03-02 VITALS
RESPIRATION RATE: 18 BRPM | DIASTOLIC BLOOD PRESSURE: 70 MMHG | TEMPERATURE: 98 F | HEART RATE: 86 BPM | SYSTOLIC BLOOD PRESSURE: 139 MMHG | OXYGEN SATURATION: 96 %

## 2024-03-02 DIAGNOSIS — C16.9 GASTRIC CANCER (HCC): Primary | ICD-10-CM

## 2024-03-02 PROCEDURE — 96523 IRRIG DRUG DELIVERY DEVICE: CPT

## 2024-03-02 RX ORDER — HEPARIN SODIUM (PORCINE) LOCK FLUSH IV SOLN 100 UNIT/ML 100 UNIT/ML
SOLUTION INTRAVENOUS
Status: COMPLETED
Start: 2024-03-02 | End: 2024-03-02

## 2024-03-02 RX ORDER — SODIUM CHLORIDE 9 MG/ML
10 INJECTION, SOLUTION INTRAMUSCULAR; INTRAVENOUS; SUBCUTANEOUS ONCE
OUTPATIENT
Start: 2024-03-02

## 2024-03-02 RX ORDER — HEPARIN SODIUM (PORCINE) LOCK FLUSH IV SOLN 100 UNIT/ML 100 UNIT/ML
5 SOLUTION INTRAVENOUS ONCE
Status: COMPLETED | OUTPATIENT
Start: 2024-03-02 | End: 2024-03-02

## 2024-03-02 RX ORDER — HEPARIN SODIUM (PORCINE) LOCK FLUSH IV SOLN 100 UNIT/ML 100 UNIT/ML
5 SOLUTION INTRAVENOUS ONCE
OUTPATIENT
Start: 2024-03-02

## 2024-03-02 RX ADMIN — HEPARIN SODIUM (PORCINE) LOCK FLUSH IV SOLN 100 UNIT/ML 500 UNITS: 100 SOLUTION INTRAVENOUS at 09:25:00

## 2024-03-02 NOTE — PROGRESS NOTES
Patient presented with CADD pump connected. Reservoir with 1.2 ml remaining. Disconnected CADD pump, flushed port with 0.9% Normal Saline and established blood return in port. Flushed with 500 units of Heparin and de-accessed port. Gauze and paper tape applied to port site.

## 2024-03-12 ENCOUNTER — HOSPITAL ENCOUNTER (OUTPATIENT)
Dept: CT IMAGING | Facility: HOSPITAL | Age: 64
Discharge: HOME | End: 2024-03-12
Attending: NURSE PRACTITIONER
Payer: COMMERCIAL

## 2024-03-12 DIAGNOSIS — Z51.11 CHEMOTHERAPY MANAGEMENT, ENCOUNTER FOR: ICD-10-CM

## 2024-03-12 DIAGNOSIS — C16.9 MALIGNANT NEOPLASM OF STOMACH, UNSPECIFIED LOCATION (HCC): ICD-10-CM

## 2024-03-12 PROCEDURE — 71260 CT THORAX DX C+: CPT | Performed by: NURSE PRACTITIONER

## 2024-03-12 PROCEDURE — 74177 CT ABD & PELVIS W/CONTRAST: CPT | Performed by: NURSE PRACTITIONER

## 2024-03-13 ENCOUNTER — NURSE ONLY (OUTPATIENT)
Dept: HEMATOLOGY/ONCOLOGY | Facility: HOSPITAL | Age: 64
End: 2024-03-13
Attending: INTERNAL MEDICINE
Payer: COMMERCIAL

## 2024-03-13 ENCOUNTER — OFFICE VISIT (OUTPATIENT)
Dept: HEMATOLOGY/ONCOLOGY | Facility: HOSPITAL | Age: 64
End: 2024-03-13
Attending: NURSE PRACTITIONER
Payer: COMMERCIAL

## 2024-03-13 VITALS
BODY MASS INDEX: 26.81 KG/M2 | WEIGHT: 181 LBS | DIASTOLIC BLOOD PRESSURE: 70 MMHG | TEMPERATURE: 98 F | SYSTOLIC BLOOD PRESSURE: 130 MMHG | RESPIRATION RATE: 18 BRPM | OXYGEN SATURATION: 97 % | HEIGHT: 69 IN | HEART RATE: 77 BPM

## 2024-03-13 DIAGNOSIS — D50.0 IRON DEFICIENCY ANEMIA DUE TO CHRONIC BLOOD LOSS: ICD-10-CM

## 2024-03-13 DIAGNOSIS — Z51.11 CHEMOTHERAPY MANAGEMENT, ENCOUNTER FOR: ICD-10-CM

## 2024-03-13 DIAGNOSIS — C16.9 MALIGNANT NEOPLASM OF STOMACH, UNSPECIFIED LOCATION (HCC): Primary | ICD-10-CM

## 2024-03-13 DIAGNOSIS — D69.6 THROMBOCYTOPENIA (HCC): ICD-10-CM

## 2024-03-13 DIAGNOSIS — R74.01 TRANSAMINITIS: ICD-10-CM

## 2024-03-13 LAB
ALBUMIN SERPL-MCNC: 4.4 G/DL (ref 3.2–4.8)
ALBUMIN/GLOB SERPL: 1.5 {RATIO} (ref 1–2)
ALP LIVER SERPL-CCNC: 111 U/L
ALT SERPL-CCNC: 210 U/L
ANION GAP SERPL CALC-SCNC: 6 MMOL/L (ref 0–18)
AST SERPL-CCNC: 122 U/L (ref ?–34)
BASOPHILS # BLD AUTO: 0.01 X10(3) UL (ref 0–0.2)
BASOPHILS NFR BLD AUTO: 0.2 %
BILIRUB SERPL-MCNC: 1.9 MG/DL (ref 0.2–1.1)
BUN BLD-MCNC: 15 MG/DL (ref 9–23)
BUN/CREAT SERPL: 11.5 (ref 10–20)
CALCIUM BLD-MCNC: 9.8 MG/DL (ref 8.7–10.4)
CHLORIDE SERPL-SCNC: 107 MMOL/L (ref 98–112)
CO2 SERPL-SCNC: 27 MMOL/L (ref 21–32)
CREAT BLD-MCNC: 1.3 MG/DL
DEPRECATED RDW RBC AUTO: 58.6 FL (ref 35.1–46.3)
EGFRCR SERPLBLD CKD-EPI 2021: 62 ML/MIN/1.73M2 (ref 60–?)
EOSINOPHIL # BLD AUTO: 0.06 X10(3) UL (ref 0–0.7)
EOSINOPHIL NFR BLD AUTO: 1.5 %
ERYTHROCYTE [DISTWIDTH] IN BLOOD BY AUTOMATED COUNT: 20.1 % (ref 11–15)
GLOBULIN PLAS-MCNC: 2.9 G/DL (ref 2.8–4.4)
GLUCOSE BLD-MCNC: 96 MG/DL (ref 70–99)
HCT VFR BLD AUTO: 38.4 %
HGB BLD-MCNC: 13 G/DL
IMM GRANULOCYTES # BLD AUTO: 0 X10(3) UL (ref 0–1)
IMM GRANULOCYTES NFR BLD: 0 %
LYMPHOCYTES # BLD AUTO: 1.03 X10(3) UL (ref 1–4)
LYMPHOCYTES NFR BLD AUTO: 24.9 %
MCH RBC QN AUTO: 27.8 PG (ref 26–34)
MCHC RBC AUTO-ENTMCNC: 33.9 G/DL (ref 31–37)
MCV RBC AUTO: 82.2 FL
MONOCYTES # BLD AUTO: 0.45 X10(3) UL (ref 0.1–1)
MONOCYTES NFR BLD AUTO: 10.9 %
NEUTROPHILS # BLD AUTO: 2.58 X10 (3) UL (ref 1.5–7.7)
NEUTROPHILS # BLD AUTO: 2.58 X10(3) UL (ref 1.5–7.7)
NEUTROPHILS NFR BLD AUTO: 62.5 %
OSMOLALITY SERPL CALC.SUM OF ELEC: 291 MOSM/KG (ref 275–295)
PLATELET # BLD AUTO: 102 10(3)UL (ref 150–450)
POTASSIUM SERPL-SCNC: 4.6 MMOL/L (ref 3.5–5.1)
PROT SERPL-MCNC: 7.3 G/DL (ref 5.7–8.2)
RBC # BLD AUTO: 4.67 X10(6)UL
SODIUM SERPL-SCNC: 140 MMOL/L (ref 136–145)
WBC # BLD AUTO: 4.1 X10(3) UL (ref 4–11)

## 2024-03-13 PROCEDURE — 99215 OFFICE O/P EST HI 40 MIN: CPT | Performed by: INTERNAL MEDICINE

## 2024-03-13 PROCEDURE — 80053 COMPREHEN METABOLIC PANEL: CPT

## 2024-03-13 PROCEDURE — 36415 COLL VENOUS BLD VENIPUNCTURE: CPT

## 2024-03-13 PROCEDURE — 85025 COMPLETE CBC W/AUTO DIFF WBC: CPT

## 2024-03-13 NOTE — PROGRESS NOTES
Seen and examined agree with note per APN Sharon agrees apn note.    Tranaminitis delay next dose of FOLFOX with trastuzumab pembrolizumab for HER2 positive metastatic gastric adenocarcinoma PD-L1 10% tolerating treatment well exam comfortable labored respirations blood loss iron deficiency anemia replace as needed transaminitis and thrombocytopenia monitor

## 2024-03-13 NOTE — PROGRESS NOTES
Hematology note    Requesting: Dr. Higgins    Anemia gastric cancer    HPI:  64 year old  With gastric adenocarcinoma diagnosed 12/18/23 in the setting of iron def anemia.     See below for staging workup    Initiated FOLFOX +Trastuzumab 1/18/24.        Interval History:  Returns for consideration of Cycle #5 FOLFOX +Trastuzumab.  The patient feels reasonly well denies any melena hematochezia. Tolerating relatively well. No new pain.  No focal neurological deficits.  No changes in bowel or bladder habits. No changes in breathing, no fevers/chills.  No skin rash or pruritus.      Pmh:  Arthritis      Social History     Socioeconomic History    Marital status:     Number of children: 2   Occupational History    Occupation: Food service     Employer: [email or web address]   Tobacco Use    Smoking status: Never    Smokeless tobacco: Never   Vaping Use    Vaping Use: Never used   Substance and Sexual Activity    Alcohol use: No     Alcohol/week: 0.0 standard drinks of alcohol    Drug use: No    Sexual activity: Yes   Other Topics Concern    Caffeine Concern Yes     Comment: coffee, 1 cup daily     Family History   Problem Relation Age of Onset    Prostate Cancer Other      [id number]    Nka  Current Outpatient Medications on File Prior to Visit   Medication Sig Dispense Refill    prochlorperazine (COMPAZINE) 10 mg tablet Take 1 tablet (10 mg total) by mouth every 6 (six) hours as needed for Nausea. (Patient not taking: Reported on 1/31/2024) 30 tablet 3    ondansetron (ZOFRAN) 8 MG tablet Take 1 tablet (8 mg total) by mouth every 8 (eight) hours as needed for Nausea. (Patient not taking: Reported on 1/31/2024) 30 tablet 3    Omeprazole 40 MG Oral Capsule Delayed Release Take 1 capsule (40 mg total) by mouth every morning before breakfast. 90 capsule 0    ferrous sulfate 325 (65 FE) MG Oral Tab EC Take 1 tablet (325 mg total) by mouth daily with breakfast. (Patient not taking: Reported on 1/8/2024)        Current Facility-Administered Medications on File Prior to Visit   Medication Dose Route Frequency Provider Last Rate Last Admin    [COMPLETED] iopamidol 76% (ISOVUE-370) injection for power injector  80 mL Intravenous ONCE PRN Lily Herrera APRN   80 mL at 03/12/24 1119    [COMPLETED] heparin sodium lock flush 100 UNIT/ML injection 500 Units  5 mL Intracatheter Once Marco Amaro MD   500 Units at 03/02/24 0925    [COMPLETED] ondansetron (Zofran) 16 mg, dexAMETHasone (Decadron) 20 mg in sodium chloride 0.9% 110 mL IVPB   Intravenous Once Lily Herrera APRN   Stopped at 02/29/24 0806    [COMPLETED] trastuzumab-qyyp (Trazimera) 336 mg in sodium chloride 0.9% 266 mL infusion  4 mg/kg (Treatment Plan Recorded) Intravenous Once Lily Herrera APRN   Stopped at 02/29/24 0912    [COMPLETED] oxaliplatin (Eloxatin) 170 mg in dextrose 5% 284 mL infusion  85 mg/m2 (Treatment Plan Recorded) Intravenous Once Lily Herrera APRN   Stopped at 02/29/24 1121    [COMPLETED] leucovorin 800 mg in dextrose 5% 250 mL infusion  400 mg/m2 (Treatment Plan Recorded) Intravenous Once Lily Herrera APRN   Stopped at 02/29/24 1116    [COMPLETED] fluorouracil (Adrucil) 50 mg/mL IV push 800 mg 16 mL  400 mg/m2 (Treatment Plan Recorded) Intravenous Once Lily Herrera APRN   800 mg at 02/29/24 1141    [COMPLETED] ondansetron (Zofran) 16 mg, dexAMETHasone (Decadron) 20 mg in sodium chloride 0.9% 110 mL IVPB   Intravenous Once Lily Herrera APRN   Stopped at 02/15/24 0920    [COMPLETED] pembrolizumab (Keytruda) 400 mg in sodium chloride 0.9% 116 mL IVPB  400 mg Intravenous Once Lily Herrera APRN   Stopped at 02/15/24 0852    [COMPLETED] trastuzumab-qyyp (Trazimera) 336 mg in sodium chloride 0.9% 266 mL infusion  4 mg/kg (Treatment Plan Recorded) Intravenous Once Lily Herrera APRN   Stopped at 02/15/24 0951    [COMPLETED] oxaliplatin (Eloxatin) 170 mg in dextrose 5% 284 mL infusion   85 mg/m2 (Treatment Plan Recorded) Intravenous Once Lily Herrera APRN   Stopped at 02/15/24 1202    [COMPLETED] leucovorin 800 mg in dextrose 5% 250 mL infusion  400 mg/m2 (Treatment Plan Recorded) Intravenous Once Lily Herrera APRN   Stopped at 02/15/24 1202    [COMPLETED] fluorouracil (Adrucil) 50 mg/mL IV push 800 mg 16 mL  400 mg/m2 (Treatment Plan Recorded) Intravenous Once Lily Herrera APRN   800 mg at 02/15/24 1211     There were no vitals filed for this visit.        Nad, rr,nd, no edema, kenny, cn intact      64 year old  With gastric adenocarcinoma diagnosed 12/18/23 in the setting of iron def anemia. 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 Will delay Cycle #5 x 1 week d/t worsening LFT elevation below.  CT imaging after C4 3/2024 with favorable treatment response.   PDL1 10% 1/2024. Added Pembrolizumab  as >1% per guidelines. q6week scheduling-initiated with C3 above.    --anemia iv iron for tu. Resolved.  Feraheme #2/2 s/p 1/2024.  -TCP mild with chemo. Monitor. Ok to tx.  -Gr 1 transaminitis and tbili elevation last visit now gr 2 transaminitis with Tbili 1.9. No abd pain. Likely r/t tx. Will DELAY treatment 1 week and reassess. Monitor.    --skin rash. Resolved. Supportive care with topical creams.       we will continue to be focal point of care in setting of malignancy undergoing ongoing treatment.     XOCHITL Allen

## 2024-03-13 NOTE — PATIENT INSTRUCTIONS
No treatment tomorrow due to high liver enzymes. Will delay treatment 1 week in case related to chemo    No tylenol and no alcohol      CT scan looks favorable    Schedulers call you with new appts to recheck labs and see MD next week.

## 2024-03-14 ENCOUNTER — APPOINTMENT (OUTPATIENT)
Dept: HEMATOLOGY/ONCOLOGY | Facility: HOSPITAL | Age: 64
End: 2024-03-14
Attending: INTERNAL MEDICINE
Payer: COMMERCIAL

## 2024-03-16 ENCOUNTER — APPOINTMENT (OUTPATIENT)
Dept: HEMATOLOGY/ONCOLOGY | Facility: HOSPITAL | Age: 64
End: 2024-03-16
Attending: INTERNAL MEDICINE
Payer: COMMERCIAL

## 2024-03-18 RX ORDER — OMEPRAZOLE 40 MG/1
40 CAPSULE, DELAYED RELEASE ORAL
Qty: 90 CAPSULE | Refills: 0 | Status: SHIPPED | OUTPATIENT
Start: 2024-03-18 | End: 2024-06-16

## 2024-03-18 NOTE — TELEPHONE ENCOUNTER
Requested Prescriptions     Pending Prescriptions Disp Refills    OMEPRAZOLE 40 MG Oral Capsule Delayed Release [Pharmacy Med Name: Omeprazole Dr 40 Mg Cap Nort] 90 capsule 0     Sig: Take 1 capsule (40 mg total) by mouth every morning before breakfast.        LOV   12/12/2023    LR   12/18/2023      sb

## 2024-03-20 ENCOUNTER — OFFICE VISIT (OUTPATIENT)
Dept: HEMATOLOGY/ONCOLOGY | Facility: HOSPITAL | Age: 64
End: 2024-03-20
Attending: NURSE PRACTITIONER
Payer: COMMERCIAL

## 2024-03-20 ENCOUNTER — NURSE ONLY (OUTPATIENT)
Dept: HEMATOLOGY/ONCOLOGY | Facility: HOSPITAL | Age: 64
End: 2024-03-20
Attending: INTERNAL MEDICINE
Payer: COMMERCIAL

## 2024-03-20 VITALS
TEMPERATURE: 98 F | HEART RATE: 67 BPM | HEIGHT: 69 IN | SYSTOLIC BLOOD PRESSURE: 133 MMHG | DIASTOLIC BLOOD PRESSURE: 80 MMHG | RESPIRATION RATE: 18 BRPM | BODY MASS INDEX: 26.81 KG/M2 | OXYGEN SATURATION: 97 % | WEIGHT: 181 LBS

## 2024-03-20 DIAGNOSIS — D69.6 THROMBOCYTOPENIA (HCC): ICD-10-CM

## 2024-03-20 DIAGNOSIS — C16.9 MALIGNANT NEOPLASM OF STOMACH, UNSPECIFIED LOCATION (HCC): Primary | ICD-10-CM

## 2024-03-20 DIAGNOSIS — Z51.11 CHEMOTHERAPY MANAGEMENT, ENCOUNTER FOR: ICD-10-CM

## 2024-03-20 DIAGNOSIS — R74.01 TRANSAMINITIS: ICD-10-CM

## 2024-03-20 DIAGNOSIS — D50.9 IRON DEFICIENCY ANEMIA, UNSPECIFIED IRON DEFICIENCY ANEMIA TYPE: ICD-10-CM

## 2024-03-20 LAB
ALBUMIN SERPL-MCNC: 4.5 G/DL (ref 3.2–4.8)
ALBUMIN/GLOB SERPL: 1.5 {RATIO} (ref 1–2)
ALP LIVER SERPL-CCNC: 127 U/L
ALT SERPL-CCNC: 124 U/L
ANION GAP SERPL CALC-SCNC: 4 MMOL/L (ref 0–18)
AST SERPL-CCNC: 61 U/L (ref ?–34)
BASOPHILS # BLD AUTO: 0.02 X10(3) UL (ref 0–0.2)
BASOPHILS NFR BLD AUTO: 0.5 %
BILIRUB SERPL-MCNC: 1.9 MG/DL (ref 0.2–1.1)
BUN BLD-MCNC: 14 MG/DL (ref 9–23)
BUN/CREAT SERPL: 14.3 (ref 10–20)
CALCIUM BLD-MCNC: 9.7 MG/DL (ref 8.7–10.4)
CHLORIDE SERPL-SCNC: 107 MMOL/L (ref 98–112)
CO2 SERPL-SCNC: 27 MMOL/L (ref 21–32)
CREAT BLD-MCNC: 0.98 MG/DL
DEPRECATED RDW RBC AUTO: 59.5 FL (ref 35.1–46.3)
EGFRCR SERPLBLD CKD-EPI 2021: 87 ML/MIN/1.73M2 (ref 60–?)
EOSINOPHIL # BLD AUTO: 0.09 X10(3) UL (ref 0–0.7)
EOSINOPHIL NFR BLD AUTO: 2.1 %
ERYTHROCYTE [DISTWIDTH] IN BLOOD BY AUTOMATED COUNT: 19.5 % (ref 11–15)
GLOBULIN PLAS-MCNC: 3 G/DL (ref 2.8–4.4)
GLUCOSE BLD-MCNC: 87 MG/DL (ref 70–99)
HCT VFR BLD AUTO: 40 %
HGB BLD-MCNC: 13.5 G/DL
IMM GRANULOCYTES # BLD AUTO: 0.02 X10(3) UL (ref 0–1)
IMM GRANULOCYTES NFR BLD: 0.5 %
LYMPHOCYTES # BLD AUTO: 1.54 X10(3) UL (ref 1–4)
LYMPHOCYTES NFR BLD AUTO: 36.4 %
MCH RBC QN AUTO: 28.5 PG (ref 26–34)
MCHC RBC AUTO-ENTMCNC: 33.8 G/DL (ref 31–37)
MCV RBC AUTO: 84.4 FL
MONOCYTES # BLD AUTO: 0.71 X10(3) UL (ref 0.1–1)
MONOCYTES NFR BLD AUTO: 16.8 %
NEUTROPHILS # BLD AUTO: 1.85 X10 (3) UL (ref 1.5–7.7)
NEUTROPHILS # BLD AUTO: 1.85 X10(3) UL (ref 1.5–7.7)
NEUTROPHILS NFR BLD AUTO: 43.7 %
OSMOLALITY SERPL CALC.SUM OF ELEC: 286 MOSM/KG (ref 275–295)
PLATELET # BLD AUTO: 130 10(3)UL (ref 150–450)
POTASSIUM SERPL-SCNC: 4.2 MMOL/L (ref 3.5–5.1)
PROT SERPL-MCNC: 7.5 G/DL (ref 5.7–8.2)
RBC # BLD AUTO: 4.74 X10(6)UL
SODIUM SERPL-SCNC: 138 MMOL/L (ref 136–145)
WBC # BLD AUTO: 4.2 X10(3) UL (ref 4–11)

## 2024-03-20 PROCEDURE — 85025 COMPLETE CBC W/AUTO DIFF WBC: CPT

## 2024-03-20 PROCEDURE — 80053 COMPREHEN METABOLIC PANEL: CPT

## 2024-03-20 PROCEDURE — 36415 COLL VENOUS BLD VENIPUNCTURE: CPT

## 2024-03-20 PROCEDURE — 99215 OFFICE O/P EST HI 40 MIN: CPT | Performed by: INTERNAL MEDICINE

## 2024-03-20 RX ORDER — FLUOROURACIL 50 MG/ML
400 INJECTION, SOLUTION INTRAVENOUS ONCE
Status: CANCELLED | OUTPATIENT
Start: 2024-03-21

## 2024-03-20 RX ORDER — FLUOROURACIL 50 MG/ML
2400 INJECTION, SOLUTION INTRAVENOUS CONTINUOUS
Status: CANCELLED | OUTPATIENT
Start: 2024-03-21

## 2024-03-20 NOTE — PROGRESS NOTES
Hematology note    Requesting: Dr. Higgins    Anemia gastric cancer    HPI:  64 year old  With gastric adenocarcinoma diagnosed 12/18/23 in the setting of iron def anemia.     See below for staging workup    Initiated FOLFOX +Trastuzumab 1/18/24.        Interval History:  Returns for consideration of Cycle #5 FOLFOX +Trastuzumab.  The patient feels reasonly well denies any melena hematochezia. Tolerating relatively well. No new pain.  Minimal intermittent gr 1 CIPN to fingers, no ADL limitations.  No focal neurological deficits.  No changes in bowel or bladder habits. No changes in breathing, no fevers/chills.  No skin rash or pruritus.      Pmh:  Arthritis      Social History     Socioeconomic History    Marital status:     Number of children: 2   Occupational History    Occupation: Food service     Employer: [employer]   Tobacco Use    Smoking status: Never    Smokeless tobacco: Never   Vaping Use    Vaping Use: Never used   Substance and Sexual Activity    Alcohol use: No     Alcohol/week: 0.0 standard drinks of alcohol    Drug use: No    Sexual activity: Yes   Other Topics Concern    Caffeine Concern Yes     Comment: coffee, 1 cup daily     Family History   Problem Relation Age of Onset    Prostate Cancer Other      [id number]    Nka  Current Outpatient Medications on File Prior to Visit   Medication Sig Dispense Refill    Omeprazole 40 MG Oral Capsule Delayed Release Take 1 capsule (40 mg total) by mouth every morning before breakfast. 90 capsule 0    prochlorperazine (COMPAZINE) 10 mg tablet Take 1 tablet (10 mg total) by mouth every 6 (six) hours as needed for Nausea. (Patient not taking: Reported on 1/31/2024) 30 tablet 3    ondansetron (ZOFRAN) 8 MG tablet Take 1 tablet (8 mg total) by mouth every 8 (eight) hours as needed for Nausea. (Patient not taking: Reported on 1/31/2024) 30 tablet 3     Current Facility-Administered Medications on File Prior to Visit   Medication Dose Route  Frequency Provider Last Rate Last Admin    [COMPLETED] iopamidol 76% (ISOVUE-370) injection for power injector  80 mL Intravenous ONCE PRN Lily Herrera APRN   80 mL at 03/12/24 1119    [COMPLETED] heparin sodium lock flush 100 UNIT/ML injection 500 Units  5 mL Intracatheter Once Marco Amaro MD   500 Units at 03/02/24 0925    [COMPLETED] ondansetron (Zofran) 16 mg, dexAMETHasone (Decadron) 20 mg in sodium chloride 0.9% 110 mL IVPB   Intravenous Once Lily Herrera APRN   Stopped at 02/29/24 0806    [COMPLETED] trastuzumab-qyyp (Trazimera) 336 mg in sodium chloride 0.9% 266 mL infusion  4 mg/kg (Treatment Plan Recorded) Intravenous Once Lily Herrera APRN   Stopped at 02/29/24 0912    [COMPLETED] oxaliplatin (Eloxatin) 170 mg in dextrose 5% 284 mL infusion  85 mg/m2 (Treatment Plan Recorded) Intravenous Once Lily Herrera APRN   Stopped at 02/29/24 1121    [COMPLETED] leucovorin 800 mg in dextrose 5% 250 mL infusion  400 mg/m2 (Treatment Plan Recorded) Intravenous Once Lily Herrera APRN   Stopped at 02/29/24 1116    [COMPLETED] fluorouracil (Adrucil) 50 mg/mL IV push 800 mg 16 mL  400 mg/m2 (Treatment Plan Recorded) Intravenous Once Lily Herrera APRN   800 mg at 02/29/24 1141     Vitals:    03/20/24 1440   BP: 133/80   Pulse: 67   Resp: 18   Temp: 97.9 °F (36.6 °C)           Nad, rr,nd, no edema, kenny, cn intact      64 year old  With gastric adenocarcinoma diagnosed 12/18/23 in the setting of iron def anemia. 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 Delayed Cycle #5 x 1 week d/t worsening LFT elevation below, improved ok to treat.  CT imaging after C4 3/2024 with favorable treatment response.   PDL1 10% 1/2024. Added  Pembrolizumab  as >1% per guidelines. q6week scheduling-initiated with C3 above.    --anemia iv iron for tu. Resolved.  Feraheme #2/2 s/p 1/2024.  -TCP mild with chemo. Monitor. Ok to tx.  -Gr 1 transaminitis today with tbili elevation, ok to treat. prior gr 2 transaminitis with Tbili elevation. Max 1.9.  No abd pain. Delayed x 1 week. Likely r/t tx, will hold off on dose reduction with improvement at this time.  Monitor.    --skin rash. Resolved. Supportive care with topical creams.    -minimal gr 1 CIPN to fingers. Not ADL limiting. Monitor, consider dose reduction if worsens.    -risk of cardiotoxicity. Normal EF/echo prior to treatment 1/2024. Will order repeat ECHO at next visit while on trastuzumab.        we will continue to be focal point of care in setting of malignancy undergoing ongoing treatment.     XOCHITL Allen

## 2024-03-20 NOTE — PROGRESS NOTES
Seen and examined agree with note per APN Sharon agrees apn note.    Tranaminitis improved proceed with next dose of FOLFOX with trastuzumab pembrolizumab for HER2 positive metastatic gastric adenocarcinoma PD-L1 10% tolerating treatment well exam comfortable labored respirations blood loss iron deficiency anemia replace as needed transaminitis and thrombocytopenia monitor

## 2024-03-21 ENCOUNTER — OFFICE VISIT (OUTPATIENT)
Dept: HEMATOLOGY/ONCOLOGY | Facility: HOSPITAL | Age: 64
End: 2024-03-21
Attending: INTERNAL MEDICINE
Payer: COMMERCIAL

## 2024-03-21 VITALS
DIASTOLIC BLOOD PRESSURE: 69 MMHG | SYSTOLIC BLOOD PRESSURE: 131 MMHG | OXYGEN SATURATION: 97 % | HEART RATE: 77 BPM | TEMPERATURE: 98 F | RESPIRATION RATE: 18 BRPM

## 2024-03-21 DIAGNOSIS — C16.9 MALIGNANT NEOPLASM OF STOMACH, UNSPECIFIED LOCATION (HCC): Primary | ICD-10-CM

## 2024-03-21 PROCEDURE — 96375 TX/PRO/DX INJ NEW DRUG ADDON: CPT

## 2024-03-21 PROCEDURE — 96413 CHEMO IV INFUSION 1 HR: CPT

## 2024-03-21 PROCEDURE — 96368 THER/DIAG CONCURRENT INF: CPT

## 2024-03-21 PROCEDURE — 96415 CHEMO IV INFUSION ADDL HR: CPT

## 2024-03-21 PROCEDURE — 96417 CHEMO IV INFUS EACH ADDL SEQ: CPT

## 2024-03-21 PROCEDURE — 96411 CHEMO IV PUSH ADDL DRUG: CPT

## 2024-03-21 RX ORDER — FLUOROURACIL 50 MG/ML
400 INJECTION, SOLUTION INTRAVENOUS ONCE
Status: COMPLETED | OUTPATIENT
Start: 2024-03-21 | End: 2024-03-21

## 2024-03-21 RX ORDER — FLUOROURACIL 50 MG/ML
2400 INJECTION, SOLUTION INTRAVENOUS CONTINUOUS
Status: DISCONTINUED | OUTPATIENT
Start: 2024-03-21 | End: 2024-03-21

## 2024-03-21 RX ADMIN — FLUOROURACIL 4800 MG: 50 INJECTION, SOLUTION INTRAVENOUS at 11:19:00

## 2024-03-21 RX ADMIN — FLUOROURACIL 800 MG: 50 INJECTION, SOLUTION INTRAVENOUS at 11:14:00

## 2024-03-21 NOTE — PROGRESS NOTES
Pt here for C5D1 Drug(s)FOLFOX/Trastuzumab.  Arrives Ambulating independently, accompanied by Self     Patient was evaluated today by Treatment Nurse.    Oral medications included in this regimen:  no    Patient confirms comprehension of cancer treatment schedule:  yes    Pregnancy screening:  Not applicable    Modifications in dose or schedule:  No    Medications appearance and physical integrity checked by RN: yes.    Chemotherapy IV pump settings verified by 2 RNs:  Yes.  Frequency of blood return and site check throughout administration: Prior to administration, Prior to each drug, and At completion of therapy     Infusion/treatment outcome:  patient tolerated treatment without incident    Education Record    Learner:  Patient  Barriers / Limitations:  None  Method:  Brief focused and Discussion  Education / instructions given:  Plan of care for treatment today  Outcome:  Shows understanding    Discharged Home, Ambulating independently, accompanied by:Self    Patient/family verbalized understanding of future appointments: by MyChart messaging

## 2024-03-23 ENCOUNTER — NURSE ONLY (OUTPATIENT)
Dept: HEMATOLOGY/ONCOLOGY | Facility: HOSPITAL | Age: 64
End: 2024-03-23
Attending: INTERNAL MEDICINE
Payer: COMMERCIAL

## 2024-03-23 VITALS
RESPIRATION RATE: 18 BRPM | HEART RATE: 94 BPM | DIASTOLIC BLOOD PRESSURE: 63 MMHG | TEMPERATURE: 98 F | OXYGEN SATURATION: 96 % | SYSTOLIC BLOOD PRESSURE: 128 MMHG

## 2024-03-23 DIAGNOSIS — C16.9 MALIGNANT NEOPLASM OF STOMACH, UNSPECIFIED LOCATION (HCC): Primary | ICD-10-CM

## 2024-03-23 PROCEDURE — 96523 IRRIG DRUG DELIVERY DEVICE: CPT

## 2024-03-23 RX ORDER — HEPARIN SODIUM (PORCINE) LOCK FLUSH IV SOLN 100 UNIT/ML 100 UNIT/ML
5 SOLUTION INTRAVENOUS ONCE
Status: COMPLETED | OUTPATIENT
Start: 2024-03-23 | End: 2024-03-23

## 2024-03-23 RX ORDER — SODIUM CHLORIDE 9 MG/ML
10 INJECTION, SOLUTION INTRAMUSCULAR; INTRAVENOUS; SUBCUTANEOUS ONCE
OUTPATIENT
Start: 2024-03-23

## 2024-03-23 RX ORDER — HEPARIN SODIUM (PORCINE) LOCK FLUSH IV SOLN 100 UNIT/ML 100 UNIT/ML
5 SOLUTION INTRAVENOUS ONCE
OUTPATIENT
Start: 2024-03-23

## 2024-03-23 RX ORDER — HEPARIN SODIUM (PORCINE) LOCK FLUSH IV SOLN 100 UNIT/ML 100 UNIT/ML
SOLUTION INTRAVENOUS
Status: COMPLETED
Start: 2024-03-23 | End: 2024-03-23

## 2024-03-23 RX ADMIN — HEPARIN SODIUM (PORCINE) LOCK FLUSH IV SOLN 100 UNIT/ML 500 UNITS: 100 SOLUTION INTRAVENOUS at 08:35:00

## 2024-03-23 NOTE — PROGRESS NOTES
Patient presented with CADD pump connected. Reservoir with 1.1 ml remaining. Disconnected CADD pump, flushed port with 0.9% Normal Saline and established blood return in port. Flushed with 500 units of Heparin and de-accessed port. Gauze and paper tape applied to port site.

## 2024-03-27 ENCOUNTER — APPOINTMENT (OUTPATIENT)
Dept: HEMATOLOGY/ONCOLOGY | Facility: HOSPITAL | Age: 64
End: 2024-03-27
Attending: INTERNAL MEDICINE
Payer: COMMERCIAL

## 2024-03-28 ENCOUNTER — APPOINTMENT (OUTPATIENT)
Dept: HEMATOLOGY/ONCOLOGY | Facility: HOSPITAL | Age: 64
End: 2024-03-28
Attending: INTERNAL MEDICINE
Payer: COMMERCIAL

## 2024-03-28 ENCOUNTER — TELEPHONE (OUTPATIENT)
Dept: HEMATOLOGY/ONCOLOGY | Facility: HOSPITAL | Age: 64
End: 2024-03-28

## 2024-03-28 NOTE — TELEPHONE ENCOUNTER
3/21/24 - C5D1 - mFOLFOX 6 with Trastuzumab    H/A, numbness in hands, nausea    Patient having a H/A and wondering what would be okay for him to take.  He states something previously had been mentioned about his liver so he did not know if Tylenol okay.   Please advise.    Has had some nausea but managing and eating and drinking.  Numbness in hands is same.    Requesting a call back with instructions.

## 2024-03-28 NOTE — TELEPHONE ENCOUNTER
Pt's wife is calling and would like a call back, states pt is having a headache and would like to discuss what is safe to take-NL

## 2024-03-28 NOTE — TELEPHONE ENCOUNTER
Called patient with instruction.  He can try some Aleve or Ibuprofen 1-2 times per day but take with food.  Avoid Tylenol due to liver function.  If not helping call for something stronger.  She verbalizes understanding.

## 2024-03-30 ENCOUNTER — APPOINTMENT (OUTPATIENT)
Dept: HEMATOLOGY/ONCOLOGY | Facility: HOSPITAL | Age: 64
End: 2024-03-30
Attending: INTERNAL MEDICINE
Payer: COMMERCIAL

## 2024-04-03 ENCOUNTER — NURSE ONLY (OUTPATIENT)
Dept: HEMATOLOGY/ONCOLOGY | Facility: HOSPITAL | Age: 64
End: 2024-04-03
Attending: INTERNAL MEDICINE
Payer: COMMERCIAL

## 2024-04-03 VITALS
DIASTOLIC BLOOD PRESSURE: 78 MMHG | RESPIRATION RATE: 18 BRPM | SYSTOLIC BLOOD PRESSURE: 129 MMHG | TEMPERATURE: 98 F | OXYGEN SATURATION: 96 % | BODY MASS INDEX: 26.66 KG/M2 | WEIGHT: 180 LBS | HEIGHT: 69 IN | HEART RATE: 76 BPM

## 2024-04-03 DIAGNOSIS — Z51.11 CHEMOTHERAPY MANAGEMENT, ENCOUNTER FOR: Primary | ICD-10-CM

## 2024-04-03 DIAGNOSIS — C16.9 MALIGNANT NEOPLASM OF STOMACH, UNSPECIFIED LOCATION (HCC): ICD-10-CM

## 2024-04-03 DIAGNOSIS — C16.9 MALIGNANT NEOPLASM OF STOMACH, UNSPECIFIED LOCATION (HCC): Primary | ICD-10-CM

## 2024-04-03 DIAGNOSIS — D69.6 THROMBOCYTOPENIA (HCC): ICD-10-CM

## 2024-04-03 DIAGNOSIS — R74.01 TRANSAMINITIS: ICD-10-CM

## 2024-04-03 LAB
ALBUMIN SERPL-MCNC: 4.1 G/DL (ref 3.2–4.8)
ALBUMIN/GLOB SERPL: 1.4 {RATIO} (ref 1–2)
ALP LIVER SERPL-CCNC: 119 U/L
ALT SERPL-CCNC: 90 U/L
ANION GAP SERPL CALC-SCNC: 7 MMOL/L (ref 0–18)
AST SERPL-CCNC: 64 U/L (ref ?–34)
BASOPHILS # BLD AUTO: 0.01 X10(3) UL (ref 0–0.2)
BASOPHILS NFR BLD AUTO: 0.3 %
BILIRUB SERPL-MCNC: 1.6 MG/DL (ref 0.2–1.1)
BUN BLD-MCNC: 20 MG/DL (ref 9–23)
BUN/CREAT SERPL: 17.5 (ref 10–20)
CALCIUM BLD-MCNC: 9.5 MG/DL (ref 8.7–10.4)
CHLORIDE SERPL-SCNC: 108 MMOL/L (ref 98–112)
CO2 SERPL-SCNC: 26 MMOL/L (ref 21–32)
CREAT BLD-MCNC: 1.14 MG/DL
DEPRECATED RDW RBC AUTO: 57.4 FL (ref 35.1–46.3)
EGFRCR SERPLBLD CKD-EPI 2021: 72 ML/MIN/1.73M2 (ref 60–?)
EOSINOPHIL # BLD AUTO: 0.05 X10(3) UL (ref 0–0.7)
EOSINOPHIL NFR BLD AUTO: 1.4 %
ERYTHROCYTE [DISTWIDTH] IN BLOOD BY AUTOMATED COUNT: 18.6 % (ref 11–15)
GLOBULIN PLAS-MCNC: 2.9 G/DL (ref 2.8–4.4)
GLUCOSE BLD-MCNC: 105 MG/DL (ref 70–99)
HCT VFR BLD AUTO: 37.3 %
HGB BLD-MCNC: 12.6 G/DL
IMM GRANULOCYTES # BLD AUTO: 0.01 X10(3) UL (ref 0–1)
IMM GRANULOCYTES NFR BLD: 0.3 %
LYMPHOCYTES # BLD AUTO: 0.89 X10(3) UL (ref 1–4)
LYMPHOCYTES NFR BLD AUTO: 24.1 %
MCH RBC QN AUTO: 29 PG (ref 26–34)
MCHC RBC AUTO-ENTMCNC: 33.8 G/DL (ref 31–37)
MCV RBC AUTO: 85.7 FL
MONOCYTES # BLD AUTO: 0.38 X10(3) UL (ref 0.1–1)
MONOCYTES NFR BLD AUTO: 10.3 %
NEUTROPHILS # BLD AUTO: 2.35 X10 (3) UL (ref 1.5–7.7)
NEUTROPHILS # BLD AUTO: 2.35 X10(3) UL (ref 1.5–7.7)
NEUTROPHILS NFR BLD AUTO: 63.6 %
OSMOLALITY SERPL CALC.SUM OF ELEC: 295 MOSM/KG (ref 275–295)
PLATELET # BLD AUTO: 91 10(3)UL (ref 150–450)
PLATELETS.RETICULATED NFR BLD AUTO: 3.8 % (ref 0–7)
POTASSIUM SERPL-SCNC: 4.3 MMOL/L (ref 3.5–5.1)
PROT SERPL-MCNC: 7 G/DL (ref 5.7–8.2)
RBC # BLD AUTO: 4.35 X10(6)UL
SODIUM SERPL-SCNC: 141 MMOL/L (ref 136–145)
WBC # BLD AUTO: 3.7 X10(3) UL (ref 4–11)

## 2024-04-03 PROCEDURE — 85025 COMPLETE CBC W/AUTO DIFF WBC: CPT

## 2024-04-03 PROCEDURE — 36415 COLL VENOUS BLD VENIPUNCTURE: CPT

## 2024-04-03 PROCEDURE — 99215 OFFICE O/P EST HI 40 MIN: CPT | Performed by: INTERNAL MEDICINE

## 2024-04-03 PROCEDURE — 80053 COMPREHEN METABOLIC PANEL: CPT

## 2024-04-03 RX ORDER — FLUOROURACIL 50 MG/ML
2400 INJECTION, SOLUTION INTRAVENOUS CONTINUOUS
Status: CANCELLED | OUTPATIENT
Start: 2024-04-03

## 2024-04-03 RX ORDER — FLUOROURACIL 50 MG/ML
400 INJECTION, SOLUTION INTRAVENOUS ONCE
Status: CANCELLED | OUTPATIENT
Start: 2024-04-03

## 2024-04-03 NOTE — PATIENT INSTRUCTIONS
Nausea and or vomiting    Compazine 10mg tablet every 6 hours as needed    Alternate with     Zofran 8mg tablet every 8 hours as needed        Decrease chemo dose due to nerves in fingers      ECHO heart test to check on function of heart while you are on trastuzumab.  Call to schedule when able.

## 2024-04-03 NOTE — PROGRESS NOTES
Seen and examined agree with note per APN Sharon agrees apn note.    Tranaminitis improved proceed with next dose of FOLFOX with trastuzumab pembrolizumab for HER2 positive metastatic gastric adenocarcinoma PD-L1 10% dose reduce oxal for cipn.  tolerating treatment well exam comfortable labored respirations blood loss iron deficiency anemia replace as needed transaminitis and thrombocytopenia monitor

## 2024-04-03 NOTE — PROGRESS NOTES
Hematology note    Requesting: Dr. Higgins    Anemia gastric cancer    HPI:  64 year old  With gastric adenocarcinoma diagnosed 12/18/23 in the setting of iron def anemia.     See below for staging workup    Initiated FOLFOX +Trastuzumab 1/18/24.        Interval History:  Returns for consideration of Cycle #6 FOLFOX +Trastuzumab+ Pembrolizumab.  The patient feels reasonly well denies any melena hematochezia. Tolerating relatively well. Minimal occasional left chest discomfort that last for minutes, not every day. No chest heaviness/pressure, severe CP, SOB, palpitations or changes in function. No BLE or fevers/chills.  intermittent gr 1 CIPN to fingers is slightly worse this visit.  No other focal neurological deficits.  No changes in bowel or bladder habits. Nausea without vomiting. No changes in breathing. No skin rash or pruritus.      Pmh:  Arthritis      Social History     Socioeconomic History    Marital status:     Number of children: 2   Occupational History    Occupation: Food service     Employer: [employer]   Tobacco Use    Smoking status: Never    Smokeless tobacco: Never   Vaping Use    Vaping Use: Never used   Substance and Sexual Activity    Alcohol use: No     Alcohol/week: 0.0 standard drinks of alcohol    Drug use: No    Sexual activity: Yes   Other Topics Concern    Caffeine Concern Yes     Comment: coffee, 1 cup daily     Family History   Problem Relation Age of Onset    Prostate Cancer Other      [id number]    Nka  Current Outpatient Medications on File Prior to Visit   Medication Sig Dispense Refill    Omeprazole 40 MG Oral Capsule Delayed Release Take 1 capsule (40 mg total) by mouth every morning before breakfast. 90 capsule 0    prochlorperazine (COMPAZINE) 10 mg tablet Take 1 tablet (10 mg total) by mouth every 6 (six) hours as needed for Nausea. (Patient not taking: Reported on 1/31/2024) 30 tablet 3    ondansetron (ZOFRAN) 8 MG tablet Take 1 tablet (8 mg total)  by mouth every 8 (eight) hours as needed for Nausea. (Patient not taking: Reported on 1/31/2024) 30 tablet 3     Current Facility-Administered Medications on File Prior to Visit   Medication Dose Route Frequency Provider Last Rate Last Admin    [COMPLETED] heparin sodium lock flush 100 UNIT/ML injection 500 Units  5 mL Intracatheter Once Marco Amaro MD   500 Units at 03/23/24 0835    [COMPLETED] ondansetron (Zofran) 16 mg, dexAMETHasone (Decadron) 20 mg in sodium chloride 0.9% 110 mL IVPB   Intravenous Once Lily Herrera APRN   Stopped at 03/21/24 0804    [COMPLETED] trastuzumab-qyyp (Trazimera) 336 mg in sodium chloride 0.9% 266 mL infusion  4 mg/kg (Treatment Plan Recorded) Intravenous Once Lily Herrera APRN   Stopped at 03/21/24 0850    [COMPLETED] oxaliplatin (Eloxatin) 170 mg in dextrose 5% 284 mL infusion  85 mg/m2 (Treatment Plan Recorded) Intravenous Once Lily Herrera APRN   Stopped at 03/21/24 1105    [COMPLETED] leucovorin 800 mg in dextrose 5% 250 mL infusion  400 mg/m2 (Treatment Plan Recorded) Intravenous Once Lily Herrera APRN   Stopped at 03/21/24 1105    [COMPLETED] fluorouracil (Adrucil) 50 mg/mL IV push 800 mg 16 mL  400 mg/m2 (Treatment Plan Recorded) Intravenous Once Lily Herrera APRN   800 mg at 03/21/24 1114    [COMPLETED] iopamidol 76% (ISOVUE-370) injection for power injector  80 mL Intravenous ONCE PRN Lily Herrera APRN   80 mL at 03/12/24 1119     Vitals:    04/03/24 1053   BP: 129/78   Pulse: 76   Resp: 18   Temp: 98.1 °F (36.7 °C)           Nad, rr,nd, no edema, kenny, cn intact      64 year old  With gastric adenocarcinoma diagnosed 12/18/23 in the setting of iron def anemia. 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 Delayed Cycle #5 x 1 week d/t worsening LFT elevation below, improved ok to treat with Cycle #6 with dose reduction of Oxaliplatin d/t CIPN worsening as below.  CT imaging after C4 3/2024 with favorable treatment response.   PDL1 10% 1/2024. Added Pembrolizumab  as >1% per guidelines. q6week scheduling-initiated with C3 above.    --anemia iv iron for tu. Resolved.  Feraheme #2/2 s/p 1/2024.  -TCP mild with chemo. Monitor. Ok to tx.  -Gr 1 transaminitis today with tbili elevation, ok to treat. prior gr 2 transaminitis with Tbili elevation. Max 1.9.  No abd pain. Delayed x 1 week. Likely r/t tx, will hold off on dose reduction with improvement at this time.  Monitor.    --skin rash. Resolved. Supportive care with topical creams.    -minimal gr 1/2 CIPN to fingers. occasional ADL limiting. Monitor. dose reduction Oxaliplatin to 65mg/m2 as above starting with C6.    -risk of cardiotoxicity. Normal EF/echo prior to treatment 1/2024. Will order repeat ECHO today. Fleeting chest discomfort reported, currently resolved, without any other Aes. Monitor.     -BANDAR. Prn antiemetics encouraged.       we will continue to be focal point of care in setting of malignancy undergoing ongoing treatment.     XOCHITL Allen

## 2024-04-04 ENCOUNTER — OFFICE VISIT (OUTPATIENT)
Dept: HEMATOLOGY/ONCOLOGY | Facility: HOSPITAL | Age: 64
End: 2024-04-04
Attending: INTERNAL MEDICINE
Payer: COMMERCIAL

## 2024-04-04 VITALS
DIASTOLIC BLOOD PRESSURE: 70 MMHG | RESPIRATION RATE: 16 BRPM | TEMPERATURE: 98 F | HEART RATE: 90 BPM | SYSTOLIC BLOOD PRESSURE: 138 MMHG | OXYGEN SATURATION: 96 %

## 2024-04-04 DIAGNOSIS — C16.9 MALIGNANT NEOPLASM OF STOMACH, UNSPECIFIED LOCATION (HCC): Primary | ICD-10-CM

## 2024-04-04 PROCEDURE — 96413 CHEMO IV INFUSION 1 HR: CPT

## 2024-04-04 PROCEDURE — 96375 TX/PRO/DX INJ NEW DRUG ADDON: CPT

## 2024-04-04 PROCEDURE — 96411 CHEMO IV PUSH ADDL DRUG: CPT

## 2024-04-04 PROCEDURE — 96417 CHEMO IV INFUS EACH ADDL SEQ: CPT

## 2024-04-04 PROCEDURE — 96368 THER/DIAG CONCURRENT INF: CPT

## 2024-04-04 PROCEDURE — 96415 CHEMO IV INFUSION ADDL HR: CPT

## 2024-04-04 RX ORDER — FLUOROURACIL 50 MG/ML
400 INJECTION, SOLUTION INTRAVENOUS ONCE
Status: COMPLETED | OUTPATIENT
Start: 2024-04-04 | End: 2024-04-04

## 2024-04-04 RX ORDER — FLUOROURACIL 50 MG/ML
2400 INJECTION, SOLUTION INTRAVENOUS CONTINUOUS
Status: DISCONTINUED | OUTPATIENT
Start: 2024-04-04 | End: 2024-04-04

## 2024-04-04 RX ADMIN — FLUOROURACIL 800 MG: 50 INJECTION, SOLUTION INTRAVENOUS at 13:44:00

## 2024-04-04 RX ADMIN — FLUOROURACIL 4800 MG: 50 INJECTION, SOLUTION INTRAVENOUS at 13:52:00

## 2024-04-04 NOTE — PROGRESS NOTES
Pt here for C6D1 Drug(s)FOLFOX/Trastuzumab/Keytruda.  Arrives Ambulating independently, accompanied by Self and Spouse     Patient was evaluated today by Treatment Nurse.    Oral medications included in this regimen:  no    Patient confirms comprehension of cancer treatment schedule:  yes    Pregnancy screening:  Not applicable    Modifications in dose or schedule:  Yes-dose reduction due to     Medications appearance and physical integrity checked by RN: yes.    Chemotherapy IV pump settings verified by 2 RNs:  Yes.  Frequency of blood return and site check throughout administration: Prior to administration, Prior to each drug, and At completion of therapy     Infusion/treatment outcome:  patient tolerated treatment without incident    Education Record    Learner:  Patient and Spouse  Barriers / Limitations:  None  Method:  Brief focused and Discussion  Education / instructions given:  Plan of care for treatment today  Outcome:  Shows understanding    Discharged Home, Ambulating independently, accompanied by:Self and Spouse    Patient/family verbalized understanding of future appointments: by [facility] messaging

## 2024-04-06 ENCOUNTER — NURSE ONLY (OUTPATIENT)
Dept: HEMATOLOGY/ONCOLOGY | Facility: HOSPITAL | Age: 64
End: 2024-04-06
Attending: INTERNAL MEDICINE
Payer: COMMERCIAL

## 2024-04-06 VITALS
OXYGEN SATURATION: 97 % | DIASTOLIC BLOOD PRESSURE: 68 MMHG | SYSTOLIC BLOOD PRESSURE: 126 MMHG | HEART RATE: 82 BPM | RESPIRATION RATE: 16 BRPM | TEMPERATURE: 98 F

## 2024-04-06 DIAGNOSIS — C16.9 GASTRIC CANCER (HCC): Primary | ICD-10-CM

## 2024-04-06 PROCEDURE — 96523 IRRIG DRUG DELIVERY DEVICE: CPT

## 2024-04-06 RX ORDER — HEPARIN SODIUM (PORCINE) LOCK FLUSH IV SOLN 100 UNIT/ML 100 UNIT/ML
SOLUTION INTRAVENOUS
Status: COMPLETED
Start: 2024-04-06 | End: 2024-04-06

## 2024-04-06 RX ORDER — HEPARIN SODIUM (PORCINE) LOCK FLUSH IV SOLN 100 UNIT/ML 100 UNIT/ML
5 SOLUTION INTRAVENOUS ONCE
Status: COMPLETED | OUTPATIENT
Start: 2024-04-06 | End: 2024-04-06

## 2024-04-06 RX ORDER — HEPARIN SODIUM (PORCINE) LOCK FLUSH IV SOLN 100 UNIT/ML 100 UNIT/ML
5 SOLUTION INTRAVENOUS ONCE
OUTPATIENT
Start: 2024-04-06

## 2024-04-06 RX ORDER — SODIUM CHLORIDE 9 MG/ML
10 INJECTION, SOLUTION INTRAMUSCULAR; INTRAVENOUS; SUBCUTANEOUS ONCE
OUTPATIENT
Start: 2024-04-06

## 2024-04-06 RX ADMIN — HEPARIN SODIUM (PORCINE) LOCK FLUSH IV SOLN 100 UNIT/ML 500 UNITS: 100 SOLUTION INTRAVENOUS at 09:11:00

## 2024-04-06 NOTE — PROGRESS NOTES
Patient presented with CADD pump connected. Reservoir 5.1mL remaining. Disconnected CADD pump, flushed port with 0.9% Normal Saline and established blood return in port. Flushed with 500 units of Heparin and de-accessed port. Gauze and paper tape applied to port site. Discharged ambulating independently with future appointments scheduled.

## 2024-04-17 ENCOUNTER — NURSE ONLY (OUTPATIENT)
Dept: HEMATOLOGY/ONCOLOGY | Facility: HOSPITAL | Age: 64
End: 2024-04-17
Attending: INTERNAL MEDICINE
Payer: COMMERCIAL

## 2024-04-17 ENCOUNTER — OFFICE VISIT (OUTPATIENT)
Dept: HEMATOLOGY/ONCOLOGY | Facility: HOSPITAL | Age: 64
End: 2024-04-17
Attending: NURSE PRACTITIONER
Payer: COMMERCIAL

## 2024-04-17 VITALS
DIASTOLIC BLOOD PRESSURE: 74 MMHG | HEIGHT: 69 IN | OXYGEN SATURATION: 97 % | HEART RATE: 78 BPM | RESPIRATION RATE: 16 BRPM | SYSTOLIC BLOOD PRESSURE: 122 MMHG | WEIGHT: 178 LBS | BODY MASS INDEX: 26.36 KG/M2 | TEMPERATURE: 97 F

## 2024-04-17 DIAGNOSIS — G62.0 PERIPHERAL NEUROPATHY DUE TO CHEMOTHERAPY (HCC): ICD-10-CM

## 2024-04-17 DIAGNOSIS — C16.9 MALIGNANT NEOPLASM OF STOMACH, UNSPECIFIED LOCATION (HCC): Primary | ICD-10-CM

## 2024-04-17 DIAGNOSIS — C16.2 MALIGNANT NEOPLASM OF BODY OF STOMACH (HCC): Primary | ICD-10-CM

## 2024-04-17 DIAGNOSIS — T45.1X5A PERIPHERAL NEUROPATHY DUE TO CHEMOTHERAPY (HCC): ICD-10-CM

## 2024-04-17 DIAGNOSIS — Z51.11 CHEMOTHERAPY MANAGEMENT, ENCOUNTER FOR: ICD-10-CM

## 2024-04-17 DIAGNOSIS — T45.1X5A CHEMOTHERAPY-INDUCED THROMBOCYTOPENIA: ICD-10-CM

## 2024-04-17 DIAGNOSIS — D69.59 CHEMOTHERAPY-INDUCED THROMBOCYTOPENIA: ICD-10-CM

## 2024-04-17 LAB
ALBUMIN SERPL-MCNC: 4.4 G/DL (ref 3.2–4.8)
ALBUMIN/GLOB SERPL: 1.3 {RATIO} (ref 1–2)
ALP LIVER SERPL-CCNC: 134 U/L
ALT SERPL-CCNC: 91 U/L
ANION GAP SERPL CALC-SCNC: 6 MMOL/L (ref 0–18)
AST SERPL-CCNC: 68 U/L (ref ?–34)
BASOPHILS # BLD AUTO: 0.02 X10(3) UL (ref 0–0.2)
BASOPHILS NFR BLD AUTO: 0.5 %
BILIRUB SERPL-MCNC: 1.9 MG/DL (ref 0.2–1.1)
BUN BLD-MCNC: 13 MG/DL (ref 9–23)
BUN/CREAT SERPL: 12.1 (ref 10–20)
CALCIUM BLD-MCNC: 10.1 MG/DL (ref 8.7–10.4)
CHLORIDE SERPL-SCNC: 105 MMOL/L (ref 98–112)
CO2 SERPL-SCNC: 28 MMOL/L (ref 21–32)
CREAT BLD-MCNC: 1.07 MG/DL
DEPRECATED RDW RBC AUTO: 58.5 FL (ref 35.1–46.3)
EGFRCR SERPLBLD CKD-EPI 2021: 78 ML/MIN/1.73M2 (ref 60–?)
EOSINOPHIL # BLD AUTO: 0.04 X10(3) UL (ref 0–0.7)
EOSINOPHIL NFR BLD AUTO: 0.9 %
ERYTHROCYTE [DISTWIDTH] IN BLOOD BY AUTOMATED COUNT: 18.3 % (ref 11–15)
GLOBULIN PLAS-MCNC: 3.4 G/DL (ref 2.8–4.4)
GLUCOSE BLD-MCNC: 108 MG/DL (ref 70–99)
HCT VFR BLD AUTO: 40.3 %
HGB BLD-MCNC: 13.8 G/DL
IMM GRANULOCYTES # BLD AUTO: 0 X10(3) UL (ref 0–1)
IMM GRANULOCYTES NFR BLD: 0 %
LYMPHOCYTES # BLD AUTO: 1.42 X10(3) UL (ref 1–4)
LYMPHOCYTES NFR BLD AUTO: 33.1 %
MCH RBC QN AUTO: 30.3 PG (ref 26–34)
MCHC RBC AUTO-ENTMCNC: 34.2 G/DL (ref 31–37)
MCV RBC AUTO: 88.4 FL
MONOCYTES # BLD AUTO: 0.47 X10(3) UL (ref 0.1–1)
MONOCYTES NFR BLD AUTO: 11 %
NEUTROPHILS # BLD AUTO: 2.34 X10 (3) UL (ref 1.5–7.7)
NEUTROPHILS # BLD AUTO: 2.34 X10(3) UL (ref 1.5–7.7)
NEUTROPHILS NFR BLD AUTO: 54.5 %
OSMOLALITY SERPL CALC.SUM OF ELEC: 289 MOSM/KG (ref 275–295)
PLATELET # BLD AUTO: 90 10(3)UL (ref 150–450)
PLATELETS.RETICULATED NFR BLD AUTO: 4.1 % (ref 0–7)
POTASSIUM SERPL-SCNC: 4.3 MMOL/L (ref 3.5–5.1)
PROT SERPL-MCNC: 7.8 G/DL (ref 5.7–8.2)
RBC # BLD AUTO: 4.56 X10(6)UL
SODIUM SERPL-SCNC: 139 MMOL/L (ref 136–145)
TSI SER-ACNC: 1.15 MIU/ML (ref 0.55–4.78)
WBC # BLD AUTO: 4.3 X10(3) UL (ref 4–11)

## 2024-04-17 PROCEDURE — 84443 ASSAY THYROID STIM HORMONE: CPT

## 2024-04-17 PROCEDURE — 85025 COMPLETE CBC W/AUTO DIFF WBC: CPT

## 2024-04-17 PROCEDURE — 99215 OFFICE O/P EST HI 40 MIN: CPT | Performed by: INTERNAL MEDICINE

## 2024-04-17 PROCEDURE — 36415 COLL VENOUS BLD VENIPUNCTURE: CPT

## 2024-04-17 PROCEDURE — 80053 COMPREHEN METABOLIC PANEL: CPT

## 2024-04-17 RX ORDER — FLUOROURACIL 50 MG/ML
400 INJECTION, SOLUTION INTRAVENOUS ONCE
Status: CANCELLED | OUTPATIENT
Start: 2024-04-18

## 2024-04-17 RX ORDER — FLUOROURACIL 50 MG/ML
2400 INJECTION, SOLUTION INTRAVENOUS CONTINUOUS
Status: CANCELLED | OUTPATIENT
Start: 2024-04-18

## 2024-04-17 NOTE — PATIENT INSTRUCTIONS
Proceed with cycle 7  Monitor nose bleeds and neuropathy.  Consider gloves especially when working.  If nose bleeds greater than 5 minutes, call clinic.  Can consider vaseline or saline nasal spray if nose bleeds become more frequent.  Call as needed  Follow-up prior to cycle 8

## 2024-04-17 NOTE — PROGRESS NOTES
Seen and examined agree with note per ZONIA Philippe.    Tranaminitis improved proceed with next dose of FOLFOX with trastuzumab pembrolizumab for HER2 positive metastatic gastric adenocarcinoma PD-L1 10% dose reduced oxal for cipn.  tolerating treatment well exam comfortable labored respirations blood loss iron deficiency anemia replace as needed transaminitis and thrombocytopenia monitor

## 2024-04-17 NOTE — PROGRESS NOTES
Hematology note    Requesting: Dr. Higgins    Anemia gastric cancer    HPI:  64 year old  With gastric adenocarcinoma diagnosed 12/18/23 in the setting of iron def anemia.      See below for staging workup    Initiated FOLFOX +Trastuzumab 1/18/24.    Interval History:  Returns for consideration of Cycle #7 FOLFOX +Trastuzumab+ Pembrolizumab.  The patient is tolerating treatment well.  CIPN is the limiting side effect with dose reduction of Oxaliplatin starting with cycle 6.  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  Cold contact triggers the neuropathy in his fingers.  With this past cycle, he experienced neuropathy in his toes.  Right now, he only feels the neuropathy in his fingertips.      He endorses a nosebleed last week that only lasted a few minutes.  He experienced mild nausea, which was tolerable and not requiring antiemetics.      Denies headache, mouth sores, cough, dyspnea, abdominal pain, emesis, diarrhea, constipation, hematochezia/melena, peripheral edema and rash.    Pmh:  Arthritis    Social History     Socioeconomic History    Marital status:     Number of children: 2   Occupational History    Occupation: Food service     Employer: [employer]   Tobacco Use    Smoking status: Never    Smokeless tobacco: Never   Vaping Use    Vaping status: Never Used   Substance and Sexual Activity    Alcohol use: No     Alcohol/week: 0.0 standard drinks of alcohol    Drug use: No    Sexual activity: Yes   Other Topics Concern    Caffeine Concern Yes     Comment: coffee, 1 cup daily     Family History   Problem Relation Age of Onset    Prostate Cancer Other      Ros [id number]    Nka  Current Outpatient Medications on File Prior to Visit   Medication Sig Dispense Refill    Omeprazole 40 MG Oral Capsule Delayed Release Take 1 capsule (40 mg total) by mouth every morning before breakfast. 90 capsule 0    prochlorperazine (COMPAZINE) 10 mg tablet Take 1 tablet (10 mg total) by mouth every 6 (six)  hours as needed for Nausea. (Patient not taking: Reported on 1/31/2024) 30 tablet 3    ondansetron (ZOFRAN) 8 MG tablet Take 1 tablet (8 mg total) by mouth every 8 (eight) hours as needed for Nausea. (Patient not taking: Reported on 1/31/2024) 30 tablet 3     Current Facility-Administered Medications on File Prior to Visit   Medication Dose Route Frequency Provider Last Rate Last Admin    [COMPLETED] heparin sodium lock flush 100 UNIT/ML injection 500 Units  5 mL Intracatheter Once Marco Amaro MD   500 Units at 04/06/24 0911    [COMPLETED] ondansetron (Zofran) 16 mg, dexAMETHasone (Decadron) 20 mg in sodium chloride 0.9% 110 mL IVPB   Intravenous Once Lily Herrera APRN   Stopped at 04/04/24 1049    [COMPLETED] pembrolizumab (Keytruda) 400 mg in sodium chloride 0.9% 116 mL IVPB  400 mg Intravenous Once Lily Herrera APRN   Stopped at 04/04/24 1026    [COMPLETED] trastuzumab-qyyp (Trazimera) 336 mg in sodium chloride 0.9% 266 mL infusion  4 mg/kg (Treatment Plan Recorded) Intravenous Once Lily Herrera APRN   Stopped at 04/04/24 1123    [COMPLETED] oxaliplatin (Eloxatin) 130 mg in dextrose 5% 276 mL infusion  65 mg/m2 (Treatment Plan Recorded) Intravenous Once Lily Herrera APRN   Stopped at 04/04/24 1339    [COMPLETED] leucovorin 800 mg in dextrose 5% 250 mL infusion  400 mg/m2 (Treatment Plan Recorded) Intravenous Once Lily Herrera APRN   Stopped at 04/04/24 1339    [COMPLETED] fluorouracil (Adrucil) 50 mg/mL IV push 800 mg 16 mL  400 mg/m2 (Treatment Plan Recorded) Intravenous Once Lily Herrera APRN   800 mg at 04/04/24 1344    [COMPLETED] heparin sodium lock flush 100 UNIT/ML injection 500 Units  5 mL Intracatheter Once Marco Amaro MD   500 Units at 03/23/24 0835    [COMPLETED] ondansetron (Zofran) 16 mg, dexAMETHasone (Decadron) 20 mg in sodium chloride 0.9% 110 mL IVPB   Intravenous Once Lily Herrera APRN   Stopped at 03/21/24 0804    [COMPLETED]  trastuzumab-qyyp (Trazimera) 336 mg in sodium chloride 0.9% 266 mL infusion  4 mg/kg (Treatment Plan Recorded) Intravenous Once Lily Herrera APRN   Stopped at 03/21/24 0850    [COMPLETED] oxaliplatin (Eloxatin) 170 mg in dextrose 5% 284 mL infusion  85 mg/m2 (Treatment Plan Recorded) Intravenous Once Lily Herrera, APRN   Stopped at 03/21/24 1105    [COMPLETED] leucovorin 800 mg in dextrose 5% 250 mL infusion  400 mg/m2 (Treatment Plan Recorded) Intravenous Once Lily Herrera, APRN   Stopped at 03/21/24 1105    [COMPLETED] fluorouracil (Adrucil) 50 mg/mL IV push 800 mg 16 mL  400 mg/m2 (Treatment Plan Recorded) Intravenous Once Lily Herrera, APRN   800 mg at 03/21/24 1114     Vitals:    04/17/24 0922   BP: 122/74   Pulse: 78   Resp: 16   Temp: 97.3 °F (36.3 °C)      Physical exam:  General:  WDWNWM, NAD, PS0  HEENT - EOMsi, anicteric, MMM  Neck - supple, no LAD, normal AROM  Lungs - non labored breathing, CTA  Cor - RRR without murmur  Abd - soft, non tender non distended, bs+  BLE - no edema  Neuro - DTRs grossly intact    64 year old  With gastric adenocarcinoma diagnosed 12/18/23 in the setting of iron def anemia. 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   -Delayed Cycle #5 x 1 week d/t worsening LFT elevation   -Cycle #6 with dose reduction of Oxaliplatin d/t CIPN worsening as below.    -CT imaging after C4 3/2024 with favorable treatment response.   -PDL1 10% 1/2024. Added Pembrolizumab  as >1% per guidelines. q6week scheduling-initiated with C3 above.  -Proceed with cycle 7.  Monitor T. Bili    --Iron deficiency anemia, s/p Feraheme x 2, last dose on 2/1/24, anemia resolved.  Resolved.    --Thrombocytopenia secondary to chemo, ok to treat if  plts > 75k.  Monitor  --Transaminitis improved, T. Bili 1.9.  Monitor.    --H/o skin rash. Resolved. Supportive care with topical creams.  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  Monitor.  Offered work note with restrictions, declined at this time  --risk of cardiotoxicity. Normal EF/echo prior to treatment 1/2024. Schedule ECHO.  H/o fleeting chest discomfort reported, currently resolved, without any other Aes. Monitor.   --BANDAR. Prn antiemetics encouraged.    Call prn.  Follow-up prior to cycle 8     MDM - high -we will continue to be focal point of care in setting of malignancy undergoing ongoing treatment.     Eh Philippe PA-C

## 2024-04-18 ENCOUNTER — OFFICE VISIT (OUTPATIENT)
Dept: HEMATOLOGY/ONCOLOGY | Facility: HOSPITAL | Age: 64
End: 2024-04-18
Attending: INTERNAL MEDICINE
Payer: COMMERCIAL

## 2024-04-18 VITALS
RESPIRATION RATE: 16 BRPM | DIASTOLIC BLOOD PRESSURE: 68 MMHG | SYSTOLIC BLOOD PRESSURE: 134 MMHG | TEMPERATURE: 98 F | HEART RATE: 86 BPM | OXYGEN SATURATION: 96 %

## 2024-04-18 DIAGNOSIS — C16.9 MALIGNANT NEOPLASM OF STOMACH, UNSPECIFIED LOCATION (HCC): Primary | ICD-10-CM

## 2024-04-18 PROCEDURE — 96411 CHEMO IV PUSH ADDL DRUG: CPT

## 2024-04-18 PROCEDURE — 96413 CHEMO IV INFUSION 1 HR: CPT

## 2024-04-18 PROCEDURE — 96417 CHEMO IV INFUS EACH ADDL SEQ: CPT

## 2024-04-18 PROCEDURE — 96375 TX/PRO/DX INJ NEW DRUG ADDON: CPT

## 2024-04-18 PROCEDURE — 96415 CHEMO IV INFUSION ADDL HR: CPT

## 2024-04-18 PROCEDURE — 96368 THER/DIAG CONCURRENT INF: CPT

## 2024-04-18 RX ORDER — FLUOROURACIL 50 MG/ML
2400 INJECTION, SOLUTION INTRAVENOUS CONTINUOUS
Status: DISCONTINUED | OUTPATIENT
Start: 2024-04-18 | End: 2024-04-18

## 2024-04-18 RX ORDER — FLUOROURACIL 50 MG/ML
400 INJECTION, SOLUTION INTRAVENOUS ONCE
Status: COMPLETED | OUTPATIENT
Start: 2024-04-18 | End: 2024-04-18

## 2024-04-18 RX ADMIN — FLUOROURACIL 4800 MG: 50 INJECTION, SOLUTION INTRAVENOUS at 12:11:00

## 2024-04-18 RX ADMIN — FLUOROURACIL 800 MG: 50 INJECTION, SOLUTION INTRAVENOUS at 12:06:00

## 2024-04-18 NOTE — PROGRESS NOTES
Pt here for 73D1 Drug(s)FOLFOX + Trastuzumab Arrives Ambulating independently, accompanied by Spouse     Patient was evaluated today by Treatment Nurse.    Oral medications included in this regimen:  no    Patient confirms comprehension of cancer treatment schedule:  yes    Pregnancy screening:  Not applicable    Modifications in dose or schedule:  No. PLt 90, okay to treat with plt >75 in tx plan.     Medications appearance and physical integrity checked by RN: yes.    Chemotherapy IV pump settings verified by 2 RNs:  Yes.  Frequency of blood return and site check throughout administration: Prior to administration, Prior to each drug, Every 2-3 ml IVP, and At completion of therapy     Infusion/treatment outcome:  patient tolerated treatment without incident    CADD pump connected and running. All connections reinforced with tape. Port access is clean and dry, secured with steri strips and tegaderm dressing. Patient verbalized understanding of CADD pump instructions, including troubleshooting.      Education Record    Learner:  Patient and Spouse  Barriers / Limitations:  None  Method:  Discussion  Education / instructions given:  Reviewed tx plan with added Keytruda  Outcome:  Shows understanding    Discharged Home, Ambulating independently, accompanied by:Spouse    Patient/family verbalized understanding of future appointments: by printed AVS

## 2024-04-20 ENCOUNTER — NURSE ONLY (OUTPATIENT)
Dept: HEMATOLOGY/ONCOLOGY | Facility: HOSPITAL | Age: 64
End: 2024-04-20
Attending: INTERNAL MEDICINE
Payer: COMMERCIAL

## 2024-04-20 VITALS
RESPIRATION RATE: 16 BRPM | DIASTOLIC BLOOD PRESSURE: 59 MMHG | SYSTOLIC BLOOD PRESSURE: 119 MMHG | HEART RATE: 81 BPM | OXYGEN SATURATION: 97 % | TEMPERATURE: 98 F

## 2024-04-20 DIAGNOSIS — C16.9 GASTRIC CANCER (HCC): Primary | ICD-10-CM

## 2024-04-20 PROCEDURE — 96523 IRRIG DRUG DELIVERY DEVICE: CPT

## 2024-04-20 RX ORDER — SODIUM CHLORIDE 9 MG/ML
10 INJECTION, SOLUTION INTRAMUSCULAR; INTRAVENOUS; SUBCUTANEOUS ONCE
OUTPATIENT
Start: 2024-04-20

## 2024-04-20 RX ORDER — HEPARIN SODIUM (PORCINE) LOCK FLUSH IV SOLN 100 UNIT/ML 100 UNIT/ML
5 SOLUTION INTRAVENOUS ONCE
OUTPATIENT
Start: 2024-04-20

## 2024-04-20 RX ORDER — HEPARIN SODIUM (PORCINE) LOCK FLUSH IV SOLN 100 UNIT/ML 100 UNIT/ML
SOLUTION INTRAVENOUS
Status: COMPLETED
Start: 2024-04-20 | End: 2024-04-20

## 2024-04-20 RX ORDER — HEPARIN SODIUM (PORCINE) LOCK FLUSH IV SOLN 100 UNIT/ML 100 UNIT/ML
5 SOLUTION INTRAVENOUS ONCE
Status: COMPLETED | OUTPATIENT
Start: 2024-04-20 | End: 2024-04-20

## 2024-04-20 RX ADMIN — HEPARIN SODIUM (PORCINE) LOCK FLUSH IV SOLN 100 UNIT/ML 500 UNITS: 100 SOLUTION INTRAVENOUS at 08:49:00

## 2024-04-20 NOTE — PROGRESS NOTES
Patient presented with CADD pump connected. Reservoir 2.3mL. Disconnected CADD pump, flushed port with 0.9% Normal Saline and established blood return in port. Flushed with 500 units of Heparin and de-accessed port. Gauze and paper tape applied to port site.

## 2024-05-01 ENCOUNTER — OFFICE VISIT (OUTPATIENT)
Dept: HEMATOLOGY/ONCOLOGY | Facility: HOSPITAL | Age: 64
End: 2024-05-01
Attending: NURSE PRACTITIONER
Payer: COMMERCIAL

## 2024-05-01 ENCOUNTER — NURSE ONLY (OUTPATIENT)
Dept: HEMATOLOGY/ONCOLOGY | Facility: HOSPITAL | Age: 64
End: 2024-05-01
Attending: INTERNAL MEDICINE
Payer: COMMERCIAL

## 2024-05-01 VITALS
WEIGHT: 176.63 LBS | OXYGEN SATURATION: 97 % | DIASTOLIC BLOOD PRESSURE: 74 MMHG | RESPIRATION RATE: 16 BRPM | HEART RATE: 79 BPM | TEMPERATURE: 98 F | SYSTOLIC BLOOD PRESSURE: 123 MMHG | HEIGHT: 69 IN | BODY MASS INDEX: 26.16 KG/M2

## 2024-05-01 DIAGNOSIS — G62.0 PERIPHERAL NEUROPATHY DUE TO CHEMOTHERAPY (HCC): ICD-10-CM

## 2024-05-01 DIAGNOSIS — Z51.11 CHEMOTHERAPY MANAGEMENT, ENCOUNTER FOR: ICD-10-CM

## 2024-05-01 DIAGNOSIS — T45.1X5A CHEMOTHERAPY-INDUCED THROMBOCYTOPENIA: ICD-10-CM

## 2024-05-01 DIAGNOSIS — T45.1X5A PERIPHERAL NEUROPATHY DUE TO CHEMOTHERAPY (HCC): ICD-10-CM

## 2024-05-01 DIAGNOSIS — D69.59 CHEMOTHERAPY-INDUCED THROMBOCYTOPENIA: ICD-10-CM

## 2024-05-01 DIAGNOSIS — C16.9 MALIGNANT NEOPLASM OF STOMACH, UNSPECIFIED LOCATION (HCC): Primary | ICD-10-CM

## 2024-05-01 DIAGNOSIS — R74.01 TRANSAMINITIS: ICD-10-CM

## 2024-05-01 LAB
ALBUMIN SERPL-MCNC: 4.2 G/DL (ref 3.2–4.8)
ALBUMIN/GLOB SERPL: 1.4 {RATIO} (ref 1–2)
ALP LIVER SERPL-CCNC: 128 U/L
ALT SERPL-CCNC: 69 U/L
ANION GAP SERPL CALC-SCNC: 2 MMOL/L (ref 0–18)
AST SERPL-CCNC: 54 U/L (ref ?–34)
BASOPHILS # BLD AUTO: 0.01 X10(3) UL (ref 0–0.2)
BASOPHILS NFR BLD AUTO: 0.2 %
BILIRUB SERPL-MCNC: 1.9 MG/DL (ref 0.2–1.1)
BUN BLD-MCNC: 12 MG/DL (ref 9–23)
BUN/CREAT SERPL: 12.5 (ref 10–20)
CALCIUM BLD-MCNC: 9.3 MG/DL (ref 8.7–10.4)
CHLORIDE SERPL-SCNC: 109 MMOL/L (ref 98–112)
CO2 SERPL-SCNC: 27 MMOL/L (ref 21–32)
CREAT BLD-MCNC: 0.96 MG/DL
DEPRECATED RDW RBC AUTO: 56.6 FL (ref 35.1–46.3)
EGFRCR SERPLBLD CKD-EPI 2021: 89 ML/MIN/1.73M2 (ref 60–?)
EOSINOPHIL # BLD AUTO: 0.04 X10(3) UL (ref 0–0.7)
EOSINOPHIL NFR BLD AUTO: 0.9 %
ERYTHROCYTE [DISTWIDTH] IN BLOOD BY AUTOMATED COUNT: 17.2 % (ref 11–15)
GLOBULIN PLAS-MCNC: 3.1 G/DL (ref 2.8–4.4)
GLUCOSE BLD-MCNC: 152 MG/DL (ref 70–99)
HCT VFR BLD AUTO: 38 %
HGB BLD-MCNC: 13.2 G/DL
IMM GRANULOCYTES # BLD AUTO: 0.01 X10(3) UL (ref 0–1)
IMM GRANULOCYTES NFR BLD: 0.2 %
LYMPHOCYTES # BLD AUTO: 1.2 X10(3) UL (ref 1–4)
LYMPHOCYTES NFR BLD AUTO: 26.3 %
MCH RBC QN AUTO: 31.2 PG (ref 26–34)
MCHC RBC AUTO-ENTMCNC: 34.7 G/DL (ref 31–37)
MCV RBC AUTO: 89.8 FL
MONOCYTES # BLD AUTO: 0.4 X10(3) UL (ref 0.1–1)
MONOCYTES NFR BLD AUTO: 8.8 %
NEUTROPHILS # BLD AUTO: 2.91 X10 (3) UL (ref 1.5–7.7)
NEUTROPHILS # BLD AUTO: 2.91 X10(3) UL (ref 1.5–7.7)
NEUTROPHILS NFR BLD AUTO: 63.6 %
OSMOLALITY SERPL CALC.SUM OF ELEC: 289 MOSM/KG (ref 275–295)
PLATELET # BLD AUTO: 79 10(3)UL (ref 150–450)
PLATELETS.RETICULATED NFR BLD AUTO: 4.3 % (ref 0–7)
POTASSIUM SERPL-SCNC: 4.1 MMOL/L (ref 3.5–5.1)
PROT SERPL-MCNC: 7.3 G/DL (ref 5.7–8.2)
RBC # BLD AUTO: 4.23 X10(6)UL
SODIUM SERPL-SCNC: 138 MMOL/L (ref 136–145)
TSI SER-ACNC: 1.23 MIU/ML (ref 0.55–4.78)
WBC # BLD AUTO: 4.6 X10(3) UL (ref 4–11)

## 2024-05-01 PROCEDURE — 85025 COMPLETE CBC W/AUTO DIFF WBC: CPT

## 2024-05-01 PROCEDURE — 36415 COLL VENOUS BLD VENIPUNCTURE: CPT

## 2024-05-01 PROCEDURE — 84443 ASSAY THYROID STIM HORMONE: CPT

## 2024-05-01 PROCEDURE — 80053 COMPREHEN METABOLIC PANEL: CPT

## 2024-05-01 PROCEDURE — 99215 OFFICE O/P EST HI 40 MIN: CPT | Performed by: INTERNAL MEDICINE

## 2024-05-01 RX ORDER — FLUOROURACIL 50 MG/ML
2400 INJECTION, SOLUTION INTRAVENOUS CONTINUOUS
Status: CANCELLED | OUTPATIENT
Start: 2024-05-02

## 2024-05-01 RX ORDER — FLUOROURACIL 50 MG/ML
400 INJECTION, SOLUTION INTRAVENOUS ONCE
Status: CANCELLED | OUTPATIENT
Start: 2024-05-02

## 2024-05-01 NOTE — PATIENT INSTRUCTIONS
Will help you schedule an ECHO prior to next visit. Will have appt for you by dung. Ok to treat tomorrow.    Call if any concerns [phone]

## 2024-05-01 NOTE — PROGRESS NOTES
Hematology note    Requesting: Dr. Higgins    Anemia gastric cancer    HPI:  64 year old  With gastric adenocarcinoma diagnosed 12/18/23 in the setting of iron def anemia.      See below for staging workup    Initiated FOLFOX +Trastuzumab 1/18/24. Added Pembrolizumab with C3 (PDL1 10%)    Interval History:  Returns for consideration of Cycle #8 FOLFOX +Trastuzumab+ Pembrolizumab.  The patient is tolerating treatment well.  CIPN is the limiting side effect with dose reduction of Oxaliplatin starting with cycle 6.  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  Cold contact triggers the neuropathy in his fingers.  With this past cycle, he experienced neuropathy in his toes.  Right now, he only feels the neuropathy in his fingertips.      He endorses a nosebleed last week that only lasted a few minutes.  He experienced mild nausea, which was tolerable and not requiring antiemetics.      Denies headache, mouth sores, cough, dyspnea, abdominal pain, emesis, diarrhea, constipation, hematochezia/melena, peripheral edema and rash.    Pmh:  Arthritis    Social History     Socioeconomic History    Marital status:     Number of children: 2   Occupational History    Occupation: Food service     Employer: [employer]   Tobacco Use    Smoking status: Never    Smokeless tobacco: Never   Vaping Use    Vaping status: Never Used   Substance and Sexual Activity    Alcohol use: No     Alcohol/week: 0.0 standard drinks of alcohol    Drug use: No    Sexual activity: Yes   Other Topics Concern    Caffeine Concern Yes     Comment: coffee, 1 cup daily     Family History   Problem Relation Age of Onset    Prostate Cancer Other      Ros [id number]    Nka  Current Outpatient Medications on File Prior to Visit   Medication Sig Dispense Refill    Omeprazole 40 MG Oral Capsule Delayed Release Take 1 capsule (40 mg total) by mouth every morning before breakfast. 90 capsule 0    prochlorperazine (COMPAZINE) 10 mg tablet Take 1  tablet (10 mg total) by mouth every 6 (six) hours as needed for Nausea. (Patient not taking: Reported on 1/31/2024) 30 tablet 3    ondansetron (ZOFRAN) 8 MG tablet Take 1 tablet (8 mg total) by mouth every 8 (eight) hours as needed for Nausea. (Patient not taking: Reported on 1/31/2024) 30 tablet 3     Current Facility-Administered Medications on File Prior to Visit   Medication Dose Route Frequency Provider Last Rate Last Admin    [COMPLETED] heparin sodium lock flush 100 UNIT/ML injection 500 Units  5 mL Intracatheter Once Marco Amaro MD   500 Units at 04/20/24 0849    [COMPLETED] ondansetron (Zofran) 16 mg, dexAMETHasone (Decadron) 20 mg in sodium chloride 0.9% 110 mL IVPB   Intravenous Once Eh Philipep PA   Stopped at 04/18/24 0855    [COMPLETED] trastuzumab-qyyp (Trazimera) 336 mg in sodium chloride 0.9% 266 mL infusion  4 mg/kg (Treatment Plan Recorded) Intravenous Once Eh Philippe PA   Stopped at 04/18/24 0947    [COMPLETED] oxaliplatin (Eloxatin) 130 mg in dextrose 5% 276 mL infusion  65 mg/m2 (Treatment Plan Recorded) Intravenous Once Eh Philippe PA   Stopped at 04/18/24 1157    [COMPLETED] leucovorin 800 mg in dextrose 5% 250 mL infusion  400 mg/m2 (Treatment Plan Recorded) Intravenous Once Eh Philippe PA   Stopped at 04/18/24 1157    [COMPLETED] fluorouracil (Adrucil) 50 mg/mL IV push 800 mg 16 mL  400 mg/m2 (Treatment Plan Recorded) Intravenous Once Eh Philippe PA   800 mg at 04/18/24 1206    [COMPLETED] heparin sodium lock flush 100 UNIT/ML injection 500 Units  5 mL Intracatheter Once Marco Amaro MD   500 Units at 04/06/24 0911    [COMPLETED] ondansetron (Zofran) 16 mg, dexAMETHasone (Decadron) 20 mg in sodium chloride 0.9% 110 mL IVPB   Intravenous Once Lily Herrera APRN   Stopped at 04/04/24 1049    [COMPLETED] pembrolizumab (Keytruda) 400 mg in sodium chloride 0.9% 116 mL IVPB  400 mg Intravenous Once Lily Herrera APRN   Stopped at 04/04/24 0807     [COMPLETED] trastuzumab-qyyp (Trazimera) 336 mg in sodium chloride 0.9% 266 mL infusion  4 mg/kg (Treatment Plan Recorded) Intravenous Once Lily Herrera, XOCHITL   Stopped at 04/04/24 1123    [COMPLETED] oxaliplatin (Eloxatin) 130 mg in dextrose 5% 276 mL infusion  65 mg/m2 (Treatment Plan Recorded) Intravenous Once Lily Herrera, APRN   Stopped at 04/04/24 1339    [COMPLETED] leucovorin 800 mg in dextrose 5% 250 mL infusion  400 mg/m2 (Treatment Plan Recorded) Intravenous Once Sharon, Lily, APRN   Stopped at 04/04/24 1339    [COMPLETED] fluorouracil (Adrucil) 50 mg/mL IV push 800 mg 16 mL  400 mg/m2 (Treatment Plan Recorded) Intravenous Once Lily Herrera, APRN   800 mg at 04/04/24 1344     There were no vitals filed for this visit.     Physical exam:  General:  WDWNWM, NAD, PS0  HEENT - EOMsi, anicteric, MMM  Neck - supple, no LAD, normal AROM  Lungs - non labored breathing, CTA  Cor - RRR without murmur  Abd - soft, non tender non distended, bs+  BLE - no edema  Neuro - DTRs grossly intact    64 year old  With gastric adenocarcinoma diagnosed 12/18/23 in the setting of iron def anemia. 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   -Delayed Cycle #5 x 1 week d/t worsening LFT elevation   -Cycle #6 with dose reduction of Oxaliplatin d/t CIPN worsening as below.    -CT imaging after C4 3/2024 with favorable treatment response. Consider next imaging after C9.  -PDL1 10% 1/2024. Added Pembrolizumab  as >1% per guidelines. q6week scheduling-initiated with C3 above.   -Proceed with cycle 8.      --Iron deficiency anemia, s/p Feraheme x 2, last dose on 2/1/24, anemia resolved..    --Thrombocytopenia secondary to chemo, ok to treat if plts > 75k.  Dose  reduction above. Monitor  --Transaminitis improved gr 1, T. Bili 1.9. ok to treat. Monitor.      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  Monitor.  Offered work note with restrictions, declined at this time, wearing gloves.    --risk of cardiotoxicity. Normal EF/echo prior to treatment 1/2024. Schedule ECHO.  H/o fleeting chest discomfort reported, currently resolved, without any other Aes. Monitor.     --BANDAR. Prn antiemetics encouraged.  --H/o skin rash. Resolved. Supportive care with topical creams.     MDM - high -we will continue to be focal point of care in setting of malignancy undergoing ongoing treatment.     XOCHITL Allen

## 2024-05-01 NOTE — PROGRESS NOTES
Seen and examined agree with note per APN Sharon    Tranaminitis improved proceed with next dose of FOLFOX with trastuzumab pembrolizumab for HER2 positive metastatic gastric adenocarcinoma PD-L1 10% dose reduced oxal for cipn.  tolerating treatment well exam comfortable labored respirations blood loss iron deficiency anemia replace as needed transaminitis and thrombocytopenia monitor

## 2024-05-02 ENCOUNTER — OFFICE VISIT (OUTPATIENT)
Dept: HEMATOLOGY/ONCOLOGY | Facility: HOSPITAL | Age: 64
End: 2024-05-02
Attending: INTERNAL MEDICINE
Payer: COMMERCIAL

## 2024-05-02 VITALS
RESPIRATION RATE: 16 BRPM | OXYGEN SATURATION: 97 % | HEART RATE: 82 BPM | TEMPERATURE: 98 F | DIASTOLIC BLOOD PRESSURE: 72 MMHG | SYSTOLIC BLOOD PRESSURE: 133 MMHG

## 2024-05-02 DIAGNOSIS — C16.9 MALIGNANT NEOPLASM OF STOMACH, UNSPECIFIED LOCATION (HCC): Primary | ICD-10-CM

## 2024-05-02 PROCEDURE — 96415 CHEMO IV INFUSION ADDL HR: CPT

## 2024-05-02 PROCEDURE — 96411 CHEMO IV PUSH ADDL DRUG: CPT

## 2024-05-02 PROCEDURE — 96417 CHEMO IV INFUS EACH ADDL SEQ: CPT

## 2024-05-02 PROCEDURE — 96413 CHEMO IV INFUSION 1 HR: CPT

## 2024-05-02 PROCEDURE — 96375 TX/PRO/DX INJ NEW DRUG ADDON: CPT

## 2024-05-02 PROCEDURE — 96368 THER/DIAG CONCURRENT INF: CPT

## 2024-05-02 RX ORDER — FLUOROURACIL 50 MG/ML
400 INJECTION, SOLUTION INTRAVENOUS ONCE
Status: COMPLETED | OUTPATIENT
Start: 2024-05-02 | End: 2024-05-02

## 2024-05-02 RX ORDER — FLUOROURACIL 50 MG/ML
2400 INJECTION, SOLUTION INTRAVENOUS CONTINUOUS
Status: DISCONTINUED | OUTPATIENT
Start: 2024-05-02 | End: 2024-05-02

## 2024-05-02 RX ADMIN — FLUOROURACIL 800 MG: 50 INJECTION, SOLUTION INTRAVENOUS at 11:55:00

## 2024-05-02 RX ADMIN — FLUOROURACIL 4800 MG: 50 INJECTION, SOLUTION INTRAVENOUS at 12:01:00

## 2024-05-02 NOTE — PROGRESS NOTES
Pt here for C8 FOLFOX.  Arrives Ambulating independently, accompanied by Self     Patient was evaluated today by Treatment Nurse. Saw provider yesterday, no complaints today.  Does experience PN after infusion but manages well.  Oral medications included in this regimen:  no  Patient confirms comprehension of cancer treatment schedule:  yes  Pregnancy screening:  Not applicable    Modifications in dose or schedule:  Yes, Oxali dose reduced for CIPN    Medications appearance and physical integrity checked by RN: yes. Chemotherapy IV pump settings verified by 2 RNs:  Yes.    Frequency of blood return and site check throughout administration: Prior to each drug, Every 2-3 ml IVP, and At completion of therapy     Infusion/treatment outcome:  patient tolerated treatment without incident    Education Record    Learner:  Patient  Barriers / Limitations:  None  Method:  Reinforcement  Education / instructions given:  Plan of care   Outcome:  Shows understanding    Discharged Home, Ambulating independently. Patient/family verbalized understanding of future appointments: by printed AVS, alerted pt to new echo appt on 5/3 and confirmed understanding

## 2024-05-04 ENCOUNTER — NURSE ONLY (OUTPATIENT)
Dept: HEMATOLOGY/ONCOLOGY | Facility: HOSPITAL | Age: 64
End: 2024-05-04
Attending: INTERNAL MEDICINE
Payer: COMMERCIAL

## 2024-05-04 VITALS
RESPIRATION RATE: 16 BRPM | HEART RATE: 84 BPM | SYSTOLIC BLOOD PRESSURE: 116 MMHG | OXYGEN SATURATION: 96 % | DIASTOLIC BLOOD PRESSURE: 66 MMHG | TEMPERATURE: 98 F

## 2024-05-04 DIAGNOSIS — C16.9 GASTRIC CANCER (HCC): Primary | ICD-10-CM

## 2024-05-04 PROCEDURE — 96523 IRRIG DRUG DELIVERY DEVICE: CPT

## 2024-05-04 RX ORDER — HEPARIN SODIUM (PORCINE) LOCK FLUSH IV SOLN 100 UNIT/ML 100 UNIT/ML
SOLUTION INTRAVENOUS
Status: COMPLETED
Start: 2024-05-04 | End: 2024-05-04

## 2024-05-04 RX ORDER — HEPARIN SODIUM (PORCINE) LOCK FLUSH IV SOLN 100 UNIT/ML 100 UNIT/ML
5 SOLUTION INTRAVENOUS ONCE
Status: COMPLETED | OUTPATIENT
Start: 2024-05-04 | End: 2024-05-04

## 2024-05-04 RX ORDER — SODIUM CHLORIDE 9 MG/ML
10 INJECTION, SOLUTION INTRAMUSCULAR; INTRAVENOUS; SUBCUTANEOUS ONCE
OUTPATIENT
Start: 2024-05-04

## 2024-05-04 RX ORDER — HEPARIN SODIUM (PORCINE) LOCK FLUSH IV SOLN 100 UNIT/ML 100 UNIT/ML
5 SOLUTION INTRAVENOUS ONCE
OUTPATIENT
Start: 2024-05-04

## 2024-05-04 RX ADMIN — HEPARIN SODIUM (PORCINE) LOCK FLUSH IV SOLN 100 UNIT/ML 500 UNITS: 100 SOLUTION INTRAVENOUS at 08:36:00

## 2024-05-04 NOTE — PROGRESS NOTES
Patient arrived to unit for CADD pump disconnect. 2.5ml left on pump. R chest port with good blood return noted. Port flushed, heparin locked, and de-accessed. Patient tolerated, no issues reported. Patient left ambulatory to exit.

## 2024-05-10 ENCOUNTER — HOSPITAL ENCOUNTER (OUTPATIENT)
Dept: CV DIAGNOSTICS | Facility: HOSPITAL | Age: 64
Discharge: HOME | End: 2024-05-10
Attending: NURSE PRACTITIONER
Payer: COMMERCIAL

## 2024-05-10 DIAGNOSIS — C16.9 MALIGNANT NEOPLASM OF STOMACH, UNSPECIFIED LOCATION (HCC): ICD-10-CM

## 2024-05-10 PROCEDURE — 93306 TTE W/DOPPLER COMPLETE: CPT | Performed by: NURSE PRACTITIONER

## 2024-05-15 ENCOUNTER — NURSE ONLY (OUTPATIENT)
Dept: HEMATOLOGY/ONCOLOGY | Facility: HOSPITAL | Age: 64
End: 2024-05-15
Attending: INTERNAL MEDICINE
Payer: COMMERCIAL

## 2024-05-15 VITALS
HEIGHT: 69 IN | WEIGHT: 179 LBS | BODY MASS INDEX: 26.51 KG/M2 | OXYGEN SATURATION: 97 % | TEMPERATURE: 98 F | DIASTOLIC BLOOD PRESSURE: 74 MMHG | RESPIRATION RATE: 16 BRPM | HEART RATE: 83 BPM | SYSTOLIC BLOOD PRESSURE: 140 MMHG

## 2024-05-15 DIAGNOSIS — T45.1X5A PERIPHERAL NEUROPATHY DUE TO CHEMOTHERAPY (HCC): ICD-10-CM

## 2024-05-15 DIAGNOSIS — G62.0 PERIPHERAL NEUROPATHY DUE TO CHEMOTHERAPY (HCC): ICD-10-CM

## 2024-05-15 DIAGNOSIS — R74.01 TRANSAMINITIS: ICD-10-CM

## 2024-05-15 DIAGNOSIS — T45.1X5A CHEMOTHERAPY-INDUCED THROMBOCYTOPENIA: ICD-10-CM

## 2024-05-15 DIAGNOSIS — Z51.11 CHEMOTHERAPY MANAGEMENT, ENCOUNTER FOR: ICD-10-CM

## 2024-05-15 DIAGNOSIS — C16.9 MALIGNANT NEOPLASM OF STOMACH, UNSPECIFIED LOCATION (HCC): Primary | ICD-10-CM

## 2024-05-15 DIAGNOSIS — D69.59 CHEMOTHERAPY-INDUCED THROMBOCYTOPENIA: ICD-10-CM

## 2024-05-15 LAB
ALBUMIN SERPL-MCNC: 4.2 G/DL (ref 3.2–4.8)
ALBUMIN/GLOB SERPL: 1.3 {RATIO} (ref 1–2)
ALP LIVER SERPL-CCNC: 133 U/L
ALT SERPL-CCNC: 49 U/L
ANION GAP SERPL CALC-SCNC: 8 MMOL/L (ref 0–18)
AST SERPL-CCNC: 46 U/L (ref ?–34)
BASOPHILS # BLD AUTO: 0.02 X10(3) UL (ref 0–0.2)
BASOPHILS NFR BLD AUTO: 0.5 %
BILIRUB SERPL-MCNC: 1.8 MG/DL (ref 0.2–1.1)
BUN BLD-MCNC: 13 MG/DL (ref 9–23)
BUN/CREAT SERPL: 13.7 (ref 10–20)
CALCIUM BLD-MCNC: 9.5 MG/DL (ref 8.7–10.4)
CHLORIDE SERPL-SCNC: 107 MMOL/L (ref 98–112)
CO2 SERPL-SCNC: 24 MMOL/L (ref 21–32)
CREAT BLD-MCNC: 0.95 MG/DL
DEPRECATED RDW RBC AUTO: 54.3 FL (ref 35.1–46.3)
EGFRCR SERPLBLD CKD-EPI 2021: 90 ML/MIN/1.73M2 (ref 60–?)
EOSINOPHIL # BLD AUTO: 0.02 X10(3) UL (ref 0–0.7)
EOSINOPHIL NFR BLD AUTO: 0.5 %
ERYTHROCYTE [DISTWIDTH] IN BLOOD BY AUTOMATED COUNT: 16 % (ref 11–15)
GLOBULIN PLAS-MCNC: 3.2 G/DL (ref 2–3.5)
GLUCOSE BLD-MCNC: 125 MG/DL (ref 70–99)
HCT VFR BLD AUTO: 39.2 %
HGB BLD-MCNC: 13.4 G/DL
IMM GRANULOCYTES # BLD AUTO: 0.01 X10(3) UL (ref 0–1)
IMM GRANULOCYTES NFR BLD: 0.3 %
LYMPHOCYTES # BLD AUTO: 1.05 X10(3) UL (ref 1–4)
LYMPHOCYTES NFR BLD AUTO: 26.9 %
MCH RBC QN AUTO: 31.9 PG (ref 26–34)
MCHC RBC AUTO-ENTMCNC: 34.2 G/DL (ref 31–37)
MCV RBC AUTO: 93.3 FL
MONOCYTES # BLD AUTO: 0.34 X10(3) UL (ref 0.1–1)
MONOCYTES NFR BLD AUTO: 8.7 %
NEUTROPHILS # BLD AUTO: 2.47 X10 (3) UL (ref 1.5–7.7)
NEUTROPHILS # BLD AUTO: 2.47 X10(3) UL (ref 1.5–7.7)
NEUTROPHILS NFR BLD AUTO: 63.1 %
OSMOLALITY SERPL CALC.SUM OF ELEC: 290 MOSM/KG (ref 275–295)
PLATELET # BLD AUTO: 66 10(3)UL (ref 150–450)
PLATELETS.RETICULATED NFR BLD AUTO: 5.5 % (ref 0–7)
POTASSIUM SERPL-SCNC: 4.2 MMOL/L (ref 3.5–5.1)
PROT SERPL-MCNC: 7.4 G/DL (ref 5.7–8.2)
RBC # BLD AUTO: 4.2 X10(6)UL
SODIUM SERPL-SCNC: 139 MMOL/L (ref 136–145)
TSI SER-ACNC: 1.24 MIU/ML (ref 0.55–4.78)
WBC # BLD AUTO: 3.9 X10(3) UL (ref 4–11)

## 2024-05-15 PROCEDURE — 85025 COMPLETE CBC W/AUTO DIFF WBC: CPT

## 2024-05-15 PROCEDURE — 84443 ASSAY THYROID STIM HORMONE: CPT

## 2024-05-15 PROCEDURE — 80053 COMPREHEN METABOLIC PANEL: CPT

## 2024-05-15 PROCEDURE — 99215 OFFICE O/P EST HI 40 MIN: CPT | Performed by: INTERNAL MEDICINE

## 2024-05-15 PROCEDURE — 36415 COLL VENOUS BLD VENIPUNCTURE: CPT

## 2024-05-15 NOTE — PROGRESS NOTES
Seen and examined agree with note per APN Sharon    Tranaminitis improved proceed with next dose of FOLFOX with trastuzumab pembrolizumab for HER2 positive metastatic gastric adenocarcinoma PD-L1 10% dose reduced oxal for cipn.  tolerating treatment well exam comfortable labored respirations blood loss iron deficiency anemia replace as needed transaminitis and thrombocytopenia monitor delay for plts this week      we will continue to be focal point of care in setting of malignancy undergoing ongoing treatment.

## 2024-05-15 NOTE — PROGRESS NOTES
Hematology note    Requesting: Dr. Higgins    Anemia gastric cancer    HPI:  64 year old  With gastric adenocarcinoma diagnosed 12/18/23 in the setting of iron def anemia.      See below for staging workup    Initiated FOLFOX +Trastuzumab 1/18/24. Added Pembrolizumab with C3 (PDL1 10%)    Interval History:  Returns for consideration of Cycle #8 FOLFOX +Trastuzumab+ Pembrolizumab.  The patient is tolerating treatment well.  CIPN gr1 to fingers and toes, more prolonged this cycle but still gr 1, not activity limiting.    He endorses nosebleeds at work that only lasted a few minutes.  He experienced mild nausea, which was tolerable and not requiring antiemetics.      Denies headache, mouth sores, cough, dyspnea, abdominal pain, emesis, diarrhea, constipation, hematochezia/melena, peripheral edema and rash.      Pmh:  Arthritis    Social History     Socioeconomic History    Marital status:     Number of children: 2   Occupational History    Occupation:      Employer: [employer]   Tobacco Use    Smoking status: Never    Smokeless tobacco: Never   Vaping Use    Vaping status: Never Used   Substance and Sexual Activity    Alcohol use: No     Alcohol/week: 0.0 standard drinks of alcohol    Drug use: No    Sexual activity: Yes   Other Topics Concern    Caffeine Concern Yes     Comment: coffee, 1 cup daily     Family History   Problem Relation Age of Onset    Prostate Cancer Other      Ros negx12    Nka  Current Outpatient Medications on File Prior to Visit   Medication Sig Dispense Refill    Omeprazole 40 MG Oral Capsule Delayed Release Take 1 capsule (40 mg total) by mouth every morning before breakfast. 90 capsule 0    prochlorperazine (COMPAZINE) 10 mg tablet Take 1 tablet (10 mg total) by mouth every 6 (six) hours as needed for Nausea. (Patient not taking: Reported on 1/31/2024) 30 tablet 3    ondansetron (ZOFRAN) 8 MG tablet Take 1 tablet (8 mg total) by mouth every 8 (eight)  hours as needed for Nausea. (Patient not taking: Reported on 1/31/2024) 30 tablet 3     Current Facility-Administered Medications on File Prior to Visit   Medication Dose Route Frequency Provider Last Rate Last Admin    [COMPLETED] heparin sodium lock flush 100 UNIT/ML injection 500 Units  5 mL Intracatheter Once Marco Amaro MD   500 Units at 05/04/24 0836    [COMPLETED] ondansetron (Zofran) 16 mg, dexAMETHasone (Decadron) 20 mg in sodium chloride 0.9% 110 mL IVPB   Intravenous Once Lily Herrera APRN   Stopped at 05/02/24 0856    [COMPLETED] trastuzumab-qyyp (Trazimera) 336 mg in sodium chloride 0.9% 266 mL infusion  4 mg/kg (Treatment Plan Recorded) Intravenous Once Lily Herrera APRN   Stopped at 05/02/24 0932    [COMPLETED] oxaliplatin (Eloxatin) 130 mg in dextrose 5% 276 mL infusion  65 mg/m2 (Treatment Plan Recorded) Intravenous Once Lily eHrrera APRN   Stopped at 05/02/24 1150    [COMPLETED] leucovorin 800 mg in dextrose 5% 250 mL infusion  400 mg/m2 (Treatment Plan Recorded) Intravenous Once Lily Herrera APRN   Stopped at 05/02/24 1141    [COMPLETED] fluorouracil (Adrucil) 50 mg/mL IV push 800 mg 16 mL  400 mg/m2 (Treatment Plan Recorded) Intravenous Once Lily Herrera APRN   800 mg at 05/02/24 1155    [COMPLETED] heparin sodium lock flush 100 UNIT/ML injection 500 Units  5 mL Intracatheter Once Marco Amaro MD   500 Units at 04/20/24 0849    [COMPLETED] ondansetron (Zofran) 16 mg, dexAMETHasone (Decadron) 20 mg in sodium chloride 0.9% 110 mL IVPB   Intravenous Once Eh Philippe PA   Stopped at 04/18/24 0855    [COMPLETED] trastuzumab-qyyp (Trazimera) 336 mg in sodium chloride 0.9% 266 mL infusion  4 mg/kg (Treatment Plan Recorded) Intravenous Once Eh Philippe PA   Stopped at 04/18/24 0947    [COMPLETED] oxaliplatin (Eloxatin) 130 mg in dextrose 5% 276 mL infusion  65 mg/m2 (Treatment Plan Recorded) Intravenous Once Eh Philippe PA   Stopped at 04/18/24  1157    [COMPLETED] leucovorin 800 mg in dextrose 5% 250 mL infusion  400 mg/m2 (Treatment Plan Recorded) Intravenous Once Eh Philippe PA   Stopped at 04/18/24 1157    [COMPLETED] fluorouracil (Adrucil) 50 mg/mL IV push 800 mg 16 mL  400 mg/m2 (Treatment Plan Recorded) Intravenous Once Eh Philippe PA   800 mg at 04/18/24 1206     Vitals:    05/15/24 0952   BP: 140/74   Pulse: 83   Resp: 16   Temp: 98.1 °F (36.7 °C)        Physical exam:  General:  WDWNWM, NAD, PS0  HEENT - EOMsi, anicteric, MMM  Neck - supple, no LAD, normal AROM  Lungs - non labored breathing, CTA  Cor - RRR without murmur  Abd - soft, non tender non distended, bs+  BLE - no edema  Neuro - DTRs grossly intact    64 year old  With gastric adenocarcinoma diagnosed 12/18/23 in the setting of iron def anemia. Her positive (3+) MSI-S PDL1 10%  -- Widespread ekvin disease will review at tumor conference with surgical oncology will plan for upfront systemic therapy with 5-FU oxaliplatin based regimen potentially  with trastuzumab and pembrolizumab  -Initiated FOLFOX plus trastuzumab on 1/18/23.   -Delayed Cycle #5 x 1 week d/t worsening LFT elevation   -Cycle #6 with dose reduction of Oxaliplatin d/t CIPN worsening as below.    -CT imaging after C4 3/2024 with favorable treatment response. Consider next imaging after C9.  -PDL1 10% 1/2024. Added Pembrolizumab  as >1% per guidelines. q6week scheduling-initiated with C3 above.   -DELAY Cycle 8 d/t TCP <75K.      --Iron deficiency anemia, s/p Feraheme x 2, last dose on 2/1/24, anemia resolved..    --Thrombocytopenia secondary to chemo, ok to treat if plts > 75k.  Delay 1 week as above for plt of 66K today with intermittent epistaxis.  Monitor  --Transaminitis improved gr 1, T. Bili 1.9. ok to treat. Monitor.      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  Monitor.  Offered work note with restrictions, declined at this time, wearing gloves.    --risk of cardiotoxicity. Normal  EF/echo prior to treatment 1/2024. 5/2024 ECHO repeat stable/improved.  H/o fleeting chest discomfort reported, currently resolved, without any other Aes. Monitor.     --BANDAR. Prn antiemetics encouraged.  --H/o skin rash. Resolved. Supportive care with topical creams.     MDM - high -we will continue to be focal point of care in setting of malignancy undergoing ongoing treatment.     XOCHITL Allen

## 2024-05-16 ENCOUNTER — APPOINTMENT (OUTPATIENT)
Dept: HEMATOLOGY/ONCOLOGY | Facility: HOSPITAL | Age: 64
End: 2024-05-16
Attending: INTERNAL MEDICINE
Payer: COMMERCIAL

## 2024-05-18 ENCOUNTER — APPOINTMENT (OUTPATIENT)
Dept: HEMATOLOGY/ONCOLOGY | Facility: HOSPITAL | Age: 64
End: 2024-05-18
Attending: INTERNAL MEDICINE
Payer: COMMERCIAL

## 2024-05-22 ENCOUNTER — OFFICE VISIT (OUTPATIENT)
Dept: HEMATOLOGY/ONCOLOGY | Facility: HOSPITAL | Age: 64
End: 2024-05-22
Attending: NURSE PRACTITIONER
Payer: COMMERCIAL

## 2024-05-22 ENCOUNTER — NURSE ONLY (OUTPATIENT)
Dept: HEMATOLOGY/ONCOLOGY | Facility: HOSPITAL | Age: 64
End: 2024-05-22
Attending: INTERNAL MEDICINE
Payer: COMMERCIAL

## 2024-05-22 ENCOUNTER — SOCIAL WORK SERVICES (OUTPATIENT)
Dept: HEMATOLOGY/ONCOLOGY | Facility: HOSPITAL | Age: 64
End: 2024-05-22

## 2024-05-22 VITALS
BODY MASS INDEX: 26.78 KG/M2 | OXYGEN SATURATION: 97 % | TEMPERATURE: 98 F | HEIGHT: 69 IN | SYSTOLIC BLOOD PRESSURE: 130 MMHG | HEART RATE: 78 BPM | DIASTOLIC BLOOD PRESSURE: 78 MMHG | WEIGHT: 180.81 LBS

## 2024-05-22 DIAGNOSIS — T45.1X5A PERIPHERAL NEUROPATHY DUE TO CHEMOTHERAPY (HCC): ICD-10-CM

## 2024-05-22 DIAGNOSIS — C16.9 MALIGNANT NEOPLASM OF STOMACH, UNSPECIFIED LOCATION (HCC): Primary | ICD-10-CM

## 2024-05-22 DIAGNOSIS — G62.0 PERIPHERAL NEUROPATHY DUE TO CHEMOTHERAPY (HCC): ICD-10-CM

## 2024-05-22 DIAGNOSIS — Z51.11 CHEMOTHERAPY MANAGEMENT, ENCOUNTER FOR: ICD-10-CM

## 2024-05-22 DIAGNOSIS — R74.01 TRANSAMINITIS: ICD-10-CM

## 2024-05-22 DIAGNOSIS — D69.6 THROMBOCYTOPENIA (HCC): ICD-10-CM

## 2024-05-22 LAB
ALBUMIN SERPL-MCNC: 4.3 G/DL (ref 3.2–4.8)
ALBUMIN/GLOB SERPL: 1.4 {RATIO} (ref 1–2)
ALP LIVER SERPL-CCNC: 144 U/L
ALT SERPL-CCNC: 62 U/L
ANION GAP SERPL CALC-SCNC: 7 MMOL/L (ref 0–18)
AST SERPL-CCNC: 51 U/L (ref ?–34)
BASOPHILS # BLD AUTO: 0.03 X10(3) UL (ref 0–0.2)
BASOPHILS NFR BLD AUTO: 0.7 %
BILIRUB SERPL-MCNC: 1.3 MG/DL (ref 0.2–1.1)
BUN BLD-MCNC: 16 MG/DL (ref 9–23)
BUN/CREAT SERPL: 17 (ref 10–20)
CALCIUM BLD-MCNC: 9.3 MG/DL (ref 8.7–10.4)
CHLORIDE SERPL-SCNC: 108 MMOL/L (ref 98–112)
CO2 SERPL-SCNC: 25 MMOL/L (ref 21–32)
CREAT BLD-MCNC: 0.94 MG/DL
DEPRECATED RDW RBC AUTO: 49.4 FL (ref 35.1–46.3)
EGFRCR SERPLBLD CKD-EPI 2021: 91 ML/MIN/1.73M2 (ref 60–?)
EOSINOPHIL # BLD AUTO: 0.05 X10(3) UL (ref 0–0.7)
EOSINOPHIL NFR BLD AUTO: 1.1 %
ERYTHROCYTE [DISTWIDTH] IN BLOOD BY AUTOMATED COUNT: 14.6 % (ref 11–15)
GLOBULIN PLAS-MCNC: 3 G/DL (ref 2–3.5)
GLUCOSE BLD-MCNC: 118 MG/DL (ref 70–99)
HCT VFR BLD AUTO: 38.5 %
HGB BLD-MCNC: 14 G/DL
IMM GRANULOCYTES # BLD AUTO: 0.01 X10(3) UL (ref 0–1)
IMM GRANULOCYTES NFR BLD: 0.2 %
LYMPHOCYTES # BLD AUTO: 1.53 X10(3) UL (ref 1–4)
LYMPHOCYTES NFR BLD AUTO: 34.8 %
MCH RBC QN AUTO: 33.6 PG (ref 26–34)
MCHC RBC AUTO-ENTMCNC: 36.4 G/DL (ref 31–37)
MCV RBC AUTO: 92.3 FL
MONOCYTES # BLD AUTO: 0.6 X10(3) UL (ref 0.1–1)
MONOCYTES NFR BLD AUTO: 13.6 %
NEUTROPHILS # BLD AUTO: 2.18 X10 (3) UL (ref 1.5–7.7)
NEUTROPHILS # BLD AUTO: 2.18 X10(3) UL (ref 1.5–7.7)
NEUTROPHILS NFR BLD AUTO: 49.6 %
OSMOLALITY SERPL CALC.SUM OF ELEC: 292 MOSM/KG (ref 275–295)
PLATELET # BLD AUTO: 104 10(3)UL (ref 150–450)
PLATELETS.RETICULATED NFR BLD AUTO: 4.2 % (ref 0–7)
POTASSIUM SERPL-SCNC: 4 MMOL/L (ref 3.5–5.1)
PROT SERPL-MCNC: 7.3 G/DL (ref 5.7–8.2)
RBC # BLD AUTO: 4.17 X10(6)UL
SODIUM SERPL-SCNC: 140 MMOL/L (ref 136–145)
TSI SER-ACNC: 1.14 MIU/ML (ref 0.55–4.78)
WBC # BLD AUTO: 4.4 X10(3) UL (ref 4–11)

## 2024-05-22 PROCEDURE — 36415 COLL VENOUS BLD VENIPUNCTURE: CPT

## 2024-05-22 PROCEDURE — 80053 COMPREHEN METABOLIC PANEL: CPT

## 2024-05-22 PROCEDURE — 84443 ASSAY THYROID STIM HORMONE: CPT

## 2024-05-22 PROCEDURE — 85025 COMPLETE CBC W/AUTO DIFF WBC: CPT

## 2024-05-22 PROCEDURE — 99215 OFFICE O/P EST HI 40 MIN: CPT | Performed by: NURSE PRACTITIONER

## 2024-05-22 RX ORDER — FLUOROURACIL 50 MG/ML
2400 INJECTION, SOLUTION INTRAVENOUS CONTINUOUS
Status: CANCELLED | OUTPATIENT
Start: 2024-05-23

## 2024-05-22 RX ORDER — FLUOROURACIL 50 MG/ML
400 INJECTION, SOLUTION INTRAVENOUS ONCE
Status: CANCELLED | OUTPATIENT
Start: 2024-05-23

## 2024-05-22 NOTE — PATIENT INSTRUCTIONS
Transition to Dr Gustavo Díaz oncologist    CT scan prior to next cycle-  1-2 days prior to appts    Plts better

## 2024-05-22 NOTE — PROGRESS NOTES
JOAQUIN assisted with the completion of patient's STD paperwork. JOAQUIN faxed paperwork to [employer] at [phone] and also sent a copy of paperwork to his MyChart.

## 2024-05-22 NOTE — PROGRESS NOTES
Hematology note    Requesting: Dr. Higgins    Anemia gastric cancer    HPI:  64 year old With gastric adenocarcinoma diagnosed 12/18/23 in the setting of iron def anemia.      See below for staging workup    Initiated FOLFOX +Trastuzumab 1/18/24. Added Pembrolizumab with C3 (PDL1 10%).      Interval History:  Returns for consideration of Cycle #9 FOLFOX +Trastuzumab+ Pembrolizumab.  Delayed due to TCP.   The patient is tolerating treatment well.  CIPN gr1 to fingers and toes, more prolonged and uncomfortable after C8 but now improved with week delay to gr1, not activity limiting.  NO active bleeding.  He experienced mild nausea, which was tolerable and not requiring antiemetics.      Denies headache, mouth sores, cough, dyspnea, abdominal pain, emesis, diarrhea, constipation, hematochezia/melena, peripheral edema and rash.      Pmh:  Arthritis    Social History     Socioeconomic History    Marital status:     Number of children: 2   Occupational History    Occupation: Food service     Employer: [email or web address]   Tobacco Use    Smoking status: Never    Smokeless tobacco: Never   Vaping Use    Vaping status: Never Used   Substance and Sexual Activity    Alcohol use: No     Alcohol/week: 0.0 standard drinks of alcohol    Drug use: No    Sexual activity: Yes   Other Topics Concern    Caffeine Concern Yes     Comment: coffee, 1 cup daily     Family History   Problem Relation Age of Onset    Prostate Cancer Other      Ros [id number]    Nka  Current Outpatient Medications on File Prior to Visit   Medication Sig Dispense Refill    Omeprazole 40 MG Oral Capsule Delayed Release Take 1 capsule (40 mg total) by mouth every morning before breakfast. 90 capsule 0    prochlorperazine (COMPAZINE) 10 mg tablet Take 1 tablet (10 mg total) by mouth every 6 (six) hours as needed for Nausea. (Patient not taking: Reported on 1/31/2024) 30 tablet 3    ondansetron (ZOFRAN) 8 MG tablet Take 1 tablet (8 mg total) by mouth  every 8 (eight) hours as needed for Nausea. (Patient not taking: Reported on 1/31/2024) 30 tablet 3     Current Facility-Administered Medications on File Prior to Visit   Medication Dose Route Frequency Provider Last Rate Last Admin    [COMPLETED] heparin sodium lock flush 100 UNIT/ML injection 500 Units  5 mL Intracatheter Once Marco Amaro MD   500 Units at 05/04/24 0836    [COMPLETED] ondansetron (Zofran) 16 mg, dexAMETHasone (Decadron) 20 mg in sodium chloride 0.9% 110 mL IVPB   Intravenous Once Lily Herrera APRN   Stopped at 05/02/24 0856    [COMPLETED] trastuzumab-qyyp (Trazimera) 336 mg in sodium chloride 0.9% 266 mL infusion  4 mg/kg (Treatment Plan Recorded) Intravenous Once Lily Herrera APRN   Stopped at 05/02/24 0932    [COMPLETED] oxaliplatin (Eloxatin) 130 mg in dextrose 5% 276 mL infusion  65 mg/m2 (Treatment Plan Recorded) Intravenous Once Lily Herrera APRN   Stopped at 05/02/24 1150    [COMPLETED] leucovorin 800 mg in dextrose 5% 250 mL infusion  400 mg/m2 (Treatment Plan Recorded) Intravenous Once Lily Herrera APRN   Stopped at 05/02/24 1141    [COMPLETED] fluorouracil (Adrucil) 50 mg/mL IV push 800 mg 16 mL  400 mg/m2 (Treatment Plan Recorded) Intravenous Once Lily Herrera APRN   800 mg at 05/02/24 1155     Vitals:    05/22/24 1426   BP: 130/78   Pulse: 78   Temp: 97.9 °F (36.6 °C)          Physical exam:  General:  WDWNWM, NAD, PS0  HEENT - EOMsi, anicteric, MMM  Neck - supple, no LAD, normal AROM  Lungs - non labored breathing, CTA  Cor - RRR without murmur  Abd - soft, non tender non distended, bs+  BLE - no edema  Neuro - DTRs grossly intact    64 year old  With gastric adenocarcinoma diagnosed 12/18/23 in the setting of iron def anemia. 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   -Delayed Cycle #5 x 1 week d/t worsening LFT elevation   -Cycle #6 with dose reduction of Oxaliplatin d/t CIPN worsening and LFTs as below.    -CT imaging after C4 3/2024 with favorable treatment response. Consider next imaging after C9-order placed today  -PDL1 10% 1/2024. Added Pembrolizumab  as >1% per guidelines. q6week scheduling-initiated with C3 above.   -DELAY Cycle 8 d/t TCP <75K.  Ok to treat with Cycle #9 FOLFOX plus trastuzumab plus Pembrolizumab at same dosing.    --Iron deficiency anemia, s/p Feraheme x 2, last dose on 2/1/24, anemia resolved..    --Thrombocytopenia secondary to chemo, ok to treat if plts > 75k.  Delayed C9 as above. Ok to treat with improvement >75K.  Monitor  --Transaminitis improved gr 1, T. Bili 1.3. dose reduction oxali as above. ok to treat. Scan as above. Monitor.      --CIPN, gr 1-2 after C8, Dose reduction of oxaliplatin to 65 mg/m2 with C6. Delay of C9 d/t Tcp with imrpovement to gr1 CIPN to fingers without aDL limitations. Ok to continue as above. Consider discontinue in coming cycles based on symptoms.  Monitor.      --risk of cardiotoxicity. Normal EF/echo prior to treatment 1/2024. 5/2024 ECHO repeat stable/improved.  H/o fleeting chest discomfort reported, currently resolved, without any other Aes. Monitor.     --BANDAR. Prn antiemetics encouraged.  --H/o skin rash. Resolved. Supportive care with topical creams.       MDM - high -we will continue to be focal point of care in setting of malignancy undergoing ongoing treatment.     XOCHITL Allen

## 2024-05-23 ENCOUNTER — OFFICE VISIT (OUTPATIENT)
Dept: HEMATOLOGY/ONCOLOGY | Facility: HOSPITAL | Age: 64
End: 2024-05-23
Attending: INTERNAL MEDICINE
Payer: COMMERCIAL

## 2024-05-23 ENCOUNTER — TELEPHONE (OUTPATIENT)
Dept: FAMILY MEDICINE CLINIC | Facility: CLINIC | Age: 64
End: 2024-05-23

## 2024-05-23 VITALS
OXYGEN SATURATION: 96 % | RESPIRATION RATE: 16 BRPM | DIASTOLIC BLOOD PRESSURE: 72 MMHG | HEART RATE: 83 BPM | TEMPERATURE: 98 F | SYSTOLIC BLOOD PRESSURE: 141 MMHG | BODY MASS INDEX: 27 KG/M2 | WEIGHT: 179.88 LBS

## 2024-05-23 DIAGNOSIS — C16.9 MALIGNANT NEOPLASM OF STOMACH, UNSPECIFIED LOCATION (HCC): Primary | ICD-10-CM

## 2024-05-23 PROCEDURE — 96413 CHEMO IV INFUSION 1 HR: CPT

## 2024-05-23 PROCEDURE — 96375 TX/PRO/DX INJ NEW DRUG ADDON: CPT

## 2024-05-23 PROCEDURE — 96368 THER/DIAG CONCURRENT INF: CPT

## 2024-05-23 PROCEDURE — 96417 CHEMO IV INFUS EACH ADDL SEQ: CPT

## 2024-05-23 PROCEDURE — 96411 CHEMO IV PUSH ADDL DRUG: CPT

## 2024-05-23 PROCEDURE — 96415 CHEMO IV INFUSION ADDL HR: CPT

## 2024-05-23 RX ORDER — FLUOROURACIL 50 MG/ML
400 INJECTION, SOLUTION INTRAVENOUS ONCE
Status: COMPLETED | OUTPATIENT
Start: 2024-05-23 | End: 2024-05-23

## 2024-05-23 RX ORDER — FLUOROURACIL 50 MG/ML
2400 INJECTION, SOLUTION INTRAVENOUS CONTINUOUS
Status: DISCONTINUED | OUTPATIENT
Start: 2024-05-23 | End: 2024-05-23

## 2024-05-23 RX ADMIN — FLUOROURACIL 800 MG: 50 INJECTION, SOLUTION INTRAVENOUS at 14:38:00

## 2024-05-23 RX ADMIN — FLUOROURACIL 4800 MG: 50 INJECTION, SOLUTION INTRAVENOUS at 14:43:00

## 2024-05-23 NOTE — TELEPHONE ENCOUNTER
Referral request noted. Referral approved, signed and sent to [facility].

## 2024-05-23 NOTE — TELEPHONE ENCOUNTER
Dr. Higgins,     Per message below, patient is requesting a referral to hematology oncology.    Please provide the name of the specialist you recommend.     Pended referral please review diagnosis and sign off if you agree.    Thank you.  Mariel Pedro  [facility]

## 2024-05-23 NOTE — TELEPHONE ENCOUNTER
Patient is requesting referral.     Name of specialist and specialty department : oncology  Reason for visit with the specialist: old oncologist leaving practice, Dr Amaro  *They were told yesterday he is leaving  Address of the specialist office:[facility]  Appointment date:          CSS informed patient the turnaround time for referral is 5-7 business days.  Patient was informed to check their [employer] account for referral status.

## 2024-05-23 NOTE — PROGRESS NOTES
Pt here for C9D1 Drug(s)FOLFOX-TRASTUZUMAB-KEYTRUDA.  Arrives Ambulating independently, accompanied by Self     Patient was evaluated today by Treatment Nurse.    R chest port accessed, good blood return noted. Biopatch, steri strips placed.    Oral medications included in this regimen:  no    Patient confirms comprehension of cancer treatment schedule:  yes    Pregnancy screening:  Not applicable    Modifications in dose or schedule:  No    Medications appearance and physical integrity checked by RN: yes.    Chemotherapy IV pump settings verified by 2 RNs:  Yes. Except Immunotherapies Keytruda with filter and Trastuzumab  Frequency of blood return and site check throughout administration: Prior to administration, Prior to each drug, Every 2-3 ml IVP, and At completion of therapy     Infusion/treatment outcome:  patient tolerated treatment without incident    CADD pump connected and running. All connections reinforced with tape. Port access is clean and dry, secured with steri strips and tegaderm dressing. Patient verbalized understanding of CADD pump instructions, including troubleshooting.      Education Record    Learner:  Patient  Barriers / Limitations:  None  Method:  Discussion and Printed material  Education / instructions given:  Medication, plan of care, schedule  Outcome:  Shows understanding    Discharged Home, Ambulating independently, accompanied by:Self    Patient/family verbalized understanding of future appointments: by printed AVS

## 2024-05-25 ENCOUNTER — NURSE ONLY (OUTPATIENT)
Dept: HEMATOLOGY/ONCOLOGY | Facility: HOSPITAL | Age: 64
End: 2024-05-25
Attending: INTERNAL MEDICINE
Payer: COMMERCIAL

## 2024-05-25 VITALS
HEART RATE: 82 BPM | SYSTOLIC BLOOD PRESSURE: 125 MMHG | OXYGEN SATURATION: 96 % | RESPIRATION RATE: 16 BRPM | TEMPERATURE: 98 F | DIASTOLIC BLOOD PRESSURE: 70 MMHG

## 2024-05-25 DIAGNOSIS — C16.9 GASTRIC CANCER (HCC): Primary | ICD-10-CM

## 2024-05-25 PROCEDURE — 96523 IRRIG DRUG DELIVERY DEVICE: CPT

## 2024-05-25 RX ORDER — HEPARIN SODIUM (PORCINE) LOCK FLUSH IV SOLN 100 UNIT/ML 100 UNIT/ML
5 SOLUTION INTRAVENOUS ONCE
Status: COMPLETED | OUTPATIENT
Start: 2024-05-25 | End: 2024-05-25

## 2024-05-25 RX ADMIN — HEPARIN SODIUM (PORCINE) LOCK FLUSH IV SOLN 100 UNIT/ML 500 UNITS: 100 SOLUTION INTRAVENOUS at 09:35:00

## 2024-05-25 NOTE — PROGRESS NOTES
Patient presents for CADD pump d'c.   Pump with 6ml volume remaining.   Pump turned off at 0935.     Deaccessed port per protocol.  Patient discharged in stable condition.

## 2024-05-29 ENCOUNTER — APPOINTMENT (OUTPATIENT)
Dept: HEMATOLOGY/ONCOLOGY | Facility: HOSPITAL | Age: 64
End: 2024-05-29
Attending: INTERNAL MEDICINE
Payer: COMMERCIAL

## 2024-05-30 ENCOUNTER — APPOINTMENT (OUTPATIENT)
Dept: HEMATOLOGY/ONCOLOGY | Facility: HOSPITAL | Age: 64
End: 2024-05-30
Attending: INTERNAL MEDICINE
Payer: COMMERCIAL

## 2024-06-01 ENCOUNTER — APPOINTMENT (OUTPATIENT)
Dept: HEMATOLOGY/ONCOLOGY | Facility: HOSPITAL | Age: 64
End: 2024-06-01
Attending: INTERNAL MEDICINE
Payer: COMMERCIAL

## 2024-06-02 ENCOUNTER — HOSPITAL ENCOUNTER (OUTPATIENT)
Dept: CT IMAGING | Age: 64
Discharge: HOME | End: 2024-06-02
Attending: NURSE PRACTITIONER
Payer: COMMERCIAL

## 2024-06-02 ENCOUNTER — HOSPITAL ENCOUNTER (OUTPATIENT)
Dept: CT IMAGING | Age: 64
End: 2024-06-02
Attending: NURSE PRACTITIONER
Payer: COMMERCIAL

## 2024-06-02 DIAGNOSIS — Z51.11 CHEMOTHERAPY MANAGEMENT, ENCOUNTER FOR: ICD-10-CM

## 2024-06-02 DIAGNOSIS — C16.9 MALIGNANT NEOPLASM OF STOMACH, UNSPECIFIED LOCATION (HCC): ICD-10-CM

## 2024-06-02 PROCEDURE — 71260 CT THORAX DX C+: CPT | Performed by: NURSE PRACTITIONER

## 2024-06-02 PROCEDURE — 74177 CT ABD & PELVIS W/CONTRAST: CPT | Performed by: NURSE PRACTITIONER

## 2024-06-05 ENCOUNTER — NURSE ONLY (OUTPATIENT)
Dept: HEMATOLOGY/ONCOLOGY | Facility: HOSPITAL | Age: 64
End: 2024-06-05
Attending: INTERNAL MEDICINE
Payer: COMMERCIAL

## 2024-06-05 ENCOUNTER — APPOINTMENT (OUTPATIENT)
Dept: HEMATOLOGY/ONCOLOGY | Facility: HOSPITAL | Age: 64
End: 2024-06-05
Attending: NURSE PRACTITIONER
Payer: COMMERCIAL

## 2024-06-05 VITALS
WEIGHT: 180 LBS | HEIGHT: 69 IN | RESPIRATION RATE: 16 BRPM | HEART RATE: 83 BPM | OXYGEN SATURATION: 96 % | SYSTOLIC BLOOD PRESSURE: 119 MMHG | TEMPERATURE: 98 F | DIASTOLIC BLOOD PRESSURE: 66 MMHG | BODY MASS INDEX: 26.66 KG/M2

## 2024-06-05 DIAGNOSIS — C16.9 MALIGNANT NEOPLASM OF STOMACH, UNSPECIFIED LOCATION (HCC): ICD-10-CM

## 2024-06-05 DIAGNOSIS — T45.1X5A PERIPHERAL NEUROPATHY DUE TO CHEMOTHERAPY (HCC): ICD-10-CM

## 2024-06-05 DIAGNOSIS — C16.9 MALIGNANT NEOPLASM OF STOMACH, UNSPECIFIED LOCATION (HCC): Primary | ICD-10-CM

## 2024-06-05 DIAGNOSIS — Z51.11 CHEMOTHERAPY MANAGEMENT, ENCOUNTER FOR: ICD-10-CM

## 2024-06-05 DIAGNOSIS — Z51.11 CHEMOTHERAPY MANAGEMENT, ENCOUNTER FOR: Primary | ICD-10-CM

## 2024-06-05 DIAGNOSIS — R74.01 TRANSAMINITIS: ICD-10-CM

## 2024-06-05 DIAGNOSIS — T45.1X5A CHEMOTHERAPY-INDUCED THROMBOCYTOPENIA: ICD-10-CM

## 2024-06-05 DIAGNOSIS — G62.0 PERIPHERAL NEUROPATHY DUE TO CHEMOTHERAPY (HCC): ICD-10-CM

## 2024-06-05 DIAGNOSIS — D69.59 CHEMOTHERAPY-INDUCED THROMBOCYTOPENIA: ICD-10-CM

## 2024-06-05 LAB
ALBUMIN SERPL-MCNC: 4.3 G/DL (ref 3.2–4.8)
ALBUMIN/GLOB SERPL: 1.3 {RATIO} (ref 1–2)
ALP LIVER SERPL-CCNC: 146 U/L
ALT SERPL-CCNC: 49 U/L
ANION GAP SERPL CALC-SCNC: 6 MMOL/L (ref 0–18)
AST SERPL-CCNC: 47 U/L (ref ?–34)
BASOPHILS # BLD AUTO: 0.02 X10(3) UL (ref 0–0.2)
BASOPHILS NFR BLD AUTO: 0.4 %
BILIRUB SERPL-MCNC: 1.5 MG/DL (ref 0.2–1.1)
BUN BLD-MCNC: 16 MG/DL (ref 9–23)
BUN/CREAT SERPL: 15.4 (ref 10–20)
CALCIUM BLD-MCNC: 9.4 MG/DL (ref 8.7–10.4)
CHLORIDE SERPL-SCNC: 109 MMOL/L (ref 98–112)
CO2 SERPL-SCNC: 25 MMOL/L (ref 21–32)
CREAT BLD-MCNC: 1.04 MG/DL
DEPRECATED RDW RBC AUTO: 46.9 FL (ref 35.1–46.3)
EGFRCR SERPLBLD CKD-EPI 2021: 81 ML/MIN/1.73M2 (ref 60–?)
EOSINOPHIL # BLD AUTO: 0.07 X10(3) UL (ref 0–0.7)
EOSINOPHIL NFR BLD AUTO: 1.3 %
ERYTHROCYTE [DISTWIDTH] IN BLOOD BY AUTOMATED COUNT: 13.7 % (ref 11–15)
GLOBULIN PLAS-MCNC: 3.4 G/DL (ref 2–3.5)
GLUCOSE BLD-MCNC: 120 MG/DL (ref 70–99)
HCT VFR BLD AUTO: 39.4 %
HGB BLD-MCNC: 14 G/DL
IMM GRANULOCYTES # BLD AUTO: 0.01 X10(3) UL (ref 0–1)
IMM GRANULOCYTES NFR BLD: 0.2 %
LYMPHOCYTES # BLD AUTO: 1.72 X10(3) UL (ref 1–4)
LYMPHOCYTES NFR BLD AUTO: 31.7 %
MCH RBC QN AUTO: 33.5 PG (ref 26–34)
MCHC RBC AUTO-ENTMCNC: 35.5 G/DL (ref 31–37)
MCV RBC AUTO: 94.3 FL
MONOCYTES # BLD AUTO: 0.58 X10(3) UL (ref 0.1–1)
MONOCYTES NFR BLD AUTO: 10.7 %
NEUTROPHILS # BLD AUTO: 3.03 X10 (3) UL (ref 1.5–7.7)
NEUTROPHILS # BLD AUTO: 3.03 X10(3) UL (ref 1.5–7.7)
NEUTROPHILS NFR BLD AUTO: 55.7 %
OSMOLALITY SERPL CALC.SUM OF ELEC: 292 MOSM/KG (ref 275–295)
PLATELET # BLD AUTO: 98 10(3)UL (ref 150–450)
PLATELETS.RETICULATED NFR BLD AUTO: 2.9 % (ref 0–7)
POTASSIUM SERPL-SCNC: 4.2 MMOL/L (ref 3.5–5.1)
PROT SERPL-MCNC: 7.7 G/DL (ref 5.7–8.2)
RBC # BLD AUTO: 4.18 X10(6)UL
SODIUM SERPL-SCNC: 140 MMOL/L (ref 136–145)
TSI SER-ACNC: 0.97 MIU/ML (ref 0.55–4.78)
WBC # BLD AUTO: 5.4 X10(3) UL (ref 4–11)

## 2024-06-05 PROCEDURE — 84443 ASSAY THYROID STIM HORMONE: CPT

## 2024-06-05 PROCEDURE — 36415 COLL VENOUS BLD VENIPUNCTURE: CPT

## 2024-06-05 PROCEDURE — 80053 COMPREHEN METABOLIC PANEL: CPT

## 2024-06-05 PROCEDURE — 99215 OFFICE O/P EST HI 40 MIN: CPT | Performed by: INTERNAL MEDICINE

## 2024-06-05 PROCEDURE — 85025 COMPLETE CBC W/AUTO DIFF WBC: CPT

## 2024-06-05 RX ORDER — FLUOROURACIL 50 MG/ML
400 INJECTION, SOLUTION INTRAVENOUS ONCE
Status: CANCELLED | OUTPATIENT
Start: 2024-06-06

## 2024-06-05 RX ORDER — FLUOROURACIL 50 MG/ML
2400 INJECTION, SOLUTION INTRAVENOUS CONTINUOUS
Status: CANCELLED | OUTPATIENT
Start: 2024-06-06

## 2024-06-05 NOTE — PROGRESS NOTES
Oncology Progress note    Requesting: Dr. Higgins    Chief Complaint: Anemia gastric cancer    HPI:  64 year old With gastric adenocarcinoma diagnosed 12/18/23 in the setting of iron def anemia.      See below for staging workup    Initiated FOLFOX +Trastuzumab 1/18/24. Added Pembrolizumab with C3 (PDL1 10%).      Interval History:  Returns for consideration of Cycle #10, FOLFOX +Trastuzumab+ Pembrolizumab.  His surveillance CT scan shows a favorable treatment.     The patient is tolerating treatment well.  CIPN gr1 to fingers and toes, more prolonged and uncomfortable after C8 but now improved with week delay to gr1, not activity limiting.  NO active bleeding.  He denies concerns for fever, infection, bleeding, chest pain, dyspnea or rash. No new concerns on ROS.      Pmh:  Arthritis    Social History     Socioeconomic History    Marital status:     Number of children: 2   Occupational History    Occupation:      Employer: [email or web address]   Tobacco Use    Smoking status: Never    Smokeless tobacco: Never   Vaping Use    Vaping status: Never Used   Substance and Sexual Activity    Alcohol use: No     Alcohol/week: 0.0 standard drinks of alcohol    Drug use: No    Sexual activity: Yes   Other Topics Concern    Caffeine Concern Yes     Comment: coffee, 1 cup daily     Family History   Problem Relation Age of Onset    Prostate Cancer Other      Ros negx12    Nka  Current Outpatient Medications on File Prior to Visit   Medication Sig Dispense Refill    Omeprazole 40 MG Oral Capsule Delayed Release Take 1 capsule (40 mg total) by mouth every morning before breakfast. 90 capsule 0    prochlorperazine (COMPAZINE) 10 mg tablet Take 1 tablet (10 mg total) by mouth every 6 (six) hours as needed for Nausea. (Patient not taking: Reported on 1/31/2024) 30 tablet 3    ondansetron (ZOFRAN) 8 MG tablet Take 1 tablet (8 mg total) by mouth every 8 (eight) hours as needed for Nausea. (Patient not  taking: Reported on 1/31/2024) 30 tablet 3     Current Facility-Administered Medications on File Prior to Visit   Medication Dose Route Frequency Provider Last Rate Last Admin    [COMPLETED] iopamidol 76% (ISOVUE-370) injection for power injector  80 mL Intravenous ONCE PRN Lily Herrera APRN   80 mL at 06/02/24 1221    [COMPLETED] heparin sodium lock flush 100 UNIT/ML injection 500 Units  5 mL Intracatheter Once Lily Herrera APRN   500 Units at 05/25/24 0935    [COMPLETED] ondansetron (Zofran) 16 mg, dexAMETHasone (Decadron) 20 mg in sodium chloride 0.9% 110 mL IVPB   Intravenous Once Lily Herrera APRN   Stopped at 05/23/24 1138    [COMPLETED] pembrolizumab (Keytruda) 400 mg in sodium chloride 0.9% 116 mL IVPB  400 mg Intravenous Once Lily Herrera APRN   Stopped at 05/23/24 1120    [COMPLETED] trastuzumab-qyyp (Trazimera) 336 mg in sodium chloride 0.9% 266 mL infusion  4 mg/kg (Treatment Plan Recorded) Intravenous Once Lily Herrera APRN   Stopped at 05/23/24 1215    [COMPLETED] oxaliplatin (Eloxatin) 130 mg in dextrose 5% 276 mL infusion  65 mg/m2 (Treatment Plan Recorded) Intravenous Once Lily Herrera APRN   Stopped at 05/23/24 1425    [COMPLETED] leucovorin 800 mg in dextrose 5% 250 mL infusion  400 mg/m2 (Treatment Plan Recorded) Intravenous Once Lily Herrera APRN   Stopped at 05/23/24 1425    [COMPLETED] fluorouracil (Adrucil) 50 mg/mL IV push 800 mg 16 mL  400 mg/m2 (Treatment Plan Recorded) Intravenous Once Lily Herrera APRN   800 mg at 05/23/24 1438     Vitals:    06/05/24 1425   BP: 119/66   Pulse: 83   Resp: 16   Temp: 97.9 °F (36.6 °C)     Physical exam:  General:  Awake, alert, oriented in NAD  HEENT - EOMsi, anicteric, MMM  Neck - supple, no LAD, normal ROM  Lungs - non labored breathing, CTA  Cor - RRR   Abd - soft, non tender non distended, bs+  BLE - no edema  Neuro - DTRs grossly intact    ECOG 0: Fully active, able to carry on all  pre-disease performance without restriction  Imaging:    CT C/A/P 6/2/24  Impression   CONCLUSION:  1. History of metastatic gastric adenocarcinoma.  Known primary distal gastric body/antral mass is not well assessed by CT.  Metastatic right upper quadrant/greater omental lymph nodes demonstrate continued decrease in size since comparison CT imaging  from March, 2024. Findings suggest continued favorable interval treatment response.  No other suspicious new or enlarging upper abdominal lymphadenopathy to suggest interval disease progression.  Continued surveillance imaging is recommended.  2. Nonspecific 1.9 x 1.5 cm right hilar lymph node, which extends to the paramediastinal right middle lobe is unchanged in size since index CT imaging from December, 2023 and was hypermetabolic on PET scan from January, 2024. This could relate to a  stable metastatic right hilar kevin deposit versus nonspecific chronic infectious/inflammatory lymph node.  Close attention on anticipated follow-up is advised.  3. Tiny 0.3 cm right upper lobe pulmonary micronodule is unchanged since index chest CT from January, 2023.  Small size and long-term stability suggests benign etiology.  4. Stable probable subcentimeter left hepatic lobe cysts.  5. Mild prostatomegaly.  6. Right inguinal herniorrhaphy with no hernia recurrence.  7. Lesser incidental findings as above.         64 year old  With gastric adenocarcinoma diagnosed 12/18/23 in the setting of iron def anemia. Her positive (3+) MSI-S PDL1 10%  -- Widespread kevin disease reviewed at tumor conference by Dr. Amaro with surgical oncology will plan for upfront systemic therapy with 5-FU oxaliplatin based regimen potentially with trastuzumab and pembrolizumab  -Initiated FOLFOX plus trastuzumab on 1/18/23.   -Delayed Cycle #5 x 1 week d/t worsening LFT elevation   -Cycle #6 with dose reduction of Oxaliplatin d/t CIPN worsening and LFTs as below.    -CT imaging after C4 3/2024 with  favorable treatment response.   -PDL1 10% 1/2024. Added Pembrolizumab  as >1% per guidelines. q6week scheduling-initiated with C3 above.   -DELAY Cycle 8 d/t TCP <75K.    - reviewed the pt's current CT scan from above; favorable treatment response, continue current systemic treatment    -proceed with Cycle #10 FOLFOX plus trastuzumab plus Pembrolizumab at same dosing.    --Iron deficiency anemia, s/p Feraheme x 2, last dose on 2/1/24, anemia resolved.   --Thrombocytopenia secondary to chemo, ok to treat if plts > 75k.  Delayed C9 as above. Ok to treat with improvement >75K.  Monitor  --Transaminitis improved gr 1, T. Bili 1.5. dose reduction oxali as above. ok to treat. Monitor.      --CIPN, gr 1-2 after C8, Dose reduction of oxaliplatin to 65 mg/m2 with C6. Delay of C9 d/t Tcp with imrpovement to gr1 CIPN to fingers without aDL limitations. Ok to continue as above. Consider discontinue in coming cycles based on symptoms.  Monitor.      --risk of cardiotoxicity. Normal EF/echo prior to treatment 1/2024. 5/2024 ECHO repeat stable/improved.  H/o fleeting chest discomfort reported, currently resolved, without any other Aes. Monitor.     --BANDAR. Prn antiemetics encouraged.  --H/o skin rash. Resolved. Supportive care with topical creams.       [facility] - high -    Gustavo Díaz MD  [facility]  [place] [address] [facility], [place] [address]

## 2024-06-06 ENCOUNTER — OFFICE VISIT (OUTPATIENT)
Dept: HEMATOLOGY/ONCOLOGY | Facility: HOSPITAL | Age: 64
End: 2024-06-06
Attending: INTERNAL MEDICINE
Payer: COMMERCIAL

## 2024-06-06 VITALS
TEMPERATURE: 98 F | BODY MASS INDEX: 26.49 KG/M2 | SYSTOLIC BLOOD PRESSURE: 130 MMHG | HEART RATE: 81 BPM | HEIGHT: 69.02 IN | DIASTOLIC BLOOD PRESSURE: 72 MMHG | WEIGHT: 178.88 LBS | OXYGEN SATURATION: 96 % | RESPIRATION RATE: 16 BRPM

## 2024-06-06 DIAGNOSIS — C16.9 MALIGNANT NEOPLASM OF STOMACH, UNSPECIFIED LOCATION (HCC): Primary | ICD-10-CM

## 2024-06-06 PROCEDURE — 96375 TX/PRO/DX INJ NEW DRUG ADDON: CPT

## 2024-06-06 PROCEDURE — 96415 CHEMO IV INFUSION ADDL HR: CPT

## 2024-06-06 PROCEDURE — 96417 CHEMO IV INFUS EACH ADDL SEQ: CPT

## 2024-06-06 PROCEDURE — 96413 CHEMO IV INFUSION 1 HR: CPT

## 2024-06-06 PROCEDURE — 96368 THER/DIAG CONCURRENT INF: CPT

## 2024-06-06 PROCEDURE — 96411 CHEMO IV PUSH ADDL DRUG: CPT

## 2024-06-06 RX ORDER — FLUOROURACIL 50 MG/ML
400 INJECTION, SOLUTION INTRAVENOUS ONCE
Status: COMPLETED | OUTPATIENT
Start: 2024-06-06 | End: 2024-06-06

## 2024-06-06 RX ORDER — FLUOROURACIL 50 MG/ML
2400 INJECTION, SOLUTION INTRAVENOUS CONTINUOUS
Status: DISCONTINUED | OUTPATIENT
Start: 2024-06-06 | End: 2024-06-06

## 2024-06-06 RX ADMIN — FLUOROURACIL 4750 MG: 50 INJECTION, SOLUTION INTRAVENOUS at 13:53:00

## 2024-06-06 RX ADMIN — FLUOROURACIL 800 MG: 50 INJECTION, SOLUTION INTRAVENOUS at 13:47:00

## 2024-06-08 ENCOUNTER — NURSE ONLY (OUTPATIENT)
Dept: HEMATOLOGY/ONCOLOGY | Facility: HOSPITAL | Age: 64
End: 2024-06-08
Attending: INTERNAL MEDICINE
Payer: COMMERCIAL

## 2024-06-08 VITALS
TEMPERATURE: 98 F | HEART RATE: 84 BPM | DIASTOLIC BLOOD PRESSURE: 56 MMHG | RESPIRATION RATE: 16 BRPM | SYSTOLIC BLOOD PRESSURE: 114 MMHG | OXYGEN SATURATION: 96 %

## 2024-06-08 PROCEDURE — 96523 IRRIG DRUG DELIVERY DEVICE: CPT

## 2024-06-08 RX ORDER — HEPARIN SODIUM (PORCINE) LOCK FLUSH IV SOLN 100 UNIT/ML 100 UNIT/ML
SOLUTION INTRAVENOUS
Status: DISPENSED
Start: 2024-06-08 | End: 2024-06-08

## 2024-06-08 NOTE — PROGRESS NOTES
Patient arrived to unit for CADD pump disconnect. 4.9ml left on pump.  When removing dressing skin appeared moist/wet patient states he showered this AM.patient advised importance of keeping site dry . He verbalized understanding     R chest port with good blood return noted. Port flushed, heparin locked, and de-accessed.     Patient tolerated, no issues reported. Patient left ambulatory to exit.

## 2024-06-10 RX ORDER — OMEPRAZOLE 40 MG/1
40 CAPSULE, DELAYED RELEASE ORAL
Qty: 90 CAPSULE | Refills: 0 | Status: SHIPPED | OUTPATIENT
Start: 2024-06-10 | End: 2024-09-08

## 2024-06-10 NOTE — TELEPHONE ENCOUNTER
Requested Prescriptions     Pending Prescriptions Disp Refills    OMEPRAZOLE 40 MG Oral Capsule Delayed Release [Pharmacy Med Name: Omeprazole Dr 40 Mg Cap Nort] 90 capsule 0     Sig: TAKE 1 CAPSULE (40 MG TOTAL) BY MOUTH EVERY MORNING BEFORE BREAKFAST.        LOV   12/12/2023    LR   3/18/2024

## 2024-06-19 ENCOUNTER — NURSE ONLY (OUTPATIENT)
Dept: HEMATOLOGY/ONCOLOGY | Facility: HOSPITAL | Age: 64
End: 2024-06-19
Attending: INTERNAL MEDICINE
Payer: COMMERCIAL

## 2024-06-19 VITALS
SYSTOLIC BLOOD PRESSURE: 108 MMHG | WEIGHT: 176 LBS | BODY MASS INDEX: 26.07 KG/M2 | DIASTOLIC BLOOD PRESSURE: 68 MMHG | HEIGHT: 69 IN | HEART RATE: 85 BPM | OXYGEN SATURATION: 98 % | TEMPERATURE: 98 F | RESPIRATION RATE: 16 BRPM

## 2024-06-19 DIAGNOSIS — C16.9 MALIGNANT NEOPLASM OF STOMACH, UNSPECIFIED LOCATION (HCC): Primary | ICD-10-CM

## 2024-06-19 DIAGNOSIS — T45.1X5A PERIPHERAL NEUROPATHY DUE TO CHEMOTHERAPY (HCC): ICD-10-CM

## 2024-06-19 DIAGNOSIS — Z51.11 CHEMOTHERAPY MANAGEMENT, ENCOUNTER FOR: ICD-10-CM

## 2024-06-19 DIAGNOSIS — T45.1X5A CHEMOTHERAPY-INDUCED THROMBOCYTOPENIA: ICD-10-CM

## 2024-06-19 DIAGNOSIS — D69.59 CHEMOTHERAPY-INDUCED THROMBOCYTOPENIA: ICD-10-CM

## 2024-06-19 DIAGNOSIS — G62.0 PERIPHERAL NEUROPATHY DUE TO CHEMOTHERAPY (HCC): ICD-10-CM

## 2024-06-19 LAB
ALBUMIN SERPL-MCNC: 4.3 G/DL (ref 3.2–4.8)
ALBUMIN/GLOB SERPL: 1.4 {RATIO} (ref 1–2)
ALP LIVER SERPL-CCNC: 145 U/L
ALT SERPL-CCNC: 55 U/L
ANION GAP SERPL CALC-SCNC: 6 MMOL/L (ref 0–18)
AST SERPL-CCNC: 50 U/L (ref ?–34)
BASOPHILS # BLD AUTO: 0.02 X10(3) UL (ref 0–0.2)
BASOPHILS NFR BLD AUTO: 0.4 %
BILIRUB SERPL-MCNC: 1.6 MG/DL (ref 0.2–1.1)
BUN BLD-MCNC: 13 MG/DL (ref 9–23)
BUN/CREAT SERPL: 13.8 (ref 10–20)
CALCIUM BLD-MCNC: 9.6 MG/DL (ref 8.7–10.4)
CHLORIDE SERPL-SCNC: 109 MMOL/L (ref 98–112)
CO2 SERPL-SCNC: 27 MMOL/L (ref 21–32)
CREAT BLD-MCNC: 0.94 MG/DL
DEPRECATED RDW RBC AUTO: 46.5 FL (ref 35.1–46.3)
EGFRCR SERPLBLD CKD-EPI 2021: 91 ML/MIN/1.73M2 (ref 60–?)
EOSINOPHIL # BLD AUTO: 0.02 X10(3) UL (ref 0–0.7)
EOSINOPHIL NFR BLD AUTO: 0.4 %
ERYTHROCYTE [DISTWIDTH] IN BLOOD BY AUTOMATED COUNT: 13.5 % (ref 11–15)
FASTING STATUS PATIENT QL REPORTED: NO
GLOBULIN PLAS-MCNC: 3.1 G/DL (ref 2–3.5)
GLUCOSE BLD-MCNC: 132 MG/DL (ref 70–99)
HCT VFR BLD AUTO: 40.5 %
HGB BLD-MCNC: 14 G/DL
IMM GRANULOCYTES # BLD AUTO: 0.01 X10(3) UL (ref 0–1)
IMM GRANULOCYTES NFR BLD: 0.2 %
LYMPHOCYTES # BLD AUTO: 1.03 X10(3) UL (ref 1–4)
LYMPHOCYTES NFR BLD AUTO: 18.6 %
MCH RBC QN AUTO: 32.9 PG (ref 26–34)
MCHC RBC AUTO-ENTMCNC: 34.6 G/DL (ref 31–37)
MCV RBC AUTO: 95.1 FL
MONOCYTES # BLD AUTO: 0.62 X10(3) UL (ref 0.1–1)
MONOCYTES NFR BLD AUTO: 11.2 %
NEUTROPHILS # BLD AUTO: 3.85 X10 (3) UL (ref 1.5–7.7)
NEUTROPHILS # BLD AUTO: 3.85 X10(3) UL (ref 1.5–7.7)
NEUTROPHILS NFR BLD AUTO: 69.2 %
OSMOLALITY SERPL CALC.SUM OF ELEC: 296 MOSM/KG (ref 275–295)
PLATELET # BLD AUTO: 66 10(3)UL (ref 150–450)
PLATELETS.RETICULATED NFR BLD AUTO: 4.9 % (ref 0–7)
POTASSIUM SERPL-SCNC: 4.7 MMOL/L (ref 3.5–5.1)
PROT SERPL-MCNC: 7.4 G/DL (ref 5.7–8.2)
RBC # BLD AUTO: 4.26 X10(6)UL
SODIUM SERPL-SCNC: 142 MMOL/L (ref 136–145)
TSI SER-ACNC: 1.01 MIU/ML (ref 0.55–4.78)
WBC # BLD AUTO: 5.6 X10(3) UL (ref 4–11)

## 2024-06-19 PROCEDURE — 36415 COLL VENOUS BLD VENIPUNCTURE: CPT

## 2024-06-19 PROCEDURE — 80053 COMPREHEN METABOLIC PANEL: CPT

## 2024-06-19 PROCEDURE — 85025 COMPLETE CBC W/AUTO DIFF WBC: CPT

## 2024-06-19 PROCEDURE — 99215 OFFICE O/P EST HI 40 MIN: CPT | Performed by: INTERNAL MEDICINE

## 2024-06-19 PROCEDURE — 84443 ASSAY THYROID STIM HORMONE: CPT

## 2024-06-19 NOTE — PROGRESS NOTES
Oncology Progress note    Requesting: Dr. Higgins    Chief Complaint: Anemia gastric cancer    HPI:  64 year old With gastric adenocarcinoma diagnosed 12/18/23 in the setting of iron def anemia.      See below for staging workup    Initiated FOLFOX +Trastuzumab 1/18/24. Added Pembrolizumab with C3 (PDL1 10%).      Interval History:  Returns for consideration of Cycle #11, FOLFOX +Trastuzumab+ Pembrolizumab.  His surveillance CT scan from 6/2 shows a favorable treatment.     The patient is tolerating treatment well.  Bayroni mentions a 1x episode of nosebleeds yesterday. His CIPN gr1 to fingers and toes, more prolonged and uncomfortable after C8 but now improved with week delay to gr1, not activity limiting. He denies concerns for fever, infection,  chest pain, dyspnea or rash. No new concerns on ROS.      Pmh:  Arthritis    Social History     Socioeconomic History    Marital status:     Number of children: 2   Occupational History    Occupation:      Employer: [employer]   Tobacco Use    Smoking status: Never    Smokeless tobacco: Never   Vaping Use    Vaping status: Never Used   Substance and Sexual Activity    Alcohol use: No     Alcohol/week: 0.0 standard drinks of alcohol    Drug use: No    Sexual activity: Yes   Other Topics Concern    Caffeine Concern Yes     Comment: coffee, 1 cup daily     Family History   Problem Relation Age of Onset    Prostate Cancer Other      Ros [id number]    Nka  Current Outpatient Medications on File Prior to Visit   Medication Sig Dispense Refill    OMEPRAZOLE 40 MG Oral Capsule Delayed Release TAKE 1 CAPSULE (40 MG TOTAL) BY MOUTH EVERY MORNING BEFORE BREAKFAST. 90 capsule 0    prochlorperazine (COMPAZINE) 10 mg tablet Take 1 tablet (10 mg total) by mouth every 6 (six) hours as needed for Nausea. (Patient not taking: Reported on 1/31/2024) 30 tablet 3    ondansetron (ZOFRAN) 8 MG tablet Take 1 tablet (8 mg total) by mouth every 8 (eight) hours as  needed for Nausea. (Patient not taking: Reported on 2024) 30 tablet 3     Current Facility-Administered Medications on File Prior to Visit   Medication Dose Route Frequency Provider Last Rate Last Admin    [] heparin sodium lock flush 100 UNIT/ML injection             [COMPLETED] ondansetron (Zofran) 16 mg, dexAMETHasone (Decadron) 20 mg in sodium chloride 0.9% 110 mL IVPB   Intravenous Once Gustavo Díaz MD   Stopped at 24 0958    [COMPLETED] trastuzumab-qyyp (Trazimera) 336 mg in sodium chloride 0.9% 266 mL infusion  4 mg/kg (Treatment Plan Recorded) Intravenous Once Gustavo Díaz MD   Stopped at 24 1122    [COMPLETED] oxaliplatin (Eloxatin) 130 mg in dextrose 5% 276 mL infusion  65 mg/m2 (Treatment Plan Recorded) Intravenous Once Gustavo Díaz MD   Stopped at 24 1343    [COMPLETED] leucovorin 800 mg in dextrose 5% 250 mL infusion  400 mg/m2 (Treatment Plan Recorded) Intravenous Once Gustavo Díaz MD   Stopped at 24 1343    [COMPLETED] fluorouracil (Adrucil) 50 mg/mL IV push 800 mg 16 mL  400 mg/m2 (Treatment Plan Recorded) Intravenous Once Gustavo Díaz MD   800 mg at 24 1347    [COMPLETED] iopamidol 76% (ISOVUE-370) injection for power injector  80 mL Intravenous ONCE PRN Lily Herrera APRN   80 mL at 24 1221    [COMPLETED] heparin sodium lock flush 100 UNIT/ML injection 500 Units  5 mL Intracatheter Once Lily Herrera APRN   500 Units at 24 0935    [COMPLETED] ondansetron (Zofran) 16 mg, dexAMETHasone (Decadron) 20 mg in sodium chloride 0.9% 110 mL IVPB   Intravenous Once Lily Herrera APRN   Stopped at 24 1138    [COMPLETED] pembrolizumab (Keytruda) 400 mg in sodium chloride 0.9% 116 mL IVPB  400 mg Intravenous Once Lily Herrera APRN   Stopped at 24 1120    [COMPLETED] trastuzumab-qyyp (Trazimera) 336 mg in sodium chloride 0.9% 266 mL infusion  4 mg/kg (Treatment Plan Recorded) Intravenous Once Sharon  Liyl APRN   Stopped at 05/23/24 1215    [COMPLETED] oxaliplatin (Eloxatin) 130 mg in dextrose 5% 276 mL infusion  65 mg/m2 (Treatment Plan Recorded) Intravenous Once HerreraLily APRN   Stopped at 05/23/24 1425    [COMPLETED] leucovorin 800 mg in dextrose 5% 250 mL infusion  400 mg/m2 (Treatment Plan Recorded) Intravenous Once Lily Herrera, APRN   Stopped at 05/23/24 1425    [COMPLETED] fluorouracil (Adrucil) 50 mg/mL IV push 800 mg 16 mL  400 mg/m2 (Treatment Plan Recorded) Intravenous Once HerreraLily, APRN   800 mg at 05/23/24 1438     Vitals:    06/19/24 1425   BP: 108/68   Pulse: 85   Resp: 16   Temp: 97.9 °F (36.6 °C)     Physical exam:  General:  Awake, alert, oriented in NAD  HEENT - EOMsi, anicteric, MMM  Neck - supple, no LAD, normal ROM  Lungs -  CTA bilaterally  Cor - S1/S2 w/o murmur noted  Abd - soft, non tender non distended, bs+  BLE - no edema  Neuro - DTRs grossly intact    ECOG 0: Fully active, able to carry on all pre-disease performance without restriction  Imaging:    CT C/A/P 6/2/24  Impression   CONCLUSION:  1. History of metastatic gastric adenocarcinoma.  Known primary distal gastric body/antral mass is not well assessed by CT.  Metastatic right upper quadrant/greater omental lymph nodes demonstrate continued decrease in size since comparison CT imaging  from March, 2024. Findings suggest continued favorable interval treatment response.  No other suspicious new or enlarging upper abdominal lymphadenopathy to suggest interval disease progression.  Continued surveillance imaging is recommended.  2. Nonspecific 1.9 x 1.5 cm right hilar lymph node, which extends to the paramediastinal right middle lobe is unchanged in size since index CT imaging from December, 2023 and was hypermetabolic on PET scan from January, 2024. This could relate to a  stable metastatic right hilar kevin deposit versus nonspecific chronic infectious/inflammatory lymph node.  Close attention  on anticipated follow-up is advised.  3. Tiny 0.3 cm right upper lobe pulmonary micronodule is unchanged since index chest CT from January, 2023.  Small size and long-term stability suggests benign etiology.  4. Stable probable subcentimeter left hepatic lobe cysts.  5. Mild prostatomegaly.  6. Right inguinal herniorrhaphy with no hernia recurrence.  7. Lesser incidental findings as above.         64 year old  With gastric adenocarcinoma diagnosed 12/18/23 in the setting of iron def anemia. Her positive (3+) MSI-S PDL1 10%    1.) Gastric cancer, stage IV  -- Widespread kevin disease reviewed at tumor conference by Dr. Amaro with surgical oncology will plan for upfront systemic therapy with 5-FU oxaliplatin based regimen potentially with trastuzumab and pembrolizumab  -Initiated FOLFOX plus trastuzumab on 1/18/23   -Delayed Cycle #5 x 1 week d/t worsening LFT elevation   -Cycle #6 with dose reduction of Oxaliplatin d/t CIPN worsening and LFTs as below.    -CT imaging after C4 3/2024 with favorable treatment response.   -PDL1 10% 1/2024. Added Pembrolizumab  as >1% per guidelines. q6week scheduling-initiated with C3 above.   -DELAY Cycle 8 d/t TCP <75K.    - reviewed the pt's current CT scan from above early June; favorable treatment response, continue current systemic treatment    -Defer Cycle #11 due to TCP<75K; labs checked next week for FOLFOX plus trastuzumab plus Pembrolizumab at same dosing. We reviewed tx may need to be every 21 days for better count tolerance    2.)Iron deficiency anemia    --s/p Feraheme x 2, last dose on 2/1/24, anemia resolved    3.) Chemotherapy induced thrombocytopenia  --Thrombocytopenia secondary to chemo, ok to treat if plts > 75k.    --Delayed C9 as above. Cycle 11 now delayed; Ok to treat with improvement >75K.  Monitor, may need to dose reduce chemo further    4.)Transaminitis   --improved gr 1, T. Bili 1.5. dose reduction oxali as above. ok to treat. Monitor.      5.)CIPN  -- gr  1-2 after C8, Dose reduction of oxaliplatin to 65 mg/m2 with C6.   --Delay of C9 d/t Tcp with imrpovement to gr1 CIPN to fingers without aDL limitations.   --Ok to continue as above. Consider discontinue in coming cycles based on symptoms.  Monitor.      6.)risk of cardiotoxicity  --Normal EF/echo prior to treatment 1/2024. 5/2024 ECHO repeat stable/improved.    --H/o fleeting chest discomfort reported, currently resolved, without any other Aes. Monitor.     7.)CINausea  --Prn antiemetics encouraged    9.)H/o skin rash  --Resolved. Supportive care with topical creams.       MDM - High    Gustavo Díaz MD  [facility]  [facility]  [address] [place] [address], [place] [address]

## 2024-06-20 ENCOUNTER — APPOINTMENT (OUTPATIENT)
Dept: HEMATOLOGY/ONCOLOGY | Facility: HOSPITAL | Age: 64
End: 2024-06-20
Attending: INTERNAL MEDICINE
Payer: COMMERCIAL

## 2024-06-22 ENCOUNTER — APPOINTMENT (OUTPATIENT)
Dept: HEMATOLOGY/ONCOLOGY | Facility: HOSPITAL | Age: 64
End: 2024-06-22
Attending: INTERNAL MEDICINE
Payer: COMMERCIAL

## 2024-06-26 ENCOUNTER — LAB ENCOUNTER (OUTPATIENT)
Dept: LAB | Age: 64
End: 2024-06-26
Attending: INTERNAL MEDICINE

## 2024-06-26 DIAGNOSIS — C16.9 MALIGNANT NEOPLASM OF STOMACH, UNSPECIFIED LOCATION (HCC): ICD-10-CM

## 2024-06-26 LAB
ALBUMIN SERPL-MCNC: 4.3 G/DL (ref 3.2–4.8)
ALBUMIN/GLOB SERPL: 1.4 {RATIO} (ref 1–2)
ALP LIVER SERPL-CCNC: 145 U/L
ALT SERPL-CCNC: 60 U/L
ANION GAP SERPL CALC-SCNC: 10 MMOL/L (ref 0–18)
AST SERPL-CCNC: 52 U/L (ref ?–34)
BASOPHILS # BLD AUTO: 0.01 X10(3) UL (ref 0–0.2)
BASOPHILS NFR BLD AUTO: 0.3 %
BILIRUB SERPL-MCNC: 1.6 MG/DL (ref 0.2–1.1)
BUN BLD-MCNC: 10 MG/DL (ref 9–23)
BUN/CREAT SERPL: 10.8 (ref 10–20)
CALCIUM BLD-MCNC: 9.6 MG/DL (ref 8.7–10.4)
CHLORIDE SERPL-SCNC: 107 MMOL/L (ref 98–112)
CO2 SERPL-SCNC: 25 MMOL/L (ref 21–32)
CREAT BLD-MCNC: 0.93 MG/DL
DEPRECATED RDW RBC AUTO: 47.3 FL (ref 35.1–46.3)
EGFRCR SERPLBLD CKD-EPI 2021: 92 ML/MIN/1.73M2 (ref 60–?)
EOSINOPHIL # BLD AUTO: 0.05 X10(3) UL (ref 0–0.7)
EOSINOPHIL NFR BLD AUTO: 1.4 %
ERYTHROCYTE [DISTWIDTH] IN BLOOD BY AUTOMATED COUNT: 13.4 % (ref 11–15)
FASTING STATUS PATIENT QL REPORTED: YES
GLOBULIN PLAS-MCNC: 3 G/DL (ref 2–3.5)
GLUCOSE BLD-MCNC: 84 MG/DL (ref 70–99)
HCT VFR BLD AUTO: 42.5 %
HGB BLD-MCNC: 14.5 G/DL
IMM GRANULOCYTES # BLD AUTO: 0.02 X10(3) UL (ref 0–1)
IMM GRANULOCYTES NFR BLD: 0.6 %
LYMPHOCYTES # BLD AUTO: 1.65 X10(3) UL (ref 1–4)
LYMPHOCYTES NFR BLD AUTO: 47.3 %
MCH RBC QN AUTO: 32.9 PG (ref 26–34)
MCHC RBC AUTO-ENTMCNC: 34.1 G/DL (ref 31–37)
MCV RBC AUTO: 96.4 FL
MONOCYTES # BLD AUTO: 0.5 X10(3) UL (ref 0.1–1)
MONOCYTES NFR BLD AUTO: 14.3 %
NEUTROPHILS # BLD AUTO: 1.26 X10 (3) UL (ref 1.5–7.7)
NEUTROPHILS # BLD AUTO: 1.26 X10(3) UL (ref 1.5–7.7)
NEUTROPHILS NFR BLD AUTO: 36.1 %
OSMOLALITY SERPL CALC.SUM OF ELEC: 292 MOSM/KG (ref 275–295)
PLATELET # BLD AUTO: 69 10(3)UL (ref 150–450)
PLATELETS.RETICULATED NFR BLD AUTO: 12.3 % (ref 0–7)
POTASSIUM SERPL-SCNC: 4.3 MMOL/L (ref 3.5–5.1)
PROT SERPL-MCNC: 7.3 G/DL (ref 5.7–8.2)
RBC # BLD AUTO: 4.41 X10(6)UL
SODIUM SERPL-SCNC: 142 MMOL/L (ref 136–145)
WBC # BLD AUTO: 3.5 X10(3) UL (ref 4–11)

## 2024-06-26 PROCEDURE — 80053 COMPREHEN METABOLIC PANEL: CPT

## 2024-06-26 PROCEDURE — 36415 COLL VENOUS BLD VENIPUNCTURE: CPT

## 2024-06-26 PROCEDURE — 85025 COMPLETE CBC W/AUTO DIFF WBC: CPT

## 2024-06-27 ENCOUNTER — APPOINTMENT (OUTPATIENT)
Dept: HEMATOLOGY/ONCOLOGY | Facility: HOSPITAL | Age: 64
End: 2024-06-27
Attending: INTERNAL MEDICINE
Payer: COMMERCIAL

## 2024-06-27 ENCOUNTER — TELEPHONE (OUTPATIENT)
Dept: HEMATOLOGY/ONCOLOGY | Facility: HOSPITAL | Age: 64
End: 2024-06-27

## 2024-06-27 NOTE — TELEPHONE ENCOUNTER
Returned call to Bhumika and let her know it looks like the nurse on 6/24 faxed out some information for [employer]'s disability forms. Gave her forms department phone number at [phone]. Let her know to reach out to them if she does not receive any word about the forms being completed. Pt's wife stated understanding and thanked me for the call.

## 2024-06-27 NOTE — TELEPHONE ENCOUNTER
I called Bayronalexander his wife answered. The patient treatment was deferred until next week I gave her new dates and times upon doing so she states she has an additional question for  nurse. She said some forms where sent over to the office for short term disability. I explained we now have a medical forms department that is handling all forms. She wanted me to confirm with you that you have not received any forms as of yet. If you have were they forwarded over to the forms department. Please advise.

## 2024-07-02 ENCOUNTER — OFFICE VISIT (OUTPATIENT)
Dept: HEMATOLOGY/ONCOLOGY | Facility: HOSPITAL | Age: 64
End: 2024-07-02
Attending: INTERNAL MEDICINE
Payer: COMMERCIAL

## 2024-07-02 VITALS
RESPIRATION RATE: 18 BRPM | BODY MASS INDEX: 26.07 KG/M2 | HEIGHT: 69.02 IN | TEMPERATURE: 98 F | OXYGEN SATURATION: 94 % | HEART RATE: 74 BPM | WEIGHT: 176 LBS | SYSTOLIC BLOOD PRESSURE: 129 MMHG | DIASTOLIC BLOOD PRESSURE: 69 MMHG

## 2024-07-02 DIAGNOSIS — C16.9 MALIGNANT NEOPLASM OF STOMACH, UNSPECIFIED LOCATION (HCC): Primary | ICD-10-CM

## 2024-07-02 DIAGNOSIS — Z51.11 CHEMOTHERAPY MANAGEMENT, ENCOUNTER FOR: ICD-10-CM

## 2024-07-02 LAB
ALBUMIN SERPL-MCNC: 4.5 G/DL (ref 3.2–4.8)
ALBUMIN/GLOB SERPL: 1.4 {RATIO} (ref 1–2)
ALP LIVER SERPL-CCNC: 154 U/L
ALT SERPL-CCNC: 58 U/L
ANION GAP SERPL CALC-SCNC: 4 MMOL/L (ref 0–18)
AST SERPL-CCNC: 54 U/L (ref ?–34)
BASOPHILS # BLD AUTO: 0.02 X10(3) UL (ref 0–0.2)
BASOPHILS NFR BLD AUTO: 0.3 %
BILIRUB SERPL-MCNC: 1.2 MG/DL (ref 0.2–1.1)
BUN BLD-MCNC: 14 MG/DL (ref 9–23)
BUN/CREAT SERPL: 13.6 (ref 10–20)
CALCIUM BLD-MCNC: 9.9 MG/DL (ref 8.7–10.4)
CHLORIDE SERPL-SCNC: 109 MMOL/L (ref 98–112)
CO2 SERPL-SCNC: 29 MMOL/L (ref 21–32)
CREAT BLD-MCNC: 1.03 MG/DL
DEPRECATED RDW RBC AUTO: 45.3 FL (ref 35.1–46.3)
EGFRCR SERPLBLD CKD-EPI 2021: 82 ML/MIN/1.73M2 (ref 60–?)
EOSINOPHIL # BLD AUTO: 0.03 X10(3) UL (ref 0–0.7)
EOSINOPHIL NFR BLD AUTO: 0.5 %
ERYTHROCYTE [DISTWIDTH] IN BLOOD BY AUTOMATED COUNT: 13.2 % (ref 11–15)
FASTING STATUS PATIENT QL REPORTED: NO
GLOBULIN PLAS-MCNC: 3.3 G/DL (ref 2–3.5)
GLUCOSE BLD-MCNC: 124 MG/DL (ref 70–99)
HCT VFR BLD AUTO: 43.1 %
HGB BLD-MCNC: 15.3 G/DL
IMM GRANULOCYTES # BLD AUTO: 0.01 X10(3) UL (ref 0–1)
IMM GRANULOCYTES NFR BLD: 0.2 %
LYMPHOCYTES # BLD AUTO: 1.38 X10(3) UL (ref 1–4)
LYMPHOCYTES NFR BLD AUTO: 24 %
MCH RBC QN AUTO: 33.1 PG (ref 26–34)
MCHC RBC AUTO-ENTMCNC: 35.5 G/DL (ref 31–37)
MCV RBC AUTO: 93.3 FL
MONOCYTES # BLD AUTO: 0.46 X10(3) UL (ref 0.1–1)
MONOCYTES NFR BLD AUTO: 8 %
NEUTROPHILS # BLD AUTO: 3.86 X10 (3) UL (ref 1.5–7.7)
NEUTROPHILS # BLD AUTO: 3.86 X10(3) UL (ref 1.5–7.7)
NEUTROPHILS NFR BLD AUTO: 67 %
OSMOLALITY SERPL CALC.SUM OF ELEC: 296 MOSM/KG (ref 275–295)
PLATELET # BLD AUTO: 111 10(3)UL (ref 150–450)
PLATELETS.RETICULATED NFR BLD AUTO: 4.5 % (ref 0–7)
POTASSIUM SERPL-SCNC: 5.1 MMOL/L (ref 3.5–5.1)
PROT SERPL-MCNC: 7.8 G/DL (ref 5.7–8.2)
RBC # BLD AUTO: 4.62 X10(6)UL
SODIUM SERPL-SCNC: 142 MMOL/L (ref 136–145)
TSI SER-ACNC: 0.9 MIU/ML (ref 0.55–4.78)
WBC # BLD AUTO: 5.8 X10(3) UL (ref 4–11)

## 2024-07-02 PROCEDURE — 84443 ASSAY THYROID STIM HORMONE: CPT

## 2024-07-02 PROCEDURE — 99215 OFFICE O/P EST HI 40 MIN: CPT | Performed by: NURSE PRACTITIONER

## 2024-07-02 PROCEDURE — 36415 COLL VENOUS BLD VENIPUNCTURE: CPT

## 2024-07-02 PROCEDURE — 80053 COMPREHEN METABOLIC PANEL: CPT

## 2024-07-02 PROCEDURE — 85025 COMPLETE CBC W/AUTO DIFF WBC: CPT

## 2024-07-02 RX ORDER — FLUOROURACIL 50 MG/ML
400 INJECTION, SOLUTION INTRAVENOUS ONCE
Status: CANCELLED | OUTPATIENT
Start: 2024-07-03

## 2024-07-02 RX ORDER — FLUOROURACIL 50 MG/ML
2400 INJECTION, SOLUTION INTRAVENOUS CONTINUOUS
Status: CANCELLED | OUTPATIENT
Start: 2024-07-03

## 2024-07-02 NOTE — PROGRESS NOTES
Oncology Progress note    Requesting: Dr. Higgins    Chief Complaint: Anemia, gastric cancer    HPI:  64 year old With gastric adenocarcinoma diagnosed 12/18/23 in the setting of iron def anemia.      See below for staging workup    Initiated FOLFOX +Trastuzumab 1/18/24. Added Pembrolizumab with C3 (PDL1 10%).      Interval History:  Returns for consideration of Cycle #11, FOLFOX +Trastuzumab+ Pembrolizumab.  His surveillance CT scan from 6/2 shows a favorable treatment.     The patient is tolerating treatment well.  Bayroni mentions a 1x episode of nosebleeds yesterday. His CIPN gr1 to fingers and toes, more prolonged and uncomfortable after C8 but now improved with week delay to gr1, not activity limiting. He denies concerns for fever, infection,  chest pain, dyspnea or rash. No new concerns on ROS.      Pmh:  Arthritis  Here today for evaluate for C 11 after delay for low platelets.   States feeling well today. C/o persistent peripheral neuropathy.   Discussed addition of medications to help with neuropathy and patient declined at this time.     Trouble working with sensitivity of smells leading to nausea, dizzy lightheaded and neuropathy. Frequent nosebleeds.   Neuropathy to hand and toes. Dropping items.       Social History     Socioeconomic History    Marital status:     Number of children: 2   Occupational History    Occupation:      Employer: [employer]   Tobacco Use    Smoking status: Never    Smokeless tobacco: Never   Vaping Use    Vaping status: Never Used   Substance and Sexual Activity    Alcohol use: No     Alcohol/week: 0.0 standard drinks of alcohol    Drug use: No    Sexual activity: Yes   Other Topics Concern    Caffeine Concern Yes     Comment: coffee, 1 cup daily     Family History   Problem Relation Age of Onset    Prostate Cancer Other      Ros negx12    Nka  Current Outpatient Medications on File Prior to Visit   Medication Sig Dispense Refill     OMEPRAZOLE 40 MG Oral Capsule Delayed Release TAKE 1 CAPSULE (40 MG TOTAL) BY MOUTH EVERY MORNING BEFORE BREAKFAST. 90 capsule 0    prochlorperazine (COMPAZINE) 10 mg tablet Take 1 tablet (10 mg total) by mouth every 6 (six) hours as needed for Nausea. (Patient not taking: Reported on 2024) 30 tablet 3    ondansetron (ZOFRAN) 8 MG tablet Take 1 tablet (8 mg total) by mouth every 8 (eight) hours as needed for Nausea. (Patient not taking: Reported on 2024) 30 tablet 3     Current Facility-Administered Medications on File Prior to Visit   Medication Dose Route Frequency Provider Last Rate Last Admin    [] heparin (Porcine) 100 Units/mL lock flush             [] heparin sodium lock flush 100 UNIT/ML injection             [COMPLETED] ondansetron (Zofran) 16 mg, dexAMETHasone (Decadron) 20 mg in sodium chloride 0.9% 110 mL IVPB   Intravenous Once Gustavo Díaz MD   Stopped at 24 0958    [COMPLETED] trastuzumab-qyyp (Trazimera) 336 mg in sodium chloride 0.9% 266 mL infusion  4 mg/kg (Treatment Plan Recorded) Intravenous Once Gustavo Díaz MD   Stopped at 24 1122    [COMPLETED] oxaliplatin (Eloxatin) 130 mg in dextrose 5% 276 mL infusion  65 mg/m2 (Treatment Plan Recorded) Intravenous Once Gustavo Díaz MD   Stopped at 24 1343    [COMPLETED] leucovorin 800 mg in dextrose 5% 250 mL infusion  400 mg/m2 (Treatment Plan Recorded) Intravenous Once Gustavo Díaz MD   Stopped at 24 1343    [COMPLETED] fluorouracil (Adrucil) 50 mg/mL IV push 800 mg 16 mL  400 mg/m2 (Treatment Plan Recorded) Intravenous Once Gustavo Díaz MD   800 mg at 24 1347    [COMPLETED] iopamidol 76% (ISOVUE-370) injection for power injector  80 mL Intravenous ONCE PRN Lily Herrera APRN   80 mL at 24 1221     Vitals:    24 1049   BP: 129/69   Pulse: 74   Resp: 18   Temp: 97.7 °F (36.5 °C)     Wt Readings from Last 6 Encounters:   24 79.8 kg (176 lb)   24 79.8 kg  (176 lb)   06/06/24 81.1 kg (178 lb 14.4 oz)   06/05/24 81.6 kg (180 lb)   05/23/24 81.6 kg (179 lb 14.4 oz)   05/22/24 82 kg (180 lb 12.8 oz)         Physical exam:  General:  Awake, alert, oriented in NAD  HEENT - EOMsi, anicteric, MMM  Neck - supple, no LAD, normal ROM  Lungs -  CTA bilaterally  Cor - S1/S2 w/o murmur noted  Abd - soft, non tender non distended, bs+  BLE - no edema  Neuro - DTRs grossly intact    ECOG 0: Fully active, able to carry on all pre-disease performance without restriction  Imaging:    CT C/A/P 6/2/24  Impression   CONCLUSION:  1. History of metastatic gastric adenocarcinoma.  Known primary distal gastric body/antral mass is not well assessed by CT.  Metastatic right upper quadrant/greater omental lymph nodes demonstrate continued decrease in size since comparison CT imaging  from March, 2024. Findings suggest continued favorable interval treatment response.  No other suspicious new or enlarging upper abdominal lymphadenopathy to suggest interval disease progression.  Continued surveillance imaging is recommended.  2. Nonspecific 1.9 x 1.5 cm right hilar lymph node, which extends to the paramediastinal right middle lobe is unchanged in size since index CT imaging from December, 2023 and was hypermetabolic on PET scan from January, 2024. This could relate to a  stable metastatic right hilar kevin deposit versus nonspecific chronic infectious/inflammatory lymph node.  Close attention on anticipated follow-up is advised.  3. Tiny 0.3 cm right upper lobe pulmonary micronodule is unchanged since index chest CT from January, 2023.  Small size and long-term stability suggests benign etiology.  4. Stable probable subcentimeter left hepatic lobe cysts.  5. Mild prostatomegaly.  6. Right inguinal herniorrhaphy with no hernia recurrence.  7. Lesser incidental findings as above.         64 year old  With gastric adenocarcinoma diagnosed 12/18/23 in the setting of iron def anemia. Her positive (3+)  MSI-S PDL1 10%    1.) Gastric cancer, stage IV  -- Widespread kevin disease reviewed at tumor conference by Dr. Amaro with surgical oncology will plan for upfront systemic therapy with 5-FU oxaliplatin based regimen potentially with trastuzumab and pembrolizumab  -Initiated FOLFOX plus trastuzumab on 1/18/23   -Delayed Cycle #5 x 1 week d/t worsening LFT elevation   -Cycle #6 with dose reduction of Oxaliplatin d/t CIPN worsening and LFTs as below.    -CT imaging after C4 3/2024 with favorable treatment response.   -PDL1 10% 1/2024. Added Pembrolizumab  as >1% per guidelines. q6week scheduling-initiated with C3 above.   -DELAY Cycle 8 d/t TCP <75K.    - reviewed the pt's current CT scan from above early June; favorable treatment response, continue current systemic treatment    -Proceed Cycle #11 FOLFOX plus trastuzumab plus Pembrolizumab at same dosing.   We reviewed tx may need to be every 21 days for better count tolerance- thrombocytopenia has been an issue    Re eval in 2 weeks     2.)Iron deficiency anemia    --s/p Feraheme x 2, last dose on 2/1/24, anemia resolved    3.) Chemotherapy induced thrombocytopenia  --Thrombocytopenia secondary to chemo, ok to treat if plts > 75k.    --Delayed C9 as above. Cycle 11 now delayed x 2 weeks; Ok to treat with improvement >75K.    Monitor, may need to dose reduce chemo further      4.)Transaminitis   --improved gr 1, T. Bili 1.5. dose reduction oxali as above. ok to treat. Monitor.      5.)CIPN  -- gr 1-2 after C8, Dose reduction of oxaliplatin to 65 mg/m2 with C6.   --Delay of C9 d/t Tcp with imrpovement to gr1 CIPN to fingers without aDL limitations.   --Ok to continue as above. Consider discontinue in coming cycles based on symptoms.  Monitor.    --offered duloxetine for neuropathy- declined at this time     Applied for disability from work awaiting paperwork and approval    6.)risk of cardiotoxicity  --Normal EF/echo prior to treatment 1/2024. 5/2024 ECHO repeat  stable/improved.    --H/o fleeting chest discomfort reported, currently resolved, without any other Aes. Monitor.     7.)CINausea  --Prn antiemetics encouraged    9.)H/o skin rash  --Resolved. Supportive care with topical creams.       MDM - High    Vilma LEUNG   [facility]  [facility]  [address] [place] [id number]

## 2024-07-03 ENCOUNTER — OFFICE VISIT (OUTPATIENT)
Dept: HEMATOLOGY/ONCOLOGY | Facility: HOSPITAL | Age: 64
End: 2024-07-03
Attending: INTERNAL MEDICINE
Payer: COMMERCIAL

## 2024-07-03 VITALS
SYSTOLIC BLOOD PRESSURE: 123 MMHG | RESPIRATION RATE: 18 BRPM | OXYGEN SATURATION: 95 % | DIASTOLIC BLOOD PRESSURE: 68 MMHG | TEMPERATURE: 98 F | HEART RATE: 82 BPM

## 2024-07-03 DIAGNOSIS — C16.9 MALIGNANT NEOPLASM OF STOMACH, UNSPECIFIED LOCATION (HCC): Primary | ICD-10-CM

## 2024-07-03 PROCEDURE — 96411 CHEMO IV PUSH ADDL DRUG: CPT

## 2024-07-03 PROCEDURE — 96368 THER/DIAG CONCURRENT INF: CPT

## 2024-07-03 PROCEDURE — 96417 CHEMO IV INFUS EACH ADDL SEQ: CPT

## 2024-07-03 PROCEDURE — 96375 TX/PRO/DX INJ NEW DRUG ADDON: CPT

## 2024-07-03 PROCEDURE — 96415 CHEMO IV INFUSION ADDL HR: CPT

## 2024-07-03 PROCEDURE — 96413 CHEMO IV INFUSION 1 HR: CPT

## 2024-07-03 RX ORDER — FLUOROURACIL 50 MG/ML
400 INJECTION, SOLUTION INTRAVENOUS ONCE
Status: COMPLETED | OUTPATIENT
Start: 2024-07-03 | End: 2024-07-03

## 2024-07-03 RX ORDER — FLUOROURACIL 50 MG/ML
2400 INJECTION, SOLUTION INTRAVENOUS CONTINUOUS
Status: DISCONTINUED | OUTPATIENT
Start: 2024-07-03 | End: 2024-07-03

## 2024-07-03 RX ADMIN — FLUOROURACIL 4750 MG: 50 INJECTION, SOLUTION INTRAVENOUS at 11:52:00

## 2024-07-03 RX ADMIN — FLUOROURACIL 800 MG: 50 INJECTION, SOLUTION INTRAVENOUS at 11:47:00

## 2024-07-03 NOTE — PROGRESS NOTES
Pt here for C11D1 Drug(s)FOLFOX + Trastuzumab Arrives Ambulating independently, accompanied by Self     Patient was evaluated today by Treatment Nurse.    Oral medications included in this regimen:  no    Patient confirms comprehension of cancer treatment schedule:  yes    Pregnancy screening:  Not applicable    Modifications in dose or schedule:  Yes. Patient previously deferred d/t thrombocytopenia.     Medications appearance and physical integrity checked by RN: yes.    Chemotherapy IV pump settings verified by 2 RNs:  Yes.  Frequency of blood return and site check throughout administration: Prior to administration, Prior to each drug, Every 2-3 ml IVP, and At completion of therapy     Infusion/treatment outcome:  patient tolerated treatment without incident    CADD pump connected and running. All connections reinforced with tape. Port access is clean and dry, secured with steri strips and tegaderm dressing. Patient verbalized understanding of CADD pump instructions, including troubleshooting.      Education Record    Learner:  Patient and Spouse  Barriers / Limitations:  None  Method:  Discussion  Education / instructions given:  Reviewed tx plan with added Keytruda  Outcome:  Shows understanding    Discharged Home, Ambulating independently, accompanied by:Self    Patient/family verbalized understanding of future appointments: by printed AVS

## 2024-07-05 ENCOUNTER — NURSE ONLY (OUTPATIENT)
Dept: HEMATOLOGY/ONCOLOGY | Facility: HOSPITAL | Age: 64
End: 2024-07-05
Attending: INTERNAL MEDICINE
Payer: COMMERCIAL

## 2024-07-05 DIAGNOSIS — C16.9 MALIGNANT NEOPLASM OF STOMACH, UNSPECIFIED LOCATION (HCC): Primary | ICD-10-CM

## 2024-07-05 PROCEDURE — 96523 IRRIG DRUG DELIVERY DEVICE: CPT

## 2024-07-05 RX ORDER — HEPARIN SODIUM (PORCINE) LOCK FLUSH IV SOLN 100 UNIT/ML 100 UNIT/ML
5 SOLUTION INTRAVENOUS ONCE
OUTPATIENT
Start: 2024-07-05

## 2024-07-05 RX ORDER — SODIUM CHLORIDE 9 MG/ML
10 INJECTION, SOLUTION INTRAMUSCULAR; INTRAVENOUS; SUBCUTANEOUS ONCE
OUTPATIENT
Start: 2024-07-05

## 2024-07-05 RX ORDER — HEPARIN SODIUM (PORCINE) LOCK FLUSH IV SOLN 100 UNIT/ML 100 UNIT/ML
5 SOLUTION INTRAVENOUS ONCE
Status: DISCONTINUED | OUTPATIENT
Start: 2024-07-05 | End: 2024-07-05

## 2024-07-10 ENCOUNTER — APPOINTMENT (OUTPATIENT)
Dept: HEMATOLOGY/ONCOLOGY | Facility: HOSPITAL | Age: 64
End: 2024-07-10
Attending: INTERNAL MEDICINE
Payer: COMMERCIAL

## 2024-07-11 ENCOUNTER — APPOINTMENT (OUTPATIENT)
Dept: HEMATOLOGY/ONCOLOGY | Facility: HOSPITAL | Age: 64
End: 2024-07-11
Attending: INTERNAL MEDICINE
Payer: COMMERCIAL

## 2024-07-13 ENCOUNTER — APPOINTMENT (OUTPATIENT)
Dept: HEMATOLOGY/ONCOLOGY | Facility: HOSPITAL | Age: 64
End: 2024-07-13
Attending: INTERNAL MEDICINE
Payer: COMMERCIAL

## 2024-07-17 ENCOUNTER — OFFICE VISIT (OUTPATIENT)
Dept: HEMATOLOGY/ONCOLOGY | Facility: HOSPITAL | Age: 64
End: 2024-07-17
Attending: INTERNAL MEDICINE
Payer: COMMERCIAL

## 2024-07-17 VITALS
OXYGEN SATURATION: 96 % | DIASTOLIC BLOOD PRESSURE: 70 MMHG | RESPIRATION RATE: 16 BRPM | BODY MASS INDEX: 26.16 KG/M2 | HEART RATE: 80 BPM | TEMPERATURE: 98 F | WEIGHT: 176.63 LBS | SYSTOLIC BLOOD PRESSURE: 136 MMHG | HEIGHT: 69.02 IN

## 2024-07-17 DIAGNOSIS — G62.0 PERIPHERAL NEUROPATHY DUE TO CHEMOTHERAPY (HCC): ICD-10-CM

## 2024-07-17 DIAGNOSIS — Z51.11 CHEMOTHERAPY MANAGEMENT, ENCOUNTER FOR: ICD-10-CM

## 2024-07-17 DIAGNOSIS — C16.9 MALIGNANT NEOPLASM OF STOMACH, UNSPECIFIED LOCATION (HCC): ICD-10-CM

## 2024-07-17 DIAGNOSIS — T45.1X5A PERIPHERAL NEUROPATHY DUE TO CHEMOTHERAPY (HCC): ICD-10-CM

## 2024-07-17 DIAGNOSIS — C16.9 MALIGNANT NEOPLASM OF STOMACH, UNSPECIFIED LOCATION (HCC): Primary | ICD-10-CM

## 2024-07-17 DIAGNOSIS — Z51.11 CHEMOTHERAPY MANAGEMENT, ENCOUNTER FOR: Primary | ICD-10-CM

## 2024-07-17 LAB
ALBUMIN SERPL-MCNC: 4.4 G/DL (ref 3.2–4.8)
ALBUMIN/GLOB SERPL: 1.3 {RATIO} (ref 1–2)
ALP LIVER SERPL-CCNC: 154 U/L
ALT SERPL-CCNC: 61 U/L
ANION GAP SERPL CALC-SCNC: 6 MMOL/L (ref 0–18)
AST SERPL-CCNC: 52 U/L (ref ?–34)
BASOPHILS # BLD AUTO: 0.01 X10(3) UL (ref 0–0.2)
BASOPHILS NFR BLD AUTO: 0.2 %
BILIRUB SERPL-MCNC: 1.6 MG/DL (ref 0.2–1.1)
BUN BLD-MCNC: 11 MG/DL (ref 9–23)
BUN/CREAT SERPL: 10.7 (ref 10–20)
CALCIUM BLD-MCNC: 9.7 MG/DL (ref 8.7–10.4)
CHLORIDE SERPL-SCNC: 107 MMOL/L (ref 98–112)
CO2 SERPL-SCNC: 26 MMOL/L (ref 21–32)
CREAT BLD-MCNC: 1.03 MG/DL
DEPRECATED RDW RBC AUTO: 44.4 FL (ref 35.1–46.3)
EGFRCR SERPLBLD CKD-EPI 2021: 82 ML/MIN/1.73M2 (ref 60–?)
EOSINOPHIL # BLD AUTO: 0.06 X10(3) UL (ref 0–0.7)
EOSINOPHIL NFR BLD AUTO: 1.3 %
ERYTHROCYTE [DISTWIDTH] IN BLOOD BY AUTOMATED COUNT: 13 % (ref 11–15)
GLOBULIN PLAS-MCNC: 3.4 G/DL (ref 2–3.5)
GLUCOSE BLD-MCNC: 113 MG/DL (ref 70–99)
HCT VFR BLD AUTO: 40.5 %
HGB BLD-MCNC: 14.1 G/DL
IMM GRANULOCYTES # BLD AUTO: 0.01 X10(3) UL (ref 0–1)
IMM GRANULOCYTES NFR BLD: 0.2 %
LYMPHOCYTES # BLD AUTO: 1.57 X10(3) UL (ref 1–4)
LYMPHOCYTES NFR BLD AUTO: 34.1 %
MCH RBC QN AUTO: 32.9 PG (ref 26–34)
MCHC RBC AUTO-ENTMCNC: 34.8 G/DL (ref 31–37)
MCV RBC AUTO: 94.4 FL
MONOCYTES # BLD AUTO: 0.52 X10(3) UL (ref 0.1–1)
MONOCYTES NFR BLD AUTO: 11.3 %
NEUTROPHILS # BLD AUTO: 2.44 X10 (3) UL (ref 1.5–7.7)
NEUTROPHILS # BLD AUTO: 2.44 X10(3) UL (ref 1.5–7.7)
NEUTROPHILS NFR BLD AUTO: 52.9 %
OSMOLALITY SERPL CALC.SUM OF ELEC: 288 MOSM/KG (ref 275–295)
PLATELET # BLD AUTO: 79 10(3)UL (ref 150–450)
PLATELETS.RETICULATED NFR BLD AUTO: 4.1 % (ref 0–7)
POTASSIUM SERPL-SCNC: 4.1 MMOL/L (ref 3.5–5.1)
PROT SERPL-MCNC: 7.8 G/DL (ref 5.7–8.2)
RBC # BLD AUTO: 4.29 X10(6)UL
SODIUM SERPL-SCNC: 139 MMOL/L (ref 136–145)
TSI SER-ACNC: 1.02 MIU/ML (ref 0.55–4.78)
WBC # BLD AUTO: 4.6 X10(3) UL (ref 4–11)

## 2024-07-17 PROCEDURE — 84443 ASSAY THYROID STIM HORMONE: CPT

## 2024-07-17 PROCEDURE — 36415 COLL VENOUS BLD VENIPUNCTURE: CPT

## 2024-07-17 PROCEDURE — 99215 OFFICE O/P EST HI 40 MIN: CPT | Performed by: INTERNAL MEDICINE

## 2024-07-17 PROCEDURE — 85025 COMPLETE CBC W/AUTO DIFF WBC: CPT

## 2024-07-17 PROCEDURE — 80053 COMPREHEN METABOLIC PANEL: CPT

## 2024-07-17 RX ORDER — FLUOROURACIL 50 MG/ML
2400 INJECTION, SOLUTION INTRAVENOUS CONTINUOUS
Status: CANCELLED | OUTPATIENT
Start: 2024-07-17

## 2024-07-17 NOTE — PROGRESS NOTES
Oncology Progress note    Requesting: Dr. Higgins    Chief Complaint: Anemia, gastric cancer    HPI:  64 year old With gastric adenocarcinoma diagnosed 12/18/23 in the setting of iron def anemia.      See below for staging workup    Initiated FOLFOX +Trastuzumab 1/18/24. Added Pembrolizumab with C3 (PDL1 10%).      Interval History:  Returns for consideration of Cycle #12, FOLFOX +Trastuzumab+ Pembrolizumab.  His surveillance CT scan from 6/2 shows a favorable treatment.     The patient is tolerating treatment well.  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 He denies concerns for fever, infection,  chest pain, dyspnea or rash. No new concerns on ROS.      Pmh: Arthritis      Social History     Socioeconomic History    Marital status:     Number of children: 2   Occupational History    Occupation:      Employer: [employer]   Tobacco Use    Smoking status: Never    Smokeless tobacco: Never   Vaping Use    Vaping status: Never Used   Substance and Sexual Activity    Alcohol use: No     Alcohol/week: 0.0 standard drinks of alcohol    Drug use: No    Sexual activity: Yes   Other Topics Concern    Caffeine Concern Yes     Comment: coffee, 1 cup daily     Family History   Problem Relation Age of Onset    Prostate Cancer Other      Ros [id number]    Nka  Current Outpatient Medications on File Prior to Visit   Medication Sig Dispense Refill    OMEPRAZOLE 40 MG Oral Capsule Delayed Release TAKE 1 CAPSULE (40 MG TOTAL) BY MOUTH EVERY MORNING BEFORE BREAKFAST. 90 capsule 0    prochlorperazine (COMPAZINE) 10 mg tablet Take 1 tablet (10 mg total) by mouth every 6 (six) hours as needed for Nausea. 30 tablet 3    ondansetron (ZOFRAN) 8 MG tablet Take 1 tablet (8 mg total) by mouth every 8 (eight) hours as needed for Nausea. 30 tablet 3     Current Facility-Administered Medications on File Prior to Visit   Medication Dose Route  Frequency Provider Last Rate Last Admin    [COMPLETED] heparin (Porcine) 100 Units/mL lock flush        500 Units at 24 0823    [COMPLETED] ondansetron (Zofran) 16 mg, dexAMETHasone (Decadron) 20 mg in sodium chloride 0.9% 110 mL IVPB   Intravenous Once Vilma Peterson APRN   Stopped at 24 0925    [COMPLETED] trastuzumab-qyyp (Trazimera) 336 mg in sodium chloride 0.9% 266 mL infusion  4 mg/kg (Treatment Plan Recorded) Intravenous Once Vilma Peterson APRN   Stopped at 24 0904    [COMPLETED] oxaliplatin (Eloxatin) 130 mg in dextrose 5% 276 mL infusion  65 mg/m2 (Treatment Plan Recorded) Intravenous Once Vilma Peterson APRN   Stopped at 24 1133    [COMPLETED] leucovorin 800 mg in dextrose 5% 250 mL infusion  400 mg/m2 (Treatment Plan Recorded) Intravenous Once Vilma Peterson APRN   Stopped at 24 1133    [COMPLETED] fluorouracil (Adrucil) 50 mg/mL IV push 800 mg 16 mL  400 mg/m2 (Treatment Plan Recorded) Intravenous Once Vilma Peterson APRN   800 mg at 24 1147    [] heparin (Porcine) 100 Units/mL lock flush              Vitals:    24 1305   BP: 136/70   Pulse: 80   Resp: 16   Temp: 98.4 °F (36.9 °C)     Wt Readings from Last 6 Encounters:   24 80.1 kg (176 lb 9.6 oz)   24 79.8 kg (176 lb)   24 79.8 kg (176 lb)   24 81.1 kg (178 lb 14.4 oz)   24 81.6 kg (180 lb)   24 81.6 kg (179 lb 14.4 oz)         Physical exam:  General:  Awake, alert, oriented in NAD  HEENT - EOMsi, anicteric, MMM  Neck - supple, no LAD, normal ROM  Lungs -  CTA bilaterally  Cor - S1/S2 w/o murmur noted  Abd - soft, non tender non distended, bs+  BLE - no edema  Neuro - DTRs grossly intact    ECOG 0: Fully active, able to carry on all pre-disease performance without restriction  Imaging:    CT C/A/P 24  Impression   CONCLUSION:  1. History of metastatic gastric adenocarcinoma.  Known primary distal gastric body/antral mass is not well assessed by CT.   Metastatic right upper quadrant/greater omental lymph nodes demonstrate continued decrease in size since comparison CT imaging  from March, 2024. Findings suggest continued favorable interval treatment response.  No other suspicious new or enlarging upper abdominal lymphadenopathy to suggest interval disease progression.  Continued surveillance imaging is recommended.  2. Nonspecific 1.9 x 1.5 cm right hilar lymph node, which extends to the paramediastinal right middle lobe is unchanged in size since index CT imaging from December, 2023 and was hypermetabolic on PET scan from January, 2024. This could relate to a  stable metastatic right hilar kevin deposit versus nonspecific chronic infectious/inflammatory lymph node.  Close attention on anticipated follow-up is advised.  3. Tiny 0.3 cm right upper lobe pulmonary micronodule is unchanged since index chest CT from January, 2023.  Small size and long-term stability suggests benign etiology.  4. Stable probable subcentimeter left hepatic lobe cysts.  5. Mild prostatomegaly.  6. Right inguinal herniorrhaphy with no hernia recurrence.  7. Lesser incidental findings as above.         64 year old  With gastric adenocarcinoma diagnosed 12/18/23 in the setting of iron def anemia. Her positive (3+) MSI-S PDL1 10%    1.) Gastric cancer, stage IV  -- Widespread kevin disease reviewed at tumor conference by Dr. Amaro with surgical oncology will plan for upfront systemic therapy with 5-FU oxaliplatin based regimen potentially with trastuzumab and pembrolizumab  -Initiated FOLFOX plus trastuzumab on 1/18/23   -Delayed Cycle #5 x 1 week d/t worsening LFT elevation   -Cycle #6 with dose reduction of Oxaliplatin d/t CIPN worsening and LFTs as below.    -CT imaging after C4 3/2024 with favorable treatment response.   -PDL1 10% 1/2024. Added Pembrolizumab  as >1% per guidelines. q6week scheduling-initiated with C3 above.   -DELAY Cycle 8 d/t TCP <75K.    - reviewed the pt's current CT  scan from above early June; favorable treatment response, continue current systemic treatment    -Proceed Cycle #12 FOLFOX plus trastuzumab plus Pembrolizumab at same dosing. I have discontinued the 5-FU bolus as this is likely contributing to the chemotherapy induced thrombocytopenia    -Pembro is at the 400 mg dose every 6 wks  -Will repeat ECHO with cycle 14 (every 3mo)and CT scans about every 3 mo  -We reviewed tx may need to be every 21 days for better count tolerance- thrombocytopenia has been an issue      2.)Iron deficiency anemia    --s/p Feraheme x 2, last dose on 2/1/24, anemia resolved    3.) Chemotherapy induced thrombocytopenia  --Thrombocytopenia secondary to chemo, ok to treat if plts > 75k.    --Delayed C9 as above. Cycle 11 now delayed x 2 weeks; Ok to treat with plts >75K.    Monitor, may need to dose reduce chemo further      4.)Transaminitis   --improved gr 1, T. Bili 1.5. dose reduction oxali as above. ok to treat. Monitor.      5.)CIPN  -- gr 1-2 after C8, Dose reduction of oxaliplatin to 65 mg/m2 with C6.   --Delay of C9 d/t Tcp with imrpovement to gr1 CIPN to fingers without aDL limitations.   --Ok to continue as above. Consider discontinue in coming cycles based on symptoms.  Monitor.    --offered duloxetine for neuropathy- declined at this time     Applied for disability from work awaiting paperwork and approval    6.)risk of cardiotoxicity  --Normal EF/echo prior to treatment 1/2024. 5/2024 ECHO repeat stable/improved.    --H/o fleeting chest discomfort reported, currently resolved, without any other Aes. Monitor.     7.)CINausea  --Prn antiemetics encouraged    9.)H/o skin rash  --Resolved. Supportive care with topical creams.       [facility]    Gustavo Díaz MD  [facility]  [place] [address], [place] [address]

## 2024-07-18 ENCOUNTER — OFFICE VISIT (OUTPATIENT)
Dept: HEMATOLOGY/ONCOLOGY | Facility: HOSPITAL | Age: 64
End: 2024-07-18
Attending: INTERNAL MEDICINE
Payer: COMMERCIAL

## 2024-07-18 ENCOUNTER — TELEPHONE (OUTPATIENT)
Dept: HEMATOLOGY/ONCOLOGY | Facility: HOSPITAL | Age: 64
End: 2024-07-18

## 2024-07-18 VITALS
HEART RATE: 73 BPM | DIASTOLIC BLOOD PRESSURE: 75 MMHG | RESPIRATION RATE: 17 BRPM | SYSTOLIC BLOOD PRESSURE: 130 MMHG | OXYGEN SATURATION: 96 % | TEMPERATURE: 98 F

## 2024-07-18 DIAGNOSIS — C16.9 MALIGNANT NEOPLASM OF STOMACH, UNSPECIFIED LOCATION (HCC): Primary | ICD-10-CM

## 2024-07-18 PROCEDURE — 96413 CHEMO IV INFUSION 1 HR: CPT

## 2024-07-18 PROCEDURE — 96415 CHEMO IV INFUSION ADDL HR: CPT

## 2024-07-18 PROCEDURE — 96417 CHEMO IV INFUS EACH ADDL SEQ: CPT

## 2024-07-18 PROCEDURE — 96375 TX/PRO/DX INJ NEW DRUG ADDON: CPT

## 2024-07-18 PROCEDURE — 96368 THER/DIAG CONCURRENT INF: CPT

## 2024-07-18 RX ORDER — FLUOROURACIL 50 MG/ML
2400 INJECTION, SOLUTION INTRAVENOUS CONTINUOUS
Status: DISCONTINUED | OUTPATIENT
Start: 2024-07-18 | End: 2024-07-18

## 2024-07-18 RX ADMIN — FLUOROURACIL 4700 MG: 50 INJECTION, SOLUTION INTRAVENOUS at 11:55:00

## 2024-07-18 NOTE — PROGRESS NOTES
Pt here for C12D1 Drug(s)FOLFOX - KEYTRUDA - TRAZIMERA.  Arrives Ambulating independently, accompanied by Spouse     Patient was evaluated today by Treatment Nurse.    Oral medications included in this regimen:  no    Patient confirms comprehension of cancer treatment schedule:  yes    Pregnancy screening:  Not applicable    Modifications in dose or schedule:  yes - \"ok to treat with platelets >75k\"    Due to drug availability, TRAZIMERA given after after oxaliplatin and leucovorin. Confirmed with pharmacy that is it safe to administer after.     Medications appearance and physical integrity checked by RN: yes.    Chemotherapy IV pump settings verified by 2 RNs:  Yes.  Frequency of blood return and site check throughout administration: Prior to administration, Prior to each drug, and At completion of therapy     Infusion/treatment outcome:  patient tolerated treatment without incident    Education Record    Learner:  Patient  Barriers / Limitations:  None  Method:  Discussion  Education / instructions given:  POC  Outcome:  Shows understanding    Discharged Home, Ambulating independently, accompanied by:Spouse    Patient/family verbalized understanding of future appointments: by printed AVS

## 2024-07-20 ENCOUNTER — NURSE ONLY (OUTPATIENT)
Dept: HEMATOLOGY/ONCOLOGY | Facility: HOSPITAL | Age: 64
End: 2024-07-20
Attending: INTERNAL MEDICINE
Payer: COMMERCIAL

## 2024-07-20 PROCEDURE — 96523 IRRIG DRUG DELIVERY DEVICE: CPT

## 2024-07-20 RX ORDER — SODIUM CHLORIDE 9 MG/ML
10 INJECTION, SOLUTION INTRAMUSCULAR; INTRAVENOUS; SUBCUTANEOUS ONCE
OUTPATIENT
Start: 2024-07-20

## 2024-07-20 RX ORDER — HEPARIN SODIUM (PORCINE) LOCK FLUSH IV SOLN 100 UNIT/ML 100 UNIT/ML
SOLUTION INTRAVENOUS
Status: COMPLETED
Start: 2024-07-20 | End: 2024-07-20

## 2024-07-20 RX ORDER — HEPARIN SODIUM (PORCINE) LOCK FLUSH IV SOLN 100 UNIT/ML 100 UNIT/ML
5 SOLUTION INTRAVENOUS ONCE
OUTPATIENT
Start: 2024-07-20

## 2024-07-20 RX ADMIN — HEPARIN SODIUM (PORCINE) LOCK FLUSH IV SOLN 100 UNIT/ML 500 UNITS: 100 SOLUTION INTRAVENOUS at 08:20:00

## 2024-07-23 ENCOUNTER — TELEPHONE (OUTPATIENT)
Dept: HEMATOLOGY/ONCOLOGY | Facility: HOSPITAL | Age: 64
End: 2024-07-23

## 2024-07-23 NOTE — TELEPHONE ENCOUNTER
Dr. Díaz,     *The ACKNOWLEDGE button has been moved to the top right ribbon*    Please sign off on form if you agree to:  Continuous Disability (cancer dx & treatments), start date: 6/1/24, end date: 12/1/24, patient to return to work on 12/2/24   (place your signature on the first page only)    -From your Inbasket, Highlight the patient and click Chart   -Double click the 7/23/24 Forms Completion telephone encounter  -Scroll down to the Media section   -Click the blue Hyperlink:  Disability Dr. Díaz 7/23/24  -Click Acknowledge located in the top right ribbon/menu   -Drag the mouse into the blank space of the document and a + sign will appear. Left click to   electronically sign the document.     Thank you,    Stella

## 2024-07-23 NOTE — TELEPHONE ENCOUNTER
Type of Leave:  Disability  Reason for Leave:  cancer dx & treatments  Start date of leave:  6/1/24  How much time needed?:  patient to return to work on 12/2/24 (6 months off)   Forms Due Date:  not given  Was Fee and Turnaround info Given?:  yes    Patient has treatments every other week.

## 2024-07-24 NOTE — TELEPHONE ENCOUNTER
Disability forms completed & uploaded to patient's MyChart.    No fax number included on Release of Information form or Disability forms, only option to \"mail\".

## 2024-07-31 ENCOUNTER — TELEPHONE (OUTPATIENT)
Dept: HEMATOLOGY/ONCOLOGY | Facility: HOSPITAL | Age: 64
End: 2024-07-31

## 2024-07-31 ENCOUNTER — OFFICE VISIT (OUTPATIENT)
Dept: HEMATOLOGY/ONCOLOGY | Facility: HOSPITAL | Age: 64
End: 2024-07-31
Attending: INTERNAL MEDICINE
Payer: COMMERCIAL

## 2024-07-31 VITALS
OXYGEN SATURATION: 98 % | SYSTOLIC BLOOD PRESSURE: 129 MMHG | HEART RATE: 66 BPM | RESPIRATION RATE: 16 BRPM | HEIGHT: 69 IN | WEIGHT: 174.38 LBS | BODY MASS INDEX: 25.83 KG/M2 | DIASTOLIC BLOOD PRESSURE: 77 MMHG | TEMPERATURE: 98 F

## 2024-07-31 DIAGNOSIS — Z51.81 ENCOUNTER FOR THERAPEUTIC DRUG MONITORING: ICD-10-CM

## 2024-07-31 DIAGNOSIS — R74.01 TRANSAMINITIS: ICD-10-CM

## 2024-07-31 DIAGNOSIS — Z51.11 CHEMOTHERAPY MANAGEMENT, ENCOUNTER FOR: ICD-10-CM

## 2024-07-31 DIAGNOSIS — C16.9 MALIGNANT NEOPLASM OF STOMACH, UNSPECIFIED LOCATION (HCC): ICD-10-CM

## 2024-07-31 DIAGNOSIS — C16.9 MALIGNANT NEOPLASM OF STOMACH, UNSPECIFIED LOCATION (HCC): Primary | ICD-10-CM

## 2024-07-31 DIAGNOSIS — Z51.11 CHEMOTHERAPY MANAGEMENT, ENCOUNTER FOR: Primary | ICD-10-CM

## 2024-07-31 LAB
ALBUMIN SERPL-MCNC: 4.2 G/DL (ref 3.2–4.8)
ALBUMIN/GLOB SERPL: 1.2 {RATIO} (ref 1–2)
ALP LIVER SERPL-CCNC: 143 U/L
ALT SERPL-CCNC: 58 U/L
ANION GAP SERPL CALC-SCNC: 5 MMOL/L (ref 0–18)
AST SERPL-CCNC: 51 U/L (ref ?–34)
BASOPHILS # BLD AUTO: 0.01 X10(3) UL (ref 0–0.2)
BASOPHILS NFR BLD AUTO: 0.3 %
BILIRUB SERPL-MCNC: 1.8 MG/DL (ref 0.2–1.1)
BUN BLD-MCNC: 7 MG/DL (ref 9–23)
BUN/CREAT SERPL: 6.5 (ref 10–20)
CALCIUM BLD-MCNC: 9.8 MG/DL (ref 8.7–10.4)
CHLORIDE SERPL-SCNC: 108 MMOL/L (ref 98–112)
CO2 SERPL-SCNC: 26 MMOL/L (ref 21–32)
CREAT BLD-MCNC: 1.08 MG/DL
DEPRECATED RDW RBC AUTO: 44.5 FL (ref 35.1–46.3)
EGFRCR SERPLBLD CKD-EPI 2021: 77 ML/MIN/1.73M2 (ref 60–?)
EOSINOPHIL # BLD AUTO: 0.04 X10(3) UL (ref 0–0.7)
EOSINOPHIL NFR BLD AUTO: 1 %
ERYTHROCYTE [DISTWIDTH] IN BLOOD BY AUTOMATED COUNT: 13.2 % (ref 11–15)
GLOBULIN PLAS-MCNC: 3.4 G/DL (ref 2–3.5)
GLUCOSE BLD-MCNC: 109 MG/DL (ref 70–99)
HCT VFR BLD AUTO: 40 %
HGB BLD-MCNC: 14.1 G/DL
IMM GRANULOCYTES # BLD AUTO: 0 X10(3) UL (ref 0–1)
IMM GRANULOCYTES NFR BLD: 0 %
LYMPHOCYTES # BLD AUTO: 1.21 X10(3) UL (ref 1–4)
LYMPHOCYTES NFR BLD AUTO: 30.9 %
MCH RBC QN AUTO: 32.8 PG (ref 26–34)
MCHC RBC AUTO-ENTMCNC: 35.3 G/DL (ref 31–37)
MCV RBC AUTO: 93 FL
MONOCYTES # BLD AUTO: 0.4 X10(3) UL (ref 0.1–1)
MONOCYTES NFR BLD AUTO: 10.2 %
NEUTROPHILS # BLD AUTO: 2.25 X10 (3) UL (ref 1.5–7.7)
NEUTROPHILS # BLD AUTO: 2.25 X10(3) UL (ref 1.5–7.7)
NEUTROPHILS NFR BLD AUTO: 57.6 %
OSMOLALITY SERPL CALC.SUM OF ELEC: 287 MOSM/KG (ref 275–295)
PLATELET # BLD AUTO: 67 10(3)UL (ref 150–450)
PLATELETS.RETICULATED NFR BLD AUTO: 4.6 % (ref 0–7)
POTASSIUM SERPL-SCNC: 4.3 MMOL/L (ref 3.5–5.1)
PROT SERPL-MCNC: 7.6 G/DL (ref 5.7–8.2)
RBC # BLD AUTO: 4.3 X10(6)UL
SODIUM SERPL-SCNC: 139 MMOL/L (ref 136–145)
TSI SER-ACNC: 0.83 MIU/ML (ref 0.55–4.78)
WBC # BLD AUTO: 3.9 X10(3) UL (ref 4–11)

## 2024-07-31 PROCEDURE — 80053 COMPREHEN METABOLIC PANEL: CPT

## 2024-07-31 PROCEDURE — 36415 COLL VENOUS BLD VENIPUNCTURE: CPT

## 2024-07-31 PROCEDURE — 99215 OFFICE O/P EST HI 40 MIN: CPT | Performed by: INTERNAL MEDICINE

## 2024-07-31 PROCEDURE — 85025 COMPLETE CBC W/AUTO DIFF WBC: CPT

## 2024-07-31 PROCEDURE — 84443 ASSAY THYROID STIM HORMONE: CPT

## 2024-07-31 RX ORDER — FLUOROURACIL 50 MG/ML
2400 INJECTION, SOLUTION INTRAVENOUS CONTINUOUS
Status: CANCELLED | OUTPATIENT
Start: 2024-07-31

## 2024-07-31 NOTE — PROGRESS NOTES
Oncology Progress note    Requesting: Dr. Higgins    Chief Complaint: Anemia, gastric cancer    HPI:  64 year old With gastric adenocarcinoma diagnosed 12/18/23 in the setting of iron def anemia.      See below for staging workup    Initiated FOLFOX +Trastuzumab 1/18/24. Added Pembrolizumab with C3 (PDL1 10%).      Interval History:  Returns for consideration of Cycle #13, FOLFOX +Trastuzumab+ Pembrolizumab.  His surveillance CT scan from 6/2 shows a favorable treatment.     The patient is tolerating treatment well.  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 He denies concerns for fever, infection,  chest pain, dyspnea or rash. No new concerns on ROS.      Pmh: Arthritis      Social History     Socioeconomic History    Marital status:     Number of children: 2   Occupational History    Occupation:      Employer: [employer]   Tobacco Use    Smoking status: Never    Smokeless tobacco: Never   Vaping Use    Vaping status: Never Used   Substance and Sexual Activity    Alcohol use: No     Alcohol/week: 0.0 standard drinks of alcohol    Drug use: No    Sexual activity: Yes   Other Topics Concern    Caffeine Concern Yes     Comment: coffee, 1 cup daily     Family History   Problem Relation Age of Onset    Prostate Cancer Other      Ros negx12    Nka  Current Outpatient Medications on File Prior to Visit   Medication Sig Dispense Refill    OMEPRAZOLE 40 MG Oral Capsule Delayed Release TAKE 1 CAPSULE (40 MG TOTAL) BY MOUTH EVERY MORNING BEFORE BREAKFAST. 90 capsule 0    prochlorperazine (COMPAZINE) 10 mg tablet Take 1 tablet (10 mg total) by mouth every 6 (six) hours as needed for Nausea. (Patient not taking: Reported on 7/31/2024) 30 tablet 3    ondansetron (ZOFRAN) 8 MG tablet Take 1 tablet (8 mg total) by mouth every 8 (eight) hours as needed for Nausea. (Patient not taking: Reported on 7/31/2024) 30 tablet 3     Current  Facility-Administered Medications on File Prior to Visit   Medication Dose Route Frequency Provider Last Rate Last Admin    [COMPLETED] heparin sodium lock flush 100 UNIT/ML injection        500 Units at 07/20/24 0820    [COMPLETED] ondansetron (Zofran) 16 mg, dexAMETHasone (Decadron) 20 mg in sodium chloride 0.9% 110 mL IVPB   Intravenous Once Gustavo Díaz MD   Stopped at 07/18/24 0931    [COMPLETED] pembrolizumab (Keytruda) 400 mg in sodium chloride 0.9% 116 mL IVPB  400 mg Intravenous Once Gustavo Díaz MD   Stopped at 07/18/24 0908    [COMPLETED] trastuzumab-qyyp (Trazimera) 315 mg in sodium chloride 0.9% 265 mL infusion  4 mg/kg (Treatment Plan Recorded) Intravenous Once Gustavo Díaz MD   Stopped at 07/18/24 1230    [COMPLETED] oxaliplatin (Eloxatin) 125 mg in dextrose 5% 275 mL infusion  65 mg/m2 (Treatment Plan Recorded) Intravenous Once Gustavo Díaz MD   Stopped at 07/18/24 1150    [COMPLETED] leucovorin 800 mg in dextrose 5% 250 mL infusion  400 mg/m2 (Treatment Plan Recorded) Intravenous Once Gustavo Díaz MD   Stopped at 07/18/24 1143    [COMPLETED] heparin (Porcine) 100 Units/mL lock flush        500 Units at 07/05/24 0823    [COMPLETED] ondansetron (Zofran) 16 mg, dexAMETHasone (Decadron) 20 mg in sodium chloride 0.9% 110 mL IVPB   Intravenous Once Vilma Peterson APRN   Stopped at 07/03/24 0925    [COMPLETED] trastuzumab-qyyp (Trazimera) 336 mg in sodium chloride 0.9% 266 mL infusion  4 mg/kg (Treatment Plan Recorded) Intravenous Once Vilma Peterson APRN   Stopped at 07/03/24 0904    [COMPLETED] oxaliplatin (Eloxatin) 130 mg in dextrose 5% 276 mL infusion  65 mg/m2 (Treatment Plan Recorded) Intravenous Once Vilma Peterson APRN   Stopped at 07/03/24 1133    [COMPLETED] leucovorin 800 mg in dextrose 5% 250 mL infusion  400 mg/m2 (Treatment Plan Recorded) Intravenous Once Vilma Peterson APRN   Stopped at 07/03/24 1133    [COMPLETED] fluorouracil (Adrucil) 50 mg/mL IV push 800 mg 16 mL   400 mg/m2 (Treatment Plan Recorded) Intravenous Once Vilma Peterson, XOCHITL   800 mg at 07/03/24 1147     Vitals:    07/31/24 0955   BP: 129/77   Pulse: 66   Resp: 16   Temp: 97.6 °F (36.4 °C)     Wt Readings from Last 6 Encounters:   07/31/24 79.1 kg (174 lb 6.4 oz)   07/17/24 80.1 kg (176 lb 9.6 oz)   07/02/24 79.8 kg (176 lb)   06/19/24 79.8 kg (176 lb)   06/06/24 81.1 kg (178 lb 14.4 oz)   06/05/24 81.6 kg (180 lb)         Physical exam:  General:  Awake, alert, oriented in NAD  HEENT - EOMsi, anicteric, MMM  Neck - supple, no LAD, normal ROM  Lungs -  CTA bilaterally  Cor - S1/S2 w/o murmur noted  Abd - soft, non tender non distended, bs+  BLE - no edema  Neuro - DTRs grossly intact    ECOG 0: Fully active, able to carry on all pre-disease performance without restriction  Imaging:    CT C/A/P 6/2/24  Impression   CONCLUSION:  1. History of metastatic gastric adenocarcinoma.  Known primary distal gastric body/antral mass is not well assessed by CT.  Metastatic right upper quadrant/greater omental lymph nodes demonstrate continued decrease in size since comparison CT imaging  from March, 2024. Findings suggest continued favorable interval treatment response.  No other suspicious new or enlarging upper abdominal lymphadenopathy to suggest interval disease progression.  Continued surveillance imaging is recommended.  2. Nonspecific 1.9 x 1.5 cm right hilar lymph node, which extends to the paramediastinal right middle lobe is unchanged in size since index CT imaging from December, 2023 and was hypermetabolic on PET scan from January, 2024. This could relate to a  stable metastatic right hilar kevin deposit versus nonspecific chronic infectious/inflammatory lymph node.  Close attention on anticipated follow-up is advised.  3. Tiny 0.3 cm right upper lobe pulmonary micronodule is unchanged since index chest CT from January, 2023.  Small size and long-term stability suggests benign etiology.  4. Stable probable  subcentimeter left hepatic lobe cysts.  5. Mild prostatomegaly.  6. Right inguinal herniorrhaphy with no hernia recurrence.  7. Lesser incidental findings as above.         64 year old  With gastric adenocarcinoma diagnosed 12/18/23 in the setting of iron def anemia. Her positive (3+) MSI-S PDL1 10%    1.) Gastric cancer, stage IV  -- Widespread kevin disease reviewed at tumor conference by Dr. Amaro with surgical oncology will plan for upfront systemic therapy with 5-FU oxaliplatin based regimen potentially with trastuzumab and pembrolizumab  -Initiated FOLFOX plus trastuzumab on 1/18/23   -Delayed Cycle #5 x 1 week d/t worsening LFT elevation   -Cycle #6 with dose reduction of Oxaliplatin d/t CIPN worsening and LFTs as below.    -CT imaging after C4 3/2024 with favorable treatment response.   -PDL1 10% 1/2024. Added Pembrolizumab  as >1% per guidelines. q6week scheduling-initiated with C3 above.   -DELAY Cycle 8 d/t TCP <75K.    - reviewed the pt's current CT scan from above early June; favorable treatment response, continue current systemic treatment    -defer Cycle #13 FOLFOX plus trastuzumab plus Pembrolizumab due to platelets being <75K. I have discontinued the 5-FU bolus as this is likely contributing to the chemotherapy induced thrombocytopenia    -Pembro is at the 400 mg dose every 6 wks  -Will repeat ECHO with cycle 14 (every 3mo)and CT scans about every 3 mo  -We reviewed tx may need to be every 21 days for better count tolerance- thrombocytopenia has been an issue      2.)Iron deficiency anemia    --s/p Feraheme x 2, last dose on 2/1/24, anemia resolved    3.) Chemotherapy induced thrombocytopenia  --Thrombocytopenia secondary to chemo, ok to treat if plts > 75k.    --Delayed C9 as above. Cycle 11 now delayed x 2 weeks; Ok to treat with plts >75K.    Monitor, may need to dose reduce chemo further      4.)Transaminitis   --improved gr 1, T. Bili 1.5. dose reduction oxali as above. ok to treat.   --Monitor;  especially on days when the pt gets pembrolizumab to see if tx needs to be held     5.)CIPN  -- gr 1-2 after C8, Dose reduction of oxaliplatin to 65 mg/m2 with C6.   --Delay of C9 d/t Tcp with imrpovement to gr1 CIPN to fingers without aDL limitations.   --Ok to continue as above. Consider discontinue in coming cycles based on symptoms.  Monitor.    --offered duloxetine for neuropathy- declined at this time       6.)risk of cardiotoxicity  --Normal EF/echo prior to treatment 1/2024. 5/2024 ECHO repeat stable/improved.    --H/o fleeting chest discomfort reported, currently resolved, without any other Aes. Monitor w/ ECHO every 3 mo    7.)CINausea  --Prn antiemetics encouraged    9.)H/o skin rash  --Resolved. Supportive care with topical creams.       [facility]    Gustavo Díaz MD  [facility]  [facility]  [facility]. [facility], [place] [address]

## 2024-07-31 NOTE — TELEPHONE ENCOUNTER
Called and spoke with the patient's wife, Bhumika regarding her question if the patient can do anything to help increase his platelet count. Told her Dr. Díaz states the patient needs rest and time for his blood counts to recover. Nothing the patient can do on his end to up his counts. She stated understanding and thanked me for the call back.

## 2024-07-31 NOTE — TELEPHONE ENCOUNTER
I called Brenda the patients spouse to give her times for next week. She was very polite and Thanked me for the call. She had a additional question she wants to know if he can do anything to help increase his platelet count. She is asking for a quick call back regarding this.

## 2024-08-01 ENCOUNTER — APPOINTMENT (OUTPATIENT)
Dept: HEMATOLOGY/ONCOLOGY | Facility: HOSPITAL | Age: 64
End: 2024-08-01
Attending: INTERNAL MEDICINE
Payer: COMMERCIAL

## 2024-08-03 ENCOUNTER — APPOINTMENT (OUTPATIENT)
Dept: HEMATOLOGY/ONCOLOGY | Facility: HOSPITAL | Age: 64
End: 2024-08-03
Attending: INTERNAL MEDICINE
Payer: COMMERCIAL

## 2024-08-08 ENCOUNTER — OFFICE VISIT (OUTPATIENT)
Dept: HEMATOLOGY/ONCOLOGY | Facility: HOSPITAL | Age: 64
End: 2024-08-08
Attending: INTERNAL MEDICINE
Payer: COMMERCIAL

## 2024-08-08 VITALS
HEIGHT: 69.02 IN | OXYGEN SATURATION: 97 % | TEMPERATURE: 98 F | RESPIRATION RATE: 18 BRPM | WEIGHT: 176.63 LBS | BODY MASS INDEX: 26.16 KG/M2 | HEART RATE: 69 BPM | SYSTOLIC BLOOD PRESSURE: 142 MMHG | DIASTOLIC BLOOD PRESSURE: 73 MMHG

## 2024-08-08 DIAGNOSIS — C16.9 MALIGNANT NEOPLASM OF STOMACH, UNSPECIFIED LOCATION (HCC): ICD-10-CM

## 2024-08-08 DIAGNOSIS — C16.9 MALIGNANT NEOPLASM OF STOMACH, UNSPECIFIED LOCATION (HCC): Primary | ICD-10-CM

## 2024-08-08 DIAGNOSIS — Z51.11 CHEMOTHERAPY MANAGEMENT, ENCOUNTER FOR: Primary | ICD-10-CM

## 2024-08-08 LAB
ALBUMIN SERPL-MCNC: 4.2 G/DL (ref 3.2–4.8)
ALBUMIN/GLOB SERPL: 1.3 {RATIO} (ref 1–2)
ALP LIVER SERPL-CCNC: 134 U/L
ALT SERPL-CCNC: 57 U/L
ANION GAP SERPL CALC-SCNC: 7 MMOL/L (ref 0–18)
AST SERPL-CCNC: 51 U/L (ref ?–34)
BASOPHILS # BLD AUTO: 0.02 X10(3) UL (ref 0–0.2)
BASOPHILS NFR BLD AUTO: 0.6 %
BILIRUB SERPL-MCNC: 1.4 MG/DL (ref 0.2–1.1)
BUN BLD-MCNC: 11 MG/DL (ref 9–23)
BUN/CREAT SERPL: 12.4 (ref 10–20)
CALCIUM BLD-MCNC: 9.6 MG/DL (ref 8.7–10.4)
CHLORIDE SERPL-SCNC: 107 MMOL/L (ref 98–112)
CO2 SERPL-SCNC: 27 MMOL/L (ref 21–32)
CREAT BLD-MCNC: 0.89 MG/DL
DEPRECATED RDW RBC AUTO: 44.5 FL (ref 35.1–46.3)
EGFRCR SERPLBLD CKD-EPI 2021: 96 ML/MIN/1.73M2 (ref 60–?)
EOSINOPHIL # BLD AUTO: 0.07 X10(3) UL (ref 0–0.7)
EOSINOPHIL NFR BLD AUTO: 2.3 %
ERYTHROCYTE [DISTWIDTH] IN BLOOD BY AUTOMATED COUNT: 13.2 % (ref 11–15)
GLOBULIN PLAS-MCNC: 3.2 G/DL (ref 2–3.5)
GLUCOSE BLD-MCNC: 99 MG/DL (ref 70–99)
HCT VFR BLD AUTO: 39.4 %
HGB BLD-MCNC: 14 G/DL
IMM GRANULOCYTES # BLD AUTO: 0.01 X10(3) UL (ref 0–1)
IMM GRANULOCYTES NFR BLD: 0.3 %
LYMPHOCYTES # BLD AUTO: 1.21 X10(3) UL (ref 1–4)
LYMPHOCYTES NFR BLD AUTO: 39 %
MCH RBC QN AUTO: 32.8 PG (ref 26–34)
MCHC RBC AUTO-ENTMCNC: 35.5 G/DL (ref 31–37)
MCV RBC AUTO: 92.3 FL
MONOCYTES # BLD AUTO: 0.44 X10(3) UL (ref 0.1–1)
MONOCYTES NFR BLD AUTO: 14.2 %
NEUTROPHILS # BLD AUTO: 1.35 X10 (3) UL (ref 1.5–7.7)
NEUTROPHILS # BLD AUTO: 1.35 X10(3) UL (ref 1.5–7.7)
NEUTROPHILS NFR BLD AUTO: 43.6 %
OSMOLALITY SERPL CALC.SUM OF ELEC: 291 MOSM/KG (ref 275–295)
PLATELET # BLD AUTO: 109 10(3)UL (ref 150–450)
POTASSIUM SERPL-SCNC: 3.9 MMOL/L (ref 3.5–5.1)
PROT SERPL-MCNC: 7.4 G/DL (ref 5.7–8.2)
RBC # BLD AUTO: 4.27 X10(6)UL
SODIUM SERPL-SCNC: 141 MMOL/L (ref 136–145)
TSI SER-ACNC: 1.4 MIU/ML (ref 0.55–4.78)
WBC # BLD AUTO: 3.1 X10(3) UL (ref 4–11)

## 2024-08-08 PROCEDURE — 96375 TX/PRO/DX INJ NEW DRUG ADDON: CPT

## 2024-08-08 PROCEDURE — 85025 COMPLETE CBC W/AUTO DIFF WBC: CPT

## 2024-08-08 PROCEDURE — 80053 COMPREHEN METABOLIC PANEL: CPT

## 2024-08-08 PROCEDURE — 96415 CHEMO IV INFUSION ADDL HR: CPT

## 2024-08-08 PROCEDURE — 84443 ASSAY THYROID STIM HORMONE: CPT

## 2024-08-08 PROCEDURE — 96413 CHEMO IV INFUSION 1 HR: CPT

## 2024-08-08 PROCEDURE — 96417 CHEMO IV INFUS EACH ADDL SEQ: CPT

## 2024-08-08 PROCEDURE — 96368 THER/DIAG CONCURRENT INF: CPT

## 2024-08-08 RX ORDER — FLUOROURACIL 50 MG/ML
2400 INJECTION, SOLUTION INTRAVENOUS CONTINUOUS
Status: CANCELLED | OUTPATIENT
Start: 2024-08-08

## 2024-08-08 RX ORDER — FLUOROURACIL 50 MG/ML
2400 INJECTION, SOLUTION INTRAVENOUS CONTINUOUS
Status: DISCONTINUED | OUTPATIENT
Start: 2024-08-08 | End: 2024-08-08

## 2024-08-08 RX ADMIN — FLUOROURACIL 4700 MG: 50 INJECTION, SOLUTION INTRAVENOUS at 12:36:00

## 2024-08-08 NOTE — PROGRESS NOTES
Pt here for C14D1 Drug(s)FOLFOX - TRAZIMERA.  Arrives Ambulating independently, accompanied by Self     Patient was evaluated today by Treatment Nurse.    Oral medications included in this regimen:  no    Patient confirms comprehension of cancer treatment schedule:  yes    Pregnancy screening:  Not applicable    Modifications in dose or schedule:  Yes. Pt was delayed one week for thrombocytopenia.  Platelets adequate for treatment today.     Medications appearance and physical integrity checked by RN: yes.    Chemotherapy IV pump settings verified by 2 RNs:  Yes.  Frequency of blood return and site check throughout administration: Prior to administration, Prior to each drug, and At completion of therapy     Infusion/treatment outcome:  patient tolerated treatment without incident CADD pump connected and running. All connections reinforced with tape. Port access is clean and dry, secured with steri strips and tegaderm dressing. Patient verbalized understanding of CADD pump instructions, including troubleshooting.      Education Record    Learner:  Patient  Barriers / Limitations:  None  Method:  Discussion  Education / reviewed plan of care  Outcome:  Shows understanding    Discharged Home, Ambulating independently, accompanied by:Self    Patient/family verbalized understanding of future appointments: by printed AVS

## 2024-08-10 ENCOUNTER — NURSE ONLY (OUTPATIENT)
Dept: HEMATOLOGY/ONCOLOGY | Facility: HOSPITAL | Age: 64
End: 2024-08-10
Attending: INTERNAL MEDICINE
Payer: COMMERCIAL

## 2024-08-10 VITALS
HEART RATE: 86 BPM | SYSTOLIC BLOOD PRESSURE: 117 MMHG | TEMPERATURE: 98 F | RESPIRATION RATE: 18 BRPM | DIASTOLIC BLOOD PRESSURE: 68 MMHG | OXYGEN SATURATION: 95 %

## 2024-08-10 DIAGNOSIS — C16.9 MALIGNANT NEOPLASM OF STOMACH, UNSPECIFIED LOCATION (HCC): Primary | ICD-10-CM

## 2024-08-10 PROCEDURE — 96523 IRRIG DRUG DELIVERY DEVICE: CPT

## 2024-08-10 RX ORDER — SODIUM CHLORIDE 9 MG/ML
10 INJECTION, SOLUTION INTRAMUSCULAR; INTRAVENOUS; SUBCUTANEOUS ONCE
OUTPATIENT
Start: 2024-08-10

## 2024-08-10 RX ORDER — HEPARIN SODIUM (PORCINE) LOCK FLUSH IV SOLN 100 UNIT/ML 100 UNIT/ML
5 SOLUTION INTRAVENOUS ONCE
OUTPATIENT
Start: 2024-08-10

## 2024-08-10 RX ORDER — HEPARIN SODIUM (PORCINE) LOCK FLUSH IV SOLN 100 UNIT/ML 100 UNIT/ML
5 SOLUTION INTRAVENOUS ONCE
Status: COMPLETED | OUTPATIENT
Start: 2024-08-10 | End: 2024-08-10

## 2024-08-10 RX ADMIN — HEPARIN SODIUM (PORCINE) LOCK FLUSH IV SOLN 100 UNIT/ML 500 UNITS: 100 SOLUTION INTRAVENOUS at 08:24:00

## 2024-08-10 NOTE — PROGRESS NOTES
Patient presented with CADD pump connected. [place] near empty. Disconnected CADD pump, flushed port with 0.9% Normal Saline and established blood return in port. Flushed with 500 units of Heparin and de-accessed port. Gauze and paper tape applied to port site.     Reports that Thurs evening had temp of 99.9 and body aches, chills - took tylenol and pushed fluids, now feeling better  Denies n/v/c/d.  Denies resp, uro infection symptoms.  Denies sore throat, runny nose.  Constipation x 2 days - going to take prune juice, miralax/ MOM today    Instructed to call dr office/ triage RN if fever over 100 occurs or other concerns arise  Continue to push fluids    Discharged stable to home with future appts  Wants to see Dr Díaz if possible  Gait steady, indep

## 2024-08-12 NOTE — TELEPHONE ENCOUNTER
Patient called requesting status of forms. Informed patient forms were uploaded to [employer] due to incomplete Release of Information. Patient verbalized understanding and stated she will mail forms.

## 2024-08-14 ENCOUNTER — APPOINTMENT (OUTPATIENT)
Dept: HEMATOLOGY/ONCOLOGY | Facility: HOSPITAL | Age: 64
End: 2024-08-14
Attending: INTERNAL MEDICINE
Payer: COMMERCIAL

## 2024-08-15 ENCOUNTER — APPOINTMENT (OUTPATIENT)
Dept: HEMATOLOGY/ONCOLOGY | Facility: HOSPITAL | Age: 64
End: 2024-08-15
Attending: INTERNAL MEDICINE
Payer: COMMERCIAL

## 2024-08-21 ENCOUNTER — NURSE ONLY (OUTPATIENT)
Dept: HEMATOLOGY/ONCOLOGY | Facility: HOSPITAL | Age: 64
End: 2024-08-21
Attending: INTERNAL MEDICINE
Payer: COMMERCIAL

## 2024-08-21 ENCOUNTER — DOCUMENTATION ONLY (OUTPATIENT)
Dept: HEMATOLOGY/ONCOLOGY | Facility: HOSPITAL | Age: 64
End: 2024-08-21

## 2024-08-21 VITALS
BODY MASS INDEX: 26 KG/M2 | SYSTOLIC BLOOD PRESSURE: 123 MMHG | HEART RATE: 73 BPM | DIASTOLIC BLOOD PRESSURE: 75 MMHG | WEIGHT: 175.5 LBS | RESPIRATION RATE: 18 BRPM | OXYGEN SATURATION: 95 % | HEIGHT: 69 IN | TEMPERATURE: 98 F

## 2024-08-21 DIAGNOSIS — Z51.11 CHEMOTHERAPY MANAGEMENT, ENCOUNTER FOR: ICD-10-CM

## 2024-08-21 DIAGNOSIS — Z51.11 CHEMOTHERAPY MANAGEMENT, ENCOUNTER FOR: Primary | ICD-10-CM

## 2024-08-21 DIAGNOSIS — C16.9 MALIGNANT NEOPLASM OF STOMACH, UNSPECIFIED LOCATION (HCC): ICD-10-CM

## 2024-08-21 DIAGNOSIS — C16.9 MALIGNANT NEOPLASM OF STOMACH, UNSPECIFIED LOCATION (HCC): Primary | ICD-10-CM

## 2024-08-21 LAB
ALBUMIN SERPL-MCNC: 4.2 G/DL (ref 3.2–4.8)
ALBUMIN/GLOB SERPL: 1.3 {RATIO} (ref 1–2)
ALP LIVER SERPL-CCNC: 171 U/L
ALT SERPL-CCNC: 60 U/L
ANION GAP SERPL CALC-SCNC: 4 MMOL/L (ref 0–18)
AST SERPL-CCNC: 51 U/L (ref ?–34)
BASOPHILS # BLD AUTO: 0.03 X10(3) UL (ref 0–0.2)
BASOPHILS NFR BLD AUTO: 0.5 %
BILIRUB SERPL-MCNC: 1.4 MG/DL (ref 0.2–1.1)
BUN BLD-MCNC: 14 MG/DL (ref 9–23)
BUN/CREAT SERPL: 14.4 (ref 10–20)
CALCIUM BLD-MCNC: 9.5 MG/DL (ref 8.7–10.4)
CHLORIDE SERPL-SCNC: 107 MMOL/L (ref 98–112)
CO2 SERPL-SCNC: 30 MMOL/L (ref 21–32)
CREAT BLD-MCNC: 0.97 MG/DL
DEPRECATED RDW RBC AUTO: 45 FL (ref 35.1–46.3)
EGFRCR SERPLBLD CKD-EPI 2021: 88 ML/MIN/1.73M2 (ref 60–?)
EOSINOPHIL # BLD AUTO: 0.11 X10(3) UL (ref 0–0.7)
EOSINOPHIL NFR BLD AUTO: 1.8 %
ERYTHROCYTE [DISTWIDTH] IN BLOOD BY AUTOMATED COUNT: 13.3 % (ref 11–15)
GLOBULIN PLAS-MCNC: 3.2 G/DL (ref 2–3.5)
GLUCOSE BLD-MCNC: 124 MG/DL (ref 70–99)
HCT VFR BLD AUTO: 38.5 %
HGB BLD-MCNC: 13.5 G/DL
IMM GRANULOCYTES # BLD AUTO: 0.03 X10(3) UL (ref 0–1)
IMM GRANULOCYTES NFR BLD: 0.5 %
LYMPHOCYTES # BLD AUTO: 1.63 X10(3) UL (ref 1–4)
LYMPHOCYTES NFR BLD AUTO: 26.9 %
MCH RBC QN AUTO: 32.5 PG (ref 26–34)
MCHC RBC AUTO-ENTMCNC: 35.1 G/DL (ref 31–37)
MCV RBC AUTO: 92.5 FL
MONOCYTES # BLD AUTO: 0.61 X10(3) UL (ref 0.1–1)
MONOCYTES NFR BLD AUTO: 10.1 %
NEUTROPHILS # BLD AUTO: 3.65 X10 (3) UL (ref 1.5–7.7)
NEUTROPHILS # BLD AUTO: 3.65 X10(3) UL (ref 1.5–7.7)
NEUTROPHILS NFR BLD AUTO: 60.2 %
OSMOLALITY SERPL CALC.SUM OF ELEC: 294 MOSM/KG (ref 275–295)
PLATELET # BLD AUTO: 88 10(3)UL (ref 150–450)
POTASSIUM SERPL-SCNC: 4.2 MMOL/L (ref 3.5–5.1)
PROT SERPL-MCNC: 7.4 G/DL (ref 5.7–8.2)
RBC # BLD AUTO: 4.16 X10(6)UL
SODIUM SERPL-SCNC: 141 MMOL/L (ref 136–145)
TSI SER-ACNC: 0.75 MIU/ML (ref 0.55–4.78)
WBC # BLD AUTO: 6.1 X10(3) UL (ref 4–11)

## 2024-08-21 PROCEDURE — 99215 OFFICE O/P EST HI 40 MIN: CPT | Performed by: NURSE PRACTITIONER

## 2024-08-21 PROCEDURE — 36415 COLL VENOUS BLD VENIPUNCTURE: CPT

## 2024-08-21 PROCEDURE — 85025 COMPLETE CBC W/AUTO DIFF WBC: CPT

## 2024-08-21 PROCEDURE — 80053 COMPREHEN METABOLIC PANEL: CPT

## 2024-08-21 PROCEDURE — 84443 ASSAY THYROID STIM HORMONE: CPT

## 2024-08-21 RX ORDER — FLUOROURACIL 50 MG/ML
2400 INJECTION, SOLUTION INTRAVENOUS CONTINUOUS
Status: CANCELLED | OUTPATIENT
Start: 2024-08-22

## 2024-08-21 NOTE — PROGRESS NOTES
Oncology Progress note    Requesting: Dr. Higgins    Chief Complaint: Anemia, gastric cancer    HPI:  64 year old With gastric adenocarcinoma diagnosed 12/18/23 in the setting of iron def anemia.      See below for staging workup    Initiated FOLFOX +Trastuzumab 1/18/24. Added Pembrolizumab with C3 (PDL1 10%).      Interval History:  Returns for consideration of Cycle #14, FOLFOX +Trastuzumab+ Pembrolizumab.  His surveillance CT scan from 6/2 shows a favorable treatment.     After chemotherapy feels tired, some nausea and cold sensitivty x 3 days then better . This cycle he did have  Low grade fevers T Max 99.9      The patient is tolerating treatment well.  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 He denies concerns for fever, infection, no cough, chest pain, dyspnea or rash. No new concerns on ROS.    Denies issues with constipation or diarrhea. Mild nausea controlled with medication.  Pmh: Arthritis      Social History     Socioeconomic History    Marital status:     Number of children: 2   Occupational History    Occupation: Food service     Employer: [employer]   Tobacco Use    Smoking status: Never    Smokeless tobacco: Never   Vaping Use    Vaping status: Never Used   Substance and Sexual Activity    Alcohol use: No     Alcohol/week: 0.0 standard drinks of alcohol    Drug use: No    Sexual activity: Yes   Other Topics Concern    Caffeine Concern Yes     Comment: coffee, 1 cup daily     Family History   Problem Relation Age of Onset    Prostate Cancer Other      Ros [id number]    Nka  Current Outpatient Medications on File Prior to Visit   Medication Sig Dispense Refill    OMEPRAZOLE 40 MG Oral Capsule Delayed Release TAKE 1 CAPSULE (40 MG TOTAL) BY MOUTH EVERY MORNING BEFORE BREAKFAST. 90 capsule 0    prochlorperazine (COMPAZINE) 10 mg tablet Take 1 tablet (10 mg total)  by mouth every 6 (six) hours as needed for Nausea. (Patient not taking: Reported on 7/31/2024) 30 tablet 3    ondansetron (ZOFRAN) 8 MG tablet Take 1 tablet (8 mg total) by mouth every 8 (eight) hours as needed for Nausea. (Patient not taking: Reported on 7/31/2024) 30 tablet 3     Current Facility-Administered Medications on File Prior to Visit   Medication Dose Route Frequency Provider Last Rate Last Admin    [COMPLETED] heparin sodium lock flush 100 UNIT/ML injection 500 Units  5 mL Intracatheter Once Gustavo Díaz MD   500 Units at 08/10/24 0824    [COMPLETED] ondansetron (Zofran) 16 mg, dexAMETHasone (Decadron) 20 mg in sodium chloride 0.9% 110 mL IVPB   Intravenous Once Vilma Peterson APRN   Stopped at 08/08/24 0859    [COMPLETED] trastuzumab-qyyp (Trazimera) 315 mg in sodium chloride 0.9% 265 mL infusion  4 mg/kg (Treatment Plan Recorded) Intravenous Once Vilma Peterson APRN   Stopped at 08/08/24 1025    [COMPLETED] oxaliplatin (Eloxatin) 125 mg in dextrose 5% 275 mL infusion  65 mg/m2 (Treatment Plan Recorded) Intravenous Once Vilma Peterson APRN   Stopped at 08/08/24 1233    [COMPLETED] leucovorin 800 mg in dextrose 5% 250 mL infusion  400 mg/m2 (Treatment Plan Recorded) Intravenous Once Vilma Peterson APRN   Stopped at 08/08/24 1233     Vitals:    08/21/24 1500   BP: 123/75   Pulse: 73   Resp: 18   Temp: 98.2 °F (36.8 °C)       Wt Readings from Last 6 Encounters:   08/21/24 79.6 kg (175 lb 8 oz)   08/08/24 80.1 kg (176 lb 9.6 oz)   07/31/24 79.1 kg (174 lb 6.4 oz)   07/17/24 80.1 kg (176 lb 9.6 oz)   07/02/24 79.8 kg (176 lb)   06/19/24 79.8 kg (176 lb)         Physical exam:  General:  Awake, alert, oriented in NAD  HEENT - EOMsi, anicteric, MMM  Neck - supple, no LAD, normal ROM  Lungs -  CTA bilaterally  Cor - S1/S2 w/o murmur noted  Abd - soft, non tender non distended, bs+  BLE - no edema  Neuro - DTRs grossly intact    ECOG 0: Fully active, able to carry on all pre-disease performance  without restriction  Imaging:    CT C/A/P 6/2/24  Impression   CONCLUSION:  1. History of metastatic gastric adenocarcinoma.  Known primary distal gastric body/antral mass is not well assessed by CT.  Metastatic right upper quadrant/greater omental lymph nodes demonstrate continued decrease in size since comparison CT imaging  from March, 2024. Findings suggest continued favorable interval treatment response.  No other suspicious new or enlarging upper abdominal lymphadenopathy to suggest interval disease progression.  Continued surveillance imaging is recommended.  2. Nonspecific 1.9 x 1.5 cm right hilar lymph node, which extends to the paramediastinal right middle lobe is unchanged in size since index CT imaging from December, 2023 and was hypermetabolic on PET scan from January, 2024. This could relate to a  stable metastatic right hilar kevin deposit versus nonspecific chronic infectious/inflammatory lymph node.  Close attention on anticipated follow-up is advised.  3. Tiny 0.3 cm right upper lobe pulmonary micronodule is unchanged since index chest CT from January, 2023.  Small size and long-term stability suggests benign etiology.  4. Stable probable subcentimeter left hepatic lobe cysts.  5. Mild prostatomegaly.  6. Right inguinal herniorrhaphy with no hernia recurrence.  7. Lesser incidental findings as above.       Component      Latest Ref Rng 8/21/2024   WBC      4.0 - 11.0 x10(3) uL 6.1    RBC      4.30 - 5.70 x10(6)uL 4.16 (L)    Hemoglobin      13.0 - 17.5 g/dL 13.5    Hematocrit      39.0 - 53.0 % 38.5 (L)    MCV      80.0 - 100.0 fL 92.5    MCH      26.0 - 34.0 pg 32.5    MCHC      31.0 - 37.0 g/dL 35.1    RDW-SD      35.1 - 46.3 fL 45.0    RDW      11.0 - 15.0 % 13.3    Platelet Count      150.0 - 450.0 10(3)uL 88.0 (L)    Prelim Neutrophil Abs      1.50 - 7.70 x10 (3) uL 3.65    Neutrophils Absolute      1.50 - 7.70 x10(3) uL 3.65    Lymphocytes Absolute      1.00 - 4.00 x10(3) uL 1.63    Monocytes  Absolute      0.10 - 1.00 x10(3) uL 0.61    Eosinophils Absolute      0.00 - 0.70 x10(3) uL 0.11    Basophils Absolute      0.00 - 0.20 x10(3) uL 0.03    Immature Granulocyte Absolute      0.00 - 1.00 x10(3) uL 0.03    Neutrophils %      % 60.2    Lymphocytes %      % 26.9    Monocytes %      % 10.1    Eosinophils %      % 1.8    Basophils %      % 0.5    Immature Granulocyte %      % 0.5    Glucose      70 - 99 mg/dL 124 (H)    Sodium      136 - 145 mmol/L 141    Potassium      3.5 - 5.1 mmol/L 4.2    Chloride      98 - 112 mmol/L 107    Carbon Dioxide, Total      21.0 - 32.0 mmol/L 30.0    ANION GAP      0 - 18 mmol/L 4    BUN      9 - 23 mg/dL 14    CREATININE      0.70 - 1.30 mg/dL 0.97    BUN/CREATININE RATIO      10.0 - 20.0  14.4    CALCIUM      8.7 - 10.4 mg/dL 9.5    CALCULATED OSMOLALITY      275 - 295 mOsm/kg 294    EGFR      >=60 mL/min/1.73m2 88    ALT (SGPT)      10 - 49 U/L 60 (H)    AST (SGOT)      <34 U/L 51 (H)    ALKALINE PHOSPHATASE      45 - 117 U/L 171 (H)    Total Bilirubin      0.2 - 1.1 mg/dL 1.4 (H)    PROTEIN, TOTAL      5.7 - 8.2 g/dL 7.4    Albumin      3.2 - 4.8 g/dL 4.2    Globulin      2.0 - 3.5 g/dL 3.2    A/G Ratio      1.0 - 2.0  1.3    Patient Fasting for CMP? Patient not present    TSH      0.550 - 4.780 mIU/mL 0.751           64 year old  With gastric adenocarcinoma diagnosed 12/18/23 in the setting of iron def anemia. Her positive (3+) MSI-S PDL1 10%    1.) Gastric cancer, stage IV  -- Widespread kevin disease reviewed at tumor conference by Dr. Amaro with surgical oncology will plan for upfront systemic therapy with 5-FU oxaliplatin based regimen potentially with trastuzumab and pembrolizumab  -Initiated FOLFOX plus trastuzumab on 1/18/23   -Delayed Cycle #5 x 1 week d/t worsening LFT elevation   -Cycle #6 with dose reduction of Oxaliplatin d/t CIPN worsening and LFTs as below.    -CT imaging after C4 3/2024 with favorable treatment response.   -PDL1 10% 1/2024. Added  Pembrolizumab  as >1% per guidelines. q6week scheduling-initiated with C3 above.   -DELAY Cycle 8 d/t TCP <75K.    - reviewed the pt's current CT scan from above early June; favorable treatment response, continue current systemic treatment    -defer Cycle #13 FOLFOX plus trastuzumab plus Pembrolizumab due to platelets being <75K. I have discontinued the 5-FU bolus as this is likely contributing to the chemotherapy induced thrombocytopenia  Currently s/p cycle 13  Proceed with Cycle 14 - Pl 88,000    -Pembro is at the 400 mg dose every 6 wks  -Will repeat ECHO with cycle 14 (every 3mo)and CT scans about every 3 mo- orders placed   -We reviewed tx may need to be every 21 days for better count tolerance- thrombocytopenia has been an issue      2.)Iron deficiency anemia    --s/p Feraheme x 2, last dose on 2/1/24, anemia resolved    3.) Chemotherapy induced thrombocytopenia  --Thrombocytopenia secondary to chemo, ok to treat if plts > 75k.    --Delayed C9 as above. Cycle 11 now delayed x 2 weeks; Ok to treat with plts >75K.    Monitor, may need to dose reduce chemo further      4.)Transaminitis   --improved gr 1, T. Bili 1.5. dose reduction oxali as above. ok to treat. -stable  --Monitor; especially on days when the pt gets pembrolizumab to see if tx needs to be held     5.)CIPN  -- gr 1-2 after C8, Dose reduction of oxaliplatin to 65 mg/m2 with C6.   --Delay of C9 d/t Tcp with imrpovement to gr1 CIPN to fingers without aDL limitations.   --Ok to continue as above. Consider discontinue in coming cycles based on symptoms.  Monitor.    --offered duloxetine for neuropathy- declined at this time       6.)risk of cardiotoxicity  --Normal EF/echo prior to treatment 1/2024. 5/2024 ECHO repeat stable/improved.    --H/o fleeting chest discomfort reported, currently resolved, without any other Aes. Monitor w/ ECHO every          3 mo- scheduled     7.)CINausea  --Prn antiemetics encouraged    9.)H/o skin rash  --Resolved.  Supportive care with topical creams.       [facility] - [facility]    Vilma LEUNG   [facility]  [address] [address]

## 2024-08-21 NOTE — PROGRESS NOTES
Called and spoke with Christina from [facility]. Told her I am calling to assist with scheduling the patient's CT scan. She stated it is already scheduled for for 9/13/2024. Told her we have to get a sooner date, before 9/03 since patient is on active treatment. New CT scan appointment scheduled for Friday, 8/23 at [facility] at 2:00 pm. She stated to tell the patient to arrive 1 hour before the appointment time (1:00 pm) to start drinking the oral contrast, park in the blue parking lot, enter the Main entrance and check in at [facility]. I stated understanding and thanked her for her help.

## 2024-08-22 ENCOUNTER — OFFICE VISIT (OUTPATIENT)
Dept: HEMATOLOGY/ONCOLOGY | Facility: HOSPITAL | Age: 64
End: 2024-08-22
Attending: INTERNAL MEDICINE
Payer: COMMERCIAL

## 2024-08-22 VITALS
OXYGEN SATURATION: 96 % | TEMPERATURE: 98 F | DIASTOLIC BLOOD PRESSURE: 68 MMHG | SYSTOLIC BLOOD PRESSURE: 123 MMHG | HEART RATE: 74 BPM | RESPIRATION RATE: 18 BRPM

## 2024-08-22 DIAGNOSIS — C16.9 MALIGNANT NEOPLASM OF STOMACH, UNSPECIFIED LOCATION (HCC): Primary | ICD-10-CM

## 2024-08-22 PROCEDURE — 96415 CHEMO IV INFUSION ADDL HR: CPT

## 2024-08-22 PROCEDURE — 96367 TX/PROPH/DG ADDL SEQ IV INF: CPT

## 2024-08-22 PROCEDURE — 96417 CHEMO IV INFUS EACH ADDL SEQ: CPT

## 2024-08-22 PROCEDURE — 96368 THER/DIAG CONCURRENT INF: CPT

## 2024-08-22 PROCEDURE — 96413 CHEMO IV INFUSION 1 HR: CPT

## 2024-08-22 RX ORDER — FLUOROURACIL 50 MG/ML
2400 INJECTION, SOLUTION INTRAVENOUS CONTINUOUS
Status: DISCONTINUED | OUTPATIENT
Start: 2024-08-22 | End: 2024-08-22

## 2024-08-22 RX ADMIN — FLUOROURACIL 4700 MG: 50 INJECTION, SOLUTION INTRAVENOUS at 11:14:00

## 2024-08-22 NOTE — PROGRESS NOTES
Nasi to infusion today for C14 D1 FOLFOX / TRAZIMERA.  Arrives Ambulating independently, accompanied by Self     Patient was evaluated today by Treatment Nurse. Seen by Vilma LEUNG yesterday for pre-chemo visit. Labs from yesterday reviewed. PLT of 88k on CBC; however OK to treat order is in treatment plan for PLT > 75k.    Oral medications included in this regimen:  no    Patient confirms comprehension of cancer treatment schedule:  yes    Pregnancy screening:  Not applicable    Modifications in dose or schedule:  No    Medications appearance and physical integrity checked by RN: yes.    Chemotherapy IV pump settings verified by 2 RNs:  Yes.  Frequency of blood return and site check throughout administration: Prior to administration, Prior to each drug, and At completion of therapy     Infusion/treatment outcome:  patient tolerated treatment without incident    CADD pump connected and running. All connections reinforced with tape. Port access is clean and dry, secured with steri strips and tegaderm dressing. Patient verbalized understanding of CADD pump instructions, including troubleshooting.      Education Record    Learner:  Patient  Barriers / Limitations:  None  Method:  Reinforcement  Education / instructions given:  Plan of care reviewed  Outcome:  Shows understanding    Discharged Home, Ambulating independently, accompanied by:Self    Patient/family verbalized understanding of future appointments: by printed AVS

## 2024-08-24 ENCOUNTER — NURSE ONLY (OUTPATIENT)
Dept: HEMATOLOGY/ONCOLOGY | Facility: HOSPITAL | Age: 64
End: 2024-08-24
Attending: INTERNAL MEDICINE
Payer: COMMERCIAL

## 2024-08-24 VITALS
DIASTOLIC BLOOD PRESSURE: 66 MMHG | SYSTOLIC BLOOD PRESSURE: 130 MMHG | RESPIRATION RATE: 18 BRPM | TEMPERATURE: 98 F | OXYGEN SATURATION: 95 % | HEART RATE: 91 BPM

## 2024-08-24 DIAGNOSIS — C16.9 MALIGNANT NEOPLASM OF STOMACH, UNSPECIFIED LOCATION (HCC): Primary | ICD-10-CM

## 2024-08-24 PROCEDURE — 96523 IRRIG DRUG DELIVERY DEVICE: CPT

## 2024-08-24 RX ORDER — HEPARIN SODIUM (PORCINE) LOCK FLUSH IV SOLN 100 UNIT/ML 100 UNIT/ML
5 SOLUTION INTRAVENOUS ONCE
OUTPATIENT
Start: 2024-08-24

## 2024-08-24 RX ORDER — HEPARIN SODIUM (PORCINE) LOCK FLUSH IV SOLN 100 UNIT/ML 100 UNIT/ML
5 SOLUTION INTRAVENOUS ONCE
Status: COMPLETED | OUTPATIENT
Start: 2024-08-24 | End: 2024-08-24

## 2024-08-24 RX ORDER — SODIUM CHLORIDE 9 MG/ML
10 INJECTION, SOLUTION INTRAMUSCULAR; INTRAVENOUS; SUBCUTANEOUS ONCE
OUTPATIENT
Start: 2024-08-24

## 2024-08-24 RX ADMIN — HEPARIN SODIUM (PORCINE) LOCK FLUSH IV SOLN 100 UNIT/ML 500 UNITS: 100 SOLUTION INTRAVENOUS at 07:52:00

## 2024-08-24 NOTE — PROGRESS NOTES
Patient presented with CADD pump connected. [place] with 5 mL left. Disconnected CADD pump, flushed port with 0.9% Normal Saline and established blood return in port. Flushed with 500 units of Heparin and de-accessed port. Gauze and paper tape applied to port site.

## 2024-08-27 ENCOUNTER — APPOINTMENT (OUTPATIENT)
Dept: HEMATOLOGY/ONCOLOGY | Facility: HOSPITAL | Age: 64
End: 2024-08-27
Attending: INTERNAL MEDICINE
Payer: COMMERCIAL

## 2024-08-27 ENCOUNTER — HOSPITAL ENCOUNTER (OUTPATIENT)
Dept: CV DIAGNOSTICS | Facility: HOSPITAL | Age: 64
Discharge: HOME | End: 2024-08-27
Attending: INTERNAL MEDICINE
Payer: COMMERCIAL

## 2024-08-27 DIAGNOSIS — C16.9 MALIGNANT NEOPLASM OF STOMACH, UNSPECIFIED LOCATION (HCC): ICD-10-CM

## 2024-08-27 PROCEDURE — 93306 TTE W/DOPPLER COMPLETE: CPT | Performed by: INTERNAL MEDICINE

## 2024-08-29 ENCOUNTER — APPOINTMENT (OUTPATIENT)
Dept: HEMATOLOGY/ONCOLOGY | Facility: HOSPITAL | Age: 64
End: 2024-08-29
Attending: INTERNAL MEDICINE
Payer: COMMERCIAL

## 2024-08-29 ENCOUNTER — TELEPHONE (OUTPATIENT)
Dept: FAMILY MEDICINE CLINIC | Facility: CLINIC | Age: 64
End: 2024-08-29

## 2024-08-29 NOTE — TELEPHONE ENCOUNTER
Patient's spouse called to request an appointment for this week, if possible. Patient is a cancer patient and looking to have some forms completed regarding his condition and needs them turned in as soon as possible.     Next available is 9/9, patient declined.

## 2024-08-29 NOTE — TELEPHONE ENCOUNTER
Please call patient to schedule  Dr. Higgins next available appointment . Dr Higgins  is not here again until next Tuesday.  Patient can schedule with another  doctor is need sooner appointment. thanks

## 2024-08-30 ENCOUNTER — HOSPITAL ENCOUNTER (OUTPATIENT)
Dept: CT IMAGING | Facility: HOSPITAL | Age: 64
Discharge: HOME | End: 2024-08-30
Attending: NURSE PRACTITIONER
Payer: COMMERCIAL

## 2024-08-30 DIAGNOSIS — C16.9 MALIGNANT NEOPLASM OF STOMACH, UNSPECIFIED LOCATION (HCC): ICD-10-CM

## 2024-08-30 PROCEDURE — 74177 CT ABD & PELVIS W/CONTRAST: CPT | Performed by: NURSE PRACTITIONER

## 2024-08-30 PROCEDURE — 71260 CT THORAX DX C+: CPT | Performed by: NURSE PRACTITIONER

## 2024-09-03 ENCOUNTER — NURSE ONLY (OUTPATIENT)
Dept: HEMATOLOGY/ONCOLOGY | Facility: HOSPITAL | Age: 64
End: 2024-09-03
Attending: INTERNAL MEDICINE
Payer: COMMERCIAL

## 2024-09-03 ENCOUNTER — TELEPHONE (OUTPATIENT)
Dept: HEMATOLOGY/ONCOLOGY | Facility: HOSPITAL | Age: 64
End: 2024-09-03

## 2024-09-03 VITALS
SYSTOLIC BLOOD PRESSURE: 127 MMHG | OXYGEN SATURATION: 96 % | RESPIRATION RATE: 16 BRPM | HEIGHT: 69 IN | DIASTOLIC BLOOD PRESSURE: 69 MMHG | TEMPERATURE: 98 F | BODY MASS INDEX: 25.62 KG/M2 | WEIGHT: 173 LBS | HEART RATE: 76 BPM

## 2024-09-03 DIAGNOSIS — C16.2 MALIGNANT NEOPLASM OF BODY OF STOMACH (HCC): Primary | ICD-10-CM

## 2024-09-03 DIAGNOSIS — C16.9 MALIGNANT NEOPLASM OF STOMACH, UNSPECIFIED LOCATION (HCC): ICD-10-CM

## 2024-09-03 DIAGNOSIS — Z51.12 ENCOUNTER FOR ANTINEOPLASTIC CHEMOTHERAPY AND IMMUNOTHERAPY: ICD-10-CM

## 2024-09-03 DIAGNOSIS — Z51.11 CHEMOTHERAPY MANAGEMENT, ENCOUNTER FOR: Primary | ICD-10-CM

## 2024-09-03 DIAGNOSIS — Z51.11 ENCOUNTER FOR ANTINEOPLASTIC CHEMOTHERAPY AND IMMUNOTHERAPY: ICD-10-CM

## 2024-09-03 LAB
ALBUMIN SERPL-MCNC: 4.3 G/DL (ref 3.2–4.8)
ALBUMIN/GLOB SERPL: 1.3 {RATIO} (ref 1–2)
ALP LIVER SERPL-CCNC: 180 U/L
ALT SERPL-CCNC: 72 U/L
ANION GAP SERPL CALC-SCNC: 7 MMOL/L (ref 0–18)
AST SERPL-CCNC: 58 U/L (ref ?–34)
BASOPHILS # BLD AUTO: 0.02 X10(3) UL (ref 0–0.2)
BASOPHILS NFR BLD AUTO: 0.3 %
BILIRUB SERPL-MCNC: 1.4 MG/DL (ref 0.2–1.1)
BUN BLD-MCNC: 14 MG/DL (ref 9–23)
BUN/CREAT SERPL: 13.2 (ref 10–20)
CALCIUM BLD-MCNC: 9.5 MG/DL (ref 8.7–10.4)
CHLORIDE SERPL-SCNC: 107 MMOL/L (ref 98–112)
CO2 SERPL-SCNC: 25 MMOL/L (ref 21–32)
CREAT BLD-MCNC: 1.06 MG/DL
DEPRECATED RDW RBC AUTO: 45.2 FL (ref 35.1–46.3)
EGFRCR SERPLBLD CKD-EPI 2021: 79 ML/MIN/1.73M2 (ref 60–?)
EOSINOPHIL # BLD AUTO: 0.07 X10(3) UL (ref 0–0.7)
EOSINOPHIL NFR BLD AUTO: 1.1 %
ERYTHROCYTE [DISTWIDTH] IN BLOOD BY AUTOMATED COUNT: 13.3 % (ref 11–15)
GLOBULIN PLAS-MCNC: 3.4 G/DL (ref 2–3.5)
GLUCOSE BLD-MCNC: 107 MG/DL (ref 70–99)
HCT VFR BLD AUTO: 40.3 %
HGB BLD-MCNC: 14.1 G/DL
IMM GRANULOCYTES # BLD AUTO: 0.01 X10(3) UL (ref 0–1)
IMM GRANULOCYTES NFR BLD: 0.2 %
LYMPHOCYTES # BLD AUTO: 2 X10(3) UL (ref 1–4)
LYMPHOCYTES NFR BLD AUTO: 32.5 %
MCH RBC QN AUTO: 32.4 PG (ref 26–34)
MCHC RBC AUTO-ENTMCNC: 35 G/DL (ref 31–37)
MCV RBC AUTO: 92.6 FL
MONOCYTES # BLD AUTO: 0.66 X10(3) UL (ref 0.1–1)
MONOCYTES NFR BLD AUTO: 10.7 %
NEUTROPHILS # BLD AUTO: 3.39 X10 (3) UL (ref 1.5–7.7)
NEUTROPHILS # BLD AUTO: 3.39 X10(3) UL (ref 1.5–7.7)
NEUTROPHILS NFR BLD AUTO: 55.2 %
OSMOLALITY SERPL CALC.SUM OF ELEC: 289 MOSM/KG (ref 275–295)
PLATELET # BLD AUTO: 99 10(3)UL (ref 150–450)
PLATELETS.RETICULATED NFR BLD AUTO: 3.3 % (ref 0–7)
POTASSIUM SERPL-SCNC: 4.4 MMOL/L (ref 3.5–5.1)
PROT SERPL-MCNC: 7.7 G/DL (ref 5.7–8.2)
RBC # BLD AUTO: 4.35 X10(6)UL
SODIUM SERPL-SCNC: 139 MMOL/L (ref 136–145)
TSI SER-ACNC: 0.66 MIU/ML (ref 0.55–4.78)
WBC # BLD AUTO: 6.2 X10(3) UL (ref 4–11)

## 2024-09-03 PROCEDURE — 85025 COMPLETE CBC W/AUTO DIFF WBC: CPT

## 2024-09-03 PROCEDURE — 84443 ASSAY THYROID STIM HORMONE: CPT

## 2024-09-03 PROCEDURE — 80053 COMPREHEN METABOLIC PANEL: CPT

## 2024-09-03 PROCEDURE — 99215 OFFICE O/P EST HI 40 MIN: CPT | Performed by: INTERNAL MEDICINE

## 2024-09-03 PROCEDURE — 36415 COLL VENOUS BLD VENIPUNCTURE: CPT

## 2024-09-03 RX ORDER — FLUOROURACIL 50 MG/ML
2400 INJECTION, SOLUTION INTRAVENOUS CONTINUOUS
Status: CANCELLED | OUTPATIENT
Start: 2024-09-05

## 2024-09-03 NOTE — PROGRESS NOTES
Oncology Progress note    Requesting: Dr. Higgins    Chief Complaint: Anemia, gastric cancer    HPI:  64 year old With gastric adenocarcinoma diagnosed 12/18/23 in the setting of iron def anemia.      See below for staging workup    Initiated FOLFOX +Trastuzumab 1/18/24. Added Pembrolizumab with C3 (PDL1 10%).      Interval History:  Returns for consideration of Cycle #15, FOLFOX +Trastuzumab+ Pembrolizumab.  His surveillance CT scan from 8/30 shows stable results; c/w favorable treatment.     Patient is tolerating treatment well.  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 He denies concerns for fever, infection, no cough, chest pain, dyspnea or rash. No new concerns on ROS.    Denies issues with constipation or diarrhea. Mild nausea controlled with medications.    Pmh: Arthritis      Social History     Socioeconomic History    Marital status:     Number of children: 2   Occupational History    Occupation:      Employer: [employer]   Tobacco Use    Smoking status: Never    Smokeless tobacco: Never   Vaping Use    Vaping status: Never Used   Substance and Sexual Activity    Alcohol use: No     Alcohol/week: 0.0 standard drinks of alcohol    Drug use: No    Sexual activity: Yes   Other Topics Concern    Caffeine Concern Yes     Comment: coffee, 1 cup daily     Family History   Problem Relation Age of Onset    Prostate Cancer Other      Ros negx12    Nka  Current Outpatient Medications on File Prior to Visit   Medication Sig Dispense Refill    OMEPRAZOLE 40 MG Oral Capsule Delayed Release TAKE 1 CAPSULE (40 MG TOTAL) BY MOUTH EVERY MORNING BEFORE BREAKFAST. 90 capsule 0    prochlorperazine (COMPAZINE) 10 mg tablet Take 1 tablet (10 mg total) by mouth every 6 (six) hours as needed for Nausea. (Patient not taking: Reported on 7/31/2024) 30 tablet 3    ondansetron  (ZOFRAN) 8 MG tablet Take 1 tablet (8 mg total) by mouth every 8 (eight) hours as needed for Nausea. (Patient not taking: Reported on 7/31/2024) 30 tablet 3     Current Facility-Administered Medications on File Prior to Visit   Medication Dose Route Frequency Provider Last Rate Last Admin    [COMPLETED] iopamidol 76% (ISOVUE-370) injection for power injector  80 mL Intravenous ONCE PRN Vilma Peterson APRN   80 mL at 08/30/24 1416    [COMPLETED] heparin sodium lock flush 100 UNIT/ML injection 500 Units  5 mL Intracatheter Once Gustavo Díaz MD   500 Units at 08/24/24 0752    [COMPLETED] ondansetron (Zofran) 16 mg, dexAMETHasone (Decadron) 20 mg in sodium chloride 0.9% 110 mL IVPB   Intravenous Once Vilma Peterson APRN   Stopped at 08/22/24 0820    [COMPLETED] trastuzumab-qyyp (Trazimera) 315 mg in sodium chloride 0.9% 265 mL infusion  4 mg/kg (Treatment Plan Recorded) Intravenous Once Vilma Peterson APRN   Stopped at 08/22/24 0855    [COMPLETED] oxaliplatin (Eloxatin) 125 mg in dextrose 5% 275 mL infusion  65 mg/m2 (Treatment Plan Recorded) Intravenous Once Vilma Peterson APRN   Stopped at 08/22/24 1110    [COMPLETED] leucovorin 800 mg in dextrose 5% 250 mL infusion  400 mg/m2 (Treatment Plan Recorded) Intravenous Once Vilma Peterson APRN   Stopped at 08/22/24 1108    [COMPLETED] heparin sodium lock flush 100 UNIT/ML injection 500 Units  5 mL Intracatheter Once Gustavo Díaz MD   500 Units at 08/10/24 0824    [COMPLETED] ondansetron (Zofran) 16 mg, dexAMETHasone (Decadron) 20 mg in sodium chloride 0.9% 110 mL IVPB   Intravenous Once Vilma Peterson APRN   Stopped at 08/08/24 0859    [COMPLETED] trastuzumab-qyyp (Trazimera) 315 mg in sodium chloride 0.9% 265 mL infusion  4 mg/kg (Treatment Plan Recorded) Intravenous Once Vilma Peterson APRN   Stopped at 08/08/24 1025    [COMPLETED] oxaliplatin (Eloxatin) 125 mg in dextrose 5% 275 mL infusion  65 mg/m2 (Treatment Plan Recorded) Intravenous Once Nicholas,  VilmaXOCHITL   Stopped at 08/08/24 1233    [COMPLETED] leucovorin 800 mg in dextrose 5% 250 mL infusion  400 mg/m2 (Treatment Plan Recorded) Intravenous Once Terra PetersonXOCHITL cunningham   Stopped at 08/08/24 1233     Vitals:    09/03/24 1340   BP: 127/69   Pulse: 76   Resp: 16   Temp: 97.8 °F (36.6 °C)       Wt Readings from Last 6 Encounters:   09/03/24 78.5 kg (173 lb)   08/21/24 79.6 kg (175 lb 8 oz)   08/08/24 80.1 kg (176 lb 9.6 oz)   07/31/24 79.1 kg (174 lb 6.4 oz)   07/17/24 80.1 kg (176 lb 9.6 oz)   07/02/24 79.8 kg (176 lb)         Physical exam:  General:  Awake, alert, oriented in NAD  HEENT - EOMsi, anicteric, MMM  Neck - supple, no LAD, normal ROM  Lungs -  CTA bilaterally  Cor - S1/S2 w/o murmur noted  Abd - soft, non tender non distended, bs+  BLE - no edema  Neuro - DTRs grossly intact    ECOG 0: Fully active, able to carry on all pre-disease performance without restriction    Lab Results   Component Value Date    WBC 6.2 09/03/2024    RBC 4.35 09/03/2024    HGB 14.1 09/03/2024    HCT 40.3 09/03/2024    MCV 92.6 09/03/2024    MCH 32.4 09/03/2024    MCHC 35.0 09/03/2024    RDW 13.3 09/03/2024    PLT 99.0 (L) 09/03/2024    MPV 8.9 10/31/2018     Lab Results   Component Value Date     (H) 09/03/2024    BUN 14 09/03/2024    BUNCREA 13.2 09/03/2024    CREATSERUM 1.06 09/03/2024    ANIONGAP 7 09/03/2024    GFRNAA 86 11/10/2021    GFRAA 99 11/10/2021    CA 9.5 09/03/2024    OSMOCALC 289 09/03/2024    ALKPHO 180 (H) 09/03/2024    AST 58 (H) 09/03/2024    ALT 72 (H) 09/03/2024    ALKPHOS 82 10/08/2015    BILT 1.4 (H) 09/03/2024    TP 7.7 09/03/2024    ALB 4.3 09/03/2024    GLOBULIN 3.4 09/03/2024    AGRATIO 1.4 10/08/2015     09/03/2024    K 4.4 09/03/2024     09/03/2024    CO2 25.0 09/03/2024     Imaging:    CT C/A/P 8/30/24    Impression   CONCLUSION: Stable         64 year old  With gastric adenocarcinoma diagnosed 12/18/23 in the setting of iron def anemia. Her positive (3+) MSI-S PDL1  10%    1.) Gastric cancer, stage IV  -- Widespread kevin disease reviewed at tumor conference by Dr. Amaro with surgical oncology will plan for upfront systemic therapy with 5-FU oxaliplatin based regimen potentially with trastuzumab and pembrolizumab  -Initiated FOLFOX plus trastuzumab on 1/18/23   -Delayed Cycle #5 x 1 week d/t worsening LFT elevation   -Cycle #6 with dose reduction of Oxaliplatin d/t CIPN worsening and LFTs as below.    -CT imaging after C4 3/2024 with favorable treatment response.   -PDL1 10% 1/2024. Added Pembrolizumab  as >1% per guidelines. q6week scheduling-initiated with C3 above.   -DELAY Cycle 8 d/t TCP <75K.    - reviewed the pt's current CT scan from above early June; favorable treatment response, continue current systemic treatment    -proceed w/#15 FOLFOX plus trastuzumab plus Pembrolizumab  -surveillance CT scan from 8/24 w/ stable findings; c/w favorable tx response, continue current management  -tx will be held for platelets <75K. I have discontinued the 5-FU bolus as this is likely contributing to the chemotherapy induced thrombocytopenia      -Pembro is at the 400 mg dose every 6 wks  -Will repeat ECHO with cycle 14 (every 3mo); current ECHO from Aug, 2024 shows  a preserved EF and CT scans about every 3 mo  -We reviewed tx may need to be every 21 days for better count tolerance- thrombocytopenia has been an issue      2.)Iron deficiency anemia    --s/p Feraheme x 2, last dose on 2/1/24, anemia resolved    3.) Chemotherapy induced thrombocytopenia  --Thrombocytopenia secondary to chemo, ok to treat if plts > 75k.    --Delayed C9 as above. Cycle 11 now delayed x 2 weeks; Ok to treat with plts >75K.    Monitor, may need to dose reduce chemo further      4.)Transaminitis   --improved gr 1, T. Bili 1.5. dose reduction oxali as above. ok to treat. -stable  --Monitor; especially on days when the pt gets pembrolizumab to see if tx needs to be held     5.)CIPN  -- gr 1-2 after C8, Dose  reduction of oxaliplatin to 65 mg/m2 with C6.   --Delay of C9 d/t Tcp with imrpovement to gr1 CIPN to fingers without aDL limitations.   --Ok to continue as above. Consider discontinue in coming cycles based on symptoms.  Monitor.    --offered duloxetine for neuropathy- declined at this time       6.)risk of cardiotoxicity  --Normal EF/echo prior to treatment 1/2024. 5/2024 ECHO repeat stable/improved.    --H/o fleeting chest discomfort reported, currently resolved, without any other Aes. Monitor w/ ECHO every 3 mo    7.)CINausea  --Prn antiemetics encouraged    9.)H/o skin rash  --Resolved. Supportive care with topical creams.      [facility] - High    Gustavo Díaz MD  [facility]  [facility]  [address] [place] [id number]

## 2024-09-04 ENCOUNTER — APPOINTMENT (OUTPATIENT)
Dept: HEMATOLOGY/ONCOLOGY | Facility: HOSPITAL | Age: 64
End: 2024-09-04
Attending: INTERNAL MEDICINE
Payer: COMMERCIAL

## 2024-09-05 ENCOUNTER — OFFICE VISIT (OUTPATIENT)
Dept: HEMATOLOGY/ONCOLOGY | Facility: HOSPITAL | Age: 64
End: 2024-09-05
Attending: INTERNAL MEDICINE
Payer: COMMERCIAL

## 2024-09-05 VITALS
RESPIRATION RATE: 16 BRPM | SYSTOLIC BLOOD PRESSURE: 122 MMHG | HEART RATE: 79 BPM | OXYGEN SATURATION: 95 % | TEMPERATURE: 98 F | DIASTOLIC BLOOD PRESSURE: 70 MMHG

## 2024-09-05 DIAGNOSIS — C16.9 MALIGNANT NEOPLASM OF STOMACH, UNSPECIFIED LOCATION (HCC): Primary | ICD-10-CM

## 2024-09-05 PROCEDURE — 96417 CHEMO IV INFUS EACH ADDL SEQ: CPT

## 2024-09-05 PROCEDURE — 96415 CHEMO IV INFUSION ADDL HR: CPT

## 2024-09-05 PROCEDURE — 96413 CHEMO IV INFUSION 1 HR: CPT

## 2024-09-05 PROCEDURE — 96367 TX/PROPH/DG ADDL SEQ IV INF: CPT

## 2024-09-05 PROCEDURE — 96368 THER/DIAG CONCURRENT INF: CPT

## 2024-09-05 RX ORDER — FLUOROURACIL 50 MG/ML
2400 INJECTION, SOLUTION INTRAVENOUS CONTINUOUS
Status: DISCONTINUED | OUTPATIENT
Start: 2024-09-05 | End: 2024-09-05

## 2024-09-05 RX ADMIN — FLUOROURACIL 4650 MG: 50 INJECTION, SOLUTION INTRAVENOUS at 13:50:00

## 2024-09-05 NOTE — TELEPHONE ENCOUNTER
Dr. Díaz,     Patient is seeking Americans with Disabilities Act for malignant neoplasm of body of stomach for chemo therapy.    Patient is seeking 9/1/24 - 12/01/24    Do you support?  Sabrina VAZQUEZ

## 2024-09-05 NOTE — TELEPHONE ENCOUNTER
Patient called looking for Americans with Disabilities Act forms that were dropped off at doctor's office on 9/3/24.  I told her that I have found the forms. Valid authorization attached.    Americans with Disabilities Act forms received in forms dept. Logged for processing. Valid Release of Information attached.      Type of Leave:   Americans with Disabilities Act   Reason for Leave: Cancer patient  Start date of leave:  9/1/24 - 12/01/24    For Chemo Therapy.   How many flare ups per month/length?:  How many appts per month/length?:   Was Fee and Turnaround info Given?:

## 2024-09-05 NOTE — PROGRESS NOTES
Patient here for C15D1 Keytruda / Trastuzumab / FOLFOX.  Arrives Ambulating independently, accompanied by Self. Patient denies new issues or complaints.     Patient was evaluated today by Treatment Nurse. Seen by MD 9/3. Labs reviewed, OK to treat order active for Plt >75k.    Oral medications included in this regimen:  no    Patient confirms comprehension of cancer treatment schedule:  yes    Pregnancy screening:  Not applicable    Modifications in dose or schedule:  No    Medications appearance and physical integrity checked by RN: yes.    Chemotherapy IV pump settings verified by 2 RNs:  Yes. Not required for Keytruda and Trastuzumab.  Frequency of blood return and site check throughout administration: Prior to administration, Prior to each drug, and At completion of therapy     Infusion/treatment outcome:  patient tolerated treatment without incident    Education Record    Learner:  Patient  Barriers / Limitations:  None  Method:  Reinforcement  Education / instructions given: Reinforced plan of care and infusion process. Reviewed that patient may not receive entire dose from CADD pump due to Saturday disconnect apt and infusion center being only open in AM on weekends.  Outcome:  Shows understanding    Discharged Home, Ambulating independently, accompanied by:Self    Patient/family verbalized understanding of future appointments: by [employer] messaging

## 2024-09-06 NOTE — TELEPHONE ENCOUNTER
Dr. Díaz,      Please sign off on form if you agree to: Americans with Disabilities Act 09/01/2024-12/01/2024 Cont. FREDRICK  (place your signature on the first page only)    -From your Inbasket, Highlight the patient and click Chart   -Double click the 09/03/2024 Forms Completion telephone encounter  -Scroll down to the Media section   -Click the blue Hyperlink: Americans with Disabilities Act Dr. Gustavo Díaz 09/06/2024  -Click Acknowledge located in the top right ribbon/menu   -Drag the mouse into the blank space of the document and a + sign will appear. Left click to   electronically sign the document.     Thank you,  Dorothy GONZALEZ

## 2024-09-07 ENCOUNTER — NURSE ONLY (OUTPATIENT)
Dept: HEMATOLOGY/ONCOLOGY | Facility: HOSPITAL | Age: 64
End: 2024-09-07
Attending: INTERNAL MEDICINE
Payer: COMMERCIAL

## 2024-09-07 VITALS
RESPIRATION RATE: 18 BRPM | TEMPERATURE: 98 F | SYSTOLIC BLOOD PRESSURE: 137 MMHG | HEART RATE: 93 BPM | DIASTOLIC BLOOD PRESSURE: 69 MMHG | OXYGEN SATURATION: 96 %

## 2024-09-07 PROCEDURE — 96523 IRRIG DRUG DELIVERY DEVICE: CPT

## 2024-09-07 NOTE — PROGRESS NOTES
Patient presented with CADD pump connected.   Volume balance 8.6ml  Disconnected CADD pump, flushed port with 0.9% Normal Saline and established blood return in port. Flushed with 20ml normal saline and de-accessed port. Gauze and paper tape applied to port site.

## 2024-09-09 RX ORDER — OMEPRAZOLE 40 MG/1
40 CAPSULE, DELAYED RELEASE ORAL
Qty: 90 CAPSULE | Refills: 0 | Status: SHIPPED | OUTPATIENT
Start: 2024-09-09

## 2024-09-09 NOTE — TELEPHONE ENCOUNTER
Requested Prescriptions     Pending Prescriptions Disp Refills    OMEPRAZOLE 40 MG Oral Capsule Delayed Release [Pharmacy Med Name: OMEPRAZOLE DR 40 MG CAPSULE] 90 capsule 0     Sig: TAKE 1 CAPSULE BY MOUTH IN THE MORNING BEFORE BREAKFAST     Last seen: 12/12/23  Suggested follow up:  Last refill: 6/10/24    Refill pended, please review/sign if agreeable.

## 2024-09-10 ENCOUNTER — SOCIAL WORK SERVICES (OUTPATIENT)
Dept: HEMATOLOGY/ONCOLOGY | Facility: HOSPITAL | Age: 64
End: 2024-09-10

## 2024-09-10 NOTE — PROGRESS NOTES
JOAQUIN spoke with patient and his spouse Bhumika about the ADA Accommodations forms that were completed by the forms department. JOAQUIN explained that Dr francis did not agree with the terminology used and would like to postpone signing forms until he can examine patient at his next visit on 9/17. Patient and Bhumika expressed understanding.

## 2024-09-12 ENCOUNTER — OFFICE VISIT (OUTPATIENT)
Dept: FAMILY MEDICINE CLINIC | Facility: CLINIC | Age: 64
End: 2024-09-12
Payer: COMMERCIAL

## 2024-09-12 VITALS
SYSTOLIC BLOOD PRESSURE: 130 MMHG | DIASTOLIC BLOOD PRESSURE: 73 MMHG | HEIGHT: 69 IN | BODY MASS INDEX: 25.03 KG/M2 | WEIGHT: 169 LBS | HEART RATE: 78 BPM | TEMPERATURE: 98 F

## 2024-09-12 DIAGNOSIS — C16.9 MALIGNANT NEOPLASM OF STOMACH, UNSPECIFIED LOCATION (HCC): Primary | ICD-10-CM

## 2024-09-12 DIAGNOSIS — G47.00 INSOMNIA, UNSPECIFIED TYPE: ICD-10-CM

## 2024-09-12 PROCEDURE — 99213 OFFICE O/P EST LOW 20 MIN: CPT | Performed by: FAMILY MEDICINE

## 2024-09-12 RX ORDER — ZOLPIDEM TARTRATE 10 MG/1
10 TABLET ORAL NIGHTLY PRN
Qty: 30 TABLET | Refills: 0 | Status: SHIPPED | OUTPATIENT
Start: 2024-09-12

## 2024-09-12 RX ORDER — ACETAMINOPHEN AND CODEINE PHOSPHATE 300; 30 MG/1; MG/1
1 TABLET ORAL EVERY 6 HOURS PRN
Qty: 60 TABLET | Refills: 0 | Status: SHIPPED | OUTPATIENT
Start: 2024-09-12

## 2024-09-12 NOTE — PROGRESS NOTES
Subjective:   Patient ID: Wallace Garzon is a 64 year old male.    Pt presents with symptoms / side effects from his cancer treatment. Pt is on chemotherapy. No fevers.   Pt has had nausea, difficulty sleeping; pains. Here to discuss treatment of symptoms.   Pt also was wanting to see another oncologist in network for second opinion.         History/Other:   Review of Systems   Constitutional:  Negative for fever.   Gastrointestinal:  Positive for nausea.   Musculoskeletal:  Positive for myalgias.     Current Outpatient Medications   Medication Sig Dispense Refill    zolpidem (AMBIEN) 10 MG Oral Tab Take 1 tablet (10 mg total) by mouth nightly as needed for Sleep. 30 tablet 0    acetaminophen-codeine 300-30 MG Oral Tab Take 1 tablet by mouth every 6 (six) hours as needed for Pain. Medication may causes sedation, constipation. Not to operate heavy machinery or drive on medication. 60 tablet 0    OMEPRAZOLE 40 MG Oral Capsule Delayed Release TAKE 1 CAPSULE BY MOUTH IN THE MORNING BEFORE BREAKFAST 90 capsule 0    prochlorperazine (COMPAZINE) 10 mg tablet Take 1 tablet (10 mg total) by mouth every 6 (six) hours as needed for Nausea. 30 tablet 3    ondansetron (ZOFRAN) 8 MG tablet Take 1 tablet (8 mg total) by mouth every 8 (eight) hours as needed for Nausea. 30 tablet 3     Allergies:No Known Allergies    Objective:   Physical Exam  Constitutional:       Appearance: Normal appearance.   Cardiovascular:      Rate and Rhythm: Normal rate and regular rhythm.      Heart sounds: Normal heart sounds.   Pulmonary:      Effort: Pulmonary effort is normal.      Breath sounds: Normal breath sounds.   Abdominal:      General: Abdomen is flat. There is no distension.      Tenderness: There is no abdominal tenderness. There is no guarding or rebound.   Neurological:      Mental Status: He is alert.         Assessment & Plan:   1. Malignant neoplasm of stomach, unspecified location: having pains  - After discussion, will start tylenol  #3 as needed for pains; discussed benefits and potential side effects; to call if problems or side effects;     2. Insomnia, unspecified type:  - After discussion, will start ambien as discussed; discussed benefits and potential side effects; to call if problems or side effects; follow up in one month to reevaluate as needed. Consider follow up with psychiatry if not better.          No orders of the defined types were placed in this encounter.      Meds This Visit:  Requested Prescriptions     Signed Prescriptions Disp Refills    zolpidem (AMBIEN) 10 MG Oral Tab 30 tablet 0     Sig: Take 1 tablet (10 mg total) by mouth nightly as needed for Sleep.    acetaminophen-codeine 300-30 MG Oral Tab 60 tablet 0     Sig: Take 1 tablet by mouth every 6 (six) hours as needed for Pain. Medication may causes sedation, constipation. Not to operate heavy machinery or drive on medication.       Imaging & Referrals:  OP REFERRAL TO [facility] HEMATOLOGY/ONCOLOGY GROUP

## 2024-09-17 ENCOUNTER — NURSE ONLY (OUTPATIENT)
Dept: HEMATOLOGY/ONCOLOGY | Facility: HOSPITAL | Age: 64
End: 2024-09-17
Attending: INTERNAL MEDICINE
Payer: COMMERCIAL

## 2024-09-17 ENCOUNTER — SOCIAL WORK SERVICES (OUTPATIENT)
Dept: HEMATOLOGY/ONCOLOGY | Facility: HOSPITAL | Age: 64
End: 2024-09-17

## 2024-09-17 VITALS
SYSTOLIC BLOOD PRESSURE: 146 MMHG | HEIGHT: 69 IN | BODY MASS INDEX: 25.18 KG/M2 | RESPIRATION RATE: 18 BRPM | HEART RATE: 97 BPM | OXYGEN SATURATION: 97 % | TEMPERATURE: 98 F | DIASTOLIC BLOOD PRESSURE: 79 MMHG | WEIGHT: 170 LBS

## 2024-09-17 DIAGNOSIS — Z51.11 CHEMOTHERAPY MANAGEMENT, ENCOUNTER FOR: Primary | ICD-10-CM

## 2024-09-17 DIAGNOSIS — G62.0 CHEMOTHERAPY-INDUCED NEUROPATHY (HCC): ICD-10-CM

## 2024-09-17 DIAGNOSIS — Z51.11 CHEMOTHERAPY MANAGEMENT, ENCOUNTER FOR: ICD-10-CM

## 2024-09-17 DIAGNOSIS — C16.9 MALIGNANT NEOPLASM OF STOMACH, UNSPECIFIED LOCATION (HCC): ICD-10-CM

## 2024-09-17 DIAGNOSIS — T45.1X5A CHEMOTHERAPY-INDUCED NEUROPATHY (HCC): ICD-10-CM

## 2024-09-17 DIAGNOSIS — C16.9 MALIGNANT NEOPLASM OF STOMACH, UNSPECIFIED LOCATION (HCC): Primary | ICD-10-CM

## 2024-09-17 LAB
ALBUMIN SERPL-MCNC: 4.2 G/DL (ref 3.2–4.8)
ALBUMIN/GLOB SERPL: 1.2 {RATIO} (ref 1–2)
ALP LIVER SERPL-CCNC: 191 U/L
ALT SERPL-CCNC: 92 U/L
ANION GAP SERPL CALC-SCNC: 6 MMOL/L (ref 0–18)
AST SERPL-CCNC: 70 U/L (ref ?–34)
BASOPHILS # BLD AUTO: 0.03 X10(3) UL (ref 0–0.2)
BASOPHILS NFR BLD AUTO: 0.5 %
BILIRUB SERPL-MCNC: 1.5 MG/DL (ref 0.2–1.1)
BUN BLD-MCNC: 14 MG/DL (ref 9–23)
BUN/CREAT SERPL: 13.1 (ref 10–20)
CALCIUM BLD-MCNC: 9.5 MG/DL (ref 8.7–10.4)
CHLORIDE SERPL-SCNC: 107 MMOL/L (ref 98–112)
CO2 SERPL-SCNC: 24 MMOL/L (ref 21–32)
CREAT BLD-MCNC: 1.07 MG/DL
DEPRECATED RDW RBC AUTO: 46.2 FL (ref 35.1–46.3)
EGFRCR SERPLBLD CKD-EPI 2021: 78 ML/MIN/1.73M2 (ref 60–?)
EOSINOPHIL # BLD AUTO: 0.03 X10(3) UL (ref 0–0.7)
EOSINOPHIL NFR BLD AUTO: 0.5 %
ERYTHROCYTE [DISTWIDTH] IN BLOOD BY AUTOMATED COUNT: 13.5 % (ref 11–15)
GLOBULIN PLAS-MCNC: 3.6 G/DL (ref 2–3.5)
GLUCOSE BLD-MCNC: 152 MG/DL (ref 70–99)
HCT VFR BLD AUTO: 40.1 %
HGB BLD-MCNC: 13.8 G/DL
IMM GRANULOCYTES # BLD AUTO: 0.02 X10(3) UL (ref 0–1)
IMM GRANULOCYTES NFR BLD: 0.3 %
LYMPHOCYTES # BLD AUTO: 1.85 X10(3) UL (ref 1–4)
LYMPHOCYTES NFR BLD AUTO: 30.9 %
MCH RBC QN AUTO: 31.9 PG (ref 26–34)
MCHC RBC AUTO-ENTMCNC: 34.4 G/DL (ref 31–37)
MCV RBC AUTO: 92.8 FL
MONOCYTES # BLD AUTO: 0.47 X10(3) UL (ref 0.1–1)
MONOCYTES NFR BLD AUTO: 7.9 %
NEUTROPHILS # BLD AUTO: 3.58 X10 (3) UL (ref 1.5–7.7)
NEUTROPHILS # BLD AUTO: 3.58 X10(3) UL (ref 1.5–7.7)
NEUTROPHILS NFR BLD AUTO: 59.9 %
OSMOLALITY SERPL CALC.SUM OF ELEC: 287 MOSM/KG (ref 275–295)
PLATELET # BLD AUTO: 104 10(3)UL (ref 150–450)
PLATELETS.RETICULATED NFR BLD AUTO: 2.9 % (ref 0–7)
POTASSIUM SERPL-SCNC: 4.2 MMOL/L (ref 3.5–5.1)
PROT SERPL-MCNC: 7.8 G/DL (ref 5.7–8.2)
RBC # BLD AUTO: 4.32 X10(6)UL
SODIUM SERPL-SCNC: 137 MMOL/L (ref 136–145)
TSI SER-ACNC: 1.06 MIU/ML (ref 0.55–4.78)
WBC # BLD AUTO: 6 X10(3) UL (ref 4–11)

## 2024-09-17 PROCEDURE — 80053 COMPREHEN METABOLIC PANEL: CPT

## 2024-09-17 PROCEDURE — 36415 COLL VENOUS BLD VENIPUNCTURE: CPT

## 2024-09-17 PROCEDURE — 84443 ASSAY THYROID STIM HORMONE: CPT

## 2024-09-17 PROCEDURE — 85025 COMPLETE CBC W/AUTO DIFF WBC: CPT

## 2024-09-17 PROCEDURE — 99215 OFFICE O/P EST HI 40 MIN: CPT | Performed by: INTERNAL MEDICINE

## 2024-09-17 NOTE — PROGRESS NOTES
Oncology Progress note    Requesting: Dr. Higgins    Chief Complaint: Anemia, gastric cancer    HPI:  64 year old With gastric adenocarcinoma diagnosed 12/18/23 in the setting of iron def anemia.      See below for staging workup    Initiated FOLFOX +Trastuzumab 1/18/24. Added Pembrolizumab with C3 (PDL1 10%).      Interval History:  Returns for consideration of next Cycle, FOLFOX +Trastuzumab+ Pembrolizumab.  His surveillance CT scan from 8/30 shows stable results; c/w favorable treatment.    Patient feels that his neuropathy in his feet are preventing him from walking. He has neuropathy in his hands as well affecting his ADL's. Pt has some dizziness and he mentions some nausea and burning in chest/dryness in the throat and some times dyspnea. He can not sleep after treatment. Pt reports chills associated with some fevers up to 102. Pt has no interest in activities with sadness.        Pmh: Arthritis      Social History     Socioeconomic History    Marital status:     Number of children: 2   Occupational History    Occupation: Food service     Employer: [employer]   Tobacco Use    Smoking status: Never    Smokeless tobacco: Never   Vaping Use    Vaping status: Never Used   Substance and Sexual Activity    Alcohol use: No     Alcohol/week: 0.0 standard drinks of alcohol    Drug use: No    Sexual activity: Yes   Other Topics Concern    Caffeine Concern Yes     Comment: coffee, 1 cup daily     Family History   Problem Relation Age of Onset    Prostate Cancer Other      Ros negx12    Nka  Current Outpatient Medications on File Prior to Visit   Medication Sig Dispense Refill    zolpidem (AMBIEN) 10 MG Oral Tab Take 1 tablet (10 mg total) by mouth nightly as needed for Sleep. 30 tablet 0    acetaminophen-codeine 300-30 MG Oral Tab Take 1 tablet by mouth every 6 (six) hours as needed for Pain. Medication may causes sedation, constipation. Not to operate heavy machinery or drive on medication.  60 tablet 0    OMEPRAZOLE 40 MG Oral Capsule Delayed Release TAKE 1 CAPSULE BY MOUTH IN THE MORNING BEFORE BREAKFAST 90 capsule 0    prochlorperazine (COMPAZINE) 10 mg tablet Take 1 tablet (10 mg total) by mouth every 6 (six) hours as needed for Nausea. 30 tablet 3    ondansetron (ZOFRAN) 8 MG tablet Take 1 tablet (8 mg total) by mouth every 8 (eight) hours as needed for Nausea. 30 tablet 3     Current Facility-Administered Medications on File Prior to Visit   Medication Dose Route Frequency Provider Last Rate Last Admin    [COMPLETED] ondansetron (Zofran) 16 mg, dexAMETHasone (Decadron) 20 mg in sodium chloride 0.9% 110 mL IVPB   Intravenous Once Gustavo Díaz MD   Stopped at 09/05/24 1059    [COMPLETED] pembrolizumab (Keytruda) 400 mg in sodium chloride 0.9% 116 mL IVPB  400 mg Intravenous Once Gustavo Díaz MD   Stopped at 09/05/24 1039    [COMPLETED] trastuzumab-qyyp (Trazimera) 315 mg in sodium chloride 0.9% 265 mL infusion  4 mg/kg (Treatment Plan Recorded) Intravenous Once Gustavo Díaz MD   Stopped at 09/05/24 1137    [COMPLETED] oxaliplatin (Eloxatin) 125 mg in dextrose 5% 275 mL infusion  65 mg/m2 (Treatment Plan Recorded) Intravenous Once Gustavo Díaz MD   Stopped at 09/05/24 1345    [COMPLETED] leucovorin 800 mg in dextrose 5% 250 mL infusion  400 mg/m2 (Treatment Plan Recorded) Intravenous Once Gustavo Díaz MD   Stopped at 09/05/24 1348    [COMPLETED] iopamidol 76% (ISOVUE-370) injection for power injector  80 mL Intravenous ONCE PRN Vilma Peterson APRN   80 mL at 08/30/24 1416    [COMPLETED] heparin sodium lock flush 100 UNIT/ML injection 500 Units  5 mL Intracatheter Once Gustavo Díaz MD   500 Units at 08/24/24 0752    [COMPLETED] ondansetron (Zofran) 16 mg, dexAMETHasone (Decadron) 20 mg in sodium chloride 0.9% 110 mL IVPB   Intravenous Once Vilma Peterson APRN   Stopped at 08/22/24 0820    [COMPLETED] trastuzumab-qyyp (Trazimera) 315 mg in sodium chloride 0.9% 265 mL infusion  4  mg/kg (Treatment Plan Recorded) Intravenous Once Vilma Peterson APRN   Stopped at 08/22/24 0855    [COMPLETED] oxaliplatin (Eloxatin) 125 mg in dextrose 5% 275 mL infusion  65 mg/m2 (Treatment Plan Recorded) Intravenous Once Vilma Peterson APRN   Stopped at 08/22/24 1110    [COMPLETED] leucovorin 800 mg in dextrose 5% 250 mL infusion  400 mg/m2 (Treatment Plan Recorded) Intravenous Once Vilma Peterson APRN   Stopped at 08/22/24 1108     Vitals:    09/17/24 1023   BP: 146/79   Pulse: 97   Resp: 18   Temp: 98.1 °F (36.7 °C)       Wt Readings from Last 6 Encounters:   09/17/24 77.1 kg (170 lb)   09/12/24 76.7 kg (169 lb)   09/03/24 78 kg (172 lb)   09/03/24 78.5 kg (173 lb)   08/21/24 79.6 kg (175 lb 8 oz)   08/08/24 80.1 kg (176 lb 9.6 oz)         Physical exam:  General:  Awake, alert, oriented in mild distress today  HEENT - EOMsi, anicteric, MMM  Neck - supple, no LAD, normal ROM  Lungs -  CTA bilaterally  Cor - S1/S2 w/o murmur noted  Abd - soft, non tender non distended, bs+  BLE - no edema  Neuro - DTRs grossly intact    ECOG 2: Ambulatory and capable of all selfcare but unable to carry out any work activities. Up and about more than 50% of waking hours    Lab Results   Component Value Date    WBC 6.0 09/17/2024    RBC 4.32 09/17/2024    HGB 13.8 09/17/2024    HCT 40.1 09/17/2024    MCV 92.8 09/17/2024    MCH 31.9 09/17/2024    MCHC 34.4 09/17/2024    RDW 13.5 09/17/2024    .0 (L) 09/17/2024    MPV 8.9 10/31/2018     Lab Results   Component Value Date     (H) 09/17/2024    BUN 14 09/17/2024    BUNCREA 13.1 09/17/2024    CREATSERUM 1.07 09/17/2024    ANIONGAP 6 09/17/2024    GFRNAA 86 11/10/2021    GFRAA 99 11/10/2021    CA 9.5 09/17/2024    OSMOCALC 287 09/17/2024    ALKPHO 191 (H) 09/17/2024    AST 70 (H) 09/17/2024    ALT 92 (H) 09/17/2024    ALKPHOS 82 10/08/2015    BILT 1.5 (H) 09/17/2024    TP 7.8 09/17/2024    ALB 4.2 09/17/2024    GLOBULIN 3.6 (H) 09/17/2024    AGRATIO 1.4 10/08/2015      09/17/2024    K 4.2 09/17/2024     09/17/2024    CO2 24.0 09/17/2024     Imaging:    CT C/A/P 8/30/24    Impression   CONCLUSION: Stable         64 year old  With gastric adenocarcinoma diagnosed 12/18/23 in the setting of iron def anemia. Her positive (3+) MSI-S PDL1 10%    1.) Gastric cancer, stage IV  -- Widespread kevin disease reviewed at tumor conference by Dr. Amaro with surgical oncology will plan for upfront systemic therapy with 5-FU oxaliplatin based regimen potentially with trastuzumab and pembrolizumab  -Initiated FOLFOX plus trastuzumab on 1/18/23   -Delayed Cycle #5 x 1 week d/t worsening LFT elevation   -Cycle #6 with dose reduction of Oxaliplatin d/t CIPN worsening and LFTs as below.    -CT imaging after C4 3/2024 with favorable treatment response.   -PDL1 10% 1/2024. Added Pembrolizumab  as >1% per guidelines. q6week scheduling-initiated with C3 above.   -DELAY Cycle 8 d/t TCP <75K.    - reviewed the pt's current CT scan from above early June; favorable treatment response, continue current systemic treatment    -surveillance CT scan from 8/24 w/ stable findings; c/w favorable tx response, continue current management    -defer cycle 16 FOLFOX plus trastuzumab plus Pembrolizumab due to side effects from systemic tx, referred to palliative care to help  -pt is working with  to determine his work limitations and will f/u in 2 wks for repeat eval  --explained that we will nee to modified his treatments so that they're sustainable      -tx will be held if platelets <75K. I have discontinued the 5-FU bolus as this is likely contributing to the chemotherapy induced thrombocytopenia      -Pembro is at the 400 mg dose every 6 wks  -Will repeat ECHO with cycle 14 (every 3mo); current ECHO from Aug, 2024 shows  a preserved EF and CT scans about every 3 mo  -We reviewed tx may need to be every 21 days for better count tolerance- thrombocytopenia has been an issue      2.)Iron  deficiency anemia    --s/p Feraheme x 2, last dose on 2/1/24, anemia resolved    3.) Chemotherapy induced thrombocytopenia  --Thrombocytopenia secondary to chemo, ok to treat if plts > 75k.    --Delayed C9 as above. Cycle 11 now delayed x 2 weeks; Ok to treat with plts >75K.    Monitor, may need to dose reduce chemo further      4.)Transaminitis   --improved gr 1, T. Bili 1.5. dose reduction oxali as above. ok to treat. -stable  --Monitor; especially on days when the pt gets pembrolizumab to see if tx needs to be held     5.)CIPN  -- gr 1-2 after C8, Dose reduction of oxaliplatin to 65 mg/m2 with C6.   --Delay of C9 d/t Tcp with imrpovement to gr1 CIPN to fingers without aDL limitations.   --Ok to continue as above. Consider discontinue in coming cycles based on symptoms.  Monitor.    --offered duloxetine for neuropathy- declined at this time       6.)risk of cardiotoxicity  --Normal EF/echo prior to treatment 1/2024. 5/2024 ECHO repeat stable/improved.    --H/o fleeting chest discomfort reported, currently resolved, without any other Aes. Monitor w/ ECHO every 3 mo    7.)CINausea  --Prn antiemetics encouraged    9.)H/o skin rash  --Resolved. Supportive care with topical creams.      [facility] - [facility]    Gustavo Díaz MD  [facility]  [address], [place] [address]

## 2024-09-17 NOTE — PROGRESS NOTES
JOAQUIN assisted with paperwork. JOAQUIN faxed ADA forms to [facility] at [phone].JOAQUIN faxed short term disability paperwork to  [facility] attn Damien Roth @ [phone] as requested.

## 2024-09-19 ENCOUNTER — APPOINTMENT (OUTPATIENT)
Dept: HEMATOLOGY/ONCOLOGY | Facility: HOSPITAL | Age: 64
End: 2024-09-19
Attending: INTERNAL MEDICINE
Payer: COMMERCIAL

## 2024-09-21 ENCOUNTER — APPOINTMENT (OUTPATIENT)
Dept: HEMATOLOGY/ONCOLOGY | Facility: HOSPITAL | Age: 64
End: 2024-09-21
Attending: INTERNAL MEDICINE
Payer: COMMERCIAL

## 2024-10-01 ENCOUNTER — OFFICE VISIT (OUTPATIENT)
Dept: HEMATOLOGY/ONCOLOGY | Facility: HOSPITAL | Age: 64
End: 2024-10-01
Attending: INTERNAL MEDICINE
Payer: COMMERCIAL

## 2024-10-01 VITALS
WEIGHT: 169.63 LBS | HEART RATE: 78 BPM | HEIGHT: 69 IN | SYSTOLIC BLOOD PRESSURE: 137 MMHG | TEMPERATURE: 98 F | RESPIRATION RATE: 16 BRPM | DIASTOLIC BLOOD PRESSURE: 72 MMHG | OXYGEN SATURATION: 96 % | BODY MASS INDEX: 25.12 KG/M2

## 2024-10-01 DIAGNOSIS — R74.01 TRANSAMINITIS: ICD-10-CM

## 2024-10-01 DIAGNOSIS — C16.2 MALIGNANT NEOPLASM OF BODY OF STOMACH (HCC): Primary | ICD-10-CM

## 2024-10-01 DIAGNOSIS — G62.0 PERIPHERAL NEUROPATHY DUE TO CHEMOTHERAPY (HCC): ICD-10-CM

## 2024-10-01 DIAGNOSIS — T45.1X5A PERIPHERAL NEUROPATHY DUE TO CHEMOTHERAPY (HCC): ICD-10-CM

## 2024-10-01 DIAGNOSIS — Z51.12 ENCOUNTER FOR ANTINEOPLASTIC CHEMOTHERAPY AND IMMUNOTHERAPY: ICD-10-CM

## 2024-10-01 DIAGNOSIS — Z51.11 CHEMOTHERAPY MANAGEMENT, ENCOUNTER FOR: Primary | ICD-10-CM

## 2024-10-01 DIAGNOSIS — Z51.11 ENCOUNTER FOR ANTINEOPLASTIC CHEMOTHERAPY AND IMMUNOTHERAPY: ICD-10-CM

## 2024-10-01 DIAGNOSIS — C16.9 MALIGNANT NEOPLASM OF STOMACH, UNSPECIFIED LOCATION (HCC): ICD-10-CM

## 2024-10-01 LAB
ALBUMIN SERPL-MCNC: 4.5 G/DL (ref 3.2–4.8)
ALBUMIN/GLOB SERPL: 1.3 {RATIO} (ref 1–2)
ALP LIVER SERPL-CCNC: 184 U/L
ALT SERPL-CCNC: 91 U/L
ANION GAP SERPL CALC-SCNC: 6 MMOL/L (ref 0–18)
AST SERPL-CCNC: 65 U/L (ref ?–34)
BASOPHILS # BLD AUTO: 0.05 X10(3) UL (ref 0–0.2)
BASOPHILS NFR BLD AUTO: 0.8 %
BILIRUB SERPL-MCNC: 1.7 MG/DL (ref 0.2–1.1)
BUN BLD-MCNC: 11 MG/DL (ref 9–23)
BUN/CREAT SERPL: 12.1 (ref 10–20)
CALCIUM BLD-MCNC: 9.6 MG/DL (ref 8.7–10.4)
CHLORIDE SERPL-SCNC: 109 MMOL/L (ref 98–112)
CO2 SERPL-SCNC: 26 MMOL/L (ref 21–32)
CREAT BLD-MCNC: 0.91 MG/DL
DEPRECATED RDW RBC AUTO: 45.7 FL (ref 35.1–46.3)
EGFRCR SERPLBLD CKD-EPI 2021: 95 ML/MIN/1.73M2 (ref 60–?)
EOSINOPHIL # BLD AUTO: 0.04 X10(3) UL (ref 0–0.7)
EOSINOPHIL NFR BLD AUTO: 0.6 %
ERYTHROCYTE [DISTWIDTH] IN BLOOD BY AUTOMATED COUNT: 13.2 % (ref 11–15)
GLOBULIN PLAS-MCNC: 3.5 G/DL (ref 2–3.5)
GLUCOSE BLD-MCNC: 109 MG/DL (ref 70–99)
HCT VFR BLD AUTO: 41.9 %
HGB BLD-MCNC: 14.3 G/DL
IMM GRANULOCYTES # BLD AUTO: 0.02 X10(3) UL (ref 0–1)
IMM GRANULOCYTES NFR BLD: 0.3 %
LYMPHOCYTES # BLD AUTO: 1.94 X10(3) UL (ref 1–4)
LYMPHOCYTES NFR BLD AUTO: 30.7 %
MCH RBC QN AUTO: 32 PG (ref 26–34)
MCHC RBC AUTO-ENTMCNC: 34.1 G/DL (ref 31–37)
MCV RBC AUTO: 93.7 FL
MONOCYTES # BLD AUTO: 0.59 X10(3) UL (ref 0.1–1)
MONOCYTES NFR BLD AUTO: 9.3 %
NEUTROPHILS # BLD AUTO: 3.68 X10 (3) UL (ref 1.5–7.7)
NEUTROPHILS # BLD AUTO: 3.68 X10(3) UL (ref 1.5–7.7)
NEUTROPHILS NFR BLD AUTO: 58.3 %
OSMOLALITY SERPL CALC.SUM OF ELEC: 292 MOSM/KG (ref 275–295)
PLATELET # BLD AUTO: 124 10(3)UL (ref 150–450)
PLATELETS.RETICULATED NFR BLD AUTO: 3.8 % (ref 0–7)
POTASSIUM SERPL-SCNC: 4.3 MMOL/L (ref 3.5–5.1)
PROT SERPL-MCNC: 8 G/DL (ref 5.7–8.2)
RBC # BLD AUTO: 4.47 X10(6)UL
SODIUM SERPL-SCNC: 141 MMOL/L (ref 136–145)
TSI SER-ACNC: 0.97 MIU/ML (ref 0.55–4.78)
WBC # BLD AUTO: 6.3 X10(3) UL (ref 4–11)

## 2024-10-01 PROCEDURE — 99215 OFFICE O/P EST HI 40 MIN: CPT | Performed by: INTERNAL MEDICINE

## 2024-10-01 PROCEDURE — 80053 COMPREHEN METABOLIC PANEL: CPT

## 2024-10-01 PROCEDURE — 85025 COMPLETE CBC W/AUTO DIFF WBC: CPT

## 2024-10-01 PROCEDURE — 84443 ASSAY THYROID STIM HORMONE: CPT

## 2024-10-01 PROCEDURE — 36415 COLL VENOUS BLD VENIPUNCTURE: CPT

## 2024-10-01 RX ORDER — FLUOROURACIL 50 MG/ML
2400 INJECTION, SOLUTION INTRAVENOUS CONTINUOUS
Status: CANCELLED | OUTPATIENT
Start: 2024-10-03

## 2024-10-01 NOTE — PROGRESS NOTES
Oncology Progress note    Requesting: Dr. Higgins    Chief Complaint: Anemia, gastric cancer    HPI:  64 year old With gastric adenocarcinoma diagnosed 12/18/23 in the setting of iron def anemia.      See below for staging workup    Initiated FOLFOX +Trastuzumab 1/18/24. Added Pembrolizumab with C3 (PDL1 10%).      Interval History:  Returns for consideration of next Cycle, FOLFOX +Trastuzumab+ Pembrolizumab.  His surveillance CT scan from 8/30 shows stable results; c/w favorable treatment.    Patient feels that all of the side effects he mentioned two weeks ago have diminished/improved with the 2 wk treatment break. He feels neuropathy in his feet is now manageable. Pt denies dizziness currently. He still has some occasional nausea and burning in chest/dryness in the throat and some times dyspnea. He can not sleep after treatment. Pt reported chills associated with some fevers up to 102; currently denies fevers,bleeding or infection symptoms. Pt had no interest in activities with sadness.        Pmh: Arthritis      Social History     Socioeconomic History    Marital status:     Number of children: 2   Occupational History    Occupation: Food service     Employer: [employer]   Tobacco Use    Smoking status: Never    Smokeless tobacco: Never   Vaping Use    Vaping status: Never Used   Substance and Sexual Activity    Alcohol use: No     Alcohol/week: 0.0 standard drinks of alcohol    Drug use: No    Sexual activity: Yes   Other Topics Concern    Caffeine Concern Yes     Comment: coffee, 1 cup daily     Family History   Problem Relation Age of Onset    Prostate Cancer Other      Ros negx12    Nka  Current Outpatient Medications on File Prior to Visit   Medication Sig Dispense Refill    zolpidem (AMBIEN) 10 MG Oral Tab Take 1 tablet (10 mg total) by mouth nightly as needed for Sleep. 30 tablet 0    acetaminophen-codeine 300-30 MG Oral Tab Take 1 tablet by mouth every 6 (six) hours as needed  for Pain. Medication may causes sedation, constipation. Not to operate heavy machinery or drive on medication. 60 tablet 0    OMEPRAZOLE 40 MG Oral Capsule Delayed Release TAKE 1 CAPSULE BY MOUTH IN THE MORNING BEFORE BREAKFAST 90 capsule 0    prochlorperazine (COMPAZINE) 10 mg tablet Take 1 tablet (10 mg total) by mouth every 6 (six) hours as needed for Nausea. 30 tablet 3    ondansetron (ZOFRAN) 8 MG tablet Take 1 tablet (8 mg total) by mouth every 8 (eight) hours as needed for Nausea. 30 tablet 3     Current Facility-Administered Medications on File Prior to Visit   Medication Dose Route Frequency Provider Last Rate Last Admin    [COMPLETED] ondansetron (Zofran) 16 mg, dexAMETHasone (Decadron) 20 mg in sodium chloride 0.9% 110 mL IVPB   Intravenous Once Gustavo Díaz MD   Stopped at 09/05/24 1059    [COMPLETED] pembrolizumab (Keytruda) 400 mg in sodium chloride 0.9% 116 mL IVPB  400 mg Intravenous Once Gustavo Díaz MD   Stopped at 09/05/24 1039    [COMPLETED] trastuzumab-qyyp (Trazimera) 315 mg in sodium chloride 0.9% 265 mL infusion  4 mg/kg (Treatment Plan Recorded) Intravenous Once Gustavo Díaz MD   Stopped at 09/05/24 1137    [COMPLETED] oxaliplatin (Eloxatin) 125 mg in dextrose 5% 275 mL infusion  65 mg/m2 (Treatment Plan Recorded) Intravenous Once Gustavo Díaz MD   Stopped at 09/05/24 1345    [COMPLETED] leucovorin 800 mg in dextrose 5% 250 mL infusion  400 mg/m2 (Treatment Plan Recorded) Intravenous Once Gustavo Díaz MD   Stopped at 09/05/24 1348     Vitals:    10/01/24 1145   BP: 137/72   Pulse: 78   Resp: 16   Temp: 98.2 °F (36.8 °C)       Wt Readings from Last 6 Encounters:   10/01/24 76.9 kg (169 lb 9.6 oz)   09/17/24 77.1 kg (170 lb)   09/12/24 76.7 kg (169 lb)   09/03/24 78 kg (172 lb)   09/03/24 78.5 kg (173 lb)   08/21/24 79.6 kg (175 lb 8 oz)         Physical exam:  General:  Awake, alert, oriented in mild distress today  HEENT - EOMsi, anicteric, MMM  Neck - supple, no LAD, normal  ROM  Lungs -  CTA bilaterally  Cor - S1/S2 w/o murmur noted  Abd - soft, non tender non distended, bs+  BLE - no edema  Neuro - DTRs grossly intact    ECOG 2: Ambulatory and capable of all selfcare but unable to carry out any work activities. Up and about more than 50% of waking hours    Lab Results   Component Value Date    WBC 6.3 10/01/2024    RBC 4.47 10/01/2024    HGB 14.3 10/01/2024    HCT 41.9 10/01/2024    MCV 93.7 10/01/2024    MCH 32.0 10/01/2024    MCHC 34.1 10/01/2024    RDW 13.2 10/01/2024    .0 (L) 10/01/2024    MPV 8.9 10/31/2018     Lab Results   Component Value Date     (H) 10/01/2024    BUN 11 10/01/2024    BUNCREA 12.1 10/01/2024    CREATSERUM 0.91 10/01/2024    ANIONGAP 6 10/01/2024    GFRNAA 86 11/10/2021    GFRAA 99 11/10/2021    CA 9.6 10/01/2024    OSMOCALC 292 10/01/2024    ALKPHO 184 (H) 10/01/2024    AST 65 (H) 10/01/2024    ALT 91 (H) 10/01/2024    ALKPHOS 82 10/08/2015    BILT 1.7 (H) 10/01/2024    TP 8.0 10/01/2024    ALB 4.5 10/01/2024    GLOBULIN 3.5 10/01/2024    AGRATIO 1.4 10/08/2015     10/01/2024    K 4.3 10/01/2024     10/01/2024    CO2 26.0 10/01/2024     Imaging:    CT C/A/P 8/30/24    Impression   CONCLUSION: Stable         64 year old  With gastric adenocarcinoma diagnosed 12/18/23 in the setting of iron def anemia. Her positive (3+) MSI-S PDL1 10%    1.) Gastric cancer, stage IV  -- Widespread kevin disease reviewed at tumor conference by Dr. Amaro with surgical oncology will plan for upfront systemic therapy with 5-FU oxaliplatin based regimen potentially with trastuzumab and pembrolizumab  -Initiated FOLFOX plus trastuzumab on 1/18/23   -Delayed Cycle #5 x 1 week d/t worsening LFT elevation   -Cycle #6 with dose reduction of Oxaliplatin d/t CIPN worsening and LFTs as below.    -CT imaging after C4 3/2024 with favorable treatment response.   -PDL1 10% 1/2024. Added Pembrolizumab  as >1% per guidelines. q6week scheduling-initiated with C3 above.    -DELAY Cycle 8 d/t TCP <75K.    - reviewed the pt's current CT scan from above early June; favorable treatment response, continue current systemic treatment    -surveillance CT scan from 8/24 w/ stable findings; c/w favorable tx response, continue current management    -cycle 16 FOLFOX plus trastuzumab plus Pembrolizumab was deferred for 2 wks due to side effects from systemic tx, referred to palliative care to help. Now feels improved to continue with same dosing on Thurs, proceed with cycle 16 on Thurs    --pt is working with  to determine his work limitations     --explained that we will nee to modified his treatments so that they're sustainable; possibly dose reduced oxaliplatin to 50, 5-FU to 2200 and dec trastuzumab to 3mg/kg    -tx will be held if platelets <75K. I have discontinued the 5-FU bolus as this is likely contributing to the chemotherapy induced thrombocytopenia      -Pembro is at the 400 mg dose every 6 wks  -Will repeat ECHO with cycle 14 (every 3mo); current ECHO from Aug, 2024 shows  a preserved EF and CT scans about every 3 mo  -We reviewed tx may need to be every 21 days for better count tolerance- thrombocytopenia has been an issue      2.)Iron deficiency anemia    --s/p Feraheme x 2, last dose on 2/1/24, anemia resolved    3.) Chemotherapy induced thrombocytopenia  --Thrombocytopenia secondary to chemo, ok to treat if plts > 75k.    --Delayed C9 as above. Cycle 11 now delayed x 2 weeks; Ok to treat with plts >75K.    Monitor, may need to dose reduce chemo further      4.)Transaminitis   --improved gr 1, T. Bili 1.5. dose reduction oxali as above. ok to treat. -stable  --Monitor; especially on days when the pt gets pembrolizumab to see if tx needs to be held     5.)CIPN  -- gr 1-2 after C8, Dose reduction of oxaliplatin to 65 mg/m2 with C6.   --Delay of C9 d/t Tcp with imrpovement to gr1 CIPN to fingers without aDL limitations.   --Ok to continue as above. Consider discontinue  in coming cycles based on symptoms.  Monitor.    --offered duloxetine for neuropathy- declined at this time       6.)risk of cardiotoxicity  --Normal EF/echo prior to treatment 1/2024. 5/2024 ECHO repeat stable/improved.    --H/o fleeting chest discomfort reported, currently resolved, without any other Aes. Monitor w/ ECHO every 3 mo    7.)CINausea  --Prn antiemetics encouraged    9.)H/o skin rash  --Resolved. Supportive care with topical creams.      [facility] - [facility]    Gustavo Díaz MD  [facility]  [facility]  [address] [place] [id number]

## 2024-10-03 ENCOUNTER — OFFICE VISIT (OUTPATIENT)
Dept: HEMATOLOGY/ONCOLOGY | Facility: HOSPITAL | Age: 64
End: 2024-10-03
Attending: INTERNAL MEDICINE
Payer: COMMERCIAL

## 2024-10-03 ENCOUNTER — APPOINTMENT (OUTPATIENT)
Dept: HEMATOLOGY/ONCOLOGY | Facility: HOSPITAL | Age: 64
End: 2024-10-03
Attending: NURSE PRACTITIONER
Payer: COMMERCIAL

## 2024-10-03 VITALS
HEART RATE: 76 BPM | TEMPERATURE: 98 F | OXYGEN SATURATION: 95 % | DIASTOLIC BLOOD PRESSURE: 79 MMHG | RESPIRATION RATE: 16 BRPM | SYSTOLIC BLOOD PRESSURE: 133 MMHG

## 2024-10-03 DIAGNOSIS — C16.9 MALIGNANT NEOPLASM OF STOMACH, UNSPECIFIED LOCATION (HCC): ICD-10-CM

## 2024-10-03 DIAGNOSIS — T80.90XA INFUSION REACTION, INITIAL ENCOUNTER: Primary | ICD-10-CM

## 2024-10-03 DIAGNOSIS — C16.9 MALIGNANT NEOPLASM OF STOMACH, UNSPECIFIED LOCATION (HCC): Primary | ICD-10-CM

## 2024-10-03 PROCEDURE — 96413 CHEMO IV INFUSION 1 HR: CPT

## 2024-10-03 PROCEDURE — 99214 OFFICE O/P EST MOD 30 MIN: CPT | Performed by: PHYSICIAN ASSISTANT

## 2024-10-03 PROCEDURE — 96375 TX/PRO/DX INJ NEW DRUG ADDON: CPT

## 2024-10-03 PROCEDURE — S0028 INJECTION, FAMOTIDINE, 20 MG: HCPCS

## 2024-10-03 PROCEDURE — 96415 CHEMO IV INFUSION ADDL HR: CPT

## 2024-10-03 PROCEDURE — 96368 THER/DIAG CONCURRENT INF: CPT

## 2024-10-03 PROCEDURE — 96417 CHEMO IV INFUS EACH ADDL SEQ: CPT

## 2024-10-03 RX ORDER — FAMOTIDINE 10 MG/ML
20 INJECTION, SOLUTION INTRAVENOUS DAILY
Status: DISCONTINUED | OUTPATIENT
Start: 2024-10-03 | End: 2024-10-03

## 2024-10-03 RX ORDER — FLUOROURACIL 50 MG/ML
2400 INJECTION, SOLUTION INTRAVENOUS CONTINUOUS
Status: DISCONTINUED | OUTPATIENT
Start: 2024-10-03 | End: 2024-10-03

## 2024-10-03 RX ORDER — FAMOTIDINE 10 MG/ML
INJECTION, SOLUTION INTRAVENOUS
Status: COMPLETED
Start: 2024-10-03 | End: 2024-10-03

## 2024-10-03 RX ADMIN — FLUOROURACIL 4650 MG: 50 INJECTION, SOLUTION INTRAVENOUS at 12:00:00

## 2024-10-03 RX ADMIN — FAMOTIDINE 20 MG: 10 INJECTION, SOLUTION INTRAVENOUS at 09:53:00

## 2024-10-03 NOTE — PROGRESS NOTES
Pt here for C16D1 Drug(s)FOLFOX/TRastuzumab.  Arrives Ambulating independently, accompanied by Self     Patient was evaluated today by Treatment Nurse.    Oral medications included in this regimen:  no    Patient confirms comprehension of cancer treatment schedule:  yes    Pregnancy screening:  Not applicable    Modifications in dose or schedule:  No    Medications appearance and physical integrity checked by RN: yes.    Chemotherapy IV pump settings verified by 2 RNs:  Yes.  Frequency of blood return and site check throughout administration: Prior to administration, Prior to each drug, and At completion of therapy     Infusion/treatment outcome:  hypersensitivity protocol initiated - see documentation  Sara did complain of nausea and a   Burning hot feeling from his throat to his abdomen toward the beginning of infusion today.  See adverse reaction note.    Education Record    Learner:  Patient  Barriers / Limitations:  None  Method:  Brief focused and Discussion  Education / instructions given:  Plan of care for treatment today  Outcome:  Shows understanding    Discharged Home, Ambulating independently, accompanied by:Self    Patient/family verbalized understanding of future appointments: by printed AVS

## 2024-10-04 ENCOUNTER — TELEPHONE (OUTPATIENT)
Dept: HEMATOLOGY/ONCOLOGY | Facility: HOSPITAL | Age: 64
End: 2024-10-04

## 2024-10-04 NOTE — TELEPHONE ENCOUNTER
Toxicities: C16 D1 Fluorouracil/Leucovorin/Oxaliplatin/Trastuzmab-qyyp on 10/3/2024    Condition Update Infusion Reaction: Oxaliplatin - Chest and Face flushed and warm, nausea    Nasi reports his symptoms lasted about 10 minutes and then resolved. He has felt well since leaving the infusion center.

## 2024-10-05 ENCOUNTER — NURSE ONLY (OUTPATIENT)
Dept: HEMATOLOGY/ONCOLOGY | Facility: HOSPITAL | Age: 64
End: 2024-10-05
Attending: INTERNAL MEDICINE
Payer: COMMERCIAL

## 2024-10-05 VITALS
OXYGEN SATURATION: 94 % | HEART RATE: 92 BPM | RESPIRATION RATE: 16 BRPM | DIASTOLIC BLOOD PRESSURE: 70 MMHG | TEMPERATURE: 97 F | SYSTOLIC BLOOD PRESSURE: 134 MMHG

## 2024-10-05 PROCEDURE — 96523 IRRIG DRUG DELIVERY DEVICE: CPT

## 2024-10-05 NOTE — PROGRESS NOTES
Patient arrives to infusion ambulatory and unaccompanied.  States he had a minor nose bleed on the way here but it stopped. Per patient it has happened before and \"it is due to the medicine.\"  Patient presented with CADD pump connected.   5.2ml remaining in reservoir. Patient choosing to disconnect at this time.  Disconnected CADD pump, flushed port with 0.9% Normal Saline and established blood return in port. Port saline locked and de-accessed port. Gauze and paper tape applied to port site.   Discharged from infusion ambulatory.

## 2024-10-15 ENCOUNTER — OFFICE VISIT (OUTPATIENT)
Dept: HEMATOLOGY/ONCOLOGY | Facility: HOSPITAL | Age: 64
End: 2024-10-15
Attending: INTERNAL MEDICINE
Payer: COMMERCIAL

## 2024-10-15 ENCOUNTER — OFFICE VISIT (OUTPATIENT)
Dept: HEMATOLOGY/ONCOLOGY | Facility: HOSPITAL | Age: 64
End: 2024-10-15
Attending: NURSE PRACTITIONER
Payer: COMMERCIAL

## 2024-10-15 VITALS
HEART RATE: 76 BPM | DIASTOLIC BLOOD PRESSURE: 71 MMHG | OXYGEN SATURATION: 96 % | WEIGHT: 170 LBS | BODY MASS INDEX: 25.18 KG/M2 | TEMPERATURE: 98 F | HEIGHT: 69 IN | SYSTOLIC BLOOD PRESSURE: 125 MMHG | RESPIRATION RATE: 16 BRPM

## 2024-10-15 VITALS
OXYGEN SATURATION: 96 % | TEMPERATURE: 98 F | BODY MASS INDEX: 25.18 KG/M2 | HEIGHT: 69 IN | HEART RATE: 76 BPM | DIASTOLIC BLOOD PRESSURE: 71 MMHG | RESPIRATION RATE: 16 BRPM | SYSTOLIC BLOOD PRESSURE: 125 MMHG | WEIGHT: 170 LBS

## 2024-10-15 DIAGNOSIS — Z71.89 ADVANCE CARE PLANNING: ICD-10-CM

## 2024-10-15 DIAGNOSIS — C16.2 MALIGNANT NEOPLASM OF BODY OF STOMACH (HCC): ICD-10-CM

## 2024-10-15 DIAGNOSIS — Z51.11 CHEMOTHERAPY MANAGEMENT, ENCOUNTER FOR: ICD-10-CM

## 2024-10-15 DIAGNOSIS — Z71.89 GOALS OF CARE, COUNSELING/DISCUSSION: ICD-10-CM

## 2024-10-15 DIAGNOSIS — Z51.5 PALLIATIVE CARE ENCOUNTER: Primary | ICD-10-CM

## 2024-10-15 DIAGNOSIS — C16.9 MALIGNANT NEOPLASM OF STOMACH, UNSPECIFIED LOCATION (HCC): ICD-10-CM

## 2024-10-15 DIAGNOSIS — C16.2 MALIGNANT NEOPLASM OF BODY OF STOMACH (HCC): Primary | ICD-10-CM

## 2024-10-15 DIAGNOSIS — Z51.11 CHEMOTHERAPY MANAGEMENT, ENCOUNTER FOR: Primary | ICD-10-CM

## 2024-10-15 LAB
ALBUMIN SERPL-MCNC: 4.4 G/DL (ref 3.2–4.8)
ALBUMIN/GLOB SERPL: 1.2 {RATIO} (ref 1–2)
ALP LIVER SERPL-CCNC: 196 U/L
ALT SERPL-CCNC: 99 U/L
ANION GAP SERPL CALC-SCNC: 5 MMOL/L (ref 0–18)
AST SERPL-CCNC: 72 U/L (ref ?–34)
BASOPHILS # BLD AUTO: 0.02 X10(3) UL (ref 0–0.2)
BASOPHILS NFR BLD AUTO: 0.4 %
BILIRUB SERPL-MCNC: 1.7 MG/DL (ref 0.2–1.1)
BUN BLD-MCNC: 13 MG/DL (ref 9–23)
BUN/CREAT SERPL: 14 (ref 10–20)
CALCIUM BLD-MCNC: 9.6 MG/DL (ref 8.7–10.4)
CHLORIDE SERPL-SCNC: 108 MMOL/L (ref 98–112)
CO2 SERPL-SCNC: 28 MMOL/L (ref 21–32)
CREAT BLD-MCNC: 0.93 MG/DL
DEPRECATED RDW RBC AUTO: 45.9 FL (ref 35.1–46.3)
EGFRCR SERPLBLD CKD-EPI 2021: 92 ML/MIN/1.73M2 (ref 60–?)
EOSINOPHIL # BLD AUTO: 0.1 X10(3) UL (ref 0–0.7)
EOSINOPHIL NFR BLD AUTO: 2.1 %
ERYTHROCYTE [DISTWIDTH] IN BLOOD BY AUTOMATED COUNT: 13.5 % (ref 11–15)
GLOBULIN PLAS-MCNC: 3.6 G/DL (ref 2–3.5)
GLUCOSE BLD-MCNC: 120 MG/DL (ref 70–99)
HCT VFR BLD AUTO: 38.2 %
HGB BLD-MCNC: 13.3 G/DL
IMM GRANULOCYTES # BLD AUTO: 0 X10(3) UL (ref 0–1)
IMM GRANULOCYTES NFR BLD: 0 %
LYMPHOCYTES # BLD AUTO: 1.9 X10(3) UL (ref 1–4)
LYMPHOCYTES NFR BLD AUTO: 39 %
MCH RBC QN AUTO: 32 PG (ref 26–34)
MCHC RBC AUTO-ENTMCNC: 34.8 G/DL (ref 31–37)
MCV RBC AUTO: 92 FL
MONOCYTES # BLD AUTO: 0.38 X10(3) UL (ref 0.1–1)
MONOCYTES NFR BLD AUTO: 7.8 %
NEUTROPHILS # BLD AUTO: 2.47 X10 (3) UL (ref 1.5–7.7)
NEUTROPHILS # BLD AUTO: 2.47 X10(3) UL (ref 1.5–7.7)
NEUTROPHILS NFR BLD AUTO: 50.7 %
OSMOLALITY SERPL CALC.SUM OF ELEC: 293 MOSM/KG (ref 275–295)
PLATELET # BLD AUTO: 98 10(3)UL (ref 150–450)
PLATELETS.RETICULATED NFR BLD AUTO: 2.6 % (ref 0–7)
POTASSIUM SERPL-SCNC: 5.1 MMOL/L (ref 3.5–5.1)
PROT SERPL-MCNC: 8 G/DL (ref 5.7–8.2)
RBC # BLD AUTO: 4.15 X10(6)UL
SODIUM SERPL-SCNC: 141 MMOL/L (ref 136–145)
TSI SER-ACNC: 0.62 MIU/ML (ref 0.55–4.78)
WBC # BLD AUTO: 4.9 X10(3) UL (ref 4–11)

## 2024-10-15 PROCEDURE — 85025 COMPLETE CBC W/AUTO DIFF WBC: CPT

## 2024-10-15 PROCEDURE — 80053 COMPREHEN METABOLIC PANEL: CPT

## 2024-10-15 PROCEDURE — 99215 OFFICE O/P EST HI 40 MIN: CPT | Performed by: INTERNAL MEDICINE

## 2024-10-15 PROCEDURE — 99205 OFFICE O/P NEW HI 60 MIN: CPT | Performed by: NURSE PRACTITIONER

## 2024-10-15 PROCEDURE — 84443 ASSAY THYROID STIM HORMONE: CPT

## 2024-10-15 PROCEDURE — 36415 COLL VENOUS BLD VENIPUNCTURE: CPT

## 2024-10-15 RX ORDER — FLUOROURACIL 50 MG/ML
2400 INJECTION, SOLUTION INTRAVENOUS CONTINUOUS
Status: CANCELLED | OUTPATIENT
Start: 2024-10-17

## 2024-10-15 RX ORDER — FAMOTIDINE 10 MG/ML
20 INJECTION, SOLUTION INTRAVENOUS ONCE
Status: CANCELLED
Start: 2024-10-17 | End: 2024-10-17

## 2024-10-15 NOTE — CONSULTS
Palliative Care Consult Note     Patient Name: Wallace Garzon   YOB: 1960   Medical Record Number: [id number]   Date of visit: 10/15/2024     The 21st Century Cures Act makes medical notes like these available to patients in the interest of transparency. Please be advised this is a medical document. Medical documents are intended to carry relevant information, facts as evident, and the clinical opinion of the practitioner. The medical note is intended as peer to peer communication and may appear blunt or direct. It is written in medical language and may contain abbreviations or verbiage that are unfamiliar.     Chief Complaint/Reason for Visit:  Chief Complaint   Patient presents with    Palliative Care       Reason for Consultation:   I was asked by Dr. Díaz to evaluate patient. Consult requested for evaluation of palliative care needs and Establish palliative care.    Past Medical History/Past Surgical History:   History obtained from [employer] In addition to the patient, PMH/PSH is significant as shown below.    HPI:      Currently, Pt is listed as a FULL CODE in Epic and there are no advance directives on file (Living Will, Healthcare Power of  [HCPOA] or Practitioner Order for Life-Sustaining Treatment [POLST] documentation).     Hospital admissions in past year: 0  ER visits in past year: 0    Patient seen and examined with no family present today. Wallace is awake, alert, oriented x 4, answers questions appropriately, is a reliable historian, and is in NAD today.    Problem List:  Patient Active Problem List   Diagnosis    Pain in both hands    Iron deficiency anemia    Gastric cancer (HCC)    Chemotherapy-induced thrombocytopenia    Chemotherapy management, encounter for    Peripheral neuropathy due to chemotherapy (HCC)    Transaminitis    Infusion reaction        Medical History:  History reviewed. No pertinent past medical history.    Surgical History:  Past Surgical History:   Procedure  Laterality Date    Colonoscopy  2013    Colonoscopy N/A 12/18/2023    Procedure: COLONOSCOPY;  Surgeon: Adithya Lantigua MD;  [facility]    Inguinal hernia repair Right 01/24/2018       Allergies:  Allergies[1]    Family History:  Family History   Problem Relation Age of Onset    Prostate Cancer Other        Social History:  Social History     Socioeconomic History    Marital status:     Number of children: 2   Occupational History    Occupation: Food service     Employer: [employer]   Tobacco Use    Smoking status: Never    Smokeless tobacco: Never   Vaping Use    Vaping status: Never Used   Substance and Sexual Activity    Alcohol use: No     Alcohol/week: 0.0 standard drinks of alcohol    Drug use: No    Sexual activity: Yes       Palliative Care Social History:    Marital Status:   Children: 2 dtrs  Living Situation: Lives with wife  Does patient live alone: No  Occupational History: On [employer] - works in [employer] at [employer]    Functional History:    ADLs: Independent  Driving: Yes  Assistive Devices: None  Caregiver/caregiver support at home: No    Substance History:    Smoking Status: Never  Hx of ETOH Abuse: No  Hx of Illicit Drug Use: No  Hx of Medical Cannabis Use: No    Anglican/Cultural Information:    Anglican Affiliation: Denominational Confucianist  Ethnicity: Kenyan     Goals of care counseling/advance care planning discussion:   I discussed reason for palliative care consultation. I discussed the benefits of palliative care to include help with symptom management needs, provide extra layer of support to patient/family, conduct GOC/wishes discussions, and assistance with advance care planning needs. I discussed the differences between palliative care and hospice. I provided education about the Medicare hospice benefit and philosophy (for future reference). Palliative care brochure provided.     We discussed patient's current clinical condition. I  provided education about the typical disease trajectory of gastric cancer with associated symptoms and decline over time.     I discussed the importance of advance care planning prior to crisis with Nasi.     HCPOA/Health Surrogate:    There is completed HCPOA documentation on file in Epic.    I confirmed that patient's HCPOA is: Brenda Garzon (wife)  #1 successor agent: Shawna Garzon (dtr)  #2 successor agent: Itzel Garzon (dtr)        Code Status/POLST Documentation:    Pt is FULL CODE status.      Medications:  Current Outpatient Medications   Medication Sig Dispense Refill    zolpidem (AMBIEN) 10 MG Oral Tab Take 1 tablet (10 mg total) by mouth nightly as needed for Sleep. 30 tablet 0    acetaminophen-codeine 300-30 MG Oral Tab Take 1 tablet by mouth every 6 (six) hours as needed for Pain. Medication may causes sedation, constipation. Not to operate heavy machinery or drive on medication. 60 tablet 0    OMEPRAZOLE 40 MG Oral Capsule Delayed Release TAKE 1 CAPSULE BY MOUTH IN THE MORNING BEFORE BREAKFAST 90 capsule 0    prochlorperazine (COMPAZINE) 10 mg tablet Take 1 tablet (10 mg total) by mouth every 6 (six) hours as needed for Nausea. 30 tablet 3    ondansetron (ZOFRAN) 8 MG tablet Take 1 tablet (8 mg total) by mouth every 8 (eight) hours as needed for Nausea. 30 tablet 3       Review of Systems:  Review of Systems   Constitutional: Negative.    HENT: Negative.     Eyes: Negative.    Respiratory: Negative.     Cardiovascular: Negative.    Gastrointestinal:  Negative for abdominal pain, constipation (Occurs x 3-4 days post chemo; Miralax controls symptom), diarrhea, nausea (Occurs x 3-4 days post chemo; Ondansetron controls symptom) and vomiting.   Genitourinary: Negative.    Musculoskeletal: Negative.    Skin: Negative.    Neurological:  Positive for tingling (Occurs x 3-4 days post chemo; occurs in hands/throat; resolves on own, no meds needed since symptom is tolerable).   Endo/Heme/Allergies:  Negative.    Psychiatric/Behavioral: Negative.         Physical Examination:  Physical Exam  Vitals reviewed.   Constitutional:       Appearance: Normal appearance. He is not ill-appearing.   HENT:      Head: Normocephalic and atraumatic.      Right Ear: External ear normal.      Left Ear: External ear normal.      Nose: Nose normal.      Mouth/Throat:      Mouth: Mucous membranes are moist.      Pharynx: Oropharynx is clear.   Eyes:      General: No scleral icterus.     Conjunctiva/sclera: Conjunctivae normal.      Pupils: Pupils are equal, round, and reactive to light.   Pulmonary:      Effort: Pulmonary effort is normal. No respiratory distress.   Abdominal:      General: There is no distension.      Palpations: Abdomen is soft.   Musculoskeletal:         General: Normal range of motion.      Cervical back: Normal range of motion and neck supple.      Right lower leg: No edema.      Left lower leg: No edema.   Skin:     General: Skin is warm and dry.      Coloration: Skin is not jaundiced or pale.   Neurological:      General: No focal deficit present.      Mental Status: He is alert and oriented to person, place, and time. Mental status is at baseline.      Motor: No weakness.      Gait: Gait normal.   Psychiatric:         Mood and Affect: Mood normal.         Behavior: Behavior normal.         Thought Content: Thought content normal.         Judgment: Judgment normal.         Palliative Care Goals of Care:  Discussed with patient/family today: Yes  Patient's preference about sharing medical information: Patient and family may receive information  Patient's decision making preferences: Fully involved and speak with family  Code status: FULL CODE  Have advanced directives been discussed with patient or healthcare power of : Yes        Healthcare Agent Appointed: Yes  Healthcare Agent's Name: Brenda Garzon (wife)  Healthcare Agent's Phone Number: [phone]          Palliative Care  Assessment/Plan:  1. Palliative care encounter    2. Goals of care, counseling/discussion    3. Advance care planning    4. Malignant neoplasm of body of stomach (HCC)       Goals of care counseling/discussion  -Pt is FULL CODE  -Continue supportive medical treatments; pt plans to continue pursuing cancer treatment  -Provided emotional support to pt/family who appear to be coping adequately  -Patient is agreeable to hospitalization, if indicated  -Patient states that he will call me if he needs any Palliative Care needs for the future  -See above narrative for further details      Advance care planning counseling/discussion  -Pt is FULL CODE  -HCPOA: Brenda Garzon (wife)  -#1 successor agent: Shawna Garzon (dtr)  -#2 successor agent: Iztel Sumbilla (dtr)  -See above narrative for further details    Palliative Performance Scale 80%    Emotional support was provided to patient and family today: Yes    Palliative Care Follow-up:  I spent a total of  60 minutes with the patient today, which included all of the following:direct face to face contact, history taking, physical examination, and >50% was spent counseling and coordinating care.    Thank you for allowing the Palliative Care Team to participate in the care of your patient. I will continue to follow clinically.    XOCHITL FERREIRA DNP, FNP-BC, [facility]  [phone]  10/15/2024             [1] No Known Allergies

## 2024-10-15 NOTE — PROGRESS NOTES
Oncology Progress note    Requesting: Dr. Higgins    Chief Complaint: Anemia, gastric cancer    HPI:  64 year old With gastric adenocarcinoma diagnosed 12/18/23 in the setting of iron def anemia.      See below for staging workup    Initiated FOLFOX +Trastuzumab 1/18/24. Added Pembrolizumab with C3 (PDL1 10%).      Interval History:  Returns for consideration of next Cycle, FOLFOX +Trastuzumab+ Pembrolizumab.  His surveillance CT scan from 8/30 shows stable results; c/w favorable treatment.    Patient feels that all of the side effects he mentioned a few weeks ago have diminished/improved with the 2 wk treatment break. He feels neuropathy in his feet is now manageable. Pt denies dizziness currently. He still has some occasional nausea and burning in chest/dryness in the throat and some times dyspnea. He can not sleep after treatment. Pt reported chills associated with some fevers up to 102; currently denies fevers,bleeding or infection symptoms. Pt had no interest in activities with sadness.        Pmh: Arthritis      Social History     Socioeconomic History    Marital status:     Number of children: 2   Occupational History    Occupation: Food service     Employer: [employer]   Tobacco Use    Smoking status: Never    Smokeless tobacco: Never   Vaping Use    Vaping status: Never Used   Substance and Sexual Activity    Alcohol use: No     Alcohol/week: 0.0 standard drinks of alcohol    Drug use: No    Sexual activity: Yes   Other Topics Concern    Caffeine Concern Yes     Comment: coffee, 1 cup daily     Family History   Problem Relation Age of Onset    Prostate Cancer Other      Ros [id number]    Nka  Current Outpatient Medications on File Prior to Visit   Medication Sig Dispense Refill    zolpidem (AMBIEN) 10 MG Oral Tab Take 1 tablet (10 mg total) by mouth nightly as needed for Sleep. 30 tablet 0    acetaminophen-codeine 300-30 MG Oral Tab Take 1 tablet by mouth every 6 (six) hours as  needed for Pain. Medication may causes sedation, constipation. Not to operate heavy machinery or drive on medication. 60 tablet 0    OMEPRAZOLE 40 MG Oral Capsule Delayed Release TAKE 1 CAPSULE BY MOUTH IN THE MORNING BEFORE BREAKFAST 90 capsule 0    prochlorperazine (COMPAZINE) 10 mg tablet Take 1 tablet (10 mg total) by mouth every 6 (six) hours as needed for Nausea. 30 tablet 3    ondansetron (ZOFRAN) 8 MG tablet Take 1 tablet (8 mg total) by mouth every 8 (eight) hours as needed for Nausea. 30 tablet 3     Current Facility-Administered Medications on File Prior to Visit   Medication Dose Route Frequency Provider Last Rate Last Admin    [COMPLETED] ondansetron (Zofran) 16 mg, dexAMETHasone (Decadron) 20 mg in sodium chloride 0.9% 110 mL IVPB   Intravenous Once Gustavo Díaz MD   Stopped at 10/03/24 0818    [COMPLETED] trastuzumab-qyyp (Trazimera) 315 mg in sodium chloride 0.9% 265 mL infusion  4 mg/kg (Treatment Plan Recorded) Intravenous Once Gustavo Díaz MD   Stopped at 10/03/24 0919    [COMPLETED] oxaliplatin (Eloxatin) 125 mg in dextrose 5% 275 mL infusion  65 mg/m2 (Treatment Plan Recorded) Intravenous Once Gustavo Díaz MD   Stopped at 10/03/24 1156    [COMPLETED] leucovorin 750 mg in dextrose 5% 250 mL infusion  400 mg/m2 (Treatment Plan Recorded) Intravenous Once Gustavo Díaz MD   Stopped at 10/03/24 1156     Vitals:    10/15/24 1200   BP: 125/71   Pulse: 76   Resp: 16   Temp: 97.9 °F (36.6 °C)         Wt Readings from Last 6 Encounters:   10/15/24 77.1 kg (170 lb)   10/15/24 77.1 kg (170 lb)   10/01/24 76.9 kg (169 lb 9.6 oz)   09/17/24 77.1 kg (170 lb)   09/12/24 76.7 kg (169 lb)   09/03/24 78 kg (172 lb)         Physical exam:  General:  Awake, alert, oriented in mild distress today  HEENT - EOMsi, anicteric, MMM  Neck - supple, no LAD, normal ROM  Lungs -  CTA bilaterally  Cor - S1/S2 w/o murmur noted  Abd - soft, non tender non distended, bs+  BLE - no edema  Neuro - DTRs grossly  intact    ECOG 2: Ambulatory and capable of all selfcare but unable to carry out any work activities. Up and about more than 50% of waking hours    Lab Results   Component Value Date    WBC 4.9 10/15/2024    RBC 4.15 (L) 10/15/2024    HGB 13.3 10/15/2024    HCT 38.2 (L) 10/15/2024    MCV 92.0 10/15/2024    MCH 32.0 10/15/2024    MCHC 34.8 10/15/2024    RDW 13.5 10/15/2024    PLT 98.0 (L) 10/15/2024    MPV 8.9 10/31/2018     Lab Results   Component Value Date     (H) 10/15/2024    BUN 13 10/15/2024    BUNCREA 14.0 10/15/2024    CREATSERUM 0.93 10/15/2024    ANIONGAP 5 10/15/2024    GFRNAA 86 11/10/2021    GFRAA 99 11/10/2021    CA 9.6 10/15/2024    OSMOCALC 293 10/15/2024    ALKPHO 196 (H) 10/15/2024    AST 72 (H) 10/15/2024    ALT 99 (H) 10/15/2024    ALKPHOS 82 10/08/2015    BILT 1.7 (H) 10/15/2024    TP 8.0 10/15/2024    ALB 4.4 10/15/2024    GLOBULIN 3.6 (H) 10/15/2024    AGRATIO 1.4 10/08/2015     10/15/2024    K 5.1 10/15/2024     10/15/2024    CO2 28.0 10/15/2024     Imaging:    CT C/A/P 8/30/24    Impression   CONCLUSION: Stable         64 year old  With gastric adenocarcinoma diagnosed 12/18/23 in the setting of iron def anemia. Her positive (3+) MSI-S PDL1 10%    1.) Gastric cancer, stage IV  -- Widespread kevin disease reviewed at tumor conference by Dr. Amaro with surgical oncology will plan for upfront systemic therapy with 5-FU oxaliplatin based regimen potentially with trastuzumab and pembrolizumab  -Initiated FOLFOX plus trastuzumab on 1/18/23   -Delayed Cycle #5 x 1 week d/t worsening LFT elevation   -Cycle #6 with dose reduction of Oxaliplatin d/t CIPN worsening and LFTs as below.    -CT imaging after C4 3/2024 with favorable treatment response.   -PDL1 10% 1/2024. Added Pembrolizumab  as >1% per guidelines. q6week scheduling-initiated with C3 above.   -DELAY Cycle 8 d/t TCP <75K.    - reviewed the pt's current CT scan from above early June; favorable treatment response, continue  current systemic treatment    -surveillance CT scan from 8/24 w/ stable findings; c/w favorable tx response, continue current management    -cycle 16 FOLFOX plus trastuzumab plus Pembrolizumab was deferred for 2 wks due to side effects from systemic tx, referred to palliative care to help. Now feels improved to continue with same dosing     --pt is working with  to determine his work limitations     --explained that we will nee to modified his treatments so that they're sustainable; possibly dose reduced oxaliplatin to 50, 5-FU to 2200 and dec trastuzumab to 3mg/kg    --proceed with cycle 17 of FOLFOX with trastuzumab; holding pembrolizumab based on elevated tbili level    -tx will be held if platelets <75K. I have discontinued the 5-FU bolus as this is likely contributing to the chemotherapy induced thrombocytopenia      -Pembro is at the 400 mg dose every 6 wks  -Will repeat ECHO with cycle 14 (every 3mo); current ECHO from Aug, 2024 shows  a preserved EF and CT scans about every 3 mo  -We reviewed tx may need to be every 21 days for better count tolerance- thrombocytopenia has been an issue      2.)Iron deficiency anemia    --s/p Feraheme x 2, last dose on 2/1/24, anemia resolved    3.) Chemotherapy induced thrombocytopenia  --Thrombocytopenia secondary to chemo, ok to treat if plts > 75k.    --Delayed C9 as above. Cycle 11 now delayed x 2 weeks; Ok to treat with plts >75K.    Monitor, may need to dose reduce chemo further      4.)Transaminitis   --improved gr 1, T. Bili 1.5. dose reduction oxali as above. ok to treat. -stable  --Monitor; especially on days when the pt gets pembrolizumab to see if tx needs to be held     5.)CIPN  -- gr 1-2 after C8, Dose reduction of oxaliplatin to 65 mg/m2 with C6.   --Delay of C9 d/t Tcp with imrpovement to gr1 CIPN to fingers without aDL limitations.   --Ok to continue as above. Consider discontinue in coming cycles based on symptoms.  Monitor.    --offered  duloxetine for neuropathy- declined at this time       6.)risk of cardiotoxicity  --Normal EF/echo prior to treatment 1/2024. 5/2024 ECHO repeat stable/improved.    --H/o fleeting chest discomfort reported, currently resolved, without any other Aes. Monitor w/ ECHO every 3 mo    7.)CINausea  --Prn antiemetics encouraged    9.)H/o skin rash  --Resolved. Supportive care with topical creams.      [facility]    Gustavo Díaz MD  [facility]  [facility]  [address] [place] [id number]

## 2024-10-17 ENCOUNTER — OFFICE VISIT (OUTPATIENT)
Dept: HEMATOLOGY/ONCOLOGY | Facility: HOSPITAL | Age: 64
End: 2024-10-17
Attending: INTERNAL MEDICINE
Payer: COMMERCIAL

## 2024-10-17 VITALS
HEART RATE: 76 BPM | TEMPERATURE: 98 F | DIASTOLIC BLOOD PRESSURE: 76 MMHG | RESPIRATION RATE: 18 BRPM | SYSTOLIC BLOOD PRESSURE: 134 MMHG | BODY MASS INDEX: 25 KG/M2 | WEIGHT: 170.81 LBS | OXYGEN SATURATION: 96 %

## 2024-10-17 DIAGNOSIS — C16.9 MALIGNANT NEOPLASM OF STOMACH, UNSPECIFIED LOCATION (HCC): Primary | ICD-10-CM

## 2024-10-17 PROCEDURE — 96415 CHEMO IV INFUSION ADDL HR: CPT

## 2024-10-17 PROCEDURE — 96417 CHEMO IV INFUS EACH ADDL SEQ: CPT

## 2024-10-17 PROCEDURE — 96413 CHEMO IV INFUSION 1 HR: CPT

## 2024-10-17 PROCEDURE — S0028 INJECTION, FAMOTIDINE, 20 MG: HCPCS

## 2024-10-17 PROCEDURE — 96375 TX/PRO/DX INJ NEW DRUG ADDON: CPT

## 2024-10-17 PROCEDURE — 96368 THER/DIAG CONCURRENT INF: CPT

## 2024-10-17 RX ORDER — FAMOTIDINE 10 MG/ML
20 INJECTION, SOLUTION INTRAVENOUS ONCE
Status: COMPLETED | OUTPATIENT
Start: 2024-10-17 | End: 2024-10-17

## 2024-10-17 RX ORDER — FAMOTIDINE 10 MG/ML
INJECTION, SOLUTION INTRAVENOUS
Status: COMPLETED
Start: 2024-10-17 | End: 2024-10-17

## 2024-10-17 RX ORDER — FLUOROURACIL 50 MG/ML
2400 INJECTION, SOLUTION INTRAVENOUS CONTINUOUS
Status: DISCONTINUED | OUTPATIENT
Start: 2024-10-17 | End: 2024-10-17

## 2024-10-17 RX ADMIN — FAMOTIDINE 20 MG: 10 INJECTION, SOLUTION INTRAVENOUS at 07:55:00

## 2024-10-17 RX ADMIN — FLUOROURACIL 4650 MG: 50 INJECTION, SOLUTION INTRAVENOUS at 11:25:00

## 2024-10-17 NOTE — PROGRESS NOTES
Pt here for C17D1 Drug(s)Trastuzumab and FOLFOX.  Arrives Ambulating independently, accompanied by Spouse     Patient was evaluated today by Treatment Nurse.  Oral medications included in this regimen:  no  Patient confirms comprehension of cancer treatment schedule:  yes  Pregnancy screening:  Not applicable  Modifications in dose or schedule:  Yes, Pepcid was added on to this cycle d/t oxali reaction during C16.  Medications appearance and physical integrity checked by RN: yes.  Chemotherapy IV pump settings verified by 2 RNs:  Yes.  Frequency of blood return and site check throughout administration: Prior to administration, Prior to each drug, Every 2-3 ml IVP, and At completion of therapy   Infusion/treatment outcome:  patient tolerated treatment without incident    CADD pump connected and running. All connections reinforced with tape. Port access is clean and dry, secured with steri strips and tegaderm dressing. Patient verbalized understanding of CADD pump instructions, including troubleshooting.     Education Record    Learner:  Patient and Spouse  Barriers / Limitations:  None  Method:  Reinforcement  Education / instructions given:  Plan of care.  Outcome:  Shows understanding    Discharged Home, Ambulating independently, accompanied by:Spouse    Patient/family verbalized understanding of future appointments: by printed AVS

## 2024-10-19 ENCOUNTER — NURSE ONLY (OUTPATIENT)
Dept: HEMATOLOGY/ONCOLOGY | Facility: HOSPITAL | Age: 64
End: 2024-10-19
Attending: INTERNAL MEDICINE
Payer: COMMERCIAL

## 2024-10-19 VITALS
SYSTOLIC BLOOD PRESSURE: 127 MMHG | HEART RATE: 87 BPM | TEMPERATURE: 99 F | RESPIRATION RATE: 18 BRPM | OXYGEN SATURATION: 95 % | DIASTOLIC BLOOD PRESSURE: 74 MMHG

## 2024-10-19 PROCEDURE — 96523 IRRIG DRUG DELIVERY DEVICE: CPT

## 2024-10-29 ENCOUNTER — OFFICE VISIT (OUTPATIENT)
Dept: HEMATOLOGY/ONCOLOGY | Facility: HOSPITAL | Age: 64
End: 2024-10-29
Attending: INTERNAL MEDICINE
Payer: COMMERCIAL

## 2024-10-29 VITALS
HEIGHT: 69 IN | WEIGHT: 165.13 LBS | HEART RATE: 79 BPM | SYSTOLIC BLOOD PRESSURE: 113 MMHG | DIASTOLIC BLOOD PRESSURE: 61 MMHG | OXYGEN SATURATION: 97 % | RESPIRATION RATE: 16 BRPM | BODY MASS INDEX: 24.46 KG/M2 | TEMPERATURE: 98 F

## 2024-10-29 DIAGNOSIS — Z51.11 CHEMOTHERAPY MANAGEMENT, ENCOUNTER FOR: Primary | ICD-10-CM

## 2024-10-29 DIAGNOSIS — Z51.11 CHEMOTHERAPY MANAGEMENT, ENCOUNTER FOR: ICD-10-CM

## 2024-10-29 DIAGNOSIS — C16.9 MALIGNANT NEOPLASM OF STOMACH, UNSPECIFIED LOCATION (HCC): ICD-10-CM

## 2024-10-29 DIAGNOSIS — C16.2 MALIGNANT NEOPLASM OF BODY OF STOMACH (HCC): Primary | ICD-10-CM

## 2024-10-29 DIAGNOSIS — R74.01 TRANSAMINITIS: ICD-10-CM

## 2024-10-29 LAB
ALBUMIN SERPL-MCNC: 4.2 G/DL (ref 3.2–4.8)
ALBUMIN/GLOB SERPL: 1.1 {RATIO} (ref 1–2)
ALP LIVER SERPL-CCNC: 197 U/L
ALT SERPL-CCNC: 131 U/L
ANION GAP SERPL CALC-SCNC: 4 MMOL/L (ref 0–18)
AST SERPL-CCNC: 98 U/L (ref ?–34)
BASOPHILS # BLD AUTO: 0.02 X10(3) UL (ref 0–0.2)
BASOPHILS NFR BLD AUTO: 0.5 %
BILIRUB SERPL-MCNC: 1.7 MG/DL (ref 0.2–1.1)
BUN BLD-MCNC: 14 MG/DL (ref 9–23)
BUN/CREAT SERPL: 13.1 (ref 10–20)
CALCIUM BLD-MCNC: 9.4 MG/DL (ref 8.7–10.4)
CHLORIDE SERPL-SCNC: 108 MMOL/L (ref 98–112)
CO2 SERPL-SCNC: 28 MMOL/L (ref 21–32)
CREAT BLD-MCNC: 1.07 MG/DL
DEPRECATED RDW RBC AUTO: 46 FL (ref 35.1–46.3)
EGFRCR SERPLBLD CKD-EPI 2021: 78 ML/MIN/1.73M2 (ref 60–?)
EOSINOPHIL # BLD AUTO: 0.06 X10(3) UL (ref 0–0.7)
EOSINOPHIL NFR BLD AUTO: 1.4 %
ERYTHROCYTE [DISTWIDTH] IN BLOOD BY AUTOMATED COUNT: 13.8 % (ref 11–15)
GLOBULIN PLAS-MCNC: 3.7 G/DL (ref 2–3.5)
GLUCOSE BLD-MCNC: 98 MG/DL (ref 70–99)
HCT VFR BLD AUTO: 37.4 %
HGB BLD-MCNC: 13 G/DL
IMM GRANULOCYTES # BLD AUTO: 0.02 X10(3) UL (ref 0–1)
IMM GRANULOCYTES NFR BLD: 0.5 %
LYMPHOCYTES # BLD AUTO: 1.96 X10(3) UL (ref 1–4)
LYMPHOCYTES NFR BLD AUTO: 46.6 %
MCH RBC QN AUTO: 31.6 PG (ref 26–34)
MCHC RBC AUTO-ENTMCNC: 34.8 G/DL (ref 31–37)
MCV RBC AUTO: 91 FL
MONOCYTES # BLD AUTO: 0.41 X10(3) UL (ref 0.1–1)
MONOCYTES NFR BLD AUTO: 9.7 %
NEUTROPHILS # BLD AUTO: 1.74 X10 (3) UL (ref 1.5–7.7)
NEUTROPHILS # BLD AUTO: 1.74 X10(3) UL (ref 1.5–7.7)
NEUTROPHILS NFR BLD AUTO: 41.3 %
OSMOLALITY SERPL CALC.SUM OF ELEC: 290 MOSM/KG (ref 275–295)
PLATELET # BLD AUTO: 107 10(3)UL (ref 150–450)
PLATELETS.RETICULATED NFR BLD AUTO: 2.9 % (ref 0–7)
POTASSIUM SERPL-SCNC: 4.7 MMOL/L (ref 3.5–5.1)
PROT SERPL-MCNC: 7.9 G/DL (ref 5.7–8.2)
RBC # BLD AUTO: 4.11 X10(6)UL
SODIUM SERPL-SCNC: 140 MMOL/L (ref 136–145)
TSI SER-ACNC: 0.73 MIU/ML (ref 0.55–4.78)
WBC # BLD AUTO: 4.2 X10(3) UL (ref 4–11)

## 2024-10-29 PROCEDURE — 84443 ASSAY THYROID STIM HORMONE: CPT

## 2024-10-29 PROCEDURE — 80053 COMPREHEN METABOLIC PANEL: CPT

## 2024-10-29 PROCEDURE — 36415 COLL VENOUS BLD VENIPUNCTURE: CPT

## 2024-10-29 PROCEDURE — 85025 COMPLETE CBC W/AUTO DIFF WBC: CPT

## 2024-10-29 PROCEDURE — 99215 OFFICE O/P EST HI 40 MIN: CPT | Performed by: INTERNAL MEDICINE

## 2024-10-29 RX ORDER — FAMOTIDINE 10 MG/ML
20 INJECTION, SOLUTION INTRAVENOUS ONCE
Status: CANCELLED
Start: 2024-10-31 | End: 2024-10-31

## 2024-10-29 RX ORDER — FLUOROURACIL 50 MG/ML
2400 INJECTION, SOLUTION INTRAVENOUS CONTINUOUS
Status: CANCELLED | OUTPATIENT
Start: 2024-10-31

## 2024-10-29 NOTE — PROGRESS NOTES
Oncology Progress note    Requesting: Dr. Higgins    Chief Complaint: Anemia, gastric cancer    HPI:  64 year old With gastric adenocarcinoma diagnosed 12/18/23 in the setting of iron def anemia.      See below for staging workup    Initiated FOLFOX +Trastuzumab 1/18/24. Added Pembrolizumab with C3 (PDL1 10%).      Interval History:  Returns for consideration of next Cycle, FOLFOX +Trastuzumab+ Pembrolizumab.  His surveillance CT scan from 8/30 shows stable results; c/w favorable treatment.    Patient feels some post post tx fatigue that lasts for a few days. He feels neuropathy in his feet is now manageable. Pt denies fever, infection, bleeding, mentions some occasional nausea and burning in chest/dryness in the throat and some times dyspnea. Pt has no new concerns on ROS.       Pmh: Arthritis      Social History     Socioeconomic History    Marital status:     Number of children: 2   Occupational History    Occupation:      Employer: [employer]   Tobacco Use    Smoking status: Never    Smokeless tobacco: Never   Vaping Use    Vaping status: Never Used   Substance and Sexual Activity    Alcohol use: No     Alcohol/week: 0.0 standard drinks of alcohol    Drug use: No    Sexual activity: Yes   Other Topics Concern    Caffeine Concern Yes     Comment: coffee, 1 cup daily     Family History   Problem Relation Age of Onset    Prostate Cancer Other      Ros [id number]    Nka  Current Outpatient Medications on File Prior to Visit   Medication Sig Dispense Refill    zolpidem (AMBIEN) 10 MG Oral Tab Take 1 tablet (10 mg total) by mouth nightly as needed for Sleep. 30 tablet 0    acetaminophen-codeine 300-30 MG Oral Tab Take 1 tablet by mouth every 6 (six) hours as needed for Pain. Medication may causes sedation, constipation. Not to operate heavy machinery or drive on medication. 60 tablet 0    OMEPRAZOLE 40 MG Oral Capsule Delayed Release TAKE 1 CAPSULE BY MOUTH IN THE MORNING BEFORE  BREAKFAST 90 capsule 0    prochlorperazine (COMPAZINE) 10 mg tablet Take 1 tablet (10 mg total) by mouth every 6 (six) hours as needed for Nausea. 30 tablet 3    ondansetron (ZOFRAN) 8 MG tablet Take 1 tablet (8 mg total) by mouth every 8 (eight) hours as needed for Nausea. 30 tablet 3     Current Facility-Administered Medications on File Prior to Visit   Medication Dose Route Frequency Provider Last Rate Last Admin    [COMPLETED] famotidine (Pepcid) 20 mg/2mL injection 20 mg  20 mg Intravenous Once Gustavo Díaz MD   20 mg at 10/17/24 0755    [COMPLETED] ondansetron (Zofran) 16 mg, dexAMETHasone (Decadron) 20 mg in sodium chloride 0.9% 110 mL IVPB   Intravenous Once Gustavo Díaz MD   Stopped at 10/17/24 0905    [COMPLETED] trastuzumab-qyyp (Trazimera) 315 mg in sodium chloride 0.9% 265 mL infusion  4 mg/kg (Treatment Plan Recorded) Intravenous Once Gustavo Díaz MD   Stopped at 10/17/24 0844    [COMPLETED] oxaliplatin (Eloxatin) 125 mg in dextrose 5% 275 mL infusion  65 mg/m2 (Treatment Plan Recorded) Intravenous Once Gustavo Díaz MD   Stopped at 10/17/24 1119    [COMPLETED] leucovorin 750 mg in dextrose 5% 250 mL infusion  400 mg/m2 (Treatment Plan Recorded) Intravenous Once Gustavo Díaz MD   Stopped at 10/17/24 1111    [COMPLETED] ondansetron (Zofran) 16 mg, dexAMETHasone (Decadron) 20 mg in sodium chloride 0.9% 110 mL IVPB   Intravenous Once Gustavo Díaz MD   Stopped at 10/03/24 0818    [COMPLETED] trastuzumab-qyyp (Trazimera) 315 mg in sodium chloride 0.9% 265 mL infusion  4 mg/kg (Treatment Plan Recorded) Intravenous Once Gustavo Díaz MD   Stopped at 10/03/24 0919    [COMPLETED] oxaliplatin (Eloxatin) 125 mg in dextrose 5% 275 mL infusion  65 mg/m2 (Treatment Plan Recorded) Intravenous Once Gustavo Díaz MD   Stopped at 10/03/24 1156    [COMPLETED] leucovorin 750 mg in dextrose 5% 250 mL infusion  400 mg/m2 (Treatment Plan Recorded) Intravenous Once Gustavo Díaz MD   Stopped at 10/03/24  1156     Vitals:    10/29/24 1536   BP: 113/61   Pulse: 79   Resp: 16   Temp: 97.8 °F (36.6 °C)         Wt Readings from Last 6 Encounters:   10/29/24 74.9 kg (165 lb 1.6 oz)   10/17/24 77.5 kg (170 lb 12.8 oz)   10/15/24 77.1 kg (170 lb)   10/15/24 77.1 kg (170 lb)   10/01/24 76.9 kg (169 lb 9.6 oz)   09/17/24 77.1 kg (170 lb)         Physical exam:  General:  Awake, alert, oriented in no acute distress today  HEENT - EOMsi, anicteric, MMM  Neck - supple, no LAD, normal ROM  Lungs -  CTA bilaterally  Cor - S1/S2 w/o murmur noted  Abd - soft, non tender non distended, bs+  BLE - no edema  Neuro - DTRs grossly intact    ECOG 2: Ambulatory and capable of all selfcare but unable to carry out any work activities. Up and about more than 50% of waking hours    Lab Results   Component Value Date    WBC 4.2 10/29/2024    RBC 4.11 (L) 10/29/2024    HGB 13.0 10/29/2024    HCT 37.4 (L) 10/29/2024    MCV 91.0 10/29/2024    MCH 31.6 10/29/2024    MCHC 34.8 10/29/2024    RDW 13.8 10/29/2024    .0 (L) 10/29/2024    MPV 8.9 10/31/2018     Lab Results   Component Value Date    GLU 98 10/29/2024    BUN 14 10/29/2024    BUNCREA 13.1 10/29/2024    CREATSERUM 1.07 10/29/2024    ANIONGAP 4 10/29/2024    GFRNAA 86 11/10/2021    GFRAA 99 11/10/2021    CA 9.4 10/29/2024    OSMOCALC 290 10/29/2024    ALKPHO 197 (H) 10/29/2024    AST 98 (H) 10/29/2024     (H) 10/29/2024    ALKPHOS 82 10/08/2015    BILT 1.7 (H) 10/29/2024    TP 7.9 10/29/2024    ALB 4.2 10/29/2024    GLOBULIN 3.7 (H) 10/29/2024    AGRATIO 1.4 10/08/2015     10/29/2024    K 4.7 10/29/2024     10/29/2024    CO2 28.0 10/29/2024     Imaging:    CT C/A/P 8/30/24    Impression   CONCLUSION: Stable         64 year old  With gastric adenocarcinoma diagnosed 12/18/23 in the setting of iron def anemia. Her positive (3+) MSI-S PDL1 10%    1.) Gastric cancer, stage IV  -- Widespread kevin disease reviewed at tumor conference by Dr. Amaro with surgical oncology  will plan for upfront systemic therapy with 5-FU oxaliplatin based regimen potentially with trastuzumab and pembrolizumab  -Initiated FOLFOX plus trastuzumab on 1/18/23   -Delayed Cycle #5 x 1 week d/t worsening LFT elevation   -Cycle #6 with dose reduction of Oxaliplatin d/t CIPN worsening and LFTs as below.    -CT imaging after C4 3/2024 with favorable treatment response.   -PDL1 10% 1/2024. Added Pembrolizumab  as >1% per guidelines. q6week scheduling-initiated with C3 above.   -DELAY Cycle 8 d/t TCP <75K.    - reviewed the pt's current CT scan from above early June; favorable treatment response, continue current systemic treatment    -surveillance CT scan from 8/24 w/ stable findings; c/w favorable tx response, continue current management    -cycle 16 FOLFOX plus trastuzumab plus Pembrolizumab was deferred for 2 wks due to side effects from systemic tx, referred to palliative care to help. Now feels improved to continue with same dosing     --pt is working with  to determine his work limitations     --explained that we will nee to modified his treatments so that they're sustainable; possibly dose reduced oxaliplatin to 50, 5-FU to 2200 and dec trastuzumab to 3mg/kg    --proceed with cycle 18 of FOLFOX with trastuzumab; held pembrolizumab last cycle based on elevated tbili level. HOLDING oxaliplatin this cycle due to inc LFT's    -tx will be held if platelets <75K. I have discontinued the 5-FU bolus as this is likely contributing to the chemotherapy induced thrombocytopenia      -Pembro is at the 400 mg dose every 6 wks  -Will repeat ECHO with cycle 14 (every 3mo); current ECHO from Aug, 2024 shows  a preserved EF and CT scans about every 3 mo (due at the end of 11/2024)  -We reviewed tx may need to be every 21 days for better count tolerance- thrombocytopenia has been an issue      2.)Iron deficiency anemia    --s/p Feraheme x 2, last dose on 2/1/24, anemia resolved    3.) Chemotherapy induced  thrombocytopenia  --Thrombocytopenia secondary to chemo, ok to treat if plts > 75k.    --Delayed C9 as above. Cycle 11 now delayed x 2 weeks; Ok to treat with plts >75K.    Monitor, may need to dose reduce chemo further      4.)Transaminitis   --improved gr 1, T. Bili 1.5. dose reduction oxali as above. ok to treat. -stable  --will HOLD oxliplatin this cycle because this seems tx related; possibly from recent pembrolizumab or from the oxaliplatin   --Monitor; especially on days when the pt gets pembrolizumab to see if tx needs to be held     5.)CIPN  -- gr 1-2 after C8, Dose reduction of oxaliplatin to 65 mg/m2 with C6.   --Delay of C9 d/t Tcp with imrpovement to gr1 CIPN to fingers without aDL limitations.   --Ok to continue as above. Consider discontinue in coming cycles based on symptoms.  Monitor.    --offered duloxetine for neuropathy- declined at this time       6.)risk of cardiotoxicity  --Normal EF/echo prior to treatment 1/2024. 5/2024 ECHO repeat stable/improved.    --H/o fleeting chest discomfort reported, currently resolved, without any other Aes. Monitor w/ ECHO every 3 mo    7.)CINausea  --Prn antiemetics encouraged    9.)H/o skin rash  --Resolved. Supportive care with topical creams.      [facility]    Gustavo Díaz MD  [facility]  [address], [place] [address]

## 2024-10-31 ENCOUNTER — OFFICE VISIT (OUTPATIENT)
Dept: HEMATOLOGY/ONCOLOGY | Facility: HOSPITAL | Age: 64
End: 2024-10-31
Attending: INTERNAL MEDICINE
Payer: COMMERCIAL

## 2024-10-31 VITALS
HEART RATE: 80 BPM | TEMPERATURE: 98 F | RESPIRATION RATE: 18 BRPM | DIASTOLIC BLOOD PRESSURE: 71 MMHG | SYSTOLIC BLOOD PRESSURE: 127 MMHG

## 2024-10-31 DIAGNOSIS — C16.9 MALIGNANT NEOPLASM OF STOMACH, UNSPECIFIED LOCATION (HCC): Primary | ICD-10-CM

## 2024-10-31 PROCEDURE — 96417 CHEMO IV INFUS EACH ADDL SEQ: CPT

## 2024-10-31 PROCEDURE — 96413 CHEMO IV INFUSION 1 HR: CPT

## 2024-10-31 PROCEDURE — 96415 CHEMO IV INFUSION ADDL HR: CPT

## 2024-10-31 PROCEDURE — S0028 INJECTION, FAMOTIDINE, 20 MG: HCPCS

## 2024-10-31 PROCEDURE — 96375 TX/PRO/DX INJ NEW DRUG ADDON: CPT

## 2024-10-31 RX ORDER — FLUOROURACIL 50 MG/ML
2400 INJECTION, SOLUTION INTRAVENOUS CONTINUOUS
Status: DISCONTINUED | OUTPATIENT
Start: 2024-10-31 | End: 2024-10-31

## 2024-10-31 RX ORDER — FAMOTIDINE 10 MG/ML
INJECTION, SOLUTION INTRAVENOUS
Status: COMPLETED
Start: 2024-10-31 | End: 2024-10-31

## 2024-10-31 RX ORDER — FAMOTIDINE 10 MG/ML
20 INJECTION, SOLUTION INTRAVENOUS ONCE
Status: COMPLETED | OUTPATIENT
Start: 2024-10-31 | End: 2024-10-31

## 2024-10-31 RX ADMIN — FAMOTIDINE 20 MG: 10 INJECTION, SOLUTION INTRAVENOUS at 08:08:00

## 2024-10-31 RX ADMIN — FLUOROURACIL 4550 MG: 50 INJECTION, SOLUTION INTRAVENOUS at 12:18:00

## 2024-11-02 ENCOUNTER — NURSE ONLY (OUTPATIENT)
Dept: HEMATOLOGY/ONCOLOGY | Facility: HOSPITAL | Age: 64
End: 2024-11-02
Attending: INTERNAL MEDICINE
Payer: COMMERCIAL

## 2024-11-02 VITALS
OXYGEN SATURATION: 96 % | DIASTOLIC BLOOD PRESSURE: 72 MMHG | SYSTOLIC BLOOD PRESSURE: 123 MMHG | HEART RATE: 81 BPM | TEMPERATURE: 98 F | RESPIRATION RATE: 16 BRPM

## 2024-11-02 PROCEDURE — 96523 IRRIG DRUG DELIVERY DEVICE: CPT

## 2024-11-02 NOTE — PROGRESS NOTES
Patient arrives to infusion ambulatory and unaccompanied.  Patient presented with CADD pump connected.   2.0 ml remaining in reservoir. Patient choosing to disconnect at this time.  Disconnected CADD pump, flushed port with 0.9% Normal Saline and established blood return in port. Port saline locked and de-accessed port. Gauze and paper tape applied to port site.   Discharged from infusion ambulatory.

## 2024-11-12 ENCOUNTER — TELEPHONE (OUTPATIENT)
Dept: HEMATOLOGY/ONCOLOGY | Facility: HOSPITAL | Age: 64
End: 2024-11-12

## 2024-11-12 ENCOUNTER — OFFICE VISIT (OUTPATIENT)
Dept: HEMATOLOGY/ONCOLOGY | Facility: HOSPITAL | Age: 64
End: 2024-11-12
Attending: PHYSICIAN ASSISTANT
Payer: COMMERCIAL

## 2024-11-12 ENCOUNTER — NURSE ONLY (OUTPATIENT)
Dept: HEMATOLOGY/ONCOLOGY | Facility: HOSPITAL | Age: 64
End: 2024-11-12
Attending: INTERNAL MEDICINE
Payer: COMMERCIAL

## 2024-11-12 VITALS
SYSTOLIC BLOOD PRESSURE: 135 MMHG | HEIGHT: 69 IN | HEART RATE: 87 BPM | OXYGEN SATURATION: 97 % | RESPIRATION RATE: 16 BRPM | TEMPERATURE: 98 F | BODY MASS INDEX: 25.03 KG/M2 | WEIGHT: 169 LBS | DIASTOLIC BLOOD PRESSURE: 66 MMHG

## 2024-11-12 DIAGNOSIS — Z79.899 ENCOUNTER FOR MONITORING CARDIOTOXIC DRUG THERAPY: Primary | ICD-10-CM

## 2024-11-12 DIAGNOSIS — C16.9 MALIGNANT NEOPLASM OF STOMACH, UNSPECIFIED LOCATION (HCC): ICD-10-CM

## 2024-11-12 DIAGNOSIS — Z51.11 CHEMOTHERAPY MANAGEMENT, ENCOUNTER FOR: Primary | ICD-10-CM

## 2024-11-12 DIAGNOSIS — R79.89 ELEVATED LFTS: ICD-10-CM

## 2024-11-12 DIAGNOSIS — T45.1X5A CHEMOTHERAPY-INDUCED NAUSEA: ICD-10-CM

## 2024-11-12 DIAGNOSIS — Z51.81 ENCOUNTER FOR MONITORING CARDIOTOXIC DRUG THERAPY: Primary | ICD-10-CM

## 2024-11-12 DIAGNOSIS — R11.0 CHEMOTHERAPY-INDUCED NAUSEA: ICD-10-CM

## 2024-11-12 LAB
ALBUMIN SERPL-MCNC: 4.5 G/DL (ref 3.2–4.8)
ALBUMIN/GLOB SERPL: 1.4 {RATIO} (ref 1–2)
ALP LIVER SERPL-CCNC: 175 U/L
ALT SERPL-CCNC: 166 U/L
ANION GAP SERPL CALC-SCNC: 6 MMOL/L (ref 0–18)
AST SERPL-CCNC: 92 U/L (ref ?–34)
BASOPHILS # BLD AUTO: 0.03 X10(3) UL (ref 0–0.2)
BASOPHILS NFR BLD AUTO: 0.5 %
BILIRUB SERPL-MCNC: 1.5 MG/DL (ref 0.2–1.1)
BUN BLD-MCNC: 13 MG/DL (ref 9–23)
BUN/CREAT SERPL: 14 (ref 10–20)
CALCIUM BLD-MCNC: 10.1 MG/DL (ref 8.7–10.4)
CHLORIDE SERPL-SCNC: 110 MMOL/L (ref 98–112)
CO2 SERPL-SCNC: 27 MMOL/L (ref 21–32)
CREAT BLD-MCNC: 0.93 MG/DL
DEPRECATED RDW RBC AUTO: 48.4 FL (ref 35.1–46.3)
EGFRCR SERPLBLD CKD-EPI 2021: 92 ML/MIN/1.73M2 (ref 60–?)
EOSINOPHIL # BLD AUTO: 0.03 X10(3) UL (ref 0–0.7)
EOSINOPHIL NFR BLD AUTO: 0.5 %
ERYTHROCYTE [DISTWIDTH] IN BLOOD BY AUTOMATED COUNT: 14.2 % (ref 11–15)
GLOBULIN PLAS-MCNC: 3.3 G/DL (ref 2–3.5)
GLUCOSE BLD-MCNC: 138 MG/DL (ref 70–99)
HCT VFR BLD AUTO: 38.6 %
HGB BLD-MCNC: 13.1 G/DL
IMM GRANULOCYTES # BLD AUTO: 0.02 X10(3) UL (ref 0–1)
IMM GRANULOCYTES NFR BLD: 0.3 %
LYMPHOCYTES # BLD AUTO: 2.04 X10(3) UL (ref 1–4)
LYMPHOCYTES NFR BLD AUTO: 34.8 %
MCH RBC QN AUTO: 31.6 PG (ref 26–34)
MCHC RBC AUTO-ENTMCNC: 33.9 G/DL (ref 31–37)
MCV RBC AUTO: 93.2 FL
MONOCYTES # BLD AUTO: 0.47 X10(3) UL (ref 0.1–1)
MONOCYTES NFR BLD AUTO: 8 %
NEUTROPHILS # BLD AUTO: 3.28 X10 (3) UL (ref 1.5–7.7)
NEUTROPHILS # BLD AUTO: 3.28 X10(3) UL (ref 1.5–7.7)
NEUTROPHILS NFR BLD AUTO: 55.9 %
OSMOLALITY SERPL CALC.SUM OF ELEC: 298 MOSM/KG (ref 275–295)
PLATELET # BLD AUTO: 104 10(3)UL (ref 150–450)
PLATELETS.RETICULATED NFR BLD AUTO: 3 % (ref 0–7)
POTASSIUM SERPL-SCNC: 5 MMOL/L (ref 3.5–5.1)
PROT SERPL-MCNC: 7.8 G/DL (ref 5.7–8.2)
RBC # BLD AUTO: 4.14 X10(6)UL
SODIUM SERPL-SCNC: 143 MMOL/L (ref 136–145)
TSI SER-ACNC: 1.22 UIU/ML (ref 0.55–4.78)
WBC # BLD AUTO: 5.9 X10(3) UL (ref 4–11)

## 2024-11-12 PROCEDURE — 84443 ASSAY THYROID STIM HORMONE: CPT

## 2024-11-12 PROCEDURE — 80053 COMPREHEN METABOLIC PANEL: CPT

## 2024-11-12 PROCEDURE — 85025 COMPLETE CBC W/AUTO DIFF WBC: CPT

## 2024-11-12 PROCEDURE — 99215 OFFICE O/P EST HI 40 MIN: CPT | Performed by: PHYSICIAN ASSISTANT

## 2024-11-12 PROCEDURE — 36415 COLL VENOUS BLD VENIPUNCTURE: CPT

## 2024-11-12 RX ORDER — FAMOTIDINE 10 MG/ML
20 INJECTION, SOLUTION INTRAVENOUS ONCE
Status: CANCELLED
Start: 2024-11-14 | End: 2024-11-14

## 2024-11-12 RX ORDER — FLUOROURACIL 50 MG/ML
2400 INJECTION, SOLUTION INTRAVENOUS CONTINUOUS
Status: CANCELLED | OUTPATIENT
Start: 2024-11-14

## 2024-11-12 RX ORDER — OMEPRAZOLE 40 MG/1
40 CAPSULE, DELAYED RELEASE ORAL
Qty: 90 CAPSULE | Refills: 1 | Status: SHIPPED | OUTPATIENT
Start: 2024-11-12

## 2024-11-12 RX ORDER — OMEPRAZOLE 40 MG/1
40 CAPSULE, DELAYED RELEASE ORAL
Qty: 90 CAPSULE | Refills: 0 | Status: SHIPPED | OUTPATIENT
Start: 2024-11-12 | End: 2024-11-12

## 2024-11-12 NOTE — TELEPHONE ENCOUNTER
Called and spoke with patient's wife, Bhumika. Told her I was able to schedule the patient's CT scan and cardiac echo for next week. Told her the CT scan is scheduled for Monday, 11/18 at 11:00 am at [facility] and the cardiac echo is scheduled for Thursday, 11/21 at 10:00 am at [facility]. Told her to follow the appointment instructions on My Chart. Bhumika stated JODI Stauffer told her the CT scan should be either next Friday, 11/22 or Monday, 11/25. Told her there were no appointments available for the CT scan on those days. She stated understanding and thanked me for the call. I updated Denisse and she stated the current CT scan appointment date is ok.

## 2024-11-13 NOTE — PROGRESS NOTES
Oncology Progress note    Requesting: Dr. Higgins    Chief Complaint: Anemia, gastric cancer    HPI:  64 year old With gastric adenocarcinoma diagnosed 12/18/23 in the setting of iron def anemia.      See below for staging workup    Initiated FOLFOX +Trastuzumab 1/18/24. Added Pembrolizumab with C3 (PDL1 10%).    Oxaliplatin dose reduced to 65 mg/m2 with C6 due to CIPN      Interval History:  Returns for consideration of next Cycle, FOLFOX +Trastuzumab+ Pembrolizumab.  His surveillance CT scan from 8/30/24 shows stable results; c/w favorable treatment.    Patient feels some post post tx fatigue that lasts for a few days. He feels neuropathy in the distal 1/2 of his feet and in his fingertips, which has improved overall therefore more tolerable.  He endorses mild nausea for which he does not need oral antiemetics.  Oxaliplatin omitted with the last cycle due to abnormal LFTs, specifically, the elevated T. Bilirubin.     He denies fever, mouth sores, cough, dyspnea, abdominal pain, emesis, diarrhea, constipation, peripheral edema and rash.    Pmh: Arthritis      Social History     Socioeconomic History    Marital status:     Number of children: 2   Occupational History    Occupation: Food service     Employer: [employer]   Tobacco Use    Smoking status: Never    Smokeless tobacco: Never   Vaping Use    Vaping status: Never Used   Substance and Sexual Activity    Alcohol use: No     Alcohol/week: 0.0 standard drinks of alcohol    Drug use: No    Sexual activity: Yes   Other Topics Concern    Caffeine Concern Yes     Comment: coffee, 1 cup daily     Family History   Problem Relation Age of Onset    Prostate Cancer Other      Ros negx12    Nka  Current Outpatient Medications on File Prior to Visit   Medication Sig Dispense Refill    zolpidem (AMBIEN) 10 MG Oral Tab Take 1 tablet (10 mg total) by mouth nightly as needed for Sleep. 30 tablet 0    acetaminophen-codeine 300-30 MG Oral Tab Take 1  tablet by mouth every 6 (six) hours as needed for Pain. Medication may causes sedation, constipation. Not to operate heavy machinery or drive on medication. 60 tablet 0    prochlorperazine (COMPAZINE) 10 mg tablet Take 1 tablet (10 mg total) by mouth every 6 (six) hours as needed for Nausea. 30 tablet 3    ondansetron (ZOFRAN) 8 MG tablet Take 1 tablet (8 mg total) by mouth every 8 (eight) hours as needed for Nausea. 30 tablet 3     Current Facility-Administered Medications on File Prior to Visit   Medication Dose Route Frequency Provider Last Rate Last Admin    [COMPLETED] famotidine (Pepcid) 20 mg/2mL injection 20 mg  20 mg Intravenous Once Gustavo Díaz MD   20 mg at 10/31/24 0808    [COMPLETED] ondansetron (Zofran) 16 mg, dexAMETHasone (Decadron) 20 mg in sodium chloride 0.9% 110 mL IVPB   Intravenous Once Gustavo Díaz MD   Stopped at 10/31/24 0827    [COMPLETED] trastuzumab-qyyp (Trazimera) 294 mg in sodium chloride 0.9% 264 mL infusion  4 mg/kg (Treatment Plan Recorded) Intravenous Once Gustavo Díaz MD   Stopped at 10/31/24 0948    [COMPLETED] leucovorin 750 mg in dextrose 5% 250 mL infusion  400 mg/m2 (Treatment Plan Recorded) Intravenous Once Gustavo Díaz MD   Stopped at 10/31/24 1212    [COMPLETED] famotidine (Pepcid) 20 mg/2mL injection 20 mg  20 mg Intravenous Once Gustavo Díaz MD   20 mg at 10/17/24 0755    [COMPLETED] ondansetron (Zofran) 16 mg, dexAMETHasone (Decadron) 20 mg in sodium chloride 0.9% 110 mL IVPB   Intravenous Once Gustavo Díaz MD   Stopped at 10/17/24 0905    [COMPLETED] trastuzumab-qyyp (Trazimera) 315 mg in sodium chloride 0.9% 265 mL infusion  4 mg/kg (Treatment Plan Recorded) Intravenous Once Gustavo Díaz MD   Stopped at 10/17/24 0844    [COMPLETED] oxaliplatin (Eloxatin) 125 mg in dextrose 5% 275 mL infusion  65 mg/m2 (Treatment Plan Recorded) Intravenous Once Gustavo Díaz MD   Stopped at 10/17/24 1119    [COMPLETED] leucovorin 750 mg in dextrose 5% 250 mL  infusion  400 mg/m2 (Treatment Plan Recorded) Intravenous Once Gustavo Daíz MD   Stopped at 10/17/24 1111     Vitals:    11/12/24 1022   BP: 135/66   Pulse: 87   Resp: 16   Temp: 97.9 °F (36.6 °C)         Wt Readings from Last 6 Encounters:   11/12/24 76.7 kg (169 lb)   10/29/24 74.9 kg (165 lb 1.6 oz)   10/17/24 77.5 kg (170 lb 12.8 oz)   10/15/24 77.1 kg (170 lb)   10/15/24 77.1 kg (170 lb)   10/01/24 76.9 kg (169 lb 9.6 oz)       Physical exam:  General:  Awake, alert, oriented in no acute distress today  HEENT - EOMsi, anicteric, MMM  Neck - supple, no LAD, normal ROM  Lungs -  CTA bilaterally  Cor - S1/S2 w/o murmur noted  Abd - soft, non tender non distended, bs+  BLE - no edema  Neuro - DTRs grossly intact    ECOG 2: Ambulatory and capable of all selfcare but unable to carry out any work activities. Up and about more than 50% of waking hours    Lab Results   Component Value Date    WBC 5.9 11/12/2024    RBC 4.14 (L) 11/12/2024    HGB 13.1 11/12/2024    HCT 38.6 (L) 11/12/2024    MCV 93.2 11/12/2024    MCH 31.6 11/12/2024    MCHC 33.9 11/12/2024    RDW 14.2 11/12/2024    .0 (L) 11/12/2024    MPV 8.9 10/31/2018     Lab Results   Component Value Date     (H) 11/12/2024    BUN 13 11/12/2024    BUNCREA 14.0 11/12/2024    CREATSERUM 0.93 11/12/2024    ANIONGAP 6 11/12/2024    GFRNAA 86 11/10/2021    GFRAA 99 11/10/2021    CA 10.1 11/12/2024    OSMOCALC 298 (H) 11/12/2024    ALKPHO 175 (H) 11/12/2024    AST 92 (H) 11/12/2024     (H) 11/12/2024    ALKPHOS 82 10/08/2015    BILT 1.5 (H) 11/12/2024    TP 7.8 11/12/2024    ALB 4.5 11/12/2024    GLOBULIN 3.3 11/12/2024    AGRATIO 1.4 10/08/2015     11/12/2024    K 5.0 11/12/2024     11/12/2024    CO2 27.0 11/12/2024     Imaging:    CT C/A/P 8/30/24    Impression   CONCLUSION: Stable         64 year old  With gastric adenocarcinoma diagnosed 12/18/23 in the setting of iron def anemia. Her positive (3+) MSI-S PDL1 10%    1.) Gastric  cancer, stage IV  -- Widespread kevin disease reviewed at tumor conference by Dr. Amaro with surgical oncology will plan for upfront systemic therapy with 5-FU oxaliplatin based regimen potentially with trastuzumab and pembrolizumab  -Initiated FOLFOX plus trastuzumab on 1/18/23   -Delayed Cycle #5 x 1 week d/t worsening LFT elevation   -Cycle #6 with dose reduction of Oxaliplatin d/t CIPN worsening and LFTs as below.    -CT imaging after C4 3/2024 with favorable treatment response.   -PDL1 10% 1/2024. Added Pembrolizumab  as >1% per guidelines. q6week scheduling-initiated with C3 above.   -DELAY Cycle 8 d/t TCP <75K.    - reviewed the pt's current CT scan from above early June; favorable treatment response, continue current systemic treatment    -surveillance CT scan from 8/24 w/ stable findings; c/w favorable tx response, continue current management    -cycle 16 FOLFOX plus trastuzumab plus Pembrolizumab was deferred for 2 wks due to side effects from systemic tx, referred to palliative care to help. Now feels improved to continue with same dosing     --pt is working with  to determine his work limitations     --explained that we will nee to modified his treatments so that they're sustainable; possibly dose reduced oxaliplatin to 50, 5-FU to 2200 and dec trastuzumab to 3mg/kg    --Pembrolizumab 400 mg last administered on 9/5/24 due to hyperbilirubinemia  --s/p cycle 18 of FOLFOX with trastuzumab - Oxaliplatin held with C18 due to elevated LFTs  --Proceed with cycle 19 with the omission of Oxaliplatin due to increased LFTs.  --Restaging CT due before next cycle    -tx will be held if platelets <75K. I have discontinued the 5-FU bolus as this is likely contributing to the chemotherapy induced thrombocytopenia    -Will repeat ECHO with cycle 14 (every 3mo); ECHO from Aug, 2024 shows a preserved EF.  Repeat TTE at the endo of 11/2024.      2.)Iron deficiency anemia    --s/p Feraheme x 2, last dose on  2/1/24, anemia resolved    3.) Chemotherapy induced thrombocytopenia  --Thrombocytopenia secondary to chemo, ok to treat if plts > 75k.    --Delayed C9 as above. Cycle 11 now delayed x 2 weeks; Ok to treat with plts >75K.    Monitor, may need to dose reduce chemo further    4.)Transaminitis   --improved gr 1, T. Bili 1.5. dose reduction oxali as above  --will HOLD oxliplatin this cycle because this seems tx related; possibly from recent pembrolizumab or from the oxaliplatin     5.)CIPN  -- gr 1-2 after C8, Dose reduction of oxaliplatin to 65 mg/m2 with C6.   --Delay of C9 d/t Tcp with imrpovement to gr1 CIPN to fingers without ADL limitations.   --Improving given omission of Oxaliplatin.   --offered duloxetine for neuropathy- declined at prior visit    6.)risk of cardiotoxicity  --Normal EF/echo prior to treatment 1/2024. 5/2024 ECHO repeat stable/improved.    --H/o fleeting chest discomfort reported, currently resolved, without any other Aes. Monitor w/ ECHO every 3 mo    7.) BANDAR  --gr. 1, tolerable.  Prn antiemetics encouraged    9.)H/o skin rash  --Resolved. Supportive care with topical creams.    Call prn.  Follow-up in 2 weeks    [facility] Eh Philippe PA-C  [facility]  [place] [address], [place] [address]

## 2024-11-14 ENCOUNTER — OFFICE VISIT (OUTPATIENT)
Dept: HEMATOLOGY/ONCOLOGY | Facility: HOSPITAL | Age: 64
End: 2024-11-14
Attending: INTERNAL MEDICINE
Payer: COMMERCIAL

## 2024-11-14 VITALS
HEART RATE: 79 BPM | OXYGEN SATURATION: 96 % | SYSTOLIC BLOOD PRESSURE: 128 MMHG | TEMPERATURE: 98 F | RESPIRATION RATE: 18 BRPM | DIASTOLIC BLOOD PRESSURE: 67 MMHG

## 2024-11-14 DIAGNOSIS — C16.9 MALIGNANT NEOPLASM OF STOMACH, UNSPECIFIED LOCATION (HCC): Primary | ICD-10-CM

## 2024-11-14 PROCEDURE — 96375 TX/PRO/DX INJ NEW DRUG ADDON: CPT

## 2024-11-14 PROCEDURE — 96367 TX/PROPH/DG ADDL SEQ IV INF: CPT

## 2024-11-14 PROCEDURE — 96413 CHEMO IV INFUSION 1 HR: CPT

## 2024-11-14 PROCEDURE — S0028 INJECTION, FAMOTIDINE, 20 MG: HCPCS

## 2024-11-14 RX ORDER — FLUOROURACIL 50 MG/ML
2400 INJECTION, SOLUTION INTRAVENOUS CONTINUOUS
Status: DISCONTINUED | OUTPATIENT
Start: 2024-11-14 | End: 2024-11-14

## 2024-11-14 RX ORDER — FAMOTIDINE 10 MG/ML
20 INJECTION, SOLUTION INTRAVENOUS ONCE
Status: COMPLETED | OUTPATIENT
Start: 2024-11-14 | End: 2024-11-14

## 2024-11-14 RX ORDER — FAMOTIDINE 10 MG/ML
INJECTION, SOLUTION INTRAVENOUS
Status: COMPLETED
Start: 2024-11-14 | End: 2024-11-14

## 2024-11-14 RX ADMIN — FLUOROURACIL 4550 MG: 50 INJECTION, SOLUTION INTRAVENOUS at 11:14:00

## 2024-11-14 RX ADMIN — FAMOTIDINE 20 MG: 10 INJECTION, SOLUTION INTRAVENOUS at 08:56:00

## 2024-11-14 NOTE — PROGRESS NOTES
Pt here for C19D1     Drug(s)FOLFOX-TRASTUZUMAB.        Arrives Ambulating independently, accompanied by Spouse     Patient was evaluated today by Treatment Nurse.    Oral medications included in this regimen:  no    Patient confirms comprehension of cancer treatment schedule:  yes    Pregnancy screening:  Not applicable    Modifications in dose or schedule:  Yes, No Oxaliplatin given.    Medications appearance and physical integrity checked by RN: yes.    Chemotherapy IV pump settings verified by 2 RNs:  Yes.  Frequency of blood return and site check throughout administration: Prior to administration, Prior to each drug, and At completion of therapy     Infusion/treatment outcome:  patient tolerated treatment without incident    Education Record    Learner:  Patient and Spouse  Barriers / Limitations:  None  Method:  Reinforcement  Education / instructions given:  Plan of care  Outcome:  Shows understanding    Discharged Home, Ambulating independently, accompanied by:Spouse    Patient/family verbalized understanding of future appointments: by printed AVS

## 2024-11-16 ENCOUNTER — NURSE ONLY (OUTPATIENT)
Dept: HEMATOLOGY/ONCOLOGY | Facility: HOSPITAL | Age: 64
End: 2024-11-16
Attending: INTERNAL MEDICINE
Payer: COMMERCIAL

## 2024-11-16 VITALS
TEMPERATURE: 98 F | HEART RATE: 75 BPM | OXYGEN SATURATION: 95 % | SYSTOLIC BLOOD PRESSURE: 119 MMHG | DIASTOLIC BLOOD PRESSURE: 73 MMHG

## 2024-11-16 PROCEDURE — 96523 IRRIG DRUG DELIVERY DEVICE: CPT

## 2024-11-16 NOTE — PROGRESS NOTES
Patient arrives ambulatory to infusion center for 5FU CADD pump disconnect. Patient reports mild nausea today, denies other complaints at this time. Patient presents with CADD pump connected, reservoir with 1.4 mL remaining. Pump stopped, powered off  and disconnected. Blood return established in PAC, flushed with 20mL NS. Needle removed. Gauze and paper tape applied to site. Patient discharged stable from infusion center, aware of future appts via [employer].

## 2024-11-18 ENCOUNTER — TELEPHONE (OUTPATIENT)
Dept: HEMATOLOGY/ONCOLOGY | Facility: HOSPITAL | Age: 64
End: 2024-11-18

## 2024-11-18 NOTE — TELEPHONE ENCOUNTER
[facility] [phone]  We have a letter from insurance for the CT   ECHO  they need more information to find   out Is this medically necessary. Would like a call back to know what to do about the two test. Thanks Aurora.

## 2024-11-19 NOTE — TELEPHONE ENCOUNTER
Called and spoke with Wallace. Told him I notified our prior authorization department and one of them spoke with his insurance company and stated, \"No prior authorization was required for the echo and the authorization for the CT is valid\". Told him he's good to go to proceed with both imaging scans. He stated understanding and thanked me for the call.

## 2024-11-20 ENCOUNTER — HOSPITAL ENCOUNTER (OUTPATIENT)
Dept: CT IMAGING | Facility: HOSPITAL | Age: 64
Discharge: HOME | End: 2024-11-20
Attending: PHYSICIAN ASSISTANT
Payer: COMMERCIAL

## 2024-11-20 DIAGNOSIS — C16.9 MALIGNANT NEOPLASM OF STOMACH, UNSPECIFIED LOCATION (HCC): ICD-10-CM

## 2024-11-20 PROCEDURE — 74177 CT ABD & PELVIS W/CONTRAST: CPT | Performed by: PHYSICIAN ASSISTANT

## 2024-11-20 PROCEDURE — 71260 CT THORAX DX C+: CPT | Performed by: PHYSICIAN ASSISTANT

## 2024-11-21 ENCOUNTER — HOSPITAL ENCOUNTER (OUTPATIENT)
Dept: CV DIAGNOSTICS | Facility: HOSPITAL | Age: 64
Discharge: HOME | End: 2024-11-21
Attending: PHYSICIAN ASSISTANT
Payer: COMMERCIAL

## 2024-11-21 DIAGNOSIS — Z79.899 ENCOUNTER FOR MONITORING CARDIOTOXIC DRUG THERAPY: ICD-10-CM

## 2024-11-21 DIAGNOSIS — Z51.81 ENCOUNTER FOR MONITORING CARDIOTOXIC DRUG THERAPY: ICD-10-CM

## 2024-11-21 DIAGNOSIS — C16.9 MALIGNANT NEOPLASM OF STOMACH, UNSPECIFIED LOCATION (HCC): ICD-10-CM

## 2024-11-21 PROCEDURE — 93306 TTE W/DOPPLER COMPLETE: CPT | Performed by: PHYSICIAN ASSISTANT

## 2024-11-26 ENCOUNTER — NURSE ONLY (OUTPATIENT)
Dept: HEMATOLOGY/ONCOLOGY | Facility: HOSPITAL | Age: 64
End: 2024-11-26
Attending: INTERNAL MEDICINE
Payer: COMMERCIAL

## 2024-11-26 VITALS
HEART RATE: 80 BPM | OXYGEN SATURATION: 96 % | BODY MASS INDEX: 25.09 KG/M2 | SYSTOLIC BLOOD PRESSURE: 135 MMHG | TEMPERATURE: 98 F | WEIGHT: 169.38 LBS | DIASTOLIC BLOOD PRESSURE: 71 MMHG | HEIGHT: 69 IN | RESPIRATION RATE: 16 BRPM

## 2024-11-26 DIAGNOSIS — R79.89 ELEVATED LFTS: ICD-10-CM

## 2024-11-26 DIAGNOSIS — C16.9 MALIGNANT NEOPLASM OF STOMACH, UNSPECIFIED LOCATION (HCC): ICD-10-CM

## 2024-11-26 DIAGNOSIS — D69.59 CHEMOTHERAPY-INDUCED THROMBOCYTOPENIA: ICD-10-CM

## 2024-11-26 DIAGNOSIS — T45.1X5A CHEMOTHERAPY-INDUCED THROMBOCYTOPENIA: ICD-10-CM

## 2024-11-26 DIAGNOSIS — Z51.11 CHEMOTHERAPY MANAGEMENT, ENCOUNTER FOR: ICD-10-CM

## 2024-11-26 DIAGNOSIS — Z51.11 CHEMOTHERAPY MANAGEMENT, ENCOUNTER FOR: Primary | ICD-10-CM

## 2024-11-26 DIAGNOSIS — C16.9 MALIGNANT NEOPLASM OF STOMACH, UNSPECIFIED LOCATION (HCC): Primary | ICD-10-CM

## 2024-11-26 LAB
ALBUMIN SERPL-MCNC: 4.6 G/DL (ref 3.2–4.8)
ALBUMIN/GLOB SERPL: 1.5 {RATIO} (ref 1–2)
ALP LIVER SERPL-CCNC: 152 U/L
ALT SERPL-CCNC: 152 U/L
ANION GAP SERPL CALC-SCNC: 6 MMOL/L (ref 0–18)
AST SERPL-CCNC: 85 U/L (ref ?–34)
BASOPHILS # BLD AUTO: 0.03 X10(3) UL (ref 0–0.2)
BASOPHILS NFR BLD AUTO: 0.5 %
BILIRUB SERPL-MCNC: 1.9 MG/DL (ref 0.2–1.1)
BUN BLD-MCNC: 15 MG/DL (ref 9–23)
BUN/CREAT SERPL: 15.2 (ref 10–20)
CALCIUM BLD-MCNC: 10.3 MG/DL (ref 8.7–10.4)
CHLORIDE SERPL-SCNC: 106 MMOL/L (ref 98–112)
CO2 SERPL-SCNC: 27 MMOL/L (ref 21–32)
CREAT BLD-MCNC: 0.99 MG/DL
DEPRECATED RDW RBC AUTO: 47.7 FL (ref 35.1–46.3)
EGFRCR SERPLBLD CKD-EPI 2021: 86 ML/MIN/1.73M2 (ref 60–?)
EOSINOPHIL # BLD AUTO: 0.08 X10(3) UL (ref 0–0.7)
EOSINOPHIL NFR BLD AUTO: 1.3 %
ERYTHROCYTE [DISTWIDTH] IN BLOOD BY AUTOMATED COUNT: 14.3 % (ref 11–15)
GLOBULIN PLAS-MCNC: 3 G/DL (ref 2–3.5)
GLUCOSE BLD-MCNC: 97 MG/DL (ref 70–99)
HCT VFR BLD AUTO: 38.5 %
HGB BLD-MCNC: 13.4 G/DL
IMM GRANULOCYTES # BLD AUTO: 0.01 X10(3) UL (ref 0–1)
IMM GRANULOCYTES NFR BLD: 0.2 %
LYMPHOCYTES # BLD AUTO: 2.13 X10(3) UL (ref 1–4)
LYMPHOCYTES NFR BLD AUTO: 34 %
MCH RBC QN AUTO: 32 PG (ref 26–34)
MCHC RBC AUTO-ENTMCNC: 34.8 G/DL (ref 31–37)
MCV RBC AUTO: 91.9 FL
MONOCYTES # BLD AUTO: 0.59 X10(3) UL (ref 0.1–1)
MONOCYTES NFR BLD AUTO: 9.4 %
NEUTROPHILS # BLD AUTO: 3.42 X10 (3) UL (ref 1.5–7.7)
NEUTROPHILS # BLD AUTO: 3.42 X10(3) UL (ref 1.5–7.7)
NEUTROPHILS NFR BLD AUTO: 54.6 %
OSMOLALITY SERPL CALC.SUM OF ELEC: 289 MOSM/KG (ref 275–295)
PLATELET # BLD AUTO: 107 10(3)UL (ref 150–450)
PLATELETS.RETICULATED NFR BLD AUTO: 3.7 % (ref 0–7)
POTASSIUM SERPL-SCNC: 4.3 MMOL/L (ref 3.5–5.1)
PROT SERPL-MCNC: 7.6 G/DL (ref 5.7–8.2)
RBC # BLD AUTO: 4.19 X10(6)UL
SODIUM SERPL-SCNC: 139 MMOL/L (ref 136–145)
TSI SER-ACNC: 1 UIU/ML (ref 0.55–4.78)
WBC # BLD AUTO: 6.3 X10(3) UL (ref 4–11)

## 2024-11-26 PROCEDURE — 84443 ASSAY THYROID STIM HORMONE: CPT

## 2024-11-26 PROCEDURE — 80053 COMPREHEN METABOLIC PANEL: CPT

## 2024-11-26 PROCEDURE — 99215 OFFICE O/P EST HI 40 MIN: CPT | Performed by: INTERNAL MEDICINE

## 2024-11-26 PROCEDURE — 36415 COLL VENOUS BLD VENIPUNCTURE: CPT

## 2024-11-26 PROCEDURE — 85025 COMPLETE CBC W/AUTO DIFF WBC: CPT

## 2024-11-26 RX ORDER — FLUOROURACIL 50 MG/ML
2400 INJECTION, SOLUTION INTRAVENOUS CONTINUOUS
Status: CANCELLED | OUTPATIENT
Start: 2024-11-28

## 2024-11-26 RX ORDER — FAMOTIDINE 10 MG/ML
20 INJECTION, SOLUTION INTRAVENOUS ONCE
Status: CANCELLED
Start: 2024-11-28 | End: 2024-11-28

## 2024-11-26 NOTE — PROGRESS NOTES
Oncology Progress note    Requesting: Dr. Higgins    Chief Complaint: Anemia, gastric cancer    HPI:  64 year old With gastric adenocarcinoma diagnosed 12/18/23 in the setting of iron def anemia.      See below for staging workup    Initiated FOLFOX +Trastuzumab 1/18/24. Added Pembrolizumab with C3 (PDL1 10%).    Oxaliplatin dose reduced to 65 mg/m2 with C6 due to CIPN      Interval History:  Returns for consideration of next Cycle, FOLFOX +Trastuzumab+ Pembrolizumab.  His surveillance CT scan from 11/20/24 shows stable results; c/w favorable treatment.    Patient feels some post post tx fatigue that lasts for a few days. He feels neuropathy in the distal 1/2 of his feet and in his fingertips, which has improved overall therefore more tolerable.  He endorses mild nausea for which he does not need oral antiemetics.      He denies fever, infection, bleeding, chest pain, dyspnea, n/v/a abdominal pain    Pmh: Arthritis      Social History     Socioeconomic History    Marital status:     Number of children: 2   Occupational History    Occupation: Food service     Employer: [employer]   Tobacco Use    Smoking status: Never    Smokeless tobacco: Never   Vaping Use    Vaping status: Never Used   Substance and Sexual Activity    Alcohol use: No     Alcohol/week: 0.0 standard drinks of alcohol    Drug use: No    Sexual activity: Yes   Other Topics Concern    Caffeine Concern Yes     Comment: coffee, 1 cup daily     Family History   Problem Relation Age of Onset    Prostate Cancer Other      Ros [id number]    Nka  Current Outpatient Medications on File Prior to Visit   Medication Sig Dispense Refill    Omeprazole 40 MG Oral Capsule Delayed Release Take 1 capsule (40 mg total) by mouth before breakfast. 90 capsule 1    zolpidem (AMBIEN) 10 MG Oral Tab Take 1 tablet (10 mg total) by mouth nightly as needed for Sleep. 30 tablet 0    acetaminophen-codeine 300-30 MG Oral Tab Take 1 tablet by mouth every 6  (six) hours as needed for Pain. Medication may causes sedation, constipation. Not to operate heavy machinery or drive on medication. 60 tablet 0    prochlorperazine (COMPAZINE) 10 mg tablet Take 1 tablet (10 mg total) by mouth every 6 (six) hours as needed for Nausea. 30 tablet 3    ondansetron (ZOFRAN) 8 MG tablet Take 1 tablet (8 mg total) by mouth every 8 (eight) hours as needed for Nausea. 30 tablet 3     Current Facility-Administered Medications on File Prior to Visit   Medication Dose Route Frequency Provider Last Rate Last Admin    [COMPLETED] iopamidol 76% (ISOVUE-370) injection for power injector  80 mL Intravenous ONCE PRN Eh Philippe PA   80 mL at 11/20/24 1254    [COMPLETED] famotidine (Pepcid) 20 mg/2mL injection 20 mg  20 mg Intravenous Once Eh Philippe PA   20 mg at 11/14/24 0856    [COMPLETED] ondansetron (Zofran) 16 mg, dexAMETHasone (Decadron) 20 mg in sodium chloride 0.9% 110 mL IVPB   Intravenous Once Eh Philippe PA   Stopped at 11/14/24 0854    [COMPLETED] trastuzumab-qyyp (Trazimera) 294 mg in sodium chloride 0.9% 264 mL infusion  4 mg/kg (Treatment Plan Recorded) Intravenous Once Eh Philippe PA   Stopped at 11/14/24 0934    [COMPLETED] leucovorin 750 mg in dextrose 5% 250 mL infusion  400 mg/m2 (Treatment Plan Recorded) Intravenous Once Eh Philippe PA   Stopped at 11/14/24 1105    [COMPLETED] famotidine (Pepcid) 20 mg/2mL injection 20 mg  20 mg Intravenous Once Gustavo Díaz MD   20 mg at 10/31/24 0808    [COMPLETED] ondansetron (Zofran) 16 mg, dexAMETHasone (Decadron) 20 mg in sodium chloride 0.9% 110 mL IVPB   Intravenous Once Gustavo Díaz MD   Stopped at 10/31/24 0827    [COMPLETED] trastuzumab-qyyp (Trazimera) 294 mg in sodium chloride 0.9% 264 mL infusion  4 mg/kg (Treatment Plan Recorded) Intravenous Once Gustavo Díaz MD   Stopped at 10/31/24 0948    [COMPLETED] leucovorin 750 mg in dextrose 5% 250 mL infusion  400 mg/m2 (Treatment Plan Recorded) Intravenous  Once Gustavo Díaz MD   Stopped at 10/31/24 1212     Vitals:    11/26/24 1347   BP: 135/71   Pulse: 80   Resp: 16   Temp: 97.8 °F (36.6 °C)         Wt Readings from Last 6 Encounters:   11/26/24 76.8 kg (169 lb 6.4 oz)   11/12/24 76.7 kg (169 lb)   10/29/24 74.9 kg (165 lb 1.6 oz)   10/17/24 77.5 kg (170 lb 12.8 oz)   10/15/24 77.1 kg (170 lb)   10/15/24 77.1 kg (170 lb)       Physical exam:  General:  Awake, alert, oriented in no acute distress today  HEENT - EOMsi, anicteric, MMM  Neck - supple, no LAD, normal ROM  Lungs -  CTA bilaterally  Cor - S1/S2 w/o murmur noted  Abd - soft, non tender non distended, bs+  BLE - no edema  Neuro - DTRs grossly intact    ECOG 2: Ambulatory and capable of all selfcare but unable to carry out any work activities. Up and about more than 50% of waking hours    Lab Results   Component Value Date    WBC 6.3 11/26/2024    RBC 4.19 (L) 11/26/2024    HGB 13.4 11/26/2024    HCT 38.5 (L) 11/26/2024    MCV 91.9 11/26/2024    MCH 32.0 11/26/2024    MCHC 34.8 11/26/2024    RDW 14.3 11/26/2024    .0 (L) 11/26/2024    MPV 8.9 10/31/2018     Lab Results   Component Value Date    GLU 97 11/26/2024    BUN 15 11/26/2024    BUNCREA 15.2 11/26/2024    CREATSERUM 0.99 11/26/2024    ANIONGAP 6 11/26/2024    GFRNAA 86 11/10/2021    GFRAA 99 11/10/2021    CA 10.3 11/26/2024    OSMOCALC 289 11/26/2024    ALKPHO 152 (H) 11/26/2024    AST 85 (H) 11/26/2024     (H) 11/26/2024    ALKPHOS 82 10/08/2015    BILT 1.9 (H) 11/26/2024    TP 7.6 11/26/2024    ALB 4.6 11/26/2024    GLOBULIN 3.0 11/26/2024    AGRATIO 1.4 10/08/2015     11/26/2024    K 4.3 11/26/2024     11/26/2024    CO2 27.0 11/26/2024     Imaging:    CT C/A/P 11/20/24    Impression   CONCLUSION:  1.  There is history of gastric cancer.  Stable 10 x 17 mm ovoid nodular opacity in the middle lobe near the hilum.  There is a 7 x 8 mm lymph node adjacent to the distal gastric body (series 2, image 119), which is not enlarged  by size criteria and  stable appearance of scattered subcentimeter lymph nodes within the gastric attic ligament.  2.  Prostate enlargement.       64 year old  With gastric adenocarcinoma diagnosed 12/18/23 in the setting of iron def anemia. Her positive (3+) MSI-S PDL1 10%    1.) Gastric cancer, stage IV  -- Widespread kevin disease reviewed at tumor conference by Dr. Amaro with surgical oncology will plan for upfront systemic therapy with 5-FU oxaliplatin based regimen potentially with trastuzumab and pembrolizumab  -Initiated FOLFOX plus trastuzumab on 1/18/23   -Delayed Cycle #5 x 1 week d/t worsening LFT elevation   -Cycle #6 with dose reduction of Oxaliplatin d/t CIPN worsening and LFTs as below.    -CT imaging after C4 3/2024 with favorable treatment response.   -PDL1 10% 1/2024. Added Pembrolizumab  as >1% per guidelines. q6week scheduling-initiated with C3 above.   -DELAY Cycle 8 d/t TCP <75K.    - reviewed the pt's current CT scan from above early June; favorable treatment response, continue current systemic treatment    -surveillance CT scan from 8/24 w/ stable findings; c/w favorable tx response, continue current management    -cycle 16 FOLFOX plus trastuzumab plus Pembrolizumab was deferred for 2 wks due to side effects from systemic tx, referred to palliative care to help. Now feels improved to continue with same dosing     --pt is working with  to determine his work limitations     --explained that we will nee to modified his treatments so that they're sustainable; possibly dose reduced oxaliplatin to 50, 5-FU to 2200 and dec trastuzumab to 3mg/kg    --surveillance CT scan per above shows stable disease  --Pembrolizumab+oxaliplatin are being held in light of elevated LFT's which are improving off tx    --proceed with cycle 20 of FOLFOX with trastuzumab    --next dose will be in early 1/2025    -tx will be held if platelets <75K. I have discontinued the 5-FU bolus as this is likely  contributing to the chemotherapy induced thrombocytopenia    -Will repeat ECHO with cycle 14 (every 3mo); ECHO from 11, 2024 shows a preserved EF    2.)Iron deficiency anemia    --s/p Feraheme x 2, last dose on 2/1/24, anemia resolved    3.) Chemotherapy induced thrombocytopenia  --Thrombocytopenia secondary to chemo, ok to treat if plts > 75k.    --Delayed C9 as above. Cycle 11 now delayed x 2 weeks; Ok to treat with plts >75K.    Monitor, may need to dose reduce chemo further    4.)Transaminitis   --improved gr 1, T. Bili 1.5. 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   --Delay of C9 d/t Tcp with imrpovement to gr1 CIPN to fingers without ADL limitations.   --Improving given omission of Oxaliplatin.   --offered duloxetine for neuropathy- declined at prior visit    6.)risk of cardiotoxicity  --Normal EF/echo prior to treatment 1/2024. 5/2024 ECHO repeat stable/improved.    --H/o fleeting chest discomfort reported, currently resolved, without any other Aes. Monitor w/ ECHO every 3 mo    7.) BANDAR  --gr. 1, tolerable.  Prn antiemetics encouraged    9.)H/o skin rash  --Resolved. Supportive care with topical creams    10.)Prostate Enlargement  --noted on imaging; likely BPH, pt denies nocturia or dribbling symptoms        [facility] - [facility]    Gustavo Díaz MD  [facility]  [address], [place] [address]

## 2024-11-29 ENCOUNTER — OFFICE VISIT (OUTPATIENT)
Dept: HEMATOLOGY/ONCOLOGY | Facility: HOSPITAL | Age: 64
End: 2024-11-29
Attending: INTERNAL MEDICINE
Payer: COMMERCIAL

## 2024-11-29 VITALS
RESPIRATION RATE: 16 BRPM | DIASTOLIC BLOOD PRESSURE: 63 MMHG | OXYGEN SATURATION: 97 % | TEMPERATURE: 97 F | SYSTOLIC BLOOD PRESSURE: 140 MMHG | HEART RATE: 81 BPM | BODY MASS INDEX: 25 KG/M2 | WEIGHT: 170.81 LBS

## 2024-11-29 DIAGNOSIS — C16.9 MALIGNANT NEOPLASM OF STOMACH, UNSPECIFIED LOCATION (HCC): Primary | ICD-10-CM

## 2024-11-29 PROCEDURE — 96413 CHEMO IV INFUSION 1 HR: CPT

## 2024-11-29 PROCEDURE — 96366 THER/PROPH/DIAG IV INF ADDON: CPT

## 2024-11-29 PROCEDURE — 96367 TX/PROPH/DG ADDL SEQ IV INF: CPT

## 2024-11-29 PROCEDURE — 96375 TX/PRO/DX INJ NEW DRUG ADDON: CPT

## 2024-11-29 PROCEDURE — S0028 INJECTION, FAMOTIDINE, 20 MG: HCPCS

## 2024-11-29 RX ORDER — FAMOTIDINE 10 MG/ML
20 INJECTION, SOLUTION INTRAVENOUS ONCE
Status: COMPLETED | OUTPATIENT
Start: 2024-11-29 | End: 2024-11-29

## 2024-11-29 RX ORDER — FAMOTIDINE 10 MG/ML
INJECTION, SOLUTION INTRAVENOUS
Status: COMPLETED
Start: 2024-11-29 | End: 2024-11-29

## 2024-11-29 RX ORDER — FLUOROURACIL 50 MG/ML
2400 INJECTION, SOLUTION INTRAVENOUS CONTINUOUS
Status: DISCONTINUED | OUTPATIENT
Start: 2024-11-29 | End: 2024-11-29

## 2024-11-29 RX ADMIN — FAMOTIDINE 20 MG: 10 INJECTION, SOLUTION INTRAVENOUS at 08:37:00

## 2024-11-29 RX ADMIN — FLUOROURACIL 4600 MG: 50 INJECTION, SOLUTION INTRAVENOUS at 10:44:00

## 2024-11-29 NOTE — PROGRESS NOTES
Pt here for C20 D1 FOLFOX, TRASTUZUMAB.  Arrives Ambulating independently, accompanied by Self     Patient was evaluated today by Treatment Nurse.    Oral medications included in this regimen:  no    Patient confirms comprehension of cancer treatment schedule:  yes    Pregnancy screening:  Not applicable    Modifications in dose or schedule:  No, oxaliplatin and pembrolizumab continues to be held.     Medications appearance and physical integrity checked by RN: yes.    Chemotherapy IV pump settings verified by 2 RNs:  Yes.  Frequency of blood return and site check throughout administration: Prior to administration and Prior to each drug     Infusion/treatment outcome:  patient tolerated treatment without incident    CADD pump connected and running. All connections reinforced with tape. Port access is clean and dry, secured with steri strips and tegaderm dressing. Patient verbalized understanding of CADD pump instructions, including troubleshooting.      Education Record    Learner:  Patient  Barriers / Limitations:  None  Method:  Discussion  Education / instructions given:  Plan of care reviewed  Outcome:  Shows understanding    Discharged Home, Ambulating independently, accompanied by:Self    Patient/family verbalized understanding of future appointments: by [employer] messaging

## 2024-12-01 ENCOUNTER — NURSE ONLY (OUTPATIENT)
Dept: HEMATOLOGY/ONCOLOGY | Facility: HOSPITAL | Age: 64
End: 2024-12-01
Attending: INTERNAL MEDICINE
Payer: COMMERCIAL

## 2024-12-01 VITALS
RESPIRATION RATE: 16 BRPM | HEART RATE: 69 BPM | TEMPERATURE: 99 F | OXYGEN SATURATION: 96 % | SYSTOLIC BLOOD PRESSURE: 129 MMHG | DIASTOLIC BLOOD PRESSURE: 74 MMHG

## 2024-12-01 PROCEDURE — 96523 IRRIG DRUG DELIVERY DEVICE: CPT

## 2024-12-01 NOTE — PROGRESS NOTES
Patient arrives to infusion ambulatory, accompanied by wife, for CADD pump disconnect.  Denies any issues or concerns.  Patient presented with CADD pump connected. Reservoir with 2mL remaining. Disconnected CADD pump, flushed port with 0.9% Normal Saline and established blood return in port. Flushed with 20mL of NS and de-accessed port. Gauze and paper tape applied to port site.   Some redness noted on dressing site, denies itching or burning. Patient states it gets red sometimes where the dressing sits.  Discharged from infusion ambulatory.

## 2024-12-27 ENCOUNTER — TELEPHONE (OUTPATIENT)
Age: 64
End: 2024-12-27

## 2024-12-27 NOTE — TELEPHONE ENCOUNTER
Bhumika the patients wife called because of January 1st he will no longer have [employer] he will have [employer] and the wife just wants to make sure this will not effect his current treatment on 1/2/2025. The insurance was added with a effective date of 1/1/2025. Can he still be treated on this date?

## 2025-01-02 ENCOUNTER — APPOINTMENT (OUTPATIENT)
Age: 65
End: 2025-01-02
Attending: INTERNAL MEDICINE
Payer: COMMERCIAL

## 2025-01-04 ENCOUNTER — APPOINTMENT (OUTPATIENT)
Age: 65
End: 2025-01-04
Attending: INTERNAL MEDICINE
Payer: COMMERCIAL

## 2025-01-07 ENCOUNTER — OFFICE VISIT (OUTPATIENT)
Age: 65
End: 2025-01-07
Attending: INTERNAL MEDICINE
Payer: COMMERCIAL

## 2025-01-07 ENCOUNTER — NURSE ONLY (OUTPATIENT)
Age: 65
End: 2025-01-07
Attending: INTERNAL MEDICINE
Payer: COMMERCIAL

## 2025-01-07 VITALS
RESPIRATION RATE: 16 BRPM | TEMPERATURE: 99 F | BODY MASS INDEX: 26.07 KG/M2 | DIASTOLIC BLOOD PRESSURE: 77 MMHG | WEIGHT: 176 LBS | HEIGHT: 69 IN | OXYGEN SATURATION: 95 % | HEART RATE: 84 BPM | SYSTOLIC BLOOD PRESSURE: 135 MMHG

## 2025-01-07 DIAGNOSIS — C16.9 MALIGNANT NEOPLASM OF STOMACH, UNSPECIFIED LOCATION (HCC): ICD-10-CM

## 2025-01-07 DIAGNOSIS — Z51.11 CHEMOTHERAPY MANAGEMENT, ENCOUNTER FOR: Primary | ICD-10-CM

## 2025-01-07 DIAGNOSIS — T45.1X5A CHEMOTHERAPY-INDUCED NEUROPATHY (HCC): Primary | ICD-10-CM

## 2025-01-07 DIAGNOSIS — G62.0 CHEMOTHERAPY-INDUCED NEUROPATHY (HCC): Primary | ICD-10-CM

## 2025-01-07 LAB
ALBUMIN SERPL-MCNC: 4.7 G/DL (ref 3.2–4.8)
ALBUMIN/GLOB SERPL: 1.6 {RATIO} (ref 1–2)
ALP LIVER SERPL-CCNC: 139 U/L
ALT SERPL-CCNC: 72 U/L
ANION GAP SERPL CALC-SCNC: 7 MMOL/L (ref 0–18)
AST SERPL-CCNC: 51 U/L (ref ?–34)
BASOPHILS # BLD AUTO: 0.03 X10(3) UL (ref 0–0.2)
BASOPHILS NFR BLD AUTO: 0.5 %
BILIRUB SERPL-MCNC: 2.2 MG/DL (ref 0.2–1.1)
BUN BLD-MCNC: 12 MG/DL (ref 9–23)
BUN/CREAT SERPL: 13.5 (ref 10–20)
CALCIUM BLD-MCNC: 9.8 MG/DL (ref 8.7–10.4)
CHLORIDE SERPL-SCNC: 107 MMOL/L (ref 98–112)
CO2 SERPL-SCNC: 27 MMOL/L (ref 21–32)
CREAT BLD-MCNC: 0.89 MG/DL
DEPRECATED RDW RBC AUTO: 43.7 FL (ref 35.1–46.3)
EGFRCR SERPLBLD CKD-EPI 2021: 96 ML/MIN/1.73M2 (ref 60–?)
EOSINOPHIL # BLD AUTO: 0.08 X10(3) UL (ref 0–0.7)
EOSINOPHIL NFR BLD AUTO: 1.2 %
ERYTHROCYTE [DISTWIDTH] IN BLOOD BY AUTOMATED COUNT: 12.9 % (ref 11–15)
GLOBULIN PLAS-MCNC: 2.9 G/DL (ref 2–3.5)
GLUCOSE BLD-MCNC: 110 MG/DL (ref 70–99)
HCT VFR BLD AUTO: 40 %
HGB BLD-MCNC: 14.5 G/DL
IMM GRANULOCYTES # BLD AUTO: 0.02 X10(3) UL (ref 0–1)
IMM GRANULOCYTES NFR BLD: 0.3 %
LYMPHOCYTES # BLD AUTO: 1.8 X10(3) UL (ref 1–4)
LYMPHOCYTES NFR BLD AUTO: 27.1 %
MCH RBC QN AUTO: 33.5 PG (ref 26–34)
MCHC RBC AUTO-ENTMCNC: 36.3 G/DL (ref 31–37)
MCV RBC AUTO: 92.4 FL
MONOCYTES # BLD AUTO: 0.53 X10(3) UL (ref 0.1–1)
MONOCYTES NFR BLD AUTO: 8 %
NEUTROPHILS # BLD AUTO: 4.19 X10 (3) UL (ref 1.5–7.7)
NEUTROPHILS # BLD AUTO: 4.19 X10(3) UL (ref 1.5–7.7)
NEUTROPHILS NFR BLD AUTO: 62.9 %
OSMOLALITY SERPL CALC.SUM OF ELEC: 292 MOSM/KG (ref 275–295)
PLATELET # BLD AUTO: 119 10(3)UL (ref 150–450)
PLATELETS.RETICULATED NFR BLD AUTO: 3.5 % (ref 0–7)
POTASSIUM SERPL-SCNC: 4.4 MMOL/L (ref 3.5–5.1)
PROT SERPL-MCNC: 7.6 G/DL (ref 5.7–8.2)
RBC # BLD AUTO: 4.33 X10(6)UL
SODIUM SERPL-SCNC: 141 MMOL/L (ref 136–145)
TSI SER-ACNC: 0.85 UIU/ML (ref 0.55–4.78)
WBC # BLD AUTO: 6.7 X10(3) UL (ref 4–11)

## 2025-01-07 RX ORDER — FAMOTIDINE 10 MG/ML
20 INJECTION, SOLUTION INTRAVENOUS ONCE
Status: CANCELLED
Start: 2025-01-09 | End: 2025-01-09

## 2025-01-07 RX ORDER — GABAPENTIN 100 MG/1
300 CAPSULE ORAL NIGHTLY
Qty: 30 CAPSULE | Refills: 1 | Status: SHIPPED | OUTPATIENT
Start: 2025-01-07

## 2025-01-07 RX ORDER — FLUOROURACIL 50 MG/ML
2400 INJECTION, SOLUTION INTRAVENOUS CONTINUOUS
Status: CANCELLED | OUTPATIENT
Start: 2025-01-09

## 2025-01-07 NOTE — PROGRESS NOTES
Oncology Progress note    Requesting: Dr. Higgins    Chief Complaint: Anemia, gastric cancer    HPI:  64 year old With gastric adenocarcinoma diagnosed 12/18/23 in the setting of iron def anemia.      See below for staging workup    Initiated FOLFOX +Trastuzumab 1/18/24. Added Pembrolizumab with C3 (PDL1 10%).    Oxaliplatin dose reduced to 65 mg/m2 with C6 due to CIPN      Interval History:  Returns for consideration of next Cycle, FOLFOX +Trastuzumab+ Pembrolizumab.  His surveillance CT scan from 11/20/24 shows stable results; c/w favorable treatment.    Patient feels some cold neuropathy in the feet; R>L.  He endorses mild nausea for which he does not need oral antiemetics w/o emesis.    He denies fever, infection, bleeding, chest pain, dyspnea, abdominal pain    Pmh: Arthritis      Social History     Socioeconomic History    Marital status:     Number of children: 2   Occupational History    Occupation: Food service     Employer: [employer]   Tobacco Use    Smoking status: Never    Smokeless tobacco: Never   Vaping Use    Vaping status: Never Used   Substance and Sexual Activity    Alcohol use: No     Alcohol/week: 0.0 standard drinks of alcohol    Drug use: No    Sexual activity: Yes   Other Topics Concern    Caffeine Concern Yes     Comment: coffee, 1 cup daily     Family History   Problem Relation Age of Onset    Prostate Cancer Other      Ros negx12    Nka  Current Outpatient Medications on File Prior to Visit   Medication Sig Dispense Refill    Omeprazole 40 MG Oral Capsule Delayed Release Take 1 capsule (40 mg total) by mouth before breakfast. 90 capsule 1    zolpidem (AMBIEN) 10 MG Oral Tab Take 1 tablet (10 mg total) by mouth nightly as needed for Sleep. 30 tablet 0    acetaminophen-codeine 300-30 MG Oral Tab Take 1 tablet by mouth every 6 (six) hours as needed for Pain. Medication may causes sedation, constipation. Not to operate heavy machinery or drive on medication. 60  tablet 0    prochlorperazine (COMPAZINE) 10 mg tablet Take 1 tablet (10 mg total) by mouth every 6 (six) hours as needed for Nausea. 30 tablet 3    ondansetron (ZOFRAN) 8 MG tablet Take 1 tablet (8 mg total) by mouth every 8 (eight) hours as needed for Nausea. 30 tablet 3     No current facility-administered medications on file prior to visit.     There were no vitals filed for this visit.        Wt Readings from Last 6 Encounters:   11/29/24 77.5 kg (170 lb 12.8 oz)   11/26/24 76.8 kg (169 lb 6.4 oz)   11/12/24 76.7 kg (169 lb)   10/29/24 74.9 kg (165 lb 1.6 oz)   10/17/24 77.5 kg (170 lb 12.8 oz)   10/15/24 77.1 kg (170 lb)       Physical exam:  General:  Awake, alert, oriented in no acute distress today  HEENT - EOMsi, anicteric, MMM  Neck - supple, no LAD, normal ROM  Lungs -  CTA bilaterally  Cor - S1/S2 w/o murmur noted  Abd - soft, non tender non distended, bs+  BLE - no edema  Neuro - DTRs grossly intact    ECOG 2: Ambulatory and capable of all selfcare but unable to carry out any work activities. Up and about more than 50% of waking hours    Lab Results   Component Value Date    WBC 6.7 01/07/2025    RBC 4.33 01/07/2025    HGB 14.5 01/07/2025    HCT 40.0 01/07/2025    MCV 92.4 01/07/2025    MCH 33.5 01/07/2025    MCHC 36.3 01/07/2025    RDW 12.9 01/07/2025    .0 (L) 01/07/2025    MPV 8.9 10/31/2018     Lab Results   Component Value Date     (H) 01/07/2025    BUN 12 01/07/2025    BUNCREA 13.5 01/07/2025    CREATSERUM 0.89 01/07/2025    ANIONGAP 7 01/07/2025    GFRNAA 86 11/10/2021    GFRAA 99 11/10/2021    CA 9.8 01/07/2025    OSMOCALC 292 01/07/2025    ALKPHO 139 (H) 01/07/2025    AST 51 (H) 01/07/2025    ALT 72 (H) 01/07/2025    ALKPHOS 82 10/08/2015    BILT 2.2 (H) 01/07/2025    TP 7.6 01/07/2025    ALB 4.7 01/07/2025    GLOBULIN 2.9 01/07/2025    AGRATIO 1.4 10/08/2015     01/07/2025    K 4.4 01/07/2025     01/07/2025    CO2 27.0 01/07/2025     Imaging:    CT C/A/P  11/20/24    Impression   CONCLUSION:  1.  There is history of gastric cancer.  Stable 10 x 17 mm ovoid nodular opacity in the middle lobe near the hilum.  There is a 7 x 8 mm lymph node adjacent to the distal gastric body (series 2, image 119), which is not enlarged by size criteria and  stable appearance of scattered subcentimeter lymph nodes within the gastric attic ligament.  2.  Prostate enlargement.       64 year old  With gastric adenocarcinoma diagnosed 12/18/23 in the setting of iron def anemia. Her positive (3+) MSI-S PDL1 10%    1.) Gastric cancer, stage IV  -- Widespread kevin disease reviewed at tumor conference by Dr. Amaro with surgical oncology will plan for upfront systemic therapy with 5-FU oxaliplatin based regimen potentially with trastuzumab and pembrolizumab  -Initiated FOLFOX plus trastuzumab on 1/18/23   -Delayed Cycle #5 x 1 week d/t worsening LFT elevation   -Cycle #6 with dose reduction of Oxaliplatin d/t CIPN worsening and LFTs as below.    -CT imaging after C4 3/2024 with favorable treatment response.   -PDL1 10% 1/2024. Added Pembrolizumab  as >1% per guidelines. q6week scheduling-initiated with C3 above.   -DELAY Cycle 8 d/t TCP <75K.    - reviewed the pt's current CT scan from above early June; favorable treatment response, continue current systemic treatment    -surveillance CT scan from 8/24 w/ stable findings; c/w favorable tx response, continue current management    -cycle 16 FOLFOX plus trastuzumab plus Pembrolizumab was deferred for 2 wks due to side effects from systemic tx, referred to palliative care to help. Now feels improved to continue with same dosing     --pt is working with  to determine his work limitations     --explained that we will nee to modified his treatments so that they're sustainable; possibly dose reduced oxaliplatin to 50, 5-FU to 2200 and dec trastuzumab to 3mg/kg    --surveillance CT scan from 11/2024 shows stable  disease  --Pembrolizumab+oxaliplatin were being held in light of elevated LFT's which are improving off tx    --proceed with cycle 21 of FOLFOX with trastuzumab    -tx will be held if platelets <75K. I have discontinued the 5-FU bolus as this is likely contributing to the chemotherapy induced thrombocytopenia    -Will repeat ECHO with cycle 14 (every 3mo); ECHO from 11, 2024 shows a preserved EF    2.)Iron deficiency anemia    --s/p Feraheme x 2, last dose on 2/1/24, anemia resolved    3.) Chemotherapy induced thrombocytopenia  --Thrombocytopenia secondary to chemo, ok to treat if plts > 75k.    --Delayed C9 as above. Cycle 11 now delayed x 2 weeks; Ok to treat with plts >75K.    Monitor, may need to dose reduce chemo further    4.)Transaminitis   --improved gr 1, T. Bili 1.5. 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   --Delay of C9 d/t Tcp with imrpovement to gr1 CIPN to fingers without ADL limitations.   --Improving given omission of Oxaliplatin.   --rec trial of gabapentin trial at 100 mg at night; explained to pt that this will cause drowsiness and we can titrate up to 300 mg at night, will f/u in 2 wk to see if dose inc is needed     6.)risk of cardiotoxicity  --Normal EF/echo prior to treatment 1/2024. 5/2024 ECHO repeat stable/improved.    --H/o fleeting chest discomfort reported, currently resolved, without any other Aes. Monitor w/ ECHO every 3 mo    7.) BANDAR  --gr. 1, tolerable.  Prn antiemetics encouraged    9.)H/o skin rash  --Resolved. Supportive care with topical creams    10.)Prostate Enlargement  --noted on imaging; likely BPH, pt denies nocturia or dribbling symptoms        [facility] - [facility]    Gustavo Díaz MD  [facility]  [facility]  [address]  [place] [address]

## 2025-01-09 ENCOUNTER — OFFICE VISIT (OUTPATIENT)
Age: 65
End: 2025-01-09
Attending: INTERNAL MEDICINE
Payer: COMMERCIAL

## 2025-01-09 VITALS
HEART RATE: 70 BPM | RESPIRATION RATE: 18 BRPM | OXYGEN SATURATION: 95 % | DIASTOLIC BLOOD PRESSURE: 65 MMHG | TEMPERATURE: 98 F | SYSTOLIC BLOOD PRESSURE: 144 MMHG

## 2025-01-09 VITALS
HEART RATE: 72 BPM | TEMPERATURE: 98 F | DIASTOLIC BLOOD PRESSURE: 66 MMHG | RESPIRATION RATE: 18 BRPM | SYSTOLIC BLOOD PRESSURE: 121 MMHG | OXYGEN SATURATION: 96 %

## 2025-01-09 DIAGNOSIS — T80.90XA INFUSION REACTION, INITIAL ENCOUNTER: ICD-10-CM

## 2025-01-09 DIAGNOSIS — C16.9 MALIGNANT NEOPLASM OF STOMACH, UNSPECIFIED LOCATION (HCC): Primary | ICD-10-CM

## 2025-01-09 RX ORDER — DIPHENHYDRAMINE HYDROCHLORIDE 50 MG/ML
INJECTION INTRAMUSCULAR; INTRAVENOUS
Status: COMPLETED
Start: 2025-01-09 | End: 2025-01-09

## 2025-01-09 RX ORDER — FLUOROURACIL 50 MG/ML
2400 INJECTION, SOLUTION INTRAVENOUS CONTINUOUS
Status: DISCONTINUED | OUTPATIENT
Start: 2025-01-09 | End: 2025-01-09

## 2025-01-09 RX ORDER — FAMOTIDINE 10 MG/ML
20 INJECTION, SOLUTION INTRAVENOUS ONCE
Status: COMPLETED | OUTPATIENT
Start: 2025-01-09 | End: 2025-01-09

## 2025-01-09 RX ORDER — FAMOTIDINE 10 MG/ML
INJECTION, SOLUTION INTRAVENOUS
Status: COMPLETED
Start: 2025-01-09 | End: 2025-01-09

## 2025-01-09 RX ORDER — DIPHENHYDRAMINE HYDROCHLORIDE 50 MG/ML
25 INJECTION INTRAMUSCULAR; INTRAVENOUS ONCE
Status: COMPLETED | OUTPATIENT
Start: 2025-01-09 | End: 2025-01-09

## 2025-01-09 RX ADMIN — FAMOTIDINE 20 MG: 10 INJECTION, SOLUTION INTRAVENOUS at 08:01:00

## 2025-01-09 RX ADMIN — DIPHENHYDRAMINE HYDROCHLORIDE 25 MG: 50 INJECTION INTRAMUSCULAR; INTRAVENOUS at 09:50:00

## 2025-01-09 RX ADMIN — FLUOROURACIL 4700 MG: 50 INJECTION, SOLUTION INTRAVENOUS at 12:02:00

## 2025-01-09 NOTE — PROGRESS NOTES
S:  64 year old male is being seen in the Acute Care Clinic after feeling facial flushing and redness while Oxaliplatin is administered.  Patient received appropriate premedications.  While holding the oxaliplatin, his symptoms started to madalyn.     Denies chest pain, dyspnea, abdominal/back pain and pruritus.    O:  WDWNWM, NAD  HEENT - moderate erythema to face, no edema, EOMsi  Neck - moderate erythema, no edema, normal AROM  Lungs - non labored breathing, CTA  Cor - RRR    A:  1.  Gastric adenocarcinoma  2.  Infusion reaction to Oxaliplatin    P:  VSS, diphenhydramine 25 mg IVP.  Monitor symptoms and VS.  Once resolved, resume treatment. Add Diphenhydramine 25 mg IVP to future cycles.  Call prn.  Keep originally scheduled appointments.

## 2025-01-09 NOTE — PROGRESS NOTES
Pt here for C21D1 Drug(s)Trastuzumab with FOLFOX.  Arrives Ambulating independently, accompanied by Spouse     Patient was evaluated today by Treatment Nurse.    Oral medications included in this regimen:  no    Patient confirms comprehension of cancer treatment schedule:  yes    Pregnancy screening:  Not applicable    Modifications in dose or schedule:  No    Medications appearance and physical integrity checked by RN: yes.    Chemotherapy IV pump settings verified by 2 RNs:  Yes.  Frequency of blood return and site check throughout administration: Prior to administration, Prior to each drug, and At completion of therapy     Infusion/treatment outcome:  Pt with flushing approximately 30 minutes into Oxaliplatin.  Pt assessed by Eh BRAR, see adverse reaction flowsheet . Symptoms resolved and pt completed treatment with no further problems.     Education Record    Learner:  Patient and Spouse  Barriers / Limitations:  None  Method:  Discussion  Education / instructions given:  plan of care  Outcome:  Shows understanding    Discharged Home, Ambulating independently, accompanied by:Spouse    Patient/family verbalized understanding of future appointments: by [email or web address] messaging

## 2025-01-10 ENCOUNTER — TELEPHONE (OUTPATIENT)
Age: 65
End: 2025-01-10

## 2025-01-11 ENCOUNTER — NURSE ONLY (OUTPATIENT)
Age: 65
End: 2025-01-11
Attending: INTERNAL MEDICINE
Payer: COMMERCIAL

## 2025-01-11 VITALS
OXYGEN SATURATION: 95 % | SYSTOLIC BLOOD PRESSURE: 129 MMHG | DIASTOLIC BLOOD PRESSURE: 71 MMHG | RESPIRATION RATE: 16 BRPM | HEART RATE: 106 BPM | TEMPERATURE: 98 F

## 2025-01-11 NOTE — PROGRESS NOTES
Patient arrives to infusion ambulatory, accompanied by wife, for CADD pump disconnect.  Denies any issues or concerns.  Patient presented with CADD pump connected. Reservoir with 5.8mL remaining. Disconnected CADD pump, flushed port with 0.9% Normal Saline and established blood return in port. Flushed with 20mL of NS and de-accessed port. Gauze and paper tape applied to port site.   Some redness noted on dressing site, denies itching or burning. Patient states it gets red sometimes where the dressing sits.  Discharged from infusion ambulatory.

## 2025-01-14 ENCOUNTER — APPOINTMENT (OUTPATIENT)
Age: 65
End: 2025-01-14
Attending: INTERNAL MEDICINE
Payer: COMMERCIAL

## 2025-01-16 ENCOUNTER — APPOINTMENT (OUTPATIENT)
Age: 65
End: 2025-01-16
Attending: INTERNAL MEDICINE
Payer: COMMERCIAL

## 2025-01-18 ENCOUNTER — APPOINTMENT (OUTPATIENT)
Age: 65
End: 2025-01-18
Attending: INTERNAL MEDICINE
Payer: COMMERCIAL

## 2025-01-21 ENCOUNTER — APPOINTMENT (OUTPATIENT)
Age: 65
End: 2025-01-21
Attending: INTERNAL MEDICINE
Payer: COMMERCIAL

## 2025-01-21 ENCOUNTER — NURSE ONLY (OUTPATIENT)
Age: 65
End: 2025-01-21
Attending: INTERNAL MEDICINE
Payer: COMMERCIAL

## 2025-01-21 ENCOUNTER — OFFICE VISIT (OUTPATIENT)
Age: 65
End: 2025-01-21
Attending: INTERNAL MEDICINE
Payer: COMMERCIAL

## 2025-01-21 VITALS
SYSTOLIC BLOOD PRESSURE: 113 MMHG | TEMPERATURE: 98 F | HEART RATE: 79 BPM | WEIGHT: 173.81 LBS | BODY MASS INDEX: 25.74 KG/M2 | OXYGEN SATURATION: 98 % | RESPIRATION RATE: 16 BRPM | DIASTOLIC BLOOD PRESSURE: 72 MMHG | HEIGHT: 69 IN

## 2025-01-21 DIAGNOSIS — T45.1X5A CHEMOTHERAPY-INDUCED NEUROPATHY (HCC): ICD-10-CM

## 2025-01-21 DIAGNOSIS — G62.0 CHEMOTHERAPY-INDUCED NEUROPATHY (HCC): ICD-10-CM

## 2025-01-21 DIAGNOSIS — C16.9 MALIGNANT NEOPLASM OF STOMACH, UNSPECIFIED LOCATION (HCC): Primary | ICD-10-CM

## 2025-01-21 DIAGNOSIS — C16.9 MALIGNANT NEOPLASM OF STOMACH, UNSPECIFIED LOCATION (HCC): ICD-10-CM

## 2025-01-21 DIAGNOSIS — R79.89 ELEVATED LFTS: ICD-10-CM

## 2025-01-21 DIAGNOSIS — Z51.11 CHEMOTHERAPY MANAGEMENT, ENCOUNTER FOR: Primary | ICD-10-CM

## 2025-01-21 DIAGNOSIS — Z51.11 CHEMOTHERAPY MANAGEMENT, ENCOUNTER FOR: ICD-10-CM

## 2025-01-21 LAB
ALBUMIN SERPL-MCNC: 4.6 G/DL (ref 3.2–4.8)
ALBUMIN/GLOB SERPL: 1.5 {RATIO} (ref 1–2)
ALP LIVER SERPL-CCNC: 153 U/L
ALT SERPL-CCNC: 88 U/L
ANION GAP SERPL CALC-SCNC: 8 MMOL/L (ref 0–18)
AST SERPL-CCNC: 61 U/L (ref ?–34)
BASOPHILS # BLD AUTO: 0.01 X10(3) UL (ref 0–0.2)
BASOPHILS NFR BLD AUTO: 0.2 %
BILIRUB SERPL-MCNC: 1.8 MG/DL (ref 0.2–1.1)
BUN BLD-MCNC: 14 MG/DL (ref 9–23)
BUN/CREAT SERPL: 14.9 (ref 10–20)
CALCIUM BLD-MCNC: 10.1 MG/DL (ref 8.7–10.4)
CHLORIDE SERPL-SCNC: 108 MMOL/L (ref 98–112)
CO2 SERPL-SCNC: 28 MMOL/L (ref 21–32)
CREAT BLD-MCNC: 0.94 MG/DL
DEPRECATED RDW RBC AUTO: 42.4 FL (ref 35.1–46.3)
EGFRCR SERPLBLD CKD-EPI 2021: 91 ML/MIN/1.73M2 (ref 60–?)
EOSINOPHIL # BLD AUTO: 0.05 X10(3) UL (ref 0–0.7)
EOSINOPHIL NFR BLD AUTO: 1.1 %
ERYTHROCYTE [DISTWIDTH] IN BLOOD BY AUTOMATED COUNT: 13 % (ref 11–15)
GLOBULIN PLAS-MCNC: 3.1 G/DL (ref 2–3.5)
GLUCOSE BLD-MCNC: 103 MG/DL (ref 70–99)
HCT VFR BLD AUTO: 39 %
HGB BLD-MCNC: 13.3 G/DL
IMM GRANULOCYTES # BLD AUTO: 0.04 X10(3) UL (ref 0–1)
IMM GRANULOCYTES NFR BLD: 0.9 %
LYMPHOCYTES # BLD AUTO: 1.52 X10(3) UL (ref 1–4)
LYMPHOCYTES NFR BLD AUTO: 32.9 %
MCH RBC QN AUTO: 30.9 PG (ref 26–34)
MCHC RBC AUTO-ENTMCNC: 34.1 G/DL (ref 31–37)
MCV RBC AUTO: 90.5 FL
MONOCYTES # BLD AUTO: 0.38 X10(3) UL (ref 0.1–1)
MONOCYTES NFR BLD AUTO: 8.2 %
NEUTROPHILS # BLD AUTO: 2.62 X10 (3) UL (ref 1.5–7.7)
NEUTROPHILS # BLD AUTO: 2.62 X10(3) UL (ref 1.5–7.7)
NEUTROPHILS NFR BLD AUTO: 56.7 %
OSMOLALITY SERPL CALC.SUM OF ELEC: 299 MOSM/KG (ref 275–295)
PLATELET # BLD AUTO: 102 10(3)UL (ref 150–450)
PLATELETS.RETICULATED NFR BLD AUTO: 2.7 % (ref 0–7)
POTASSIUM SERPL-SCNC: 4.8 MMOL/L (ref 3.5–5.1)
PROT SERPL-MCNC: 7.7 G/DL (ref 5.7–8.2)
RBC # BLD AUTO: 4.31 X10(6)UL
SODIUM SERPL-SCNC: 144 MMOL/L (ref 136–145)
TSI SER-ACNC: 0.79 UIU/ML (ref 0.55–4.78)
WBC # BLD AUTO: 4.6 X10(3) UL (ref 4–11)

## 2025-01-21 RX ORDER — FLUOROURACIL 50 MG/ML
2400 INJECTION, SOLUTION INTRAVENOUS CONTINUOUS
Status: CANCELLED | OUTPATIENT
Start: 2025-01-23

## 2025-01-21 RX ORDER — DIPHENHYDRAMINE HYDROCHLORIDE 50 MG/ML
25 INJECTION INTRAMUSCULAR; INTRAVENOUS ONCE
Status: CANCELLED
Start: 2025-01-23 | End: 2025-01-23

## 2025-01-21 RX ORDER — FAMOTIDINE 10 MG/ML
20 INJECTION, SOLUTION INTRAVENOUS ONCE
Status: CANCELLED
Start: 2025-01-23 | End: 2025-01-23

## 2025-01-21 NOTE — PROGRESS NOTES
Oncology Progress note    Requesting: Dr. Higgins    Chief Complaint: Anemia, gastric cancer    HPI:  65 year old With gastric adenocarcinoma diagnosed 12/18/23 in the setting of iron def anemia.      See below for staging workup    Initiated FOLFOX +Trastuzumab 1/18/24. Added Pembrolizumab with C3 (PDL1 10%).    Oxaliplatin dose reduced to 65 mg/m2 with C6 due to CIPN      Interval History:  Returns for consideration of next Cycle, FOLFOX +Trastuzumab+ Pembrolizumab.  His surveillance CT scan from 11/20/24 shows stable results; c/w favorable treatment.    Patient feels some cold neuropathy in the feet; R>L.  He endorses mild nausea for which he does not need oral antiemetics w/o emesis.    He denies fever, infection, bleeding, chest pain, dyspnea, abdominal pain or new concerns on ROS.    Pmh: Arthritis      Social History     Socioeconomic History    Marital status:     Number of children: 2   Occupational History    Occupation:      Employer: [employer]   Tobacco Use    Smoking status: Never    Smokeless tobacco: Never   Vaping Use    Vaping status: Never Used   Substance and Sexual Activity    Alcohol use: No     Alcohol/week: 0.0 standard drinks of alcohol    Drug use: No    Sexual activity: Yes   Other Topics Concern    Caffeine Concern Yes     Comment: coffee, 1 cup daily     Family History   Problem Relation Age of Onset    Prostate Cancer Other      Ros negx12    Nka  Current Outpatient Medications on File Prior to Visit   Medication Sig Dispense Refill    gabapentin 100 MG Oral Cap Take 3 capsules (300 mg total) by mouth nightly. 30 capsule 1    Omeprazole 40 MG Oral Capsule Delayed Release Take 1 capsule (40 mg total) by mouth before breakfast. 90 capsule 1    zolpidem (AMBIEN) 10 MG Oral Tab Take 1 tablet (10 mg total) by mouth nightly as needed for Sleep. 30 tablet 0    acetaminophen-codeine 300-30 MG Oral Tab Take 1 tablet by mouth every 6 (six) hours as needed  for Pain. Medication may causes sedation, constipation. Not to operate heavy machinery or drive on medication. 60 tablet 0    prochlorperazine (COMPAZINE) 10 mg tablet Take 1 tablet (10 mg total) by mouth every 6 (six) hours as needed for Nausea. 30 tablet 3    ondansetron (ZOFRAN) 8 MG tablet Take 1 tablet (8 mg total) by mouth every 8 (eight) hours as needed for Nausea. 30 tablet 3     Current Facility-Administered Medications on File Prior to Visit   Medication Dose Route Frequency Provider Last Rate Last Admin    [COMPLETED] famotidine (Pepcid) 20 mg/2mL injection 20 mg  20 mg Intravenous Once Gustavo Díaz MD   20 mg at 01/09/25 0801    [COMPLETED] ondansetron (Zofran) 16 mg, dexAMETHasone (Decadron) 20 mg in sodium chloride 0.9% 110 mL IVPB   Intravenous Once Gustavo Díaz MD   Stopped at 01/09/25 0819    [COMPLETED] trastuzumab-qyyp (Trazimera) 315 mg in sodium chloride 0.9% 265 mL infusion  4 mg/kg (Treatment Plan Recorded) Intravenous Once Gustavo Díaz MD   Stopped at 01/09/25 0901    [COMPLETED] oxaliplatin (Eloxatin) 125 mg in dextrose 5% 275 mL infusion  65 mg/m2 (Treatment Plan Recorded) Intravenous Once Gustavo Díaz MD   Stopped at 01/09/25 1200    [COMPLETED] leucovorin 800 mg in dextrose 5% 250 mL infusion  400 mg/m2 (Treatment Plan Recorded) Intravenous Once Gustavo Díaz MD   Stopped at 01/09/25 1200    [COMPLETED] diphenhydrAMINE (Benadryl) 50 mg/mL  injection 25 mg  25 mg Intravenous Once Eh Philippe PA   25 mg at 01/09/25 0950     Vitals:    01/21/25 1128   BP: 113/72   Pulse: 79   Resp: 16   Temp: 97.8 °F (36.6 °C)           Wt Readings from Last 6 Encounters:   01/21/25 78.8 kg (173 lb 12.8 oz)   01/07/25 79.8 kg (176 lb)   11/29/24 77.5 kg (170 lb 12.8 oz)   11/26/24 76.8 kg (169 lb 6.4 oz)   11/12/24 76.7 kg (169 lb)   10/29/24 74.9 kg (165 lb 1.6 oz)       Physical exam:  General:  Awake, alert, oriented in no acute distress today  HEENT - EOMsi, anicteric, MMM  Neck -  supple, no LAD, normal ROM  Lungs -  CTA bilaterally  Cor - S1/S2 w/o murmur noted  Abd - soft, non tender non distended, bs+  BLE - no edema  Neuro - DTRs grossly intact    ECOG 2: Ambulatory and capable of all selfcare but unable to carry out any work activities. Up and about more than 50% of waking hours    Lab Results   Component Value Date    WBC 4.6 01/21/2025    RBC 4.31 01/21/2025    HGB 13.3 01/21/2025    HCT 39.0 01/21/2025    MCV 90.5 01/21/2025    MCH 30.9 01/21/2025    MCHC 34.1 01/21/2025    RDW 13.0 01/21/2025    .0 (L) 01/21/2025    MPV 8.9 10/31/2018     Lab Results   Component Value Date     (H) 01/21/2025    BUN 14 01/21/2025    BUNCREA 14.9 01/21/2025    CREATSERUM 0.94 01/21/2025    ANIONGAP 8 01/21/2025    GFRNAA 86 11/10/2021    GFRAA 99 11/10/2021    CA 10.1 01/21/2025    OSMOCALC 299 (H) 01/21/2025    ALKPHO 153 (H) 01/21/2025    AST 61 (H) 01/21/2025    ALT 88 (H) 01/21/2025    ALKPHOS 82 10/08/2015    BILT 1.8 (H) 01/21/2025    TP 7.7 01/21/2025    ALB 4.6 01/21/2025    GLOBULIN 3.1 01/21/2025    AGRATIO 1.4 10/08/2015     01/21/2025    K 4.8 01/21/2025     01/21/2025    CO2 28.0 01/21/2025     Imaging:    CT C/A/P 11/20/24    Impression   CONCLUSION:  1.  There is history of gastric cancer.  Stable 10 x 17 mm ovoid nodular opacity in the middle lobe near the hilum.  There is a 7 x 8 mm lymph node adjacent to the distal gastric body (series 2, image 119), which is not enlarged by size criteria and  stable appearance of scattered subcentimeter lymph nodes within the gastric attic ligament.  2.  Prostate enlargement.       65 year old  With gastric adenocarcinoma diagnosed 12/18/23 in the setting of iron def anemia. Her positive (3+) MSI-S PDL1 10%    1.) Gastric cancer, stage IV  -- Widespread kevin disease reviewed at tumor conference by Dr. Amaro with surgical oncology will plan for upfront systemic therapy with 5-FU oxaliplatin based regimen potentially with  trastuzumab and pembrolizumab  -Initiated FOLFOX plus trastuzumab on 1/18/23   -Delayed Cycle #5 x 1 week d/t worsening LFT elevation   -Cycle #6 with dose reduction of Oxaliplatin d/t CIPN worsening and LFTs as below.    -CT imaging after C4 3/2024 with favorable treatment response.   -PDL1 10% 1/2024. Added Pembrolizumab  as >1% per guidelines. q6week scheduling-initiated with C3 above.   -DELAY Cycle 8 d/t TCP <75K.    - reviewed the pt's current CT scan from above early June; favorable treatment response, continue current systemic treatment    -surveillance CT scan from 8/24 w/ stable findings; c/w favorable tx response, continue current management    -cycle 16 FOLFOX plus trastuzumab plus Pembrolizumab was deferred for 2 wks due to side effects from systemic tx, referred to palliative care to help. Now feels improved to continue with same dosing     --pt is working with  to determine his work limitations     --explained that we will nee to modified his treatments so that they're sustainable; possibly dose reduced oxaliplatin to 50, 5-FU to 2200 and dec trastuzumab to 3mg/kg    --surveillance CT scan from 11/2024 shows stable disease  --Pembrolizumab+oxaliplatin were being held in light of elevated LFT's which are improving off tx    --proceed with cycle 22 of FOLFOX with trastuzumab    -tx will be held if platelets <75K. I have discontinued the 5-FU bolus as this is likely contributing to the chemotherapy induced thrombocytopenia    -Will repeat ECHO and CT scans (every 3mo) due next ~2/20; ECHO from 11, 2024 shows a preserved EF    2.)Iron deficiency anemia    --s/p Feraheme x 2, last dose on 2/1/24, anemia resolved    3.) Chemotherapy induced thrombocytopenia  --Thrombocytopenia secondary to chemo, ok to treat if plts > 75k.    --Delayed C9 as above. Cycle 11 now delayed x 2 weeks; Ok to treat with plts >75K.    Monitor, may need to dose reduce chemo further    4.)Transaminitis   --improved gr 1,  T. Bili 1.5. 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   --Delay of C9 d/t Tcp with imrpovement to gr1 CIPN to fingers without ADL limitations.   --Improving given omission of Oxaliplatin.   --rec trial of gabapentin trial at 100 mg at night; explained to pt that this will cause drowsiness and we can titrate up to 300 mg at night, will f/u in 2 wk to see if dose inc is needed     6.)risk of cardiotoxicity  --Normal EF/echo prior to treatment 1/2024. 5/2024 ECHO repeat stable/improved.    --H/o fleeting chest discomfort reported, currently resolved, without any other Aes. Monitor w/ ECHO every 3 mo    7.) BANDAR  --gr. 1, tolerable.  Prn antiemetics encouraged    9.)H/o skin rash  --Resolved. Supportive care with topical creams    10.)Prostate Enlargement  --noted on imaging; likely BPH, pt denies nocturia or dribbling symptoms        [facility]    Gustavo Díaz MD  [facility]  [facility] [place] [address] [facility], [place] [address]

## 2025-01-23 ENCOUNTER — APPOINTMENT (OUTPATIENT)
Age: 65
End: 2025-01-23
Attending: INTERNAL MEDICINE
Payer: COMMERCIAL

## 2025-01-23 ENCOUNTER — OFFICE VISIT (OUTPATIENT)
Age: 65
End: 2025-01-23
Attending: INTERNAL MEDICINE
Payer: COMMERCIAL

## 2025-01-23 VITALS
HEART RATE: 93 BPM | OXYGEN SATURATION: 96 % | DIASTOLIC BLOOD PRESSURE: 67 MMHG | RESPIRATION RATE: 16 BRPM | TEMPERATURE: 98 F | SYSTOLIC BLOOD PRESSURE: 123 MMHG

## 2025-01-23 DIAGNOSIS — C16.9 MALIGNANT NEOPLASM OF STOMACH, UNSPECIFIED LOCATION (HCC): Primary | ICD-10-CM

## 2025-01-23 RX ORDER — FAMOTIDINE 10 MG/ML
INJECTION, SOLUTION INTRAVENOUS
Status: COMPLETED
Start: 2025-01-23 | End: 2025-01-23

## 2025-01-23 RX ORDER — FLUOROURACIL 50 MG/ML
2400 INJECTION, SOLUTION INTRAVENOUS CONTINUOUS
Status: DISCONTINUED | OUTPATIENT
Start: 2025-01-23 | End: 2025-01-23

## 2025-01-23 RX ORDER — DIPHENHYDRAMINE HYDROCHLORIDE 50 MG/ML
25 INJECTION INTRAMUSCULAR; INTRAVENOUS ONCE
Status: COMPLETED | OUTPATIENT
Start: 2025-01-23 | End: 2025-01-23

## 2025-01-23 RX ORDER — FAMOTIDINE 10 MG/ML
20 INJECTION, SOLUTION INTRAVENOUS ONCE
Status: COMPLETED | OUTPATIENT
Start: 2025-01-23 | End: 2025-01-23

## 2025-01-23 RX ORDER — DIPHENHYDRAMINE HYDROCHLORIDE 50 MG/ML
INJECTION INTRAMUSCULAR; INTRAVENOUS
Status: COMPLETED
Start: 2025-01-23 | End: 2025-01-23

## 2025-01-23 RX ADMIN — FLUOROURACIL 4700 MG: 50 INJECTION, SOLUTION INTRAVENOUS at 12:15:00

## 2025-01-23 RX ADMIN — DIPHENHYDRAMINE HYDROCHLORIDE 25 MG: 50 INJECTION INTRAMUSCULAR; INTRAVENOUS at 08:42:00

## 2025-01-23 RX ADMIN — FAMOTIDINE 20 MG: 10 INJECTION, SOLUTION INTRAVENOUS at 08:46:00

## 2025-01-23 NOTE — PROGRESS NOTES
Patient is cleared for surgery. Pt here for C22D1 Drug(s)FOLFOX/Trastuzumab.  Arrives Ambulating independently, accompanied by Self and Spouse     Patient was evaluated today by Treatment Nurse.    Oral medications included in this regimen:  no    Patient confirms comprehension of cancer treatment schedule:  yes    Pregnancy screening:  Not applicable    Modifications in dose or schedule:  No    Medications appearance and physical integrity checked by RN: yes.    Chemotherapy IV pump settings verified by 2 RNs:  Yes.  Frequency of blood return and site check throughout administration: Prior to administration, Prior to each drug, and At completion of therapy     Infusion/treatment outcome:  patient tolerated treatment without incident    Education Record    Learner:  Patient and Spouse  Barriers / Limitations:  None  Method:  Brief focused and Discussion  Education / instructions given:  Plan of care for treatment today  Outcome:  Shows understanding    Discharged Home, Ambulating independently, accompanied by:Self and Family member    Patient/family verbalized understanding of future appointments: by MyChart messaging

## 2025-01-25 ENCOUNTER — NURSE ONLY (OUTPATIENT)
Age: 65
End: 2025-01-25
Attending: INTERNAL MEDICINE
Payer: COMMERCIAL

## 2025-01-25 ENCOUNTER — APPOINTMENT (OUTPATIENT)
Age: 65
End: 2025-01-25
Attending: INTERNAL MEDICINE
Payer: COMMERCIAL

## 2025-01-30 ENCOUNTER — APPOINTMENT (OUTPATIENT)
Age: 65
End: 2025-01-30
Attending: INTERNAL MEDICINE
Payer: COMMERCIAL

## 2025-02-04 ENCOUNTER — OFFICE VISIT (OUTPATIENT)
Age: 65
End: 2025-02-04
Attending: INTERNAL MEDICINE
Payer: COMMERCIAL

## 2025-02-04 ENCOUNTER — NURSE ONLY (OUTPATIENT)
Age: 65
End: 2025-02-04
Attending: INTERNAL MEDICINE
Payer: COMMERCIAL

## 2025-02-04 VITALS
SYSTOLIC BLOOD PRESSURE: 126 MMHG | RESPIRATION RATE: 16 BRPM | WEIGHT: 171 LBS | DIASTOLIC BLOOD PRESSURE: 74 MMHG | TEMPERATURE: 98 F | HEIGHT: 69 IN | BODY MASS INDEX: 25.33 KG/M2 | HEART RATE: 75 BPM | OXYGEN SATURATION: 96 %

## 2025-02-04 DIAGNOSIS — C16.9 MALIGNANT NEOPLASM OF STOMACH, UNSPECIFIED LOCATION (HCC): Primary | ICD-10-CM

## 2025-02-04 DIAGNOSIS — T45.1X5A CHEMOTHERAPY-INDUCED NEUROPATHY (HCC): ICD-10-CM

## 2025-02-04 DIAGNOSIS — Z51.11 CHEMOTHERAPY MANAGEMENT, ENCOUNTER FOR: ICD-10-CM

## 2025-02-04 DIAGNOSIS — Z51.11 CHEMOTHERAPY MANAGEMENT, ENCOUNTER FOR: Primary | ICD-10-CM

## 2025-02-04 DIAGNOSIS — C16.9 MALIGNANT NEOPLASM OF STOMACH, UNSPECIFIED LOCATION (HCC): ICD-10-CM

## 2025-02-04 DIAGNOSIS — R79.89 ELEVATED LFTS: ICD-10-CM

## 2025-02-04 DIAGNOSIS — G62.0 CHEMOTHERAPY-INDUCED NEUROPATHY (HCC): ICD-10-CM

## 2025-02-04 LAB
ALBUMIN SERPL-MCNC: 4.6 G/DL (ref 3.2–4.8)
ALBUMIN/GLOB SERPL: 1.5 {RATIO} (ref 1–2)
ALP LIVER SERPL-CCNC: 168 U/L
ALT SERPL-CCNC: 74 U/L
ANION GAP SERPL CALC-SCNC: 9 MMOL/L (ref 0–18)
AST SERPL-CCNC: 56 U/L (ref ?–34)
BASOPHILS # BLD AUTO: 0.01 X10(3) UL (ref 0–0.2)
BASOPHILS NFR BLD AUTO: 0.2 %
BILIRUB SERPL-MCNC: 1.8 MG/DL (ref 0.2–1.1)
BUN BLD-MCNC: 12 MG/DL (ref 9–23)
BUN/CREAT SERPL: 12.8 (ref 10–20)
CALCIUM BLD-MCNC: 9.2 MG/DL (ref 8.7–10.4)
CHLORIDE SERPL-SCNC: 106 MMOL/L (ref 98–112)
CO2 SERPL-SCNC: 27 MMOL/L (ref 21–32)
CREAT BLD-MCNC: 0.94 MG/DL
DEPRECATED RDW RBC AUTO: 43.5 FL (ref 35.1–46.3)
EGFRCR SERPLBLD CKD-EPI 2021: 91 ML/MIN/1.73M2 (ref 60–?)
EOSINOPHIL # BLD AUTO: 0.05 X10(3) UL (ref 0–0.7)
EOSINOPHIL NFR BLD AUTO: 1.2 %
ERYTHROCYTE [DISTWIDTH] IN BLOOD BY AUTOMATED COUNT: 13.4 % (ref 11–15)
GLOBULIN PLAS-MCNC: 3 G/DL (ref 2–3.5)
GLUCOSE BLD-MCNC: 94 MG/DL (ref 70–99)
HCT VFR BLD AUTO: 38.8 %
HGB BLD-MCNC: 13.9 G/DL
IMM GRANULOCYTES # BLD AUTO: 0.01 X10(3) UL (ref 0–1)
IMM GRANULOCYTES NFR BLD: 0.2 %
LYMPHOCYTES # BLD AUTO: 1.94 X10(3) UL (ref 1–4)
LYMPHOCYTES NFR BLD AUTO: 47.4 %
MCH RBC QN AUTO: 32.4 PG (ref 26–34)
MCHC RBC AUTO-ENTMCNC: 35.8 G/DL (ref 31–37)
MCV RBC AUTO: 90.4 FL
MONOCYTES # BLD AUTO: 0.49 X10(3) UL (ref 0.1–1)
MONOCYTES NFR BLD AUTO: 12 %
NEUTROPHILS # BLD AUTO: 1.59 X10 (3) UL (ref 1.5–7.7)
NEUTROPHILS # BLD AUTO: 1.59 X10(3) UL (ref 1.5–7.7)
NEUTROPHILS NFR BLD AUTO: 39 %
OSMOLALITY SERPL CALC.SUM OF ELEC: 294 MOSM/KG (ref 275–295)
PLATELET # BLD AUTO: 108 10(3)UL (ref 150–450)
POTASSIUM SERPL-SCNC: 4.6 MMOL/L (ref 3.5–5.1)
PROT SERPL-MCNC: 7.6 G/DL (ref 5.7–8.2)
RBC # BLD AUTO: 4.29 X10(6)UL
SODIUM SERPL-SCNC: 142 MMOL/L (ref 136–145)
TSI SER-ACNC: 0.7 UIU/ML (ref 0.55–4.78)
WBC # BLD AUTO: 4.1 X10(3) UL (ref 4–11)

## 2025-02-04 RX ORDER — FAMOTIDINE 10 MG/ML
20 INJECTION, SOLUTION INTRAVENOUS ONCE
Status: CANCELLED
Start: 2025-02-06 | End: 2025-02-06

## 2025-02-04 RX ORDER — FLUOROURACIL 50 MG/ML
2400 INJECTION, SOLUTION INTRAVENOUS CONTINUOUS
Status: CANCELLED | OUTPATIENT
Start: 2025-02-06

## 2025-02-04 RX ORDER — DIPHENHYDRAMINE HYDROCHLORIDE 50 MG/ML
25 INJECTION INTRAMUSCULAR; INTRAVENOUS ONCE
Status: CANCELLED
Start: 2025-02-06 | End: 2025-02-06

## 2025-02-04 NOTE — PROGRESS NOTES
Oncology Progress note    Requesting: Dr. Higgins    Chief Complaint: Anemia, gastric cancer    HPI:  65 year old With gastric adenocarcinoma diagnosed 12/18/23 in the setting of iron def anemia.      See below for staging workup    Initiated FOLFOX +Trastuzumab 1/18/24. Added Pembrolizumab with C3 (PDL1 10%).    Oxaliplatin dose reduced to 65 mg/m2 with C6 due to CIPN      Interval History:  Returns for consideration of next Cycle, FOLFOX +Trastuzumab+ Pembrolizumab.  His surveillance CT scan from 11/20/24 shows stable results; c/w favorable treatment. He had a gap in insurance coverage for Jan,2025.    Patient feels some cold neuropathy in the feet; R>L.  He endorses mild nausea for which he does not need oral antiemetics w/o emesis.    He denies fever, infection, bleeding, chest pain, dyspnea, abdominal pain or new concerns on ROS.    Pmh: Arthritis      Social History     Socioeconomic History    Marital status:     Number of children: 2   Occupational History    Occupation:      Employer: [employer]   Tobacco Use    Smoking status: Never    Smokeless tobacco: Never   Vaping Use    Vaping status: Never Used   Substance and Sexual Activity    Alcohol use: No     Alcohol/week: 0.0 standard drinks of alcohol    Drug use: No    Sexual activity: Yes   Other Topics Concern    Caffeine Concern Yes     Comment: coffee, 1 cup daily     Family History   Problem Relation Age of Onset    Prostate Cancer Other      Ros negx12    Nka  Current Outpatient Medications on File Prior to Visit   Medication Sig Dispense Refill    gabapentin 100 MG Oral Cap Take 3 capsules (300 mg total) by mouth nightly. 30 capsule 1    Omeprazole 40 MG Oral Capsule Delayed Release Take 1 capsule (40 mg total) by mouth before breakfast. 90 capsule 1    zolpidem (AMBIEN) 10 MG Oral Tab Take 1 tablet (10 mg total) by mouth nightly as needed for Sleep. 30 tablet 0    acetaminophen-codeine 300-30 MG Oral Tab Take  1 tablet by mouth every 6 (six) hours as needed for Pain. Medication may causes sedation, constipation. Not to operate heavy machinery or drive on medication. 60 tablet 0    prochlorperazine (COMPAZINE) 10 mg tablet Take 1 tablet (10 mg total) by mouth every 6 (six) hours as needed for Nausea. 30 tablet 3    ondansetron (ZOFRAN) 8 MG tablet Take 1 tablet (8 mg total) by mouth every 8 (eight) hours as needed for Nausea. 30 tablet 3     Current Facility-Administered Medications on File Prior to Visit   Medication Dose Route Frequency Provider Last Rate Last Admin    [COMPLETED] diphenhydrAMINE (Benadryl) 50 mg/mL  injection 25 mg  25 mg Intravenous Once Gustavo Díaz MD   25 mg at 01/23/25 0842    [COMPLETED] famotidine (Pepcid) 20 mg/2mL injection 20 mg  20 mg Intravenous Once Gustavo Díaz MD   20 mg at 01/23/25 0846    [COMPLETED] ondansetron (Zofran) 16 mg, dexAMETHasone (Decadron) 20 mg in sodium chloride 0.9% 110 mL IVPB   Intravenous Once Gustavo Díaz MD   Stopped at 01/23/25 0944    [COMPLETED] trastuzumab-qyyp (Trazimera) 315 mg in sodium chloride 0.9% 265 mL infusion  4 mg/kg (Treatment Plan Recorded) Intravenous Once Gustavo Díaz MD   Stopped at 01/23/25 0924    [COMPLETED] oxaliplatin (Eloxatin) 125 mg in dextrose 5% 275 mL infusion  65 mg/m2 (Treatment Plan Recorded) Intravenous Once Gustavo Díaz MD   Stopped at 01/23/25 1210    [COMPLETED] leucovorin 800 mg in dextrose 5% 250 mL infusion  400 mg/m2 (Treatment Plan Recorded) Intravenous Once Gustavo Díaz MD   Stopped at 01/23/25 1210    [COMPLETED] famotidine (Pepcid) 20 mg/2mL injection 20 mg  20 mg Intravenous Once Gustavo Díaz MD   20 mg at 01/09/25 0801    [COMPLETED] ondansetron (Zofran) 16 mg, dexAMETHasone (Decadron) 20 mg in sodium chloride 0.9% 110 mL IVPB   Intravenous Once Gustavo Díaz MD   Stopped at 01/09/25 0819    [COMPLETED] trastuzumab-qyyp (Trazimera) 315 mg in sodium chloride 0.9% 265 mL infusion  4 mg/kg  (Treatment Plan Recorded) Intravenous Once Gustavo Díaz MD   Stopped at 01/09/25 0901    [COMPLETED] oxaliplatin (Eloxatin) 125 mg in dextrose 5% 275 mL infusion  65 mg/m2 (Treatment Plan Recorded) Intravenous Once Gustavo Díaz MD   Stopped at 01/09/25 1200    [COMPLETED] leucovorin 800 mg in dextrose 5% 250 mL infusion  400 mg/m2 (Treatment Plan Recorded) Intravenous Once Gustavo Díaz MD   Stopped at 01/09/25 1200    [COMPLETED] diphenhydrAMINE (Benadryl) 50 mg/mL  injection 25 mg  25 mg Intravenous Once Eh Philippe PA   25 mg at 01/09/25 0950     Vitals:    02/04/25 1344   BP: 126/74   Pulse: 75   Resp: 16   Temp: 98 °F (36.7 °C)           Wt Readings from Last 6 Encounters:   02/04/25 77.6 kg (171 lb)   01/21/25 78.8 kg (173 lb 12.8 oz)   01/07/25 79.8 kg (176 lb)   11/29/24 77.5 kg (170 lb 12.8 oz)   11/26/24 76.8 kg (169 lb 6.4 oz)   11/12/24 76.7 kg (169 lb)       Physical exam:  General:  Awake, alert, oriented in no acute distress today  HEENT - EOMsi, anicteric, MMM  Neck - supple, no LAD, normal ROM  Lungs -  CTA bilaterally  Cor - S1/S2 w/o murmur noted  Abd - soft, non tender non distended, bs+  BLE - no edema  Neuro - DTRs grossly intact    ECOG 2: Ambulatory and capable of all selfcare but unable to carry out any work activities. Up and about more than 50% of waking hours    Lab Results   Component Value Date    WBC 4.1 02/04/2025    RBC 4.29 (L) 02/04/2025    HGB 13.9 02/04/2025    HCT 38.8 (L) 02/04/2025    MCV 90.4 02/04/2025    MCH 32.4 02/04/2025    MCHC 35.8 02/04/2025    RDW 13.4 02/04/2025    .0 (L) 02/04/2025    MPV 8.9 10/31/2018     Lab Results   Component Value Date    GLU 94 02/04/2025    BUN 12 02/04/2025    BUNCREA 12.8 02/04/2025    CREATSERUM 0.94 02/04/2025    ANIONGAP 9 02/04/2025    GFRNAA 86 11/10/2021    GFRAA 99 11/10/2021    CA 9.2 02/04/2025    OSMOCALC 294 02/04/2025    ALKPHO 168 (H) 02/04/2025    AST 56 (H) 02/04/2025    ALT 74 (H) 02/04/2025    ALKPHOS  82 10/08/2015    BILT 1.8 (H) 02/04/2025    TP 7.6 02/04/2025    ALB 4.6 02/04/2025    GLOBULIN 3.0 02/04/2025    AGRATIO 1.4 10/08/2015     02/04/2025    K 4.6 02/04/2025     02/04/2025    CO2 27.0 02/04/2025     Imaging:    N/A      65 year old  With gastric adenocarcinoma diagnosed 12/18/23 in the setting of iron def anemia. Her positive (3+) MSI-S PDL1 10%    1.) Gastric cancer, stage IV  -- Widespread kevin disease reviewed at tumor conference by Dr. Amaro with surgical oncology will plan for upfront systemic therapy with 5-FU oxaliplatin based regimen potentially with trastuzumab and pembrolizumab  -Initiated FOLFOX plus trastuzumab on 1/18/23   -Delayed Cycle #5 x 1 week d/t worsening LFT elevation   -Cycle #6 with dose reduction of Oxaliplatin d/t CIPN worsening and LFTs as below.    -CT imaging after C4 3/2024 with favorable treatment response.   -PDL1 10% 1/2024. Added Pembrolizumab  as >1% per guidelines. q6week scheduling-initiated with C3 above.   -DELAY Cycle 8 d/t TCP <75K.    - reviewed the pt's current CT scan from above early June; favorable treatment response, continue current systemic treatment    -surveillance CT scan from 8/24 w/ stable findings; c/w favorable tx response, continue current management    -cycle 16 FOLFOX plus trastuzumab plus Pembrolizumab was deferred for 2 wks due to side effects from systemic tx, referred to palliative care to help. Now feels improved to continue with same dosing     --pt is working with  to determine his work limitations     --explained that we will nee to modified his treatments so that they're sustainable; possibly dose reduced oxaliplatin to 50, 5-FU to 2200 and dec trastuzumab to 3mg/kg    --surveillance CT scan from 11/2024 shows stable disease  --Pembrolizumab+oxaliplatin were being held in light of elevated LFT's which are improving off tx    --proceed with cycle 23 of FOLFOX with trastuzumab +Pembrolizumab(every 6 wks); orders  placed for surveillance CT scan and ECHO prior to cycle 24    -tx will be held if platelets <75K. I have discontinued the 5-FU bolus as this is likely contributing to the chemotherapy induced thrombocytopenia    -Will repeat ECHO and CT scans (every 3mo)    2.)Iron deficiency anemia    --s/p Feraheme x 2, last dose on 2/1/24, anemia resolved    3.) Chemotherapy induced thrombocytopenia  --Thrombocytopenia secondary to chemo, ok to treat if plts > 75k.    --Delayed C9 as above. Cycle 11 now delayed x 2 weeks; Ok to treat with plts >75K.    Monitor, may need to dose reduce chemo further    4.)Transaminitis   --improved gr 1, T. Bili 1.5. 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   --Delay of C9 d/t Tcp with imrpovement to gr1 CIPN to fingers without ADL limitations.   --Improving given omission of Oxaliplatin.   --rec trial of gabapentin trial at 100 mg at night; explained to pt that this will cause drowsiness and we can titrate up to 300 mg at night, continue this same dose for now    6.)risk of cardiotoxicity  --Normal EF/echo prior to treatment 1/2024. 5/2024 ECHO repeat stable/improved.    --H/o fleeting chest discomfort reported, currently resolved, without any other Aes. Monitor w/ ECHO every 3 mo    7.) BANDAR  --gr. 1, tolerable.  Prn antiemetics encouraged    9.)H/o skin rash  --Resolved. Supportive care with topical creams    10.)Prostate Enlargement  --noted on imaging; likely BPH, pt denies nocturia or dribbling symptoms        [facility]    Gustavo Díaz MD  [facility]  [address]. [facility], [place] [address]

## 2025-02-06 ENCOUNTER — OFFICE VISIT (OUTPATIENT)
Age: 65
End: 2025-02-06
Attending: INTERNAL MEDICINE
Payer: COMMERCIAL

## 2025-02-06 VITALS
RESPIRATION RATE: 16 BRPM | SYSTOLIC BLOOD PRESSURE: 117 MMHG | TEMPERATURE: 98 F | HEART RATE: 92 BPM | OXYGEN SATURATION: 96 % | DIASTOLIC BLOOD PRESSURE: 64 MMHG

## 2025-02-06 DIAGNOSIS — C16.9 MALIGNANT NEOPLASM OF STOMACH, UNSPECIFIED LOCATION (HCC): Primary | ICD-10-CM

## 2025-02-06 RX ORDER — FAMOTIDINE 10 MG/ML
INJECTION, SOLUTION INTRAVENOUS
Status: COMPLETED
Start: 2025-02-06 | End: 2025-02-06

## 2025-02-06 RX ORDER — FLUOROURACIL 50 MG/ML
2400 INJECTION, SOLUTION INTRAVENOUS CONTINUOUS
Status: DISCONTINUED | OUTPATIENT
Start: 2025-02-06 | End: 2025-02-06

## 2025-02-06 RX ORDER — FAMOTIDINE 10 MG/ML
20 INJECTION, SOLUTION INTRAVENOUS ONCE
Status: COMPLETED | OUTPATIENT
Start: 2025-02-06 | End: 2025-02-06

## 2025-02-06 RX ORDER — DIPHENHYDRAMINE HYDROCHLORIDE 50 MG/ML
25 INJECTION INTRAMUSCULAR; INTRAVENOUS ONCE
Status: COMPLETED | OUTPATIENT
Start: 2025-02-06 | End: 2025-02-06

## 2025-02-06 RX ORDER — DIPHENHYDRAMINE HYDROCHLORIDE 50 MG/ML
INJECTION INTRAMUSCULAR; INTRAVENOUS
Status: COMPLETED
Start: 2025-02-06 | End: 2025-02-06

## 2025-02-06 RX ADMIN — DIPHENHYDRAMINE HYDROCHLORIDE 25 MG: 50 INJECTION INTRAMUSCULAR; INTRAVENOUS at 08:56:00

## 2025-02-06 RX ADMIN — FLUOROURACIL 4650 MG: 50 INJECTION, SOLUTION INTRAVENOUS at 13:00:00

## 2025-02-06 RX ADMIN — FAMOTIDINE 20 MG: 10 INJECTION, SOLUTION INTRAVENOUS at 08:53:00

## 2025-02-06 NOTE — PROGRESS NOTES
Pt here for C23D1 Drug(s)FOLFOX/Trastuzumab.  Arrives Ambulating independently, accompanied by Self and Spouse     Patient was evaluated today by Treatment Nurse.    Oral medications included in this regimen:  no    Patient confirms comprehension of cancer treatment schedule:  yes    Pregnancy screening:  Not applicable    Modifications in dose or schedule:  No    Medications appearance and physical integrity checked by RN: yes.    Chemotherapy IV pump settings verified by 2 RNs:  Yes.  Frequency of blood return and site check throughout administration: Prior to administration, Prior to each drug, and At completion of therapy     Infusion/treatment outcome:  patient tolerated treatment without incident    Education Record    Learner:  Patient and Spouse  Barriers / Limitations:  None  Method:  Brief focused and Discussion  Education / instructions given:  Plan of care for treatment today  Outcome:  Shows understanding    Discharged Home, Ambulating independently, accompanied by:Self and Family member    Patient/family verbalized understanding of future appointments: by MyChart messaging

## 2025-02-08 ENCOUNTER — NURSE ONLY (OUTPATIENT)
Age: 65
End: 2025-02-08
Attending: INTERNAL MEDICINE
Payer: COMMERCIAL

## 2025-02-08 VITALS
HEART RATE: 10 BPM | TEMPERATURE: 99 F | DIASTOLIC BLOOD PRESSURE: 77 MMHG | OXYGEN SATURATION: 96 % | SYSTOLIC BLOOD PRESSURE: 123 MMHG | RESPIRATION RATE: 18 BRPM

## 2025-02-08 NOTE — PROGRESS NOTES
Pt here to dc cadd pump  Reports he usually has limb pains post chemo, but has been more intense  Took his pain med with some relief - rates now 6-7/10, was 9 or so/10  Staying hydrated, eating, denies other adverse SE's or symptoms  Limbs are not red or swollen    Dc cadd pump, flushed port and de-accessed, 2x2 gauze/ paper tape to site  Tolerated well    Discharged stable to home with future appts  Gait steady, indep

## 2025-02-17 ENCOUNTER — HOSPITAL ENCOUNTER (OUTPATIENT)
Dept: CT IMAGING | Facility: HOSPITAL | Age: 65
Discharge: HOME | End: 2025-02-17
Attending: INTERNAL MEDICINE
Payer: COMMERCIAL

## 2025-02-17 DIAGNOSIS — C16.9 MALIGNANT NEOPLASM OF STOMACH, UNSPECIFIED LOCATION (HCC): ICD-10-CM

## 2025-02-17 DIAGNOSIS — Z51.11 CHEMOTHERAPY MANAGEMENT, ENCOUNTER FOR: ICD-10-CM

## 2025-02-17 PROCEDURE — 71260 CT THORAX DX C+: CPT | Performed by: INTERNAL MEDICINE

## 2025-02-17 PROCEDURE — 74177 CT ABD & PELVIS W/CONTRAST: CPT | Performed by: INTERNAL MEDICINE

## 2025-02-18 ENCOUNTER — OFFICE VISIT (OUTPATIENT)
Age: 65
End: 2025-02-18
Attending: INTERNAL MEDICINE
Payer: COMMERCIAL

## 2025-02-18 ENCOUNTER — HOSPITAL ENCOUNTER (OUTPATIENT)
Dept: CV DIAGNOSTICS | Facility: HOSPITAL | Age: 65
Discharge: HOME | End: 2025-02-18
Attending: INTERNAL MEDICINE
Payer: COMMERCIAL

## 2025-02-18 ENCOUNTER — NURSE ONLY (OUTPATIENT)
Age: 65
End: 2025-02-18
Attending: INTERNAL MEDICINE
Payer: COMMERCIAL

## 2025-02-18 VITALS
TEMPERATURE: 98 F | WEIGHT: 170.38 LBS | SYSTOLIC BLOOD PRESSURE: 120 MMHG | DIASTOLIC BLOOD PRESSURE: 76 MMHG | RESPIRATION RATE: 18 BRPM | HEIGHT: 69 IN | BODY MASS INDEX: 25.23 KG/M2 | HEART RATE: 75 BPM | OXYGEN SATURATION: 96 %

## 2025-02-18 DIAGNOSIS — C16.9 MALIGNANT NEOPLASM OF STOMACH, UNSPECIFIED LOCATION (HCC): ICD-10-CM

## 2025-02-18 DIAGNOSIS — Z51.11 CHEMOTHERAPY MANAGEMENT, ENCOUNTER FOR: Primary | ICD-10-CM

## 2025-02-18 DIAGNOSIS — C16.9 MALIGNANT NEOPLASM OF STOMACH, UNSPECIFIED LOCATION (HCC): Primary | ICD-10-CM

## 2025-02-18 DIAGNOSIS — D70.1 CHEMOTHERAPY INDUCED NEUTROPENIA: ICD-10-CM

## 2025-02-18 DIAGNOSIS — R79.89 ELEVATED LFTS: ICD-10-CM

## 2025-02-18 DIAGNOSIS — Z51.11 CHEMOTHERAPY MANAGEMENT, ENCOUNTER FOR: ICD-10-CM

## 2025-02-18 DIAGNOSIS — T45.1X5A CHEMOTHERAPY INDUCED NEUTROPENIA: ICD-10-CM

## 2025-02-18 LAB
ALBUMIN SERPL-MCNC: 4.2 G/DL (ref 3.2–4.8)
ALBUMIN/GLOB SERPL: 1.3 {RATIO} (ref 1–2)
ALP LIVER SERPL-CCNC: 155 U/L
ALT SERPL-CCNC: 86 U/L
ANION GAP SERPL CALC-SCNC: 9 MMOL/L (ref 0–18)
AST SERPL-CCNC: 72 U/L (ref ?–34)
BASOPHILS # BLD AUTO: 0.02 X10(3) UL (ref 0–0.2)
BASOPHILS NFR BLD AUTO: 0.7 %
BILIRUB SERPL-MCNC: 1.5 MG/DL (ref 0.2–1.1)
BUN BLD-MCNC: 14 MG/DL (ref 9–23)
BUN/CREAT SERPL: 16.1 (ref 10–20)
CALCIUM BLD-MCNC: 9 MG/DL (ref 8.7–10.4)
CHLORIDE SERPL-SCNC: 102 MMOL/L (ref 98–112)
CO2 SERPL-SCNC: 27 MMOL/L (ref 21–32)
CREAT BLD-MCNC: 0.87 MG/DL
DEPRECATED RDW RBC AUTO: 44.7 FL (ref 35.1–46.3)
EGFRCR SERPLBLD CKD-EPI 2021: 96 ML/MIN/1.73M2 (ref 60–?)
EOSINOPHIL # BLD AUTO: 0.07 X10(3) UL (ref 0–0.7)
EOSINOPHIL NFR BLD AUTO: 2.4 %
ERYTHROCYTE [DISTWIDTH] IN BLOOD BY AUTOMATED COUNT: 13.7 % (ref 11–15)
GLOBULIN PLAS-MCNC: 3.3 G/DL (ref 2–3.5)
GLUCOSE BLD-MCNC: 94 MG/DL (ref 70–99)
HCT VFR BLD AUTO: 35.4 %
HGB BLD-MCNC: 12.4 G/DL
IMM GRANULOCYTES # BLD AUTO: 0 X10(3) UL (ref 0–1)
IMM GRANULOCYTES NFR BLD: 0 %
LYMPHOCYTES # BLD AUTO: 1.63 X10(3) UL (ref 1–4)
LYMPHOCYTES NFR BLD AUTO: 56.8 %
MCH RBC QN AUTO: 31.6 PG (ref 26–34)
MCHC RBC AUTO-ENTMCNC: 35 G/DL (ref 31–37)
MCV RBC AUTO: 90.1 FL
MONOCYTES # BLD AUTO: 0.51 X10(3) UL (ref 0.1–1)
MONOCYTES NFR BLD AUTO: 17.8 %
NEUTROPHILS # BLD AUTO: 0.64 X10 (3) UL (ref 1.5–7.7)
NEUTROPHILS # BLD AUTO: 0.64 X10(3) UL (ref 1.5–7.7)
NEUTROPHILS NFR BLD AUTO: 22.3 %
OSMOLALITY SERPL CALC.SUM OF ELEC: 286 MOSM/KG (ref 275–295)
PLATELET # BLD AUTO: 109 10(3)UL (ref 150–450)
POTASSIUM SERPL-SCNC: 4.2 MMOL/L (ref 3.5–5.1)
PROT SERPL-MCNC: 7.5 G/DL (ref 5.7–8.2)
RBC # BLD AUTO: 3.93 X10(6)UL
SODIUM SERPL-SCNC: 138 MMOL/L (ref 136–145)
TSI SER-ACNC: 0.7 UIU/ML (ref 0.55–4.78)
WBC # BLD AUTO: 2.9 X10(3) UL (ref 4–11)

## 2025-02-18 PROCEDURE — 93306 TTE W/DOPPLER COMPLETE: CPT | Performed by: INTERNAL MEDICINE

## 2025-02-18 PROCEDURE — 85060 BLOOD SMEAR INTERPRETATION: CPT

## 2025-02-18 NOTE — PROGRESS NOTES
Oncology Progress note    Requesting: Dr. Higgins    Chief Complaint: Anemia, gastric cancer    HPI:  65 year old With gastric adenocarcinoma diagnosed 12/18/23 in the setting of iron def anemia.      See below for staging workup    Initiated FOLFOX +Trastuzumab 1/18/24. Added Pembrolizumab with C3 (PDL1 10%).    Oxaliplatin dose reduced to 65 mg/m2 with C6 due to CIPN      Interval History:  Returns for consideration of next Cycle, FOLFOX +Trastuzumab+ Pembrolizumab.  His surveillance CT scan from yesterday shows stable results; c/w favorable treatment.     Patient feels some mild sore throat symptoms that started 1 wk ago affecting how he eats and drinks, no associated fevers. 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  He endorses mild nausea for which he does not need oral antiemetics w/o emesis.    He denies fever, infection, bleeding, chest pain, dyspnea, abdominal pain or new concerns on ROS.    Pmh: Arthritis      Social History     Socioeconomic History    Marital status:     Number of children: 2   Occupational History    Occupation: Food service     Employer: [employer]   Tobacco Use    Smoking status: Never    Smokeless tobacco: Never   Vaping Use    Vaping status: Never Used   Substance and Sexual Activity    Alcohol use: No     Alcohol/week: 0.0 standard drinks of alcohol    Drug use: No    Sexual activity: Yes   Other Topics Concern    Caffeine Concern Yes     Comment: coffee, 1 cup daily     Family History   Problem Relation Age of Onset    Prostate Cancer Other      Ros [id number]    Nka  Current Outpatient Medications on File Prior to Visit   Medication Sig Dispense Refill    gabapentin 100 MG Oral Cap Take 3 capsules (300 mg total) by mouth nightly. 30 capsule 1    Omeprazole 40 MG Oral Capsule Delayed Release Take 1 capsule (40 mg total) by mouth before breakfast. 90 capsule 1    zolpidem (AMBIEN) 10 MG Oral Tab Take 1 tablet (10 mg total) by mouth nightly as needed for  Sleep. 30 tablet 0    acetaminophen-codeine 300-30 MG Oral Tab Take 1 tablet by mouth every 6 (six) hours as needed for Pain. Medication may causes sedation, constipation. Not to operate heavy machinery or drive on medication. 60 tablet 0    prochlorperazine (COMPAZINE) 10 mg tablet Take 1 tablet (10 mg total) by mouth every 6 (six) hours as needed for Nausea. 30 tablet 3    ondansetron (ZOFRAN) 8 MG tablet Take 1 tablet (8 mg total) by mouth every 8 (eight) hours as needed for Nausea. 30 tablet 3     Current Facility-Administered Medications on File Prior to Visit   Medication Dose Route Frequency Provider Last Rate Last Admin    [COMPLETED] iopamidol 76% (ISOVUE-370) injection for power injector  80 mL Intravenous ONCE PRN Gustavo Díaz MD   80 mL at 02/17/25 0915    [COMPLETED] diphenhydrAMINE (Benadryl) 50 mg/mL  injection 25 mg  25 mg Intravenous Once Gustavo Díaz MD   25 mg at 02/06/25 0856    [COMPLETED] famotidine (Pepcid) 20 mg/2mL injection 20 mg  20 mg Intravenous Once Gustavo Díaz MD   20 mg at 02/06/25 0853    [COMPLETED] ondansetron (Zofran) 16 mg, dexAMETHasone (Decadron) 20 mg in sodium chloride 0.9% 110 mL IVPB   Intravenous Once Gustavo Díaz MD   Stopped at 02/06/25 0911    [COMPLETED] pembrolizumab (Keytruda) 400 mg in sodium chloride 0.9% 116 mL IVPB  400 mg Intravenous Once Gustavo Díaz MD   Stopped at 02/06/25 0945    [COMPLETED] trastuzumab-qyyp (Trazimera) 315 mg in sodium chloride 0.9% 265 mL infusion  4 mg/kg (Treatment Plan Recorded) Intravenous Once Gustavo Díaz MD   Stopped at 02/06/25 1026    [COMPLETED] oxaliplatin (Eloxatin) 125 mg in dextrose 5% 275 mL infusion  65 mg/m2 (Treatment Plan Recorded) Intravenous Once Gustavo Díaz MD   Stopped at 02/06/25 1250    [COMPLETED] leucovorin 750 mg in dextrose 5% 250 mL infusion  400 mg/m2 (Treatment Plan Recorded) Intravenous Once Gustavo Díaz MD   Stopped at 02/06/25 1250    [COMPLETED] diphenhydrAMINE (Benadryl) 50  mg/mL  injection 25 mg  25 mg Intravenous Once Gustavo Díaz MD   25 mg at 01/23/25 0842    [COMPLETED] famotidine (Pepcid) 20 mg/2mL injection 20 mg  20 mg Intravenous Once Gustavo Díaz MD   20 mg at 01/23/25 0846    [COMPLETED] ondansetron (Zofran) 16 mg, dexAMETHasone (Decadron) 20 mg in sodium chloride 0.9% 110 mL IVPB   Intravenous Once Gustavo Díaz MD   Stopped at 01/23/25 0944    [COMPLETED] trastuzumab-qyyp (Trazimera) 315 mg in sodium chloride 0.9% 265 mL infusion  4 mg/kg (Treatment Plan Recorded) Intravenous Once Gustavo Díaz MD   Stopped at 01/23/25 0924    [COMPLETED] oxaliplatin (Eloxatin) 125 mg in dextrose 5% 275 mL infusion  65 mg/m2 (Treatment Plan Recorded) Intravenous Once Gustavo Díaz MD   Stopped at 01/23/25 1210    [COMPLETED] leucovorin 800 mg in dextrose 5% 250 mL infusion  400 mg/m2 (Treatment Plan Recorded) Intravenous Once Gustavo Díaz MD   Stopped at 01/23/25 1210     Vitals:    02/18/25 1336   BP: 120/76   Pulse: 75   Resp: 18   Temp: 98.3 °F (36.8 °C)           Wt Readings from Last 6 Encounters:   02/18/25 77.3 kg (170 lb 6.4 oz)   02/04/25 77.6 kg (171 lb)   01/21/25 78.8 kg (173 lb 12.8 oz)   01/07/25 79.8 kg (176 lb)   11/29/24 77.5 kg (170 lb 12.8 oz)   11/26/24 76.8 kg (169 lb 6.4 oz)       Physical exam:  General:  Awake, alert, oriented in no acute distress today  HEENT - EOMsi, anicteric, MMM  Neck - supple, no LAD, normal ROM  Lungs -  CTA bilaterally  Cor - S1/S2 w/o murmur noted  Abd - soft, non tender non distended, bs+  BLE - no edema  Neuro - DTRs grossly intact    ECOG 2: Ambulatory and capable of all selfcare but unable to carry out any work activities. Up and about more than 50% of waking hours    Lab Results   Component Value Date    WBC 2.9 (L) 02/18/2025    RBC 3.93 (L) 02/18/2025    HGB 12.4 (L) 02/18/2025    HCT 35.4 (L) 02/18/2025    MCV 90.1 02/18/2025    MCH 31.6 02/18/2025    MCHC 35.0 02/18/2025    RDW 13.7 02/18/2025    .0 (L)  02/18/2025    MPV 8.9 10/31/2018     Lab Results   Component Value Date    GLU 94 02/18/2025    BUN 14 02/18/2025    BUNCREA 16.1 02/18/2025    CREATSERUM 0.87 02/18/2025    ANIONGAP 9 02/18/2025    GFRNAA 86 11/10/2021    GFRAA 99 11/10/2021    CA 9.0 02/18/2025    OSMOCALC 286 02/18/2025    ALKPHO 155 (H) 02/18/2025    AST 72 (H) 02/18/2025    ALT 86 (H) 02/18/2025    ALKPHOS 82 10/08/2015    BILT 1.5 (H) 02/18/2025    TP 7.5 02/18/2025    ALB 4.2 02/18/2025    GLOBULIN 3.3 02/18/2025    AGRATIO 1.4 10/08/2015     02/18/2025    K 4.2 02/18/2025     02/18/2025    CO2 27.0 02/18/2025     Imaging:    N/A      65 year old  With gastric adenocarcinoma diagnosed 12/18/23 in the setting of iron def anemia. Her positive (3+) MSI-S PDL1 10%    1.) Gastric cancer, stage IV  -- Widespread kevin disease reviewed at tumor conference by Dr. Amaro with surgical oncology will plan for upfront systemic therapy with 5-FU oxaliplatin based regimen potentially with trastuzumab and pembrolizumab  -Initiated FOLFOX plus trastuzumab on 1/18/23   -Delayed Cycle #5 x 1 week d/t worsening LFT elevation   -Cycle #6 with dose reduction of Oxaliplatin d/t CIPN worsening and LFTs as below.    -CT imaging after C4 3/2024 with favorable treatment response.   -PDL1 10% 1/2024. Added Pembrolizumab  as >1% per guidelines. q6week scheduling-initiated with C3 above.   -DELAY Cycle 8 d/t TCP <75K.    - reviewed the pt's current CT scan from above early June; favorable treatment response, continue current systemic treatment    -surveillance CT scan from 8/24 w/ stable findings; c/w favorable tx response, continue current management    -cycle 16 FOLFOX plus trastuzumab plus Pembrolizumab was deferred for 2 wks due to side effects from systemic tx, referred to palliative care to help. Now feels improved to continue with same dosing     --pt is working with  to determine his work limitations     --explained that we will nee to  modified his treatments so that they're sustainable; possibly dose reduced oxaliplatin to 50, 5-FU to 2200 and dec trastuzumab to 3mg/kg    --surveillance CT scan from 11/2024 shows stable disease  --Pembrolizumab+oxaliplatin were being held in light of elevated LFT's which are improving off tx    --DEFER cycle 24 by 1 wk due to neutropenia; FOLFOX with trastuzumab +Pembrolizumab(every 6 wks);     --reviewed with him that his surveillance CT scan shows stable post operative changes; no signs of progression. The new splenomegaly may be from cell turnover. Due for repeat ECHO today    -tx will be held if platelets <75K. I have discontinued the 5-FU bolus as this is likely contributing to the chemotherapy induced thrombocytopenia    -following w/ ECHO and CT scans (every 3mo)    2.)Iron deficiency anemia    --s/p Feraheme x 2, last dose on 2/1/24, anemia resolved    3.) Chemotherapy induced thrombocytopenia  --Thrombocytopenia secondary to chemo, ok to treat if plts > 75k.    --Delayed C9 as above. Cycle 11 now delayed x 2 weeks; Ok to treat with plts >75K.    Monitor, may need to dose reduce chemo further    4.)Transaminitis   --improved gr 1, T. Bili 1.5. 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   --Delay of C9 d/t Tcp with imrpovement to gr1 CIPN to fingers without ADL limitations.   --Improving given omission of Oxaliplatin.   --rec trial of gabapentin trial at 100 mg at night; explained to pt that this will cause drowsiness and we can titrate up to 300 mg at night, continue this same dose for now    6.)risk of cardiotoxicity  --Normal EF/echo prior to treatment 1/2024. 5/2024 ECHO repeat stable/improved.    --H/o fleeting chest discomfort reported, currently resolved, without any other Aes.  Monitor w/ ECHO every 3 mo    7.) BANDAR  --gr. 1, tolerable.  Prn antiemetics encouraged    9.)H/o skin rash  --Resolved. Supportive care with topical creams    10.)Prostate Enlargement  --noted on imaging; likely BPH, pt denies nocturia or dribbling symptoms        MDM - High    Gustavo Díaz MD  [facility]  [address], [place] [address]

## 2025-02-19 ENCOUNTER — APPOINTMENT (OUTPATIENT)
Age: 65
End: 2025-02-19
Attending: INTERNAL MEDICINE
Payer: COMMERCIAL

## 2025-02-20 ENCOUNTER — APPOINTMENT (OUTPATIENT)
Age: 65
End: 2025-02-20
Attending: INTERNAL MEDICINE
Payer: COMMERCIAL

## 2025-02-22 ENCOUNTER — APPOINTMENT (OUTPATIENT)
Age: 65
End: 2025-02-22
Attending: INTERNAL MEDICINE
Payer: COMMERCIAL

## 2025-02-25 ENCOUNTER — APPOINTMENT (OUTPATIENT)
Age: 65
End: 2025-02-25
Attending: INTERNAL MEDICINE
Payer: COMMERCIAL

## 2025-02-25 ENCOUNTER — LAB ENCOUNTER (OUTPATIENT)
Dept: LAB | Age: 65
End: 2025-02-25
Attending: INTERNAL MEDICINE
Payer: COMMERCIAL

## 2025-02-25 DIAGNOSIS — C16.9 MALIGNANT NEOPLASM OF STOMACH, UNSPECIFIED LOCATION (HCC): ICD-10-CM

## 2025-02-25 DIAGNOSIS — Z51.11 CHEMOTHERAPY MANAGEMENT, ENCOUNTER FOR: ICD-10-CM

## 2025-02-25 LAB
ALBUMIN SERPL-MCNC: 4.3 G/DL (ref 3.2–4.8)
ALBUMIN/GLOB SERPL: 1.2 {RATIO} (ref 1–2)
ALP LIVER SERPL-CCNC: 150 U/L
ALT SERPL-CCNC: 104 U/L
ANION GAP SERPL CALC-SCNC: 9 MMOL/L (ref 0–18)
AST SERPL-CCNC: 78 U/L (ref ?–34)
BASOPHILS # BLD AUTO: 0.06 X10(3) UL (ref 0–0.2)
BASOPHILS NFR BLD AUTO: 1.9 %
BILIRUB SERPL-MCNC: 1.6 MG/DL (ref 0.2–1.1)
BUN BLD-MCNC: 13 MG/DL (ref 9–23)
BUN/CREAT SERPL: 14 (ref 10–20)
CALCIUM BLD-MCNC: 9.5 MG/DL (ref 8.7–10.4)
CHLORIDE SERPL-SCNC: 103 MMOL/L (ref 98–112)
CO2 SERPL-SCNC: 27 MMOL/L (ref 21–32)
CREAT BLD-MCNC: 0.93 MG/DL
DEPRECATED RDW RBC AUTO: 46.8 FL (ref 35.1–46.3)
EGFRCR SERPLBLD CKD-EPI 2021: 92 ML/MIN/1.73M2 (ref 60–?)
EOSINOPHIL # BLD AUTO: 0.05 X10(3) UL (ref 0–0.7)
EOSINOPHIL NFR BLD AUTO: 1.6 %
ERYTHROCYTE [DISTWIDTH] IN BLOOD BY AUTOMATED COUNT: 14.1 % (ref 11–15)
FASTING STATUS PATIENT QL REPORTED: YES
GLOBULIN PLAS-MCNC: 3.5 G/DL (ref 2–3.5)
GLUCOSE BLD-MCNC: 94 MG/DL (ref 70–99)
HCT VFR BLD AUTO: 41.6 %
HGB BLD-MCNC: 14.1 G/DL
IMM GRANULOCYTES # BLD AUTO: 0.04 X10(3) UL (ref 0–1)
IMM GRANULOCYTES NFR BLD: 1.3 %
LYMPHOCYTES # BLD AUTO: 1.64 X10(3) UL (ref 1–4)
LYMPHOCYTES NFR BLD AUTO: 51.3 %
MCH RBC QN AUTO: 31.1 PG (ref 26–34)
MCHC RBC AUTO-ENTMCNC: 33.9 G/DL (ref 31–37)
MCV RBC AUTO: 91.6 FL
MONOCYTES # BLD AUTO: 0.59 X10(3) UL (ref 0.1–1)
MONOCYTES NFR BLD AUTO: 18.4 %
NEUTROPHILS # BLD AUTO: 0.82 X10 (3) UL (ref 1.5–7.7)
NEUTROPHILS # BLD AUTO: 0.82 X10(3) UL (ref 1.5–7.7)
NEUTROPHILS NFR BLD AUTO: 25.5 %
OSMOLALITY SERPL CALC.SUM OF ELEC: 288 MOSM/KG (ref 275–295)
PLATELET # BLD AUTO: 112 10(3)UL (ref 150–450)
POTASSIUM SERPL-SCNC: 4.1 MMOL/L (ref 3.5–5.1)
PROT SERPL-MCNC: 7.8 G/DL (ref 5.7–8.2)
RBC # BLD AUTO: 4.54 X10(6)UL
SODIUM SERPL-SCNC: 139 MMOL/L (ref 136–145)
TSI SER-ACNC: 1.19 UIU/ML (ref 0.55–4.78)
WBC # BLD AUTO: 3.2 X10(3) UL (ref 4–11)

## 2025-02-25 PROCEDURE — 36415 COLL VENOUS BLD VENIPUNCTURE: CPT

## 2025-02-25 PROCEDURE — 85025 COMPLETE CBC W/AUTO DIFF WBC: CPT

## 2025-02-25 PROCEDURE — 84443 ASSAY THYROID STIM HORMONE: CPT

## 2025-02-25 PROCEDURE — 80053 COMPREHEN METABOLIC PANEL: CPT

## 2025-02-26 ENCOUNTER — TELEPHONE (OUTPATIENT)
Age: 65
End: 2025-02-26

## 2025-02-26 NOTE — TELEPHONE ENCOUNTER
Call transferred from infusion scheduling. Brenda asking why the patient's treatment is being rescheduled. Told her Dr. Díaz reviewed the patient's labs and stated \"his ANC is still low. Defer treatment another week. Have the patient get labs and see XOCHITL Sanchez on Wednesday, 3/05\". Reiterated to her that the patient's ANC is still too low to give treatment and hopefully another week will help his counts recover. Reminded her and the patient to practice neutropenic precautions (avoid sick contacts, hand hygiene) and call if he's not feeling well. They verbalized understanding and thanked me for the call.

## 2025-02-27 ENCOUNTER — APPOINTMENT (OUTPATIENT)
Age: 65
End: 2025-02-27
Attending: INTERNAL MEDICINE
Payer: COMMERCIAL

## 2025-03-01 ENCOUNTER — APPOINTMENT (OUTPATIENT)
Age: 65
End: 2025-03-01
Attending: INTERNAL MEDICINE
Payer: COMMERCIAL

## 2025-03-05 ENCOUNTER — NURSE ONLY (OUTPATIENT)
Age: 65
End: 2025-03-05
Attending: INTERNAL MEDICINE
Payer: COMMERCIAL

## 2025-03-05 ENCOUNTER — OFFICE VISIT (OUTPATIENT)
Age: 65
End: 2025-03-05
Attending: INTERNAL MEDICINE
Payer: COMMERCIAL

## 2025-03-05 VITALS
RESPIRATION RATE: 16 BRPM | DIASTOLIC BLOOD PRESSURE: 77 MMHG | OXYGEN SATURATION: 96 % | SYSTOLIC BLOOD PRESSURE: 118 MMHG | HEIGHT: 69 IN | BODY MASS INDEX: 25.15 KG/M2 | TEMPERATURE: 98 F | HEART RATE: 91 BPM | WEIGHT: 169.81 LBS

## 2025-03-05 DIAGNOSIS — T45.1X5A CHEMOTHERAPY-INDUCED NAUSEA: ICD-10-CM

## 2025-03-05 DIAGNOSIS — Z51.11 CHEMOTHERAPY MANAGEMENT, ENCOUNTER FOR: ICD-10-CM

## 2025-03-05 DIAGNOSIS — R79.89 ELEVATED LFTS: ICD-10-CM

## 2025-03-05 DIAGNOSIS — G62.0 CHEMOTHERAPY-INDUCED NEUROPATHY (HCC): ICD-10-CM

## 2025-03-05 DIAGNOSIS — G62.0 PERIPHERAL NEUROPATHY DUE TO CHEMOTHERAPY (HCC): ICD-10-CM

## 2025-03-05 DIAGNOSIS — T45.1X5A CHEMOTHERAPY-INDUCED NEUROPATHY (HCC): ICD-10-CM

## 2025-03-05 DIAGNOSIS — Z79.899 ENCOUNTER FOR MONITORING CARDIOTOXIC DRUG THERAPY: ICD-10-CM

## 2025-03-05 DIAGNOSIS — C16.9 MALIGNANT NEOPLASM OF STOMACH, UNSPECIFIED LOCATION (HCC): ICD-10-CM

## 2025-03-05 DIAGNOSIS — C16.9 MALIGNANT NEOPLASM OF STOMACH, UNSPECIFIED LOCATION (HCC): Primary | ICD-10-CM

## 2025-03-05 DIAGNOSIS — D69.59 CHEMOTHERAPY-INDUCED THROMBOCYTOPENIA: ICD-10-CM

## 2025-03-05 DIAGNOSIS — T45.1X5A PERIPHERAL NEUROPATHY DUE TO CHEMOTHERAPY (HCC): ICD-10-CM

## 2025-03-05 DIAGNOSIS — R11.0 CHEMOTHERAPY-INDUCED NAUSEA: ICD-10-CM

## 2025-03-05 DIAGNOSIS — Z51.81 ENCOUNTER FOR MONITORING CARDIOTOXIC DRUG THERAPY: ICD-10-CM

## 2025-03-05 DIAGNOSIS — T45.1X5A CHEMOTHERAPY-INDUCED THROMBOCYTOPENIA: ICD-10-CM

## 2025-03-05 DIAGNOSIS — Z87.19 HISTORY OF INDIGESTION: ICD-10-CM

## 2025-03-05 DIAGNOSIS — Z51.11 CHEMOTHERAPY MANAGEMENT, ENCOUNTER FOR: Primary | ICD-10-CM

## 2025-03-05 LAB
ALBUMIN SERPL-MCNC: 4.4 G/DL (ref 3.2–4.8)
ALBUMIN/GLOB SERPL: 1.3 {RATIO} (ref 1–2)
ALP LIVER SERPL-CCNC: 147 U/L
ALT SERPL-CCNC: 94 U/L
ANION GAP SERPL CALC-SCNC: 8 MMOL/L (ref 0–18)
AST SERPL-CCNC: 65 U/L (ref ?–34)
BASOPHILS # BLD AUTO: 0.06 X10(3) UL (ref 0–0.2)
BASOPHILS NFR BLD AUTO: 0.8 %
BILIRUB SERPL-MCNC: 1.6 MG/DL (ref 0.2–1.1)
BUN BLD-MCNC: 14 MG/DL (ref 9–23)
BUN/CREAT SERPL: 16.1 (ref 10–20)
CALCIUM BLD-MCNC: 9.4 MG/DL (ref 8.7–10.4)
CHLORIDE SERPL-SCNC: 104 MMOL/L (ref 98–112)
CO2 SERPL-SCNC: 26 MMOL/L (ref 21–32)
CREAT BLD-MCNC: 0.87 MG/DL
DEPRECATED RDW RBC AUTO: 45.6 FL (ref 35.1–46.3)
EGFRCR SERPLBLD CKD-EPI 2021: 96 ML/MIN/1.73M2 (ref 60–?)
EOSINOPHIL # BLD AUTO: 0.05 X10(3) UL (ref 0–0.7)
EOSINOPHIL NFR BLD AUTO: 0.7 %
ERYTHROCYTE [DISTWIDTH] IN BLOOD BY AUTOMATED COUNT: 13.9 % (ref 11–15)
GLOBULIN PLAS-MCNC: 3.3 G/DL (ref 2–3.5)
GLUCOSE BLD-MCNC: 105 MG/DL (ref 70–99)
HCT VFR BLD AUTO: 39.1 %
HGB BLD-MCNC: 13.5 G/DL
IMM GRANULOCYTES # BLD AUTO: 0.02 X10(3) UL (ref 0–1)
IMM GRANULOCYTES NFR BLD: 0.3 %
LYMPHOCYTES # BLD AUTO: 1.9 X10(3) UL (ref 1–4)
LYMPHOCYTES NFR BLD AUTO: 25 %
MCH RBC QN AUTO: 31 PG (ref 26–34)
MCHC RBC AUTO-ENTMCNC: 34.5 G/DL (ref 31–37)
MCV RBC AUTO: 89.7 FL
MONOCYTES # BLD AUTO: 0.71 X10(3) UL (ref 0.1–1)
MONOCYTES NFR BLD AUTO: 9.4 %
NEUTROPHILS # BLD AUTO: 4.85 X10 (3) UL (ref 1.5–7.7)
NEUTROPHILS # BLD AUTO: 4.85 X10(3) UL (ref 1.5–7.7)
NEUTROPHILS NFR BLD AUTO: 63.8 %
OSMOLALITY SERPL CALC.SUM OF ELEC: 287 MOSM/KG (ref 275–295)
PLATELET # BLD AUTO: 118 10(3)UL (ref 150–450)
PLATELETS.RETICULATED NFR BLD AUTO: 4.1 % (ref 0–7)
POTASSIUM SERPL-SCNC: 4.4 MMOL/L (ref 3.5–5.1)
PROT SERPL-MCNC: 7.7 G/DL (ref 5.7–8.2)
RBC # BLD AUTO: 4.36 X10(6)UL
SODIUM SERPL-SCNC: 138 MMOL/L (ref 136–145)
TSI SER-ACNC: 0.73 UIU/ML (ref 0.55–4.78)
WBC # BLD AUTO: 7.6 X10(3) UL (ref 4–11)

## 2025-03-05 RX ORDER — OMEPRAZOLE 40 MG/1
40 CAPSULE, DELAYED RELEASE ORAL
Qty: 90 CAPSULE | Refills: 1 | Status: SHIPPED | OUTPATIENT
Start: 2025-03-05

## 2025-03-05 RX ORDER — GABAPENTIN 100 MG/1
300 CAPSULE ORAL NIGHTLY
Qty: 30 CAPSULE | Refills: 1 | Status: CANCELLED | OUTPATIENT
Start: 2025-03-05

## 2025-03-05 NOTE — PROGRESS NOTES
Oncology Progress note    Requesting: Dr. Higgins    Chief Complaint: Anemia, gastric cancer    HPI:  65 year old With gastric adenocarcinoma diagnosed 12/18/23 in the setting of iron def anemia.      See below for staging workup    Initiated FOLFOX +Trastuzumab 1/18/24. Added Pembrolizumab with C3 (PDL1 10%).    Oxaliplatin dose reduced to 65 mg/m2 with C6 due to CIPN      Interval History:  Returns for consideration of next Cycle 24, FOLFOX(no 5FU bolus) +Trastuzumab+ Pembrolizumab(pembro not due until 3/20/25).    Completed CT and echo 2/2025 reviewed at last visit, discussed again with stable results.  LFT's improving, ANC and PLT stable.  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  He endorses mild nausea for which he does not need oral antiemetics w/o emesis.  He denies fever, infection, bleeding, chest pain, dyspnea, abdominal pain or new concerns on ROS.    Pmh: Arthritis      Social History     Socioeconomic History    Marital status:     Number of children: 2   Occupational History    Occupation: Food service     Employer: [employer]   Tobacco Use    Smoking status: Never    Smokeless tobacco: Never   Vaping Use    Vaping status: Never Used   Substance and Sexual Activity    Alcohol use: No     Alcohol/week: 0.0 standard drinks of alcohol    Drug use: No    Sexual activity: Yes   Other Topics Concern    Caffeine Concern Yes     Comment: coffee, 1 cup daily     Family History   Problem Relation Age of Onset    Prostate Cancer Other      Ros negx12    Nka  Current Outpatient Medications on File Prior to Visit   Medication Sig Dispense Refill    gabapentin 100 MG Oral Cap Take 3 capsules (300 mg total) by mouth nightly. 30 capsule 1    Omeprazole 40 MG Oral Capsule Delayed Release Take 1 capsule (40 mg total) by mouth before breakfast. 90 capsule 1    zolpidem (AMBIEN) 10 MG Oral Tab Take 1 tablet (10 mg total) by mouth nightly as needed for Sleep. 30 tablet 0     acetaminophen-codeine 300-30 MG Oral Tab Take 1 tablet by mouth every 6 (six) hours as needed for Pain. Medication may causes sedation, constipation. Not to operate heavy machinery or drive on medication. 60 tablet 0    prochlorperazine (COMPAZINE) 10 mg tablet Take 1 tablet (10 mg total) by mouth every 6 (six) hours as needed for Nausea. 30 tablet 3    ondansetron (ZOFRAN) 8 MG tablet Take 1 tablet (8 mg total) by mouth every 8 (eight) hours as needed for Nausea. 30 tablet 3     Current Facility-Administered Medications on File Prior to Visit   Medication Dose Route Frequency Provider Last Rate Last Admin    [COMPLETED] iopamidol 76% (ISOVUE-370) injection for power injector  80 mL Intravenous ONCE PRN Gustavo Díaz MD   80 mL at 02/17/25 0915    [COMPLETED] diphenhydrAMINE (Benadryl) 50 mg/mL  injection 25 mg  25 mg Intravenous Once Gustavo Díaz MD   25 mg at 02/06/25 0856    [COMPLETED] famotidine (Pepcid) 20 mg/2mL injection 20 mg  20 mg Intravenous Once Gustavo Díaz MD   20 mg at 02/06/25 0853    [COMPLETED] ondansetron (Zofran) 16 mg, dexAMETHasone (Decadron) 20 mg in sodium chloride 0.9% 110 mL IVPB   Intravenous Once Gustavo Díaz MD   Stopped at 02/06/25 0911    [COMPLETED] pembrolizumab (Keytruda) 400 mg in sodium chloride 0.9% 116 mL IVPB  400 mg Intravenous Once Gustavo Díaz MD   Stopped at 02/06/25 0945    [COMPLETED] trastuzumab-qyyp (Trazimera) 315 mg in sodium chloride 0.9% 265 mL infusion  4 mg/kg (Treatment Plan Recorded) Intravenous Once Gustavo Díaz MD   Stopped at 02/06/25 1026    [COMPLETED] oxaliplatin (Eloxatin) 125 mg in dextrose 5% 275 mL infusion  65 mg/m2 (Treatment Plan Recorded) Intravenous Once Gustavo Díaz MD   Stopped at 02/06/25 1250    [COMPLETED] leucovorin 750 mg in dextrose 5% 250 mL infusion  400 mg/m2 (Treatment Plan Recorded) Intravenous Once Gustavo Díaz MD   Stopped at 02/06/25 1250     Vitals:    03/05/25 1258   BP: 118/77   Pulse: 91   Resp: 16    Temp: 98.1 °F (36.7 °C)     Wt Readings from Last 6 Encounters:   03/05/25 77 kg (169 lb 12.8 oz)   02/18/25 77.3 kg (170 lb 6.4 oz)   02/04/25 77.6 kg (171 lb)   01/21/25 78.8 kg (173 lb 12.8 oz)   01/07/25 79.8 kg (176 lb)   11/29/24 77.5 kg (170 lb 12.8 oz)       Physical exam:  General:  Awake, alert, oriented in no acute distress today  HEENT - EOMsi, anicteric, MMM  Neck - supple, no LAD, normal ROM  Lungs -  CTA bilaterally  Cor - S1/S2 w/o murmur noted  Abd - soft, non tender non distended, bs+  BLE - no edema  Neuro - DTRs grossly intact    ECOG 2: Ambulatory and capable of all selfcare but unable to carry out any work activities. Up and about more than 50% of waking hours    Lab Results   Component Value Date    WBC 7.6 03/05/2025    RBC 4.36 03/05/2025    HGB 13.5 03/05/2025    HCT 39.1 03/05/2025    MCV 89.7 03/05/2025    MCH 31.0 03/05/2025    MCHC 34.5 03/05/2025    RDW 13.9 03/05/2025    .0 (L) 03/05/2025    MPV 8.9 10/31/2018     Lab Results   Component Value Date     (H) 03/05/2025    BUN 14 03/05/2025    BUNCREA 16.1 03/05/2025    CREATSERUM 0.87 03/05/2025    ANIONGAP 8 03/05/2025    GFRNAA 86 11/10/2021    GFRAA 99 11/10/2021    CA 9.4 03/05/2025    OSMOCALC 287 03/05/2025    ALKPHO 147 (H) 03/05/2025    AST 65 (H) 03/05/2025    ALT 94 (H) 03/05/2025    ALKPHOS 82 10/08/2015    BILT 1.6 (H) 03/05/2025    TP 7.7 03/05/2025    ALB 4.4 03/05/2025    GLOBULIN 3.3 03/05/2025    AGRATIO 1.4 10/08/2015     03/05/2025    K 4.4 03/05/2025     03/05/2025    CO2 26.0 03/05/2025     Imaging:    N/A      65 year old  With gastric adenocarcinoma diagnosed 12/18/23 in the setting of iron def anemia. Her positive (3+) MSI-S PDL1 10%    1.) Gastric cancer, stage IV  -- Widespread kevin disease reviewed at tumor conference by Dr. Amaro with surgical oncology will plan for upfront systemic therapy with 5-FU oxaliplatin based regimen potentially with trastuzumab and  pembrolizumab  -Initiated FOLFOX plus trastuzumab on 1/18/23   -Delayed Cycle #5 x 1 week d/t worsening LFT elevation   -Cycle #6 with dose reduction of Oxaliplatin d/t CIPN worsening and LFTs as below.    -CT imaging after C4 3/2024 with favorable treatment response.   -PDL1 10% 1/2024. Added Pembrolizumab  as >1% per guidelines. q6week scheduling-initiated with C3 above.   -DELAY Cycle 8 d/t TCP <75K.    - reviewed the pt's current CT scan from above early June; favorable treatment response, continue current systemic treatment    -surveillance CT scan from 8/24 w/ stable findings; c/w favorable tx response, continue current management    -cycle 16 FOLFOX plus trastuzumab plus Pembrolizumab was deferred for 2 wks due to side effects from systemic tx, referred to palliative care to help. Now feels improved to continue with same dosing     --pt is working with  to determine his work limitations     --explained that we will nee to modified his treatments so that they're sustainable; possibly dose reduced oxaliplatin to 50, 5-FU to 2200 and dec trastuzumab to 3mg/kg    --surveillance CT scan from 11/2024 shows stable disease  --Pembrolizumab+oxaliplatin were being held in light of elevated LFT's which are improving off tx    --2/27/25 DEFER cycle 24 by 1 wk due to neutropenia; FOLFOX with trastuzumab +Pembrolizumab(every 6 wks) last pembro 2/6/25;     --reviewed with him that his surveillance CT scan shows stable post operative changes; no signs of progression. The new splenomegaly may be from cell turnover.     -tx will be held if platelets <75K. Dr. Díaz discontinued the 5-FU bolus as this is likely contributing to the chemotherapy induced thrombocytopenia    -ANC and PLT stable, Cleared for C24  FOLFOX (no 5fu bolus) with trastuzumab +Pembrolizumab(every 6 wks); pembro due 3/20/25 (moved in treatment plan)  - ECHO 2/18/25 stable, EF 60-65%  -following w/ ECHO and CT scans (every 3mo) due again  5/2025  -RTC follow up Dr. Díaz 2 weeks , 3/18/25 labs and treatment on 3/20/25    2.)Iron deficiency anemia    --s/p Feraheme x 2, last dose on 2/1/24, anemia resolved    3.) Chemotherapy induced thrombocytopenia  --Thrombocytopenia secondary to chemo, ok to treat if plts > 75k.    --Delayed C9 as above. Cycle 11 now delayed x 2 weeks; Ok to treat with plts >75K.    Monitor, may need to dose reduce chemo further    4.)Transaminitis   --improved gr 1, T. Bili 1.5. dose reduction oxali as above  --possibly from recent pembrolizumab or from the oxaliplatin; LFT's are improving, may have to consider steroid tx if these rise again for CPI hepatitis  -LFT's continue to improve    5.)CIPN  -- gr 1-2 after C8, Dose reduction of oxaliplatin to 65 mg/m2 with C6.   --Delay of C9 d/t Tcp with imrpovement to gr1 CIPN to fingers without ADL limitations.   --Improving given omission of Oxaliplatin.   --1/7/25 started trial of gabapentin trial at 100 mg at night; explained to pt that this will cause drowsiness and we can titrate up to 300 mg at night, continue this same dose for now  -continues current dose gabapentin reports continued PN not worse since last visit.    6.)risk of cardiotoxicity  --Normal EF/echo prior to treatment 1/2024. 5/2024 ECHO repeat stable/improved.    --H/o fleeting chest discomfort reported, currently resolved, without any other Aes.   -Monitor w/ ECHO every 3 mo  -2/2025 Echo stable EF 60-65%, due 5/2025    7.) BANDAR  --gr. 1, tolerable.  Prn antiemetics encouraged  --reports same, refilled omeprazole and encouraged prn prochlorperazine and ondansetron.    9.)H/o skin rash  --Resolved. Supportive care with topical creams    10.)Prostate Enlargement  --noted on imaging; likely BPH, pt denies nocturia or dribbling symptoms        MDM - High    XOCHITL Saucedo    [facility]  [address] [place] [address] [facility], [place] [address]

## 2025-03-06 ENCOUNTER — OFFICE VISIT (OUTPATIENT)
Age: 65
End: 2025-03-06
Attending: INTERNAL MEDICINE
Payer: COMMERCIAL

## 2025-03-06 VITALS
HEART RATE: 77 BPM | OXYGEN SATURATION: 96 % | DIASTOLIC BLOOD PRESSURE: 72 MMHG | SYSTOLIC BLOOD PRESSURE: 138 MMHG | TEMPERATURE: 98 F | RESPIRATION RATE: 18 BRPM

## 2025-03-06 VITALS
TEMPERATURE: 98 F | RESPIRATION RATE: 18 BRPM | OXYGEN SATURATION: 96 % | SYSTOLIC BLOOD PRESSURE: 138 MMHG | DIASTOLIC BLOOD PRESSURE: 72 MMHG | HEART RATE: 77 BPM

## 2025-03-06 DIAGNOSIS — C16.9 MALIGNANT NEOPLASM OF STOMACH, UNSPECIFIED LOCATION (HCC): ICD-10-CM

## 2025-03-06 DIAGNOSIS — T80.90XA INFUSION REACTION, INITIAL ENCOUNTER: ICD-10-CM

## 2025-03-06 DIAGNOSIS — C16.9 MALIGNANT NEOPLASM OF STOMACH, UNSPECIFIED LOCATION (HCC): Primary | ICD-10-CM

## 2025-03-06 DIAGNOSIS — Z51.11 CHEMOTHERAPY MANAGEMENT, ENCOUNTER FOR: Primary | ICD-10-CM

## 2025-03-06 RX ORDER — DIPHENHYDRAMINE HYDROCHLORIDE 50 MG/ML
25 INJECTION INTRAMUSCULAR; INTRAVENOUS ONCE
Status: CANCELLED
Start: 2025-03-06 | End: 2025-03-06

## 2025-03-06 RX ORDER — FAMOTIDINE 10 MG/ML
20 INJECTION, SOLUTION INTRAVENOUS ONCE
Status: COMPLETED | OUTPATIENT
Start: 2025-03-06 | End: 2025-03-06

## 2025-03-06 RX ORDER — FLUOROURACIL 50 MG/ML
2400 INJECTION, SOLUTION INTRAVENOUS CONTINUOUS
Status: DISCONTINUED | OUTPATIENT
Start: 2025-03-06 | End: 2025-03-06

## 2025-03-06 RX ORDER — DIPHENHYDRAMINE HYDROCHLORIDE 50 MG/ML
25 INJECTION INTRAMUSCULAR; INTRAVENOUS ONCE
Status: COMPLETED | OUTPATIENT
Start: 2025-03-06 | End: 2025-03-06

## 2025-03-06 RX ORDER — FAMOTIDINE 10 MG/ML
20 INJECTION, SOLUTION INTRAVENOUS ONCE
Status: CANCELLED
Start: 2025-03-06 | End: 2025-03-06

## 2025-03-06 RX ORDER — FAMOTIDINE 10 MG/ML
INJECTION, SOLUTION INTRAVENOUS
Status: COMPLETED
Start: 2025-03-06 | End: 2025-03-06

## 2025-03-06 RX ORDER — FLUOROURACIL 50 MG/ML
2400 INJECTION, SOLUTION INTRAVENOUS CONTINUOUS
Status: CANCELLED | OUTPATIENT
Start: 2025-03-06

## 2025-03-06 RX ORDER — DIPHENHYDRAMINE HYDROCHLORIDE 50 MG/ML
INJECTION INTRAMUSCULAR; INTRAVENOUS
Status: COMPLETED
Start: 2025-03-06 | End: 2025-03-06

## 2025-03-06 RX ADMIN — DIPHENHYDRAMINE HYDROCHLORIDE 25 MG: 50 INJECTION INTRAMUSCULAR; INTRAVENOUS at 11:13:00

## 2025-03-06 RX ADMIN — FLUOROURACIL 4650 MG: 50 INJECTION, SOLUTION INTRAVENOUS at 13:52:00

## 2025-03-06 RX ADMIN — FAMOTIDINE 20 MG: 10 INJECTION, SOLUTION INTRAVENOUS at 09:19:00

## 2025-03-06 RX ADMIN — DIPHENHYDRAMINE HYDROCHLORIDE 25 MG: 50 INJECTION INTRAMUSCULAR; INTRAVENOUS at 09:22:00

## 2025-03-06 NOTE — PROGRESS NOTES
Pt here for C24D1 Folfox Trastuzmab.  Arrives Ambulating independently, accompanied by Self and Spouse     Patient was evaluated today by Treatment Nurse.    Oral medications included in this regimen:  no    Patient confirms comprehension of cancer treatment schedule:  yes    Pregnancy screening:  Not applicable    Modifications in dose or schedule:  No    Medications appearance and physical integrity checked by RN: yes.    Chemotherapy IV pump settings verified by 2 RNs:  Yes.  Frequency of blood return and site check throughout administration: Prior to administration, Prior to each drug, and At completion of therapy     Infusion/treatment outcome:  hypersensitivity protocol initiated - see documentation     After approx 10 min of starting Oxaliplatin pt reported facial flushing, chest warmth and nausea. Infusion reaction protocol initiated. Infusion stopped, APRN contacted, additional IV Benadryl 25mg dose administered. Symptoms resolved and infusion restarted at half dose. Then resumed at full dose with no further issues to report.      CADD pump connected and running. All connections reinforced with tape. Port access is clean and dry, secured with steri strips and tegaderm dressing. Patient verbalized understanding of CADD pump instructions, including troubleshooting.         Education Record    Learner:  Patient and Spouse  Barriers / Limitations:  None  Method:  Discussion  Education / instructions given:  Plan of care  Outcome:  Shows understanding    Discharged Home, Ambulating independently, accompanied by:Spouse    Patient/family verbalized understanding of future appointments: by printed AVS

## 2025-03-06 NOTE — PROGRESS NOTES
S:  65 year old male is being evaluated in the acute care clinic for an infusion reaction while cycle 24 Oxaliplatin is being infused.  RN held the infusion.  Patient reports upper chest discomfort/warmth that traveled upward to his neck then face - redness and warmth noted.  He received Dex 20 mg, Famotidine 20 mg, Diphenhydramine 25 mg IV premedications with Ondansetron 16 mg.    Denies headache, dizziness, cough, dyspnea, abdominal/back pain and pruritus.    O:  WDWNM, NAD  HEENT - moderate erythema to face, during his evaluation, the erythema started to madalyn significantly, no edema  Neck - eythema noted, started to madalyn, normal AROM  Lungs - non labored breathing, CTA  Cor - RRR    A:  1.  Stage IV gastric cancer  2. Infusion reaction to Oxaliplatin    P:  1.  Oxaliplatin held and symptoms started to madalyn.  Checked VS.  Diphenhydramine 25 mg IVP.  Monitor VS.  Symptoms resolved, resumed Oxali at half rate then transitioned to full rate.  Patient completed treatment without any further adverse events.   2.  Will add Montelukast 10 mg to future cycles  3.  Call prn  4.  Keep originally scheduled follow-up appointments.

## 2025-03-07 ENCOUNTER — TELEPHONE (OUTPATIENT)
Age: 65
End: 2025-03-07

## 2025-03-07 NOTE — TELEPHONE ENCOUNTER
Called patient for condition update post tx reaction yesterday.  Per  spouse doing well other than a sight sore throat and dry cough.  Reinforced plan and to call for any issues.  Spouse reminded us that since he ended tx late yesterday he will not be done at 0800 am tomorrow apt so will be coming later in morning for pump removal.

## 2025-03-08 ENCOUNTER — NURSE ONLY (OUTPATIENT)
Age: 65
End: 2025-03-08
Attending: INTERNAL MEDICINE
Payer: COMMERCIAL

## 2025-03-08 VITALS
OXYGEN SATURATION: 96 % | RESPIRATION RATE: 18 BRPM | TEMPERATURE: 99 F | HEART RATE: 111 BPM | SYSTOLIC BLOOD PRESSURE: 129 MMHG | DIASTOLIC BLOOD PRESSURE: 78 MMHG

## 2025-03-08 NOTE — PROGRESS NOTES
Patient arrived for CADD DC. Reports he is okay. Complains of pain in abdomen, reports not new. Reports some throat pain, educated patient that due to the oxaliplatin when exposed to cold this could happen, encouraged him to wear a mask and to not drink anything too cold. He verbalized understanding. Patient presented with CADD pump connected. Reservoir with 5 mL remaining. Disconnected CADD pump, flushed port with 0.9% Discharged ambulating independently with future appointments scheduled.

## 2025-03-18 ENCOUNTER — NURSE ONLY (OUTPATIENT)
Age: 65
End: 2025-03-18
Attending: INTERNAL MEDICINE
Payer: COMMERCIAL

## 2025-03-18 ENCOUNTER — OFFICE VISIT (OUTPATIENT)
Age: 65
End: 2025-03-18
Attending: INTERNAL MEDICINE
Payer: COMMERCIAL

## 2025-03-18 VITALS
HEIGHT: 69 IN | SYSTOLIC BLOOD PRESSURE: 119 MMHG | HEART RATE: 82 BPM | TEMPERATURE: 98 F | RESPIRATION RATE: 18 BRPM | OXYGEN SATURATION: 96 % | BODY MASS INDEX: 25.03 KG/M2 | DIASTOLIC BLOOD PRESSURE: 74 MMHG | WEIGHT: 169 LBS

## 2025-03-18 DIAGNOSIS — T45.1X5A CHEMOTHERAPY INDUCED NEUTROPENIA: ICD-10-CM

## 2025-03-18 DIAGNOSIS — C16.2 MALIGNANT NEOPLASM OF BODY OF STOMACH (HCC): Primary | ICD-10-CM

## 2025-03-18 DIAGNOSIS — C16.9 MALIGNANT NEOPLASM OF STOMACH, UNSPECIFIED LOCATION (HCC): ICD-10-CM

## 2025-03-18 DIAGNOSIS — Z51.11 CHEMOTHERAPY MANAGEMENT, ENCOUNTER FOR: ICD-10-CM

## 2025-03-18 DIAGNOSIS — Z51.11 CHEMOTHERAPY MANAGEMENT, ENCOUNTER FOR: Primary | ICD-10-CM

## 2025-03-18 DIAGNOSIS — D70.1 CHEMOTHERAPY INDUCED NEUTROPENIA: ICD-10-CM

## 2025-03-18 LAB
ALBUMIN SERPL-MCNC: 4.1 G/DL (ref 3.2–4.8)
ALBUMIN/GLOB SERPL: 1.3 {RATIO} (ref 1–2)
ALP LIVER SERPL-CCNC: 162 U/L
ALT SERPL-CCNC: 82 U/L
ANION GAP SERPL CALC-SCNC: 6 MMOL/L (ref 0–18)
AST SERPL-CCNC: 62 U/L (ref ?–34)
BASOPHILS # BLD AUTO: 0.02 X10(3) UL (ref 0–0.2)
BASOPHILS NFR BLD AUTO: 0.9 %
BILIRUB SERPL-MCNC: 1.4 MG/DL (ref 0.2–1.1)
BUN BLD-MCNC: 14 MG/DL (ref 9–23)
BUN/CREAT SERPL: 15.7 (ref 10–20)
CALCIUM BLD-MCNC: 8.9 MG/DL (ref 8.7–10.4)
CHLORIDE SERPL-SCNC: 107 MMOL/L (ref 98–112)
CO2 SERPL-SCNC: 26 MMOL/L (ref 21–32)
CREAT BLD-MCNC: 0.89 MG/DL
DEPRECATED RDW RBC AUTO: 45 FL (ref 35.1–46.3)
EGFRCR SERPLBLD CKD-EPI 2021: 96 ML/MIN/1.73M2 (ref 60–?)
EOSINOPHIL # BLD AUTO: 0.11 X10(3) UL (ref 0–0.7)
EOSINOPHIL NFR BLD AUTO: 4.8 %
ERYTHROCYTE [DISTWIDTH] IN BLOOD BY AUTOMATED COUNT: 13.8 % (ref 11–15)
GLOBULIN PLAS-MCNC: 3.2 G/DL (ref 2–3.5)
GLUCOSE BLD-MCNC: 110 MG/DL (ref 70–99)
HCT VFR BLD AUTO: 35.4 %
HGB BLD-MCNC: 12.1 G/DL
IMM GRANULOCYTES # BLD AUTO: 0 X10(3) UL (ref 0–1)
IMM GRANULOCYTES NFR BLD: 0 %
LYMPHOCYTES # BLD AUTO: 1.28 X10(3) UL (ref 1–4)
LYMPHOCYTES NFR BLD AUTO: 56.4 %
MCH RBC QN AUTO: 30.6 PG (ref 26–34)
MCHC RBC AUTO-ENTMCNC: 34.2 G/DL (ref 31–37)
MCV RBC AUTO: 89.4 FL
MONOCYTES # BLD AUTO: 0.25 X10(3) UL (ref 0.1–1)
MONOCYTES NFR BLD AUTO: 11 %
NEUTROPHILS # BLD AUTO: 0.61 X10 (3) UL (ref 1.5–7.7)
NEUTROPHILS # BLD AUTO: 0.61 X10(3) UL (ref 1.5–7.7)
NEUTROPHILS NFR BLD AUTO: 26.9 %
OSMOLALITY SERPL CALC.SUM OF ELEC: 289 MOSM/KG (ref 275–295)
PLATELET # BLD AUTO: 85 10(3)UL (ref 150–450)
PLATELETS.RETICULATED NFR BLD AUTO: 2.7 % (ref 0–7)
POTASSIUM SERPL-SCNC: 4.1 MMOL/L (ref 3.5–5.1)
PROT SERPL-MCNC: 7.3 G/DL (ref 5.7–8.2)
RBC # BLD AUTO: 3.96 X10(6)UL
SODIUM SERPL-SCNC: 139 MMOL/L (ref 136–145)
TSI SER-ACNC: 0.73 UIU/ML (ref 0.55–4.78)
WBC # BLD AUTO: 2.3 X10(3) UL (ref 4–11)

## 2025-03-18 PROCEDURE — 85060 BLOOD SMEAR INTERPRETATION: CPT

## 2025-03-18 NOTE — PROGRESS NOTES
Oncology Progress note    Requesting: Dr. Higgins    Chief Complaint: Anemia, gastric cancer    HPI:  65 year old With gastric adenocarcinoma diagnosed 12/18/23 in the setting of iron def anemia.      See below for staging workup    Initiated FOLFOX +Trastuzumab 1/18/24. Added Pembrolizumab with C3 (PDL1 10%).    Oxaliplatin dose reduced to 65 mg/m2 with C6 due to CIPN      Interval History:    Returns for consideration of next Cycle 25, FOLFOX(no 5FU bolus) +Trastuzumab+ Pembrolizumab. The pt suffered another oxaliplatin reaction with his last cycle of chemotherapy. Now feels improved.      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  He endorses mild nausea for which he does not need oral antiemetics w/o emesis.    He denies fever, infection, bleeding, chest pain, dyspnea, abdominal pain or new concerns on ROS.    Pmh: Arthritis      Social History     Socioeconomic History    Marital status:     Number of children: 2   Occupational History    Occupation:      Employer: [email or web address]   Tobacco Use    Smoking status: Never    Smokeless tobacco: Never   Vaping Use    Vaping status: Never Used   Substance and Sexual Activity    Alcohol use: No     Alcohol/week: 0.0 standard drinks of alcohol    Drug use: No    Sexual activity: Yes   Other Topics Concern    Caffeine Concern Yes     Comment: coffee, 1 cup daily     Family History   Problem Relation Age of Onset    Prostate Cancer Other      Ros negx12    Nka  Current Outpatient Medications on File Prior to Visit   Medication Sig Dispense Refill    Omeprazole 40 MG Oral Capsule Delayed Release Take 1 capsule (40 mg total) by mouth before breakfast. 90 capsule 1    gabapentin 100 MG Oral Cap Take 3 capsules (300 mg total) by mouth nightly. 30 capsule 1    zolpidem (AMBIEN) 10 MG Oral Tab Take 1 tablet (10 mg total) by mouth nightly as needed for Sleep. 30 tablet 0    acetaminophen-codeine 300-30 MG Oral Tab Take 1 tablet by  mouth every 6 (six) hours as needed for Pain. Medication may causes sedation, constipation. Not to operate heavy machinery or drive on medication. 60 tablet 0    prochlorperazine (COMPAZINE) 10 mg tablet Take 1 tablet (10 mg total) by mouth every 6 (six) hours as needed for Nausea. 30 tablet 3    ondansetron (ZOFRAN) 8 MG tablet Take 1 tablet (8 mg total) by mouth every 8 (eight) hours as needed for Nausea. 30 tablet 3     Current Facility-Administered Medications on File Prior to Visit   Medication Dose Route Frequency Provider Last Rate Last Admin    [COMPLETED] diphenhydrAMINE (Benadryl) 50 mg/mL  injection 25 mg  25 mg Intravenous Once Jodi Wilkerson APRN   25 mg at 03/06/25 0922    [COMPLETED] famotidine (Pepcid) 20 mg/2mL injection 20 mg  20 mg Intravenous Once Jodi Wilkerson APRN   20 mg at 03/06/25 0919    [COMPLETED] ondansetron (Zofran) 16 mg, dexAMETHasone (Decadron) 20 mg in sodium chloride 0.9% 110 mL IVPB   Intravenous Once Jodi Wilkerson APRN   Stopped at 03/06/25 0937    [COMPLETED] trastuzumab-qyyp (Trazimera) 315 mg in sodium chloride 0.9% 265 mL infusion  4 mg/kg (Treatment Plan Recorded) Intravenous Once Jodi Wilkerson APRN   Stopped at 03/06/25 1034    [COMPLETED] oxaliplatin (Eloxatin) 125 mg in dextrose 5% 275 mL infusion  65 mg/m2 (Treatment Plan Recorded) Intravenous Once Jodi Wilkerson APRN   Stopped at 03/06/25 1345    [COMPLETED] leucovorin 750 mg in dextrose 5% 250 mL infusion  400 mg/m2 (Treatment Plan Recorded) Intravenous Once Jodi Wilkerson APRN   Stopped at 03/06/25 1345    [COMPLETED] diphenhydrAMINE (Benadryl) 50 mg/mL  injection 25 mg  25 mg Intravenous Once Eh Philippe PA   25 mg at 03/06/25 1113    [COMPLETED] iopamidol 76% (ISOVUE-370) injection for power injector  80 mL Intravenous ONCE PRN Gustavo Díaz MD   80 mL at 02/17/25 0915     Vitals:    03/18/25 1515   BP: 119/74   Pulse: 82   Resp: 18   Temp: 98 °F (36.7 °C)     Wt Readings from Last 6  Encounters:   03/18/25 76.7 kg (169 lb)   03/05/25 77 kg (169 lb 12.8 oz)   02/18/25 77.3 kg (170 lb 6.4 oz)   02/04/25 77.6 kg (171 lb)   01/21/25 78.8 kg (173 lb 12.8 oz)   01/07/25 79.8 kg (176 lb)       Physical exam:  General:  Awake, alert, oriented in no acute distress   HEENT - EOMsi, anicteric, MMM  Neck - supple, no LAD, normal ROM  Lungs -  CTA bilaterally  Cor - S1/S2 w/o murmur noted  Abd - soft, non tender non distended, bs+  BLE - no edema  Neuro - DTRs grossly intact    ECOG 2: Ambulatory and capable of all selfcare but unable to carry out any work activities. Up and about more than 50% of waking hours    Lab Results   Component Value Date    WBC 7.6 03/05/2025    RBC 4.36 03/05/2025    HGB 13.5 03/05/2025    HCT 39.1 03/05/2025    MCV 89.7 03/05/2025    MCH 31.0 03/05/2025    MCHC 34.5 03/05/2025    RDW 13.9 03/05/2025    .0 (L) 03/05/2025    MPV 8.9 10/31/2018     Lab Results   Component Value Date     (H) 03/05/2025    BUN 14 03/05/2025    BUNCREA 16.1 03/05/2025    CREATSERUM 0.87 03/05/2025    ANIONGAP 8 03/05/2025    GFRNAA 86 11/10/2021    GFRAA 99 11/10/2021    CA 9.4 03/05/2025    OSMOCALC 287 03/05/2025    ALKPHO 147 (H) 03/05/2025    AST 65 (H) 03/05/2025    ALT 94 (H) 03/05/2025    ALKPHOS 82 10/08/2015    BILT 1.6 (H) 03/05/2025    TP 7.7 03/05/2025    ALB 4.4 03/05/2025    GLOBULIN 3.3 03/05/2025    AGRATIO 1.4 10/08/2015     03/05/2025    K 4.4 03/05/2025     03/05/2025    CO2 26.0 03/05/2025     Imaging:    N/A      65 year old  With gastric adenocarcinoma diagnosed 12/18/23 in the setting of iron def anemia. Her positive (3+) MSI-S PDL1 10%    1.) Gastric cancer, stage IV  -- Widespread kevin disease reviewed at tumor conference by Dr. Amaro with surgical oncology will plan for upfront systemic therapy with 5-FU oxaliplatin based regimen potentially with trastuzumab and pembrolizumab  -Initiated FOLFOX plus trastuzumab on 1/18/23   -Delayed Cycle #5 x 1 week  d/t worsening LFT elevation   -Cycle #6 with dose reduction of Oxaliplatin d/t CIPN worsening and LFTs as below.    -CT imaging after C4 3/2024 with favorable treatment response.   -PDL1 10% 1/2024. Added Pembrolizumab  as >1% per guidelines. q6week scheduling-initiated with C3 above.   -DELAY Cycle 8 d/t TCP <75K.    - reviewed the pt's current CT scan from above early June; favorable treatment response, continue current systemic treatment    -surveillance CT scan from 8/24 w/ stable findings; c/w favorable tx response, continue current management    -cycle 16 FOLFOX plus trastuzumab plus Pembrolizumab was deferred for 2 wks due to side effects from systemic tx, referred to palliative care to help. Now feels improved to continue with same dosing     --pt is working with  to determine his work limitations     --explained that we will nee to modified his treatments so that they're sustainable; possibly dose reduced oxaliplatin to 50, 5-FU to 2200 and dec trastuzumab to 3mg/kg    --surveillance CT scan from 11/2024 shows stable disease  --Pembrolizumab+oxaliplatin were being held in light of elevated LFT's which are improving off tx    --2/27/25 DEFER cycle 24 by 1 wk due to neutropenia; FOLFOX with trastuzumab +Pembrolizumab(every 6 wks) last pembro 2/6/25;     --reviewed with him that his surveillance CT scan shows stable post operative changes; no signs of progression. The new splenomegaly may be from cell turnover.     -tx will be held if platelets <75K. Dr. Díaz discontinued the 5-FU bolus as this is likely contributing to the chemotherapy induced thrombocytopenia    -defer C25 by 1 week due to neutropenia; 5-FU+leucovorin with trastuzumab +Pembrolizumab(every 6 wks). As the pt had a second oxaliplatin reaction with last cycle of chemotherapy, this med will be eliminated from his tx plan.    - ECHO 2/18/25 stable, EF 60-65%  -following w/ ECHO and CT scans (every 3mo) due again 5/2025    2.)Iron  deficiency anemia    --s/p Feraheme x 2, last dose on 2/1/24, anemia resolved    3.) Chemotherapy induced thrombocytopenia  --Thrombocytopenia secondary to chemo, ok to treat if plts > 75k.    --Delayed C9 as above. Cycle 11 now delayed x 2 weeks; Ok to treat with plts >75K.    Monitor, may need to dose reduce chemo further    4.)Transaminitis   --improved gr 1, T. Bili 1.5. dose reduction oxali as above  --possibly from recent pembrolizumab or from the oxaliplatin; LFT's are improving, may have to consider steroid tx if these rise again for CPI hepatitis  -LFT's continue to improve    5.)CIPN  -- gr 1-2 after C8, Dose reduction of oxaliplatin to 65 mg/m2 with C6.   --Delay of C9 d/t Tcp with imrpovement to gr1 CIPN to fingers without ADL limitations.   --Improving given omission of Oxaliplatin.   --1/7/25 started trial of gabapentin trial at 100 mg at night; explained to pt that this will cause drowsiness and we can titrate up to 300 mg at night, continue this same dose for now  -continues current dose gabapentin reports continued PN not worse since last visit.    6.)risk of cardiotoxicity  --Normal EF/echo prior to treatment 1/2024. 5/2024 ECHO repeat stable/improved.    --H/o fleeting chest discomfort reported, currently resolved, without any other Aes.   -Monitor w/ ECHO every 3 mo  -2/2025 Echo stable EF 60-65%, due 5/2025    7.) BANDAR  --gr. 1, tolerable.  Prn antiemetics encouraged  --reports same, refilled omeprazole and encouraged prn prochlorperazine and ondansetron.    9.)H/o skin rash  --Resolved. Supportive care with topical creams    10.)Prostate Enlargement  --noted on imaging; likely BPH, pt denies nocturia or dribbling symptoms        [facility]    Gustavo Díaz MD  [facility]  Franca Goodman [facility] [place]  [address]  [phone]

## 2025-03-20 ENCOUNTER — APPOINTMENT (OUTPATIENT)
Age: 65
End: 2025-03-20
Attending: INTERNAL MEDICINE
Payer: COMMERCIAL

## 2025-03-22 ENCOUNTER — APPOINTMENT (OUTPATIENT)
Age: 65
End: 2025-03-22
Attending: INTERNAL MEDICINE
Payer: COMMERCIAL

## 2025-03-27 ENCOUNTER — OFFICE VISIT (OUTPATIENT)
Age: 65
End: 2025-03-27
Attending: INTERNAL MEDICINE
Payer: COMMERCIAL

## 2025-03-27 ENCOUNTER — NURSE ONLY (OUTPATIENT)
Age: 65
End: 2025-03-27
Attending: INTERNAL MEDICINE
Payer: COMMERCIAL

## 2025-03-27 VITALS
DIASTOLIC BLOOD PRESSURE: 65 MMHG | SYSTOLIC BLOOD PRESSURE: 134 MMHG | HEART RATE: 81 BPM | TEMPERATURE: 98 F | WEIGHT: 170 LBS | RESPIRATION RATE: 16 BRPM | OXYGEN SATURATION: 97 % | BODY MASS INDEX: 25 KG/M2

## 2025-03-27 DIAGNOSIS — Z51.11 CHEMOTHERAPY MANAGEMENT, ENCOUNTER FOR: Primary | ICD-10-CM

## 2025-03-27 DIAGNOSIS — C16.9 MALIGNANT NEOPLASM OF STOMACH, UNSPECIFIED LOCATION (HCC): ICD-10-CM

## 2025-03-27 LAB
ALBUMIN SERPL-MCNC: 4.4 G/DL (ref 3.2–4.8)
ALBUMIN/GLOB SERPL: 1.5 {RATIO} (ref 1–2)
ALP LIVER SERPL-CCNC: 154 U/L
ALT SERPL-CCNC: 85 U/L
ANION GAP SERPL CALC-SCNC: 7 MMOL/L (ref 0–18)
AST SERPL-CCNC: 60 U/L (ref ?–34)
BASOPHILS # BLD AUTO: 0.03 X10(3) UL (ref 0–0.2)
BASOPHILS NFR BLD AUTO: 1.2 %
BILIRUB SERPL-MCNC: 1.4 MG/DL (ref 0.2–1.1)
BUN BLD-MCNC: 13 MG/DL (ref 9–23)
BUN/CREAT SERPL: 14.6 (ref 10–20)
CALCIUM BLD-MCNC: 9.2 MG/DL (ref 8.7–10.4)
CHLORIDE SERPL-SCNC: 105 MMOL/L (ref 98–112)
CO2 SERPL-SCNC: 27 MMOL/L (ref 21–32)
CREAT BLD-MCNC: 0.89 MG/DL
DEPRECATED RDW RBC AUTO: 44.5 FL (ref 35.1–46.3)
EGFRCR SERPLBLD CKD-EPI 2021: 96 ML/MIN/1.73M2 (ref 60–?)
EOSINOPHIL # BLD AUTO: 0.02 X10(3) UL (ref 0–0.7)
EOSINOPHIL NFR BLD AUTO: 0.8 %
ERYTHROCYTE [DISTWIDTH] IN BLOOD BY AUTOMATED COUNT: 13.7 % (ref 11–15)
GLOBULIN PLAS-MCNC: 3 G/DL (ref 2–3.5)
GLUCOSE BLD-MCNC: 124 MG/DL (ref 70–99)
HCT VFR BLD AUTO: 37.8 %
HGB BLD-MCNC: 13 G/DL
IMM GRANULOCYTES # BLD AUTO: 0.02 X10(3) UL (ref 0–1)
IMM GRANULOCYTES NFR BLD: 0.8 %
LYMPHOCYTES # BLD AUTO: 1.12 X10(3) UL (ref 1–4)
LYMPHOCYTES NFR BLD AUTO: 46.3 %
MCH RBC QN AUTO: 30.4 PG (ref 26–34)
MCHC RBC AUTO-ENTMCNC: 34.4 G/DL (ref 31–37)
MCV RBC AUTO: 88.3 FL
MONOCYTES # BLD AUTO: 0.49 X10(3) UL (ref 0.1–1)
MONOCYTES NFR BLD AUTO: 20.2 %
NEUTROPHILS # BLD AUTO: 0.74 X10 (3) UL (ref 1.5–7.7)
NEUTROPHILS # BLD AUTO: 0.74 X10(3) UL (ref 1.5–7.7)
NEUTROPHILS NFR BLD AUTO: 30.7 %
OSMOLALITY SERPL CALC.SUM OF ELEC: 290 MOSM/KG (ref 275–295)
PLATELET # BLD AUTO: 111 10(3)UL (ref 150–450)
PLATELETS.RETICULATED NFR BLD AUTO: 2.6 % (ref 0–7)
POTASSIUM SERPL-SCNC: 3.9 MMOL/L (ref 3.5–5.1)
PROT SERPL-MCNC: 7.4 G/DL (ref 5.7–8.2)
RBC # BLD AUTO: 4.28 X10(6)UL
SODIUM SERPL-SCNC: 139 MMOL/L (ref 136–145)
TSI SER-ACNC: 1.52 UIU/ML (ref 0.55–4.78)
WBC # BLD AUTO: 2.4 X10(3) UL (ref 4–11)

## 2025-03-27 NOTE — PROGRESS NOTES
Wallace's ANC 0.74 today. Treatment deferred 1 week.  DAMARIS Wilkerson APRN to chairside today.  Bayroni provided with an updated printed AVS.

## 2025-03-27 NOTE — PROGRESS NOTES
3/27/25  Pt was delayed chemo/immuno therapy last week for neutropenia, this continue neutropenic although improved. Afebrile. Reports baseling difficulty sleeping. Denies cough, chills, diarrhea, nausea, rash, bleeding.  Will delay 1 week and move to 4/3/25, with follow up prior, BETH Zamora notified.  He was on pembro, folfox (no 5fu bolus) +trastuzumab, now the oxaliplatin is removed due to reaction.  Discussed to call if fever 100.4F or any cold symptoms.   Questions answered.  Jodi Wilkerson, XOCHITL

## 2025-03-29 ENCOUNTER — APPOINTMENT (OUTPATIENT)
Age: 65
End: 2025-03-29
Attending: INTERNAL MEDICINE
Payer: COMMERCIAL

## 2025-04-01 ENCOUNTER — APPOINTMENT (OUTPATIENT)
Age: 65
End: 2025-04-01
Attending: INTERNAL MEDICINE
Payer: COMMERCIAL

## 2025-04-02 ENCOUNTER — OFFICE VISIT (OUTPATIENT)
Age: 65
End: 2025-04-02
Attending: INTERNAL MEDICINE
Payer: COMMERCIAL

## 2025-04-02 ENCOUNTER — NURSE ONLY (OUTPATIENT)
Age: 65
End: 2025-04-02
Attending: INTERNAL MEDICINE
Payer: COMMERCIAL

## 2025-04-02 VITALS
WEIGHT: 168 LBS | RESPIRATION RATE: 16 BRPM | DIASTOLIC BLOOD PRESSURE: 74 MMHG | HEIGHT: 69 IN | SYSTOLIC BLOOD PRESSURE: 123 MMHG | TEMPERATURE: 99 F | HEART RATE: 78 BPM | OXYGEN SATURATION: 96 % | BODY MASS INDEX: 24.88 KG/M2

## 2025-04-02 DIAGNOSIS — C16.9 MALIGNANT NEOPLASM OF STOMACH, UNSPECIFIED LOCATION (HCC): ICD-10-CM

## 2025-04-02 DIAGNOSIS — Z51.11 CHEMOTHERAPY MANAGEMENT, ENCOUNTER FOR: Primary | ICD-10-CM

## 2025-04-02 DIAGNOSIS — Z51.81 ENCOUNTER FOR MONITORING CARDIOTOXIC DRUG THERAPY: ICD-10-CM

## 2025-04-02 DIAGNOSIS — Z79.899 ENCOUNTER FOR MONITORING CARDIOTOXIC DRUG THERAPY: ICD-10-CM

## 2025-04-02 DIAGNOSIS — T45.1X5A PERIPHERAL NEUROPATHY DUE TO CHEMOTHERAPY (HCC): ICD-10-CM

## 2025-04-02 DIAGNOSIS — G62.0 PERIPHERAL NEUROPATHY DUE TO CHEMOTHERAPY (HCC): ICD-10-CM

## 2025-04-02 LAB
ALBUMIN SERPL-MCNC: 4.6 G/DL (ref 3.2–4.8)
ALBUMIN/GLOB SERPL: 1.4 {RATIO} (ref 1–2)
ALP LIVER SERPL-CCNC: 153 U/L
ALT SERPL-CCNC: 66 U/L
ANION GAP SERPL CALC-SCNC: 8 MMOL/L (ref 0–18)
AST SERPL-CCNC: 53 U/L (ref ?–34)
BASOPHILS # BLD AUTO: 0.04 X10(3) UL (ref 0–0.2)
BASOPHILS NFR BLD AUTO: 0.7 %
BILIRUB SERPL-MCNC: 1.7 MG/DL (ref 0.2–1.1)
BUN BLD-MCNC: 15 MG/DL (ref 9–23)
BUN/CREAT SERPL: 17 (ref 10–20)
CALCIUM BLD-MCNC: 9.7 MG/DL (ref 8.7–10.4)
CHLORIDE SERPL-SCNC: 106 MMOL/L (ref 98–112)
CO2 SERPL-SCNC: 27 MMOL/L (ref 21–32)
CREAT BLD-MCNC: 0.88 MG/DL
DEPRECATED RDW RBC AUTO: 43.5 FL (ref 35.1–46.3)
EGFRCR SERPLBLD CKD-EPI 2021: 96 ML/MIN/1.73M2 (ref 60–?)
EOSINOPHIL # BLD AUTO: 0.03 X10(3) UL (ref 0–0.7)
EOSINOPHIL NFR BLD AUTO: 0.5 %
ERYTHROCYTE [DISTWIDTH] IN BLOOD BY AUTOMATED COUNT: 13.4 % (ref 11–15)
GLOBULIN PLAS-MCNC: 3.2 G/DL (ref 2–3.5)
GLUCOSE BLD-MCNC: 92 MG/DL (ref 70–99)
HCT VFR BLD AUTO: 39.5 %
HGB BLD-MCNC: 13.6 G/DL
IMM GRANULOCYTES # BLD AUTO: 0.02 X10(3) UL (ref 0–1)
IMM GRANULOCYTES NFR BLD: 0.3 %
LYMPHOCYTES # BLD AUTO: 1.59 X10(3) UL (ref 1–4)
LYMPHOCYTES NFR BLD AUTO: 26.7 %
MCH RBC QN AUTO: 30.5 PG (ref 26–34)
MCHC RBC AUTO-ENTMCNC: 34.4 G/DL (ref 31–37)
MCV RBC AUTO: 88.6 FL
MONOCYTES # BLD AUTO: 0.64 X10(3) UL (ref 0.1–1)
MONOCYTES NFR BLD AUTO: 10.8 %
NEUTROPHILS # BLD AUTO: 3.63 X10 (3) UL (ref 1.5–7.7)
NEUTROPHILS # BLD AUTO: 3.63 X10(3) UL (ref 1.5–7.7)
NEUTROPHILS NFR BLD AUTO: 61 %
OSMOLALITY SERPL CALC.SUM OF ELEC: 292 MOSM/KG (ref 275–295)
PLATELET # BLD AUTO: 106 10(3)UL (ref 150–450)
PLATELETS.RETICULATED NFR BLD AUTO: 3.9 % (ref 0–7)
POTASSIUM SERPL-SCNC: 4.6 MMOL/L (ref 3.5–5.1)
PROT SERPL-MCNC: 7.8 G/DL (ref 5.7–8.2)
RBC # BLD AUTO: 4.46 X10(6)UL
SODIUM SERPL-SCNC: 141 MMOL/L (ref 136–145)
TSI SER-ACNC: 1.18 UIU/ML (ref 0.55–4.78)
WBC # BLD AUTO: 6 X10(3) UL (ref 4–11)

## 2025-04-02 RX ORDER — ONDANSETRON 2 MG/ML
16 INJECTION INTRAMUSCULAR; INTRAVENOUS ONCE
Status: CANCELLED
Start: 2025-04-03 | End: 2025-04-03

## 2025-04-02 RX ORDER — FLUOROURACIL 50 MG/ML
2400 INJECTION, SOLUTION INTRAVENOUS CONTINUOUS
Status: CANCELLED | OUTPATIENT
Start: 2025-04-03

## 2025-04-02 RX ORDER — FAMOTIDINE 10 MG/ML
20 INJECTION, SOLUTION INTRAVENOUS ONCE
Status: CANCELLED
Start: 2025-04-03 | End: 2025-04-03

## 2025-04-02 RX ORDER — DIPHENHYDRAMINE HYDROCHLORIDE 50 MG/ML
25 INJECTION, SOLUTION INTRAMUSCULAR; INTRAVENOUS ONCE
Status: CANCELLED
Start: 2025-04-03 | End: 2025-04-03

## 2025-04-02 NOTE — PROGRESS NOTES
Oncology Progress Note    Name: Wallace Garzon  : 1960  CSN: [id number]  Consulting Physician: Dr. GIULIANA Díaz  Requesting: Dr. Higgins  Date: 25    Chief Complaint: Follow up post neutropenia and clearance for chemotherapy for gastric cancer    HPI:  65 year old With gastric adenocarcinoma diagnosed 23 in the setting of iron def anemia.    See below for staging workup  Initiated FOLFOX +Trastuzumab 24. Added Pembrolizumab with C3 (PDL1 10%).  Oxaliplatin dose reduced to 65 mg/m2 with C6 due to CIPN    Last treatment 3/6/25, folfox (no5fu bolus)+trastuzumab and had another reaction to oxaliplatin, oxaliplatin removed for future cycles.     Interval History:    Returns for consideration of next Cycle 25, FOLFOX(no 5FU bolus, no oxaliplatin) +Trastuzumab+ Pembrolizumab. Held last week due to neutropenia.  ANC improved. No new complaints.   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  He endorses mild nausea w/o emesis, takes prilosec daily.  He denies fever, infection, bleeding, chest pain, dyspnea, abdominal pain or new concerns on ROS.    Pmh: Arthritis      Social History     Socioeconomic History    Marital status:     Number of children: 2   Occupational History    Occupation:      Employer: [email or web address]   Tobacco Use    Smoking status: Never    Smokeless tobacco: Never   Vaping Use    Vaping status: Never Used   Substance and Sexual Activity    Alcohol use: No     Alcohol/week: 0.0 standard drinks of alcohol    Drug use: No    Sexual activity: Yes   Other Topics Concern    Caffeine Concern Yes     Comment: coffee, 1 cup daily     Family History   Problem Relation Age of Onset    Prostate Cancer Other      Ros negx12    Nka  Current Outpatient Medications on File Prior to Visit   Medication Sig Dispense Refill    Omeprazole 40 MG Oral Capsule Delayed Release Take 1 capsule (40 mg total) by mouth before breakfast. 90 capsule 1     gabapentin 100 MG Oral Cap Take 3 capsules (300 mg total) by mouth nightly. 30 capsule 1    zolpidem (AMBIEN) 10 MG Oral Tab Take 1 tablet (10 mg total) by mouth nightly as needed for Sleep. 30 tablet 0    acetaminophen-codeine 300-30 MG Oral Tab Take 1 tablet by mouth every 6 (six) hours as needed for Pain. Medication may causes sedation, constipation. Not to operate heavy machinery or drive on medication. 60 tablet 0    prochlorperazine (COMPAZINE) 10 mg tablet Take 1 tablet (10 mg total) by mouth every 6 (six) hours as needed for Nausea. 30 tablet 3    ondansetron (ZOFRAN) 8 MG tablet Take 1 tablet (8 mg total) by mouth every 8 (eight) hours as needed for Nausea. 30 tablet 3     Current Facility-Administered Medications on File Prior to Visit   Medication Dose Route Frequency Provider Last Rate Last Admin    [COMPLETED] diphenhydrAMINE (Benadryl) 50 mg/mL  injection 25 mg  25 mg Intravenous Once Jodi Wilkerson APRN   25 mg at 03/06/25 0922    [COMPLETED] famotidine (Pepcid) 20 mg/2mL injection 20 mg  20 mg Intravenous Once Jodi Wilkerson APRN   20 mg at 03/06/25 0919    [COMPLETED] ondansetron (Zofran) 16 mg, dexAMETHasone (Decadron) 20 mg in sodium chloride 0.9% 110 mL IVPB   Intravenous Once Jodi Wilkerson APRN   Stopped at 03/06/25 0937    [COMPLETED] trastuzumab-qyyp (Trazimera) 315 mg in sodium chloride 0.9% 265 mL infusion  4 mg/kg (Treatment Plan Recorded) Intravenous Once Jodi Wilkerson APRN   Stopped at 03/06/25 1034    [COMPLETED] oxaliplatin (Eloxatin) 125 mg in dextrose 5% 275 mL infusion  65 mg/m2 (Treatment Plan Recorded) Intravenous Once Jodi Wilkerson APRN   Stopped at 03/06/25 1345    [COMPLETED] leucovorin 750 mg in dextrose 5% 250 mL infusion  400 mg/m2 (Treatment Plan Recorded) Intravenous Once Jodi Wilkerson APRN   Stopped at 03/06/25 1345    [COMPLETED] diphenhydrAMINE (Benadryl) 50 mg/mL  injection 25 mg  25 mg Intravenous Once Denae, Eh, PA   25 mg at 03/06/25  1113     There were no vitals filed for this visit.    Wt Readings from Last 6 Encounters:   03/27/25 77.1 kg (170 lb)   03/18/25 76.7 kg (169 lb)   03/05/25 77 kg (169 lb 12.8 oz)   02/18/25 77.3 kg (170 lb 6.4 oz)   02/04/25 77.6 kg (171 lb)   01/21/25 78.8 kg (173 lb 12.8 oz)       Physical exam:  General:  Awake, alert, oriented in no acute distress   HEENT - EOMsi, anicteric, MMM  Neck - supple, no LAD, normal ROM  Lungs -  CTA bilaterally  Cor - S1/S2 w/o murmur noted  Abd - soft, non tender non distended, bs+  BLE - no edema  Neuro - DTRs grossly intact    ECOG 2: Ambulatory and capable of all selfcare but unable to carry out any work activities. Up and about more than 50% of waking hours    Lab Results   Component Value Date    WBC 6.0 04/02/2025    RBC 4.46 04/02/2025    HGB 13.6 04/02/2025    HCT 39.5 04/02/2025    MCV 88.6 04/02/2025    MCH 30.5 04/02/2025    MCHC 34.4 04/02/2025    RDW 13.4 04/02/2025    .0 (L) 04/02/2025    MPV 8.9 10/31/2018     Lab Results   Component Value Date    GLU 92 04/02/2025    BUN 15 04/02/2025    BUNCREA 17.0 04/02/2025    CREATSERUM 0.88 04/02/2025    ANIONGAP 8 04/02/2025    GFRNAA 86 11/10/2021    GFRAA 99 11/10/2021    CA 9.7 04/02/2025    OSMOCALC 292 04/02/2025    ALKPHO 153 (H) 04/02/2025    AST 53 (H) 04/02/2025    ALT 66 (H) 04/02/2025    ALKPHOS 82 10/08/2015    BILT 1.7 (H) 04/02/2025    TP 7.8 04/02/2025    ALB 4.6 04/02/2025    GLOBULIN 3.2 04/02/2025    AGRATIO 1.4 10/08/2015     04/02/2025    K 4.6 04/02/2025     04/02/2025    CO2 27.0 04/02/2025     Imaging:    N/A    65 year old  With gastric adenocarcinoma diagnosed 12/18/23 in the setting of iron def anemia. Her positive (3+) MSI-S PDL1 10%    1.) Gastric cancer, stage IV  -- Widespread kevin disease reviewed at tumor conference by Dr. Amaro with surgical oncology will plan for upfront systemic therapy with 5-FU oxaliplatin based regimen potentially with trastuzumab and  pembrolizumab  -Initiated FOLFOX plus trastuzumab on 1/18/23   -Delayed Cycle #5 x 1 week d/t worsening LFT elevation   -Cycle #6 with dose reduction of Oxaliplatin d/t CIPN worsening and LFTs as below.    -CT imaging after C4 3/2024 with favorable treatment response.   -PDL1 10% 1/2024. Added Pembrolizumab  as >1% per guidelines. q6week scheduling-initiated with C3 above.   -DELAY Cycle 8 d/t TCP <75K.    - reviewed the pt's current CT scan from above early June; favorable treatment response, continue current systemic treatment    -surveillance CT scan from 8/24 w/ stable findings; c/w favorable tx response, continue current management    -cycle 16 FOLFOX plus trastuzumab plus Pembrolizumab was deferred for 2 wks due to side effects from systemic tx, referred to palliative care to help. Now feels improved to continue with same dosing     --pt is working with  to determine his work limitations     --explained that we will nee to modified his treatments so that they're sustainable; possibly dose reduced oxaliplatin to 50, 5-FU to 2200 and dec trastuzumab to 3mg/kg    --surveillance CT scan from 11/2024 shows stable disease  --Pembrolizumab+oxaliplatin were being held in light of elevated LFT's which are improving off tx    --2/27/25 DEFER cycle 24 by 1 wk due to neutropenia; FOLFOX with trastuzumab +Pembrolizumab(every 6 wks) last pembro 2/6/25;     --reviewed with him that his surveillance CT scan shows stable post operative changes; no signs of progression. The new splenomegaly may be from cell turnover.     -tx will be held if platelets <75K. Dr. Díaz discontinued the 5-FU bolus as this is likely contributing to the chemotherapy induced thrombocytopenia    -defer C25 by 1 week due to neutropenia; 5-FU+leucovorin with trastuzumab +Pembrolizumab(every 6 wks). As the pt had a second oxaliplatin reaction with last cycle of chemotherapy, this med will be eliminated from his tx plan.    - ECHO 2/18/25  stable, EF 60-65%    -following w/ ECHO and CT scans (every 3mo) ordered for 5/2025  -Cleared for 4/2 C25 5-FU+leucovorin+ trastuzumab and 4/2 Pembrolizumab(every 6 wks), removed pepsid and dexamethasone from premeds due to no oxaliplatin, continues premeds diphenhydramine and ondansetron  -RTC 6 weeks 4/15/25 Dr. Díaz and labs and treatment 4/17/25    2.)Iron deficiency anemia    --s/p Feraheme x 2, last dose on 2/1/24, anemia resolved  -HGB stable    3.) Chemotherapy induced thrombocytopenia  --Thrombocytopenia secondary to chemo, ok to treat if plts > 75k.    --PLT stable    4.)Transaminitis   --possibly from recent pembrolizumab or from the oxaliplatin;   -LFT's are improving, may have to consider steroid tx if these rise again for CPI hepatitis  -AST and ALT continue to improve, t bili 1.7, no longer to get oxaliplatin r/t reaction    5.)CIPN  -- gr 1-2 after C8, Dose reduction of oxaliplatin to 65 mg/m2 with C6.   --Delay of C9 d/t Tcp with imrpovement to gr1 CIPN to fingers without ADL limitations.   --Improving given omission of Oxaliplatin.   --1/7/25 started trial of gabapentin trial at 100 mg at night; explained to pt that this will cause drowsiness and we can titrate up to 300 mg at night, continue this same dose for now  -continues current dose gabapentin reports continued PN not worse since last visit, no longer to get oxaliplatin last dose 3/6/25.    6.)risk of cardiotoxicity  --Normal EF/echo prior to treatment 1/2024. 5/2024 ECHO repeat stable/improved.    --H/o fleeting chest discomfort reported, currently resolved, without any other Aes.   -Monitor w/ ECHO every 3 mo  -2/2025 Echo stable EF 60-65%, ordered for 5/2025    7.) BANDAR  --gr. 1, tolerable.  Prn antiemetics encouraged  --reports same, cont omeprazole and encouraged prn prochlorperazine and ondansetron.    9.)H/o skin rash  --Resolved. Supportive care with topical creams    10.)Prostate Enlargement  --noted on imaging; likely BPH, pt  denies nocturia or dribbling symptoms        [facility]    XOCHITL Saucedo    [facility]  Franca Goodman [facility] [place]  [address]  [phone]

## 2025-04-03 ENCOUNTER — OFFICE VISIT (OUTPATIENT)
Age: 65
End: 2025-04-03
Attending: INTERNAL MEDICINE
Payer: COMMERCIAL

## 2025-04-03 ENCOUNTER — APPOINTMENT (OUTPATIENT)
Age: 65
End: 2025-04-03
Attending: INTERNAL MEDICINE
Payer: COMMERCIAL

## 2025-04-03 VITALS
HEART RATE: 89 BPM | SYSTOLIC BLOOD PRESSURE: 138 MMHG | RESPIRATION RATE: 18 BRPM | DIASTOLIC BLOOD PRESSURE: 73 MMHG | OXYGEN SATURATION: 96 % | TEMPERATURE: 98 F

## 2025-04-03 DIAGNOSIS — C16.9 MALIGNANT NEOPLASM OF STOMACH, UNSPECIFIED LOCATION (HCC): Primary | ICD-10-CM

## 2025-04-03 RX ORDER — DIPHENHYDRAMINE HYDROCHLORIDE 50 MG/ML
INJECTION, SOLUTION INTRAMUSCULAR; INTRAVENOUS
Status: COMPLETED
Start: 2025-04-03 | End: 2025-04-03

## 2025-04-03 RX ORDER — DIPHENHYDRAMINE HYDROCHLORIDE 50 MG/ML
25 INJECTION, SOLUTION INTRAMUSCULAR; INTRAVENOUS ONCE
Status: COMPLETED | OUTPATIENT
Start: 2025-04-03 | End: 2025-04-03

## 2025-04-03 RX ORDER — FLUOROURACIL 50 MG/ML
2400 INJECTION, SOLUTION INTRAVENOUS CONTINUOUS
Status: DISCONTINUED | OUTPATIENT
Start: 2025-04-03 | End: 2025-04-03

## 2025-04-03 RX ADMIN — DIPHENHYDRAMINE HYDROCHLORIDE 25 MG: 50 INJECTION, SOLUTION INTRAMUSCULAR; INTRAVENOUS at 07:57:00

## 2025-04-03 RX ADMIN — FLUOROURACIL 4600 MG: 50 INJECTION, SOLUTION INTRAVENOUS at 10:24:00

## 2025-04-03 NOTE — PROGRESS NOTES
Pt here for C25 keytruda + trasruzumab + leucovorin/5fu CADD     Patient was evaluated today by Treatment Nurse. Had exam with Jodi LEUNG on 4/2    Oral medications included in this regimen:  no    Patient confirms comprehension of cancer treatment schedule:  yes    Pregnancy screening:  Not applicable    Modifications in dose or schedule:  Yes- oxaliplatin removed.    Medications appearance and physical integrity checked by RN: yes.    Chemotherapy IV pump settings verified by 2 RNs:  No due to targeted therapy IV administration. 5FU cadd pump checked by RN x2  Frequency of blood return and site check throughout administration: Prior to administration, Prior to each drug, and At completion of therapy   CADD pump connected and secured. + blood return noted when connected.  Infusion/treatment outcome:  patient tolerated treatment without incident    Education Record    Learner:  Patient  Barriers / Limitations:  None  Method:  Discussion  Education / instructions given:  reviewed change in treatment  Outcome:  Shows understanding    Discharged Other stable from infusion , Ambulating independently, accompanied by:Self    Patient/family verbalized understanding of future appointments: by [employer] messaging

## 2025-04-05 ENCOUNTER — NURSE ONLY (OUTPATIENT)
Age: 65
End: 2025-04-05
Attending: INTERNAL MEDICINE
Payer: COMMERCIAL

## 2025-04-05 ENCOUNTER — APPOINTMENT (OUTPATIENT)
Age: 65
End: 2025-04-05
Attending: INTERNAL MEDICINE
Payer: COMMERCIAL

## 2025-04-05 VITALS
TEMPERATURE: 98 F | HEART RATE: 78 BPM | DIASTOLIC BLOOD PRESSURE: 76 MMHG | SYSTOLIC BLOOD PRESSURE: 132 MMHG | RESPIRATION RATE: 16 BRPM | OXYGEN SATURATION: 97 %

## 2025-04-05 NOTE — PROGRESS NOTES
Patient arrives to infusion for CADD pump disconnect. Patient denies any issues or concerns. CADD reservoir with 0mL remaining, pump powered off and disconnected. Port flushed with NS and decannulated, gauze and paper tape applied to site.     Ordering Provider: Arjun     Patient discharged stable from infusion area. Reviewed next apt date/time: 4/15 labs and provider visit. Cycle 26 chemo planned 4/17.    Education Record  Learner:  Patient  Disease / Diagnosis: gastric cancer  Barriers / Limitations:  None  Method:  Reinforcement  General Topics:  Medication and Plan of care reviewed  Outcome:  Shows understanding

## 2025-04-08 ENCOUNTER — APPOINTMENT (OUTPATIENT)
Age: 65
End: 2025-04-08
Attending: INTERNAL MEDICINE
Payer: COMMERCIAL

## 2025-04-10 ENCOUNTER — APPOINTMENT (OUTPATIENT)
Age: 65
End: 2025-04-10
Attending: INTERNAL MEDICINE
Payer: COMMERCIAL

## 2025-04-12 ENCOUNTER — APPOINTMENT (OUTPATIENT)
Age: 65
End: 2025-04-12
Attending: INTERNAL MEDICINE
Payer: COMMERCIAL

## 2025-04-15 ENCOUNTER — OFFICE VISIT (OUTPATIENT)
Age: 65
End: 2025-04-15
Attending: INTERNAL MEDICINE
Payer: COMMERCIAL

## 2025-04-15 ENCOUNTER — NURSE ONLY (OUTPATIENT)
Age: 65
End: 2025-04-15
Attending: INTERNAL MEDICINE
Payer: COMMERCIAL

## 2025-04-15 VITALS
HEART RATE: 77 BPM | BODY MASS INDEX: 25 KG/M2 | WEIGHT: 169 LBS | DIASTOLIC BLOOD PRESSURE: 74 MMHG | RESPIRATION RATE: 16 BRPM | TEMPERATURE: 98 F | OXYGEN SATURATION: 96 % | SYSTOLIC BLOOD PRESSURE: 116 MMHG

## 2025-04-15 DIAGNOSIS — T45.1X5A PERIPHERAL NEUROPATHY DUE TO CHEMOTHERAPY (HCC): ICD-10-CM

## 2025-04-15 DIAGNOSIS — C16.9 MALIGNANT NEOPLASM OF STOMACH, UNSPECIFIED LOCATION (HCC): ICD-10-CM

## 2025-04-15 DIAGNOSIS — Z51.11 CHEMOTHERAPY MANAGEMENT, ENCOUNTER FOR: ICD-10-CM

## 2025-04-15 DIAGNOSIS — C16.9 MALIGNANT NEOPLASM OF STOMACH, UNSPECIFIED LOCATION (HCC): Primary | ICD-10-CM

## 2025-04-15 DIAGNOSIS — Z51.11 CHEMOTHERAPY MANAGEMENT, ENCOUNTER FOR: Primary | ICD-10-CM

## 2025-04-15 DIAGNOSIS — G62.0 PERIPHERAL NEUROPATHY DUE TO CHEMOTHERAPY (HCC): ICD-10-CM

## 2025-04-15 LAB
ALBUMIN SERPL-MCNC: 4.4 G/DL (ref 3.2–4.8)
ALBUMIN/GLOB SERPL: 1.3 {RATIO} (ref 1–2)
ALP LIVER SERPL-CCNC: 132 U/L (ref 45–117)
ALT SERPL-CCNC: 66 U/L (ref 10–49)
ANION GAP SERPL CALC-SCNC: 9 MMOL/L (ref 0–18)
AST SERPL-CCNC: 53 U/L (ref ?–34)
BASOPHILS # BLD AUTO: 0.02 X10(3) UL (ref 0–0.2)
BASOPHILS NFR BLD AUTO: 0.5 %
BILIRUB SERPL-MCNC: 1.9 MG/DL (ref 0.2–1.1)
BUN BLD-MCNC: 14 MG/DL (ref 9–23)
BUN/CREAT SERPL: 15.4 (ref 10–20)
CALCIUM BLD-MCNC: 9.4 MG/DL (ref 8.7–10.4)
CHLORIDE SERPL-SCNC: 103 MMOL/L (ref 98–112)
CO2 SERPL-SCNC: 26 MMOL/L (ref 21–32)
CREAT BLD-MCNC: 0.91 MG/DL (ref 0.7–1.3)
DEPRECATED RDW RBC AUTO: 43.8 FL (ref 35.1–46.3)
EGFRCR SERPLBLD CKD-EPI 2021: 94 ML/MIN/1.73M2 (ref 60–?)
EOSINOPHIL # BLD AUTO: 0.08 X10(3) UL (ref 0–0.7)
EOSINOPHIL NFR BLD AUTO: 2.1 %
ERYTHROCYTE [DISTWIDTH] IN BLOOD BY AUTOMATED COUNT: 14.1 % (ref 11–15)
GLOBULIN PLAS-MCNC: 3.3 G/DL (ref 2–3.5)
GLUCOSE BLD-MCNC: 97 MG/DL (ref 70–99)
HCT VFR BLD AUTO: 36.8 % (ref 39–53)
HGB BLD-MCNC: 13 G/DL (ref 13–17.5)
IMM GRANULOCYTES # BLD AUTO: 0 X10(3) UL (ref 0–1)
IMM GRANULOCYTES NFR BLD: 0 %
LYMPHOCYTES # BLD AUTO: 1.11 X10(3) UL (ref 1–4)
LYMPHOCYTES NFR BLD AUTO: 28.8 %
MCH RBC QN AUTO: 30.7 PG (ref 26–34)
MCHC RBC AUTO-ENTMCNC: 35.3 G/DL (ref 31–37)
MCV RBC AUTO: 87 FL (ref 80–100)
MONOCYTES # BLD AUTO: 0.43 X10(3) UL (ref 0.1–1)
MONOCYTES NFR BLD AUTO: 11.1 %
NEUTROPHILS # BLD AUTO: 2.22 X10 (3) UL (ref 1.5–7.7)
NEUTROPHILS # BLD AUTO: 2.22 X10(3) UL (ref 1.5–7.7)
NEUTROPHILS NFR BLD AUTO: 57.5 %
OSMOLALITY SERPL CALC.SUM OF ELEC: 286 MOSM/KG (ref 275–295)
PLATELET # BLD AUTO: 124 10(3)UL (ref 150–450)
POTASSIUM SERPL-SCNC: 4.4 MMOL/L (ref 3.5–5.1)
PROT SERPL-MCNC: 7.7 G/DL (ref 5.7–8.2)
RBC # BLD AUTO: 4.23 X10(6)UL (ref 4.3–5.7)
SODIUM SERPL-SCNC: 138 MMOL/L (ref 136–145)
TSI SER-ACNC: 1.13 UIU/ML (ref 0.55–4.78)
WBC # BLD AUTO: 3.9 X10(3) UL (ref 4–11)

## 2025-04-15 RX ORDER — DIPHENHYDRAMINE HYDROCHLORIDE 50 MG/ML
25 INJECTION, SOLUTION INTRAMUSCULAR; INTRAVENOUS ONCE
Status: CANCELLED
Start: 2025-04-17 | End: 2025-04-17

## 2025-04-15 RX ORDER — FLUOROURACIL 50 MG/ML
2400 INJECTION, SOLUTION INTRAVENOUS CONTINUOUS
Status: CANCELLED | OUTPATIENT
Start: 2025-04-17

## 2025-04-15 NOTE — PROGRESS NOTES
Oncology Progress Note    Name: Wallace Garzon  : 1960  CSN: [id number]  Consulting Physician: Dr. GIULIANA Díaz  Requesting: Dr. Higgins  Date: 4/15/25    Chief Complaint: Follow up post neutropenia and clearance for chemotherapy for gastric cancer    HPI:  65 year old With gastric adenocarcinoma diagnosed 23 in the setting of iron def anemia.    See below for staging workup  Initiated FOLFOX +Trastuzumab 24. Added Pembrolizumab with C3 (PDL1 10%).  Oxaliplatin dose reduced to 65 mg/m2 with C6 due to CIPN    Last treatment 3/6/25, folfox (no5fu bolus)+trastuzumab and had another reaction to oxaliplatin, oxaliplatin removed for future cycles.     Interval History:    Returns for consideration of next Cycle 25, 5FU +Trastuzumab+ Pembrolizumab.      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  He endorses mild nausea w/o emesis, takes prilosec daily and mild post tx fatigue.    No new concerns for fever, infection, bleeding, chest pain, dyspnea, abdominal pain or new symptoms on ROS.    Pmh: Arthritis      Social History     Socioeconomic History    Marital status:     Number of children: 2   Occupational History    Occupation:      Employer: [employer]   Tobacco Use    Smoking status: Never    Smokeless tobacco: Never   Vaping Use    Vaping status: Never Used   Substance and Sexual Activity    Alcohol use: No     Alcohol/week: 0.0 standard drinks of alcohol    Drug use: No    Sexual activity: Yes   Other Topics Concern    Caffeine Concern Yes     Comment: coffee, 1 cup daily     Family History   Problem Relation Age of Onset    Prostate Cancer Other      Ros negx12    Nka  Current Outpatient Medications on File Prior to Visit   Medication Sig Dispense Refill    Omeprazole 40 MG Oral Capsule Delayed Release Take 1 capsule (40 mg total) by mouth before breakfast. 90 capsule 1    gabapentin 100 MG Oral Cap Take 3 capsules (300 mg total) by  mouth nightly. 30 capsule 1    zolpidem (AMBIEN) 10 MG Oral Tab Take 1 tablet (10 mg total) by mouth nightly as needed for Sleep. 30 tablet 0    acetaminophen-codeine 300-30 MG Oral Tab Take 1 tablet by mouth every 6 (six) hours as needed for Pain. Medication may causes sedation, constipation. Not to operate heavy machinery or drive on medication. 60 tablet 0    prochlorperazine (COMPAZINE) 10 mg tablet Take 1 tablet (10 mg total) by mouth every 6 (six) hours as needed for Nausea. 30 tablet 3    ondansetron (ZOFRAN) 8 MG tablet Take 1 tablet (8 mg total) by mouth every 8 (eight) hours as needed for Nausea. 30 tablet 3     Current Facility-Administered Medications on File Prior to Visit   Medication Dose Route Frequency Provider Last Rate Last Admin    [COMPLETED] pembrolizumab (Keytruda) 400 mg in sodium chloride 0.9% 116 mL IVPB  400 mg Intravenous Once Jodi Wilkerson APRN   Stopped at 04/03/25 0907    [COMPLETED] trastuzumab-qyyp (Trazimera) 315 mg in sodium chloride 0.9% 265 mL infusion  4 mg/kg (Treatment Plan Recorded) Intravenous Once Jodi Wilkerson APRN   Stopped at 04/03/25 0943    [COMPLETED] leucovorin 750 mg in dextrose 5% 250 mL infusion  400 mg/m2 (Treatment Plan Recorded) Intravenous Once Jodi Wilkerson APRN   Stopped at 04/03/25 1022    [COMPLETED] ondansetron (Zofran) 16 mg in sodium chloride 0.9% 108 mL IVPB  16 mg Intravenous Once Jodi Wilkerson APRN   Stopped at 04/03/25 0831    [COMPLETED] diphenhydrAMINE (Benadryl) 50 mg/mL  injection 25 mg  25 mg Intravenous Once Jodi Wilkerson APRN   25 mg at 04/03/25 0757     Vitals:    04/15/25 1430   BP: 116/74   Pulse: 77   Resp: 16   Temp: 97.7 °F (36.5 °C)       Wt Readings from Last 6 Encounters:   04/15/25 76.7 kg (169 lb)   04/02/25 76.2 kg (168 lb)   03/27/25 77.1 kg (170 lb)   03/18/25 76.7 kg (169 lb)   03/05/25 77 kg (169 lb 12.8 oz)   02/18/25 77.3 kg (170 lb 6.4 oz)       Physical exam:  General:  Awake, alert, oriented in no  acute distress   HEENT - EOMsi, anicteric, MMM  Neck - supple, no LAD, normal ROM  Lungs -  CTA bilaterally  Cor - S1/S2 w/o murmur noted  Abd - soft, non tender non distended, bs+  BLE - no edema  Neuro - DTRs grossly intact    ECOG 2: Ambulatory and capable of all selfcare but unable to carry out any work activities. Up and about more than 50% of waking hours    Lab Results   Component Value Date    WBC 3.9 (L) 04/15/2025    RBC 4.23 (L) 04/15/2025    HGB 13.0 04/15/2025    HCT 36.8 (L) 04/15/2025    MCV 87.0 04/15/2025    MCH 30.7 04/15/2025    MCHC 35.3 04/15/2025    RDW 14.1 04/15/2025    .0 (L) 04/15/2025    MPV 8.9 10/31/2018     Lab Results   Component Value Date    GLU 97 04/15/2025    BUN 14 04/15/2025    BUNCREA 15.4 04/15/2025    CREATSERUM 0.91 04/15/2025    ANIONGAP 9 04/15/2025    GFRNAA 86 11/10/2021    GFRAA 99 11/10/2021    CA 9.4 04/15/2025    OSMOCALC 286 04/15/2025    ALKPHO 132 (H) 04/15/2025    AST 53 (H) 04/15/2025    ALT 66 (H) 04/15/2025    ALKPHOS 82 10/08/2015    BILT 1.9 (H) 04/15/2025    TP 7.7 04/15/2025    ALB 4.4 04/15/2025    GLOBULIN 3.3 04/15/2025    AGRATIO 1.4 10/08/2015     04/15/2025    K 4.4 04/15/2025     04/15/2025    CO2 26.0 04/15/2025     Imaging:    N/A    65 year old  With gastric adenocarcinoma diagnosed 12/18/23 in the setting of iron def anemia. Her positive (3+) MSI-S PDL1 10%    1.) Gastric cancer, stage IV  -- Widespread kevin disease reviewed at tumor conference by Dr. Amaro with surgical oncology will plan for upfront systemic therapy with 5-FU oxaliplatin based regimen potentially with trastuzumab and pembrolizumab  -Initiated FOLFOX plus trastuzumab on 1/18/23   -Delayed Cycle #5 x 1 week d/t worsening LFT elevation   -Cycle #6 with dose reduction of Oxaliplatin d/t CIPN worsening and LFTs as below.    -CT imaging after C4 3/2024 with favorable treatment response.   -PDL1 10% 1/2024. Added Pembrolizumab  as >1% per guidelines. q6week  scheduling-initiated with C3 above.   -DELAY Cycle 8 d/t TCP <75K.    - reviewed the pt's current CT scan from above early June; favorable treatment response, continue current systemic treatment    -surveillance CT scan from 8/24 w/ stable findings; c/w favorable tx response, continue current management    -cycle 16 FOLFOX plus trastuzumab plus Pembrolizumab was deferred for 2 wks due to side effects from systemic tx, referred to palliative care to help. Now feels improved to continue with same dosing     --pt is working with  to determine his work limitations     --explained that we will nee to modified his treatments so that they're sustainable; possibly dose reduced oxaliplatin to 50, 5-FU to 2200 and dec trastuzumab to 3mg/kg    --surveillance CT scan from 11/2024 shows stable disease  --Pembrolizumab+oxaliplatin were being held in light of elevated LFT's which are improving off tx    --2/27/25 DEFER cycle 24 by 1 wk due to neutropenia; FOLFOX with trastuzumab +Pembrolizumab(every 6 wks) last pembro 2/6/25;     --reviewed with him that his surveillance CT scan shows stable post operative changes; no signs of progression. The new splenomegaly may be from cell turnover.     -tx will be held if platelets <75K. Dr. Díaz discontinued the 5-FU bolus as this is likely contributing to the chemotherapy induced thrombocytopenia    -defer C25 by 1 week due to neutropenia; 5-FU+leucovorin with trastuzumab +Pembrolizumab(every 6 wks). As the pt had a second oxaliplatin reaction with last cycle of chemotherapy, this med was eliminated from his tx plan.    - ECHO 2/18/25 stable, EF 60-65%    -following w/ ECHO and CT scans (every 3mo) ordered for 5/2025    -Cleared for C26 5-FU+leucovorin+ trastuzumab and Pembrolizumab(every 6 wks), continues premeds diphenhydramine and ondansetron    -RTC next cycle w/ me in 2 wks; surveillance CT scan orders already in place    2.)Iron deficiency anemia    --s/p Feraheme x 2,  last dose on 2/1/24, anemia resolved  -HGB stable    3.) Chemotherapy induced thrombocytopenia  --Thrombocytopenia secondary to chemo, ok to treat if plts > 75k.    --PLT stable    4.)Transaminitis   --possibly from recent pembrolizumab or from the oxaliplatin;   -LFT's are improving, may have to consider steroid tx if these rise again for CPI hepatitis  -AST and ALT continue to improve, t bili is stable  --no longer to get oxaliplatin due to reaction    5.)CIPN  -- gr 1-2 after C8, Dose reduction of oxaliplatin to 65 mg/m2 with C6.   --Delay of C9 d/t Tcp with imrpovement to gr1 CIPN to fingers without ADL limitations.   --Improving given omission of Oxaliplatin.   --1/7/25 started trial of gabapentin trial at 100 mg at night; explained to pt that this will cause drowsiness and we can titrate up to 300 mg at night, continue this same dose for now  -continues current dose gabapentin reports continued PN not worse since last visit  --no longer to get oxaliplatin last dose 3/6/25    6.)risk of cardiotoxicity  --Normal EF/echo prior to treatment 1/2024. 5/2024 ECHO repeat stable/improved.    --H/o fleeting chest discomfort reported, currently resolved, without any other Aes.   -Monitor w/ ECHO every 3 mo  -2/2025 Echo stable EF 60-65%, ordered for 5/2025    7.) BANDAR  --gr. 1, tolerable.  Prn antiemetics encouraged  --reports same, cont omeprazole and encouraged prn prochlorperazine and ondansetron.    9.)H/o skin rash  --Resolved. Supportive care with topical creams    10.)Prostate Enlargement  --noted on imaging; likely BPH, pt denies nocturia or dribbling symptoms        [facility]    Gustavo Díaz MD  [facility]  [facility] [place]  [address]  [phone]

## 2025-04-17 ENCOUNTER — OFFICE VISIT (OUTPATIENT)
Age: 65
End: 2025-04-17
Attending: INTERNAL MEDICINE
Payer: COMMERCIAL

## 2025-04-17 VITALS
HEART RATE: 89 BPM | OXYGEN SATURATION: 97 % | TEMPERATURE: 98 F | RESPIRATION RATE: 18 BRPM | DIASTOLIC BLOOD PRESSURE: 78 MMHG | SYSTOLIC BLOOD PRESSURE: 143 MMHG

## 2025-04-17 DIAGNOSIS — C16.9 MALIGNANT NEOPLASM OF STOMACH, UNSPECIFIED LOCATION (HCC): Primary | ICD-10-CM

## 2025-04-17 RX ORDER — FLUOROURACIL 50 MG/ML
2400 INJECTION, SOLUTION INTRAVENOUS CONTINUOUS
Status: DISCONTINUED | OUTPATIENT
Start: 2025-04-17 | End: 2025-04-17

## 2025-04-17 RX ORDER — DIPHENHYDRAMINE HYDROCHLORIDE 50 MG/ML
INJECTION, SOLUTION INTRAMUSCULAR; INTRAVENOUS
Status: COMPLETED
Start: 2025-04-17 | End: 2025-04-17

## 2025-04-17 RX ORDER — DIPHENHYDRAMINE HYDROCHLORIDE 50 MG/ML
25 INJECTION, SOLUTION INTRAMUSCULAR; INTRAVENOUS ONCE
Status: COMPLETED | OUTPATIENT
Start: 2025-04-17 | End: 2025-04-17

## 2025-04-17 RX ADMIN — DIPHENHYDRAMINE HYDROCHLORIDE 25 MG: 50 INJECTION, SOLUTION INTRAMUSCULAR; INTRAVENOUS at 07:56:00

## 2025-04-17 RX ADMIN — FLUOROURACIL 4600 MG: 50 INJECTION, SOLUTION INTRAVENOUS at 10:00:00

## 2025-04-17 NOTE — PROGRESS NOTES
Pt here for C26D1 Drug(s)Trastuzumab, Leucovorin and 5FU.  Arrives Ambulating independently, accompanied by Spouse     Patient was evaluated today by Treatment Nurse and saw MD 4/15.  Oral medications included in this regimen:  no  Patient confirms comprehension of cancer treatment schedule:  yes  Pregnancy screening:  Not applicable  Modifications in dose or schedule:  No. Oxali d/c'd on C24, and 5FU push taken away.  Medications appearance and physical integrity checked by RN: no.  Chemotherapy IV pump settings verified by 2 RNs:  Yes.  Frequency of blood return and site check throughout administration: Prior to administration, Prior to each drug, and At completion of therapy   Infusion/treatment outcome:  patient tolerated treatment without incident    CADD pump connected and running. All connections reinforced with tape. Port access is clean and dry, secured with steri strips and tegaderm dressing. Patient verbalized understanding of CADD pump instructions, including troubleshooting.       Education Record    Learner:  Patient  Barriers / Limitations:  None  Method:  Reinforcement  Education / instructions given:  Plan of care and medications.   Outcome:  Shows understanding    Discharged Home, Ambulating independently, accompanied by:Spouse    Patient/family verbalized understanding of future appointments: by [employer] messaging

## 2025-04-19 ENCOUNTER — NURSE ONLY (OUTPATIENT)
Age: 65
End: 2025-04-19
Attending: INTERNAL MEDICINE
Payer: COMMERCIAL

## 2025-04-19 VITALS
SYSTOLIC BLOOD PRESSURE: 132 MMHG | TEMPERATURE: 98 F | RESPIRATION RATE: 18 BRPM | OXYGEN SATURATION: 96 % | HEART RATE: 78 BPM | DIASTOLIC BLOOD PRESSURE: 80 MMHG

## 2025-04-19 NOTE — PROGRESS NOTES
Pt here for CADD pump disconnected. Pt denies any issues or concerns. Patient presented with CADD pump connected. Reservoir empty. Disconnected CADD pump, flushed port with 0.9% Normal Saline and established blood return in port. Flushed with 20mL of 0.9% Normal Saline and de-accessed port. Gauze and paper tape applied to port site.          Education Record  Learner:  Patient  Disease / Diagnosis: Gastric cancer.  Barriers / Limitations:  None  Method:  Reinforcement  General Topics:  Plan of care reviewed  Outcome:  Shows understanding

## 2025-04-29 ENCOUNTER — NURSE ONLY (OUTPATIENT)
Age: 65
End: 2025-04-29
Attending: INTERNAL MEDICINE
Payer: COMMERCIAL

## 2025-04-29 ENCOUNTER — OFFICE VISIT (OUTPATIENT)
Age: 65
End: 2025-04-29
Attending: INTERNAL MEDICINE
Payer: COMMERCIAL

## 2025-04-29 VITALS
WEIGHT: 169 LBS | SYSTOLIC BLOOD PRESSURE: 122 MMHG | TEMPERATURE: 98 F | HEART RATE: 73 BPM | BODY MASS INDEX: 25.03 KG/M2 | HEIGHT: 69 IN | OXYGEN SATURATION: 97 % | RESPIRATION RATE: 16 BRPM | DIASTOLIC BLOOD PRESSURE: 76 MMHG

## 2025-04-29 DIAGNOSIS — C16.9 MALIGNANT NEOPLASM OF STOMACH, UNSPECIFIED LOCATION (HCC): ICD-10-CM

## 2025-04-29 DIAGNOSIS — Z51.11 CHEMOTHERAPY MANAGEMENT, ENCOUNTER FOR: ICD-10-CM

## 2025-04-29 DIAGNOSIS — G62.0 PERIPHERAL NEUROPATHY DUE TO CHEMOTHERAPY (HCC): ICD-10-CM

## 2025-04-29 DIAGNOSIS — C16.9 MALIGNANT NEOPLASM OF STOMACH, UNSPECIFIED LOCATION (HCC): Primary | ICD-10-CM

## 2025-04-29 DIAGNOSIS — Z51.11 CHEMOTHERAPY MANAGEMENT, ENCOUNTER FOR: Primary | ICD-10-CM

## 2025-04-29 DIAGNOSIS — T45.1X5A PERIPHERAL NEUROPATHY DUE TO CHEMOTHERAPY (HCC): ICD-10-CM

## 2025-04-29 LAB
ALBUMIN SERPL-MCNC: 4.4 G/DL (ref 3.2–4.8)
ALBUMIN/GLOB SERPL: 1.4 {RATIO} (ref 1–2)
ALP LIVER SERPL-CCNC: 123 U/L (ref 45–117)
ALT SERPL-CCNC: 58 U/L (ref 10–49)
ANION GAP SERPL CALC-SCNC: 6 MMOL/L (ref 0–18)
AST SERPL-CCNC: 49 U/L (ref ?–34)
BASOPHILS # BLD AUTO: 0.03 X10(3) UL (ref 0–0.2)
BASOPHILS NFR BLD AUTO: 0.8 %
BILIRUB SERPL-MCNC: 1.8 MG/DL (ref 0.2–1.1)
BUN BLD-MCNC: 13 MG/DL (ref 9–23)
BUN/CREAT SERPL: 11.2 (ref 10–20)
CALCIUM BLD-MCNC: 9.4 MG/DL (ref 8.7–10.4)
CHLORIDE SERPL-SCNC: 106 MMOL/L (ref 98–112)
CO2 SERPL-SCNC: 27 MMOL/L (ref 21–32)
CREAT BLD-MCNC: 1.16 MG/DL (ref 0.7–1.3)
DEPRECATED RDW RBC AUTO: 45 FL (ref 35.1–46.3)
EGFRCR SERPLBLD CKD-EPI 2021: 70 ML/MIN/1.73M2 (ref 60–?)
EOSINOPHIL # BLD AUTO: 0.06 X10(3) UL (ref 0–0.7)
EOSINOPHIL NFR BLD AUTO: 1.5 %
ERYTHROCYTE [DISTWIDTH] IN BLOOD BY AUTOMATED COUNT: 13.9 % (ref 11–15)
GLOBULIN PLAS-MCNC: 3.1 G/DL (ref 2–3.5)
GLUCOSE BLD-MCNC: 109 MG/DL (ref 70–99)
HCT VFR BLD AUTO: 36.8 % (ref 39–53)
HGB BLD-MCNC: 13.1 G/DL (ref 13–17.5)
IMM GRANULOCYTES # BLD AUTO: 0.01 X10(3) UL (ref 0–1)
IMM GRANULOCYTES NFR BLD: 0.3 %
LYMPHOCYTES # BLD AUTO: 1.18 X10(3) UL (ref 1–4)
LYMPHOCYTES NFR BLD AUTO: 30 %
MCH RBC QN AUTO: 32.1 PG (ref 26–34)
MCHC RBC AUTO-ENTMCNC: 35.6 G/DL (ref 31–37)
MCV RBC AUTO: 90.2 FL (ref 80–100)
MONOCYTES # BLD AUTO: 0.53 X10(3) UL (ref 0.1–1)
MONOCYTES NFR BLD AUTO: 13.5 %
NEUTROPHILS # BLD AUTO: 2.12 X10 (3) UL (ref 1.5–7.7)
NEUTROPHILS # BLD AUTO: 2.12 X10(3) UL (ref 1.5–7.7)
NEUTROPHILS NFR BLD AUTO: 53.9 %
OSMOLALITY SERPL CALC.SUM OF ELEC: 289 MOSM/KG (ref 275–295)
PLATELET # BLD AUTO: 100 10(3)UL (ref 150–450)
PLATELETS.RETICULATED NFR BLD AUTO: 2.5 % (ref 0–7)
POTASSIUM SERPL-SCNC: 4.4 MMOL/L (ref 3.5–5.1)
PROT SERPL-MCNC: 7.5 G/DL (ref 5.7–8.2)
RBC # BLD AUTO: 4.08 X10(6)UL (ref 4.3–5.7)
SODIUM SERPL-SCNC: 139 MMOL/L (ref 136–145)
TSI SER-ACNC: 0.92 UIU/ML (ref 0.55–4.78)
WBC # BLD AUTO: 3.9 X10(3) UL (ref 4–11)

## 2025-04-29 RX ORDER — DIPHENHYDRAMINE HYDROCHLORIDE 50 MG/ML
25 INJECTION, SOLUTION INTRAMUSCULAR; INTRAVENOUS ONCE
Status: CANCELLED
Start: 2025-05-01 | End: 2025-05-01

## 2025-04-29 RX ORDER — FLUOROURACIL 50 MG/ML
2400 INJECTION, SOLUTION INTRAVENOUS CONTINUOUS
Status: CANCELLED | OUTPATIENT
Start: 2025-05-01

## 2025-04-29 NOTE — PROGRESS NOTES
Oncology Progress Note    Name: Wallace Garzon  : 1960  CSN: [id number]  Consulting Physician: Dr. GIULIANA Díaz  Requesting: Dr. Higgins  Date: 25    Chief Complaint: Metastatic gastric cancer    HPI:  65 year old With gastric adenocarcinoma diagnosed 23 in the setting of iron def anemia.    See below for staging workup  Initiated FOLFOX +Trastuzumab 24. Added Pembrolizumab with C3 (PDL1 10%).  Oxaliplatin dose reduced to 65 mg/m2 with C6 due to CIPN    Last treatment 3/6/25, folfox (no5fu bolus)+trastuzumab and had another reaction to oxaliplatin, oxaliplatin removed for future cycles.     Interval History:    Returns for consideration of next Cycle 27, 5FU +Trastuzumab+ Pembrolizumab.      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  He mentions the last two cycles of chemotherapy were more tolerable for him w/o the Oxaliplatin.     No new concerns for fever, infection, bleeding, chest pain, dyspnea, abdominal pain or new symptoms on ROS.    Pmh: Arthritis      Social History     Socioeconomic History    Marital status:     Number of children: 2   Occupational History    Occupation: Food service     Employer: [employer]   Tobacco Use    Smoking status: Never    Smokeless tobacco: Never   Vaping Use    Vaping status: Never Used   Substance and Sexual Activity    Alcohol use: No     Alcohol/week: 0.0 standard drinks of alcohol    Drug use: No    Sexual activity: Yes   Other Topics Concern    Caffeine Concern Yes     Comment: coffee, 1 cup daily     Family History   Problem Relation Age of Onset    Prostate Cancer Other      Ros negx12    Nka  Current Outpatient Medications on File Prior to Visit   Medication Sig Dispense Refill    Omeprazole 40 MG Oral Capsule Delayed Release Take 1 capsule (40 mg total) by mouth before breakfast. 90 capsule 1    gabapentin 100 MG Oral Cap Take 3 capsules (300 mg total) by mouth nightly. 30 capsule 1    zolpidem  (AMBIEN) 10 MG Oral Tab Take 1 tablet (10 mg total) by mouth nightly as needed for Sleep. 30 tablet 0    acetaminophen-codeine 300-30 MG Oral Tab Take 1 tablet by mouth every 6 (six) hours as needed for Pain. Medication may causes sedation, constipation. Not to operate heavy machinery or drive on medication. 60 tablet 0    prochlorperazine (COMPAZINE) 10 mg tablet Take 1 tablet (10 mg total) by mouth every 6 (six) hours as needed for Nausea. 30 tablet 3    ondansetron (ZOFRAN) 8 MG tablet Take 1 tablet (8 mg total) by mouth every 8 (eight) hours as needed for Nausea. 30 tablet 3     Current Facility-Administered Medications on File Prior to Visit   Medication Dose Route Frequency Provider Last Rate Last Admin    [COMPLETED] ondansetron (Zofran) 16 mg in sodium chloride 0.9% 108 mL IVPB  16 mg Intravenous Once Gustavo Díaz MD   Stopped at 04/17/25 0817    [COMPLETED] diphenhydrAMINE (Benadryl) 50 mg/mL  injection 25 mg  25 mg Intravenous Once Gustavo Díaz MD   25 mg at 04/17/25 0756    [COMPLETED] trastuzumab-qyyp (Trazimera) 315 mg in sodium chloride 0.9% 265 mL infusion  4 mg/kg (Treatment Plan Recorded) Intravenous Once Gustavo Díaz MD   Stopped at 04/17/25 0906    [COMPLETED] leucovorin 750 mg in dextrose 5% 250 mL infusion  400 mg/m2 (Treatment Plan Recorded) Intravenous Once Gustavo Díaz MD   Stopped at 04/17/25 0944    [COMPLETED] pembrolizumab (Keytruda) 400 mg in sodium chloride 0.9% 116 mL IVPB  400 mg Intravenous Once Jodi Wilkerson APRN   Stopped at 04/03/25 0907    [COMPLETED] trastuzumab-qyyp (Trazimera) 315 mg in sodium chloride 0.9% 265 mL infusion  4 mg/kg (Treatment Plan Recorded) Intravenous Once Jodi Wilkerson APRN   Stopped at 04/03/25 0943    [COMPLETED] leucovorin 750 mg in dextrose 5% 250 mL infusion  400 mg/m2 (Treatment Plan Recorded) Intravenous Once Jodi Wilkerson APRN   Stopped at 04/03/25 1022    [COMPLETED] ondansetron (Zofran) 16 mg in sodium chloride 0.9% 108  mL IVPB  16 mg Intravenous Once Jodi Wilkerson APRN   Stopped at 04/03/25 0831    [COMPLETED] diphenhydrAMINE (Benadryl) 50 mg/mL  injection 25 mg  25 mg Intravenous Once Jodi Wilkerson APRN   25 mg at 04/03/25 0757     Vitals:    04/29/25 1554   BP: 122/76   Pulse: 73   Resp: 16   Temp: 97.8 °F (36.6 °C)       Wt Readings from Last 6 Encounters:   04/29/25 76.7 kg (169 lb)   04/15/25 76.7 kg (169 lb)   04/02/25 76.2 kg (168 lb)   03/27/25 77.1 kg (170 lb)   03/18/25 76.7 kg (169 lb)   03/05/25 77 kg (169 lb 12.8 oz)       Physical exam:  General:  Awake, alert, oriented in no acute distress   HEENT - EOMsi, anicteric, MMM  Neck - supple, no LAD, normal ROM  Lungs -  CTA bilaterally  Cor - S1/S2 w/o murmur noted  Abd - soft, non tender non distended, bs+  BLE - no edema  Neuro - Grossly intact    ECOG 2: Ambulatory and capable of all selfcare but unable to carry out any work activities. Up and about more than 50% of waking hours    Lab Results   Component Value Date    WBC 3.9 (L) 04/29/2025    RBC 4.08 (L) 04/29/2025    HGB 13.1 04/29/2025    HCT 36.8 (L) 04/29/2025    MCV 90.2 04/29/2025    MCH 32.1 04/29/2025    MCHC 35.6 04/29/2025    RDW 13.9 04/29/2025    .0 (L) 04/29/2025    MPV 8.9 10/31/2018     Lab Results   Component Value Date     (H) 04/29/2025    BUN 13 04/29/2025    BUNCREA 11.2 04/29/2025    CREATSERUM 1.16 04/29/2025    ANIONGAP 6 04/29/2025    GFRNAA 86 11/10/2021    GFRAA 99 11/10/2021    CA 9.4 04/29/2025    OSMOCALC 289 04/29/2025    ALKPHO 123 (H) 04/29/2025    AST 49 (H) 04/29/2025    ALT 58 (H) 04/29/2025    ALKPHOS 82 10/08/2015    BILT 1.8 (H) 04/29/2025    TP 7.5 04/29/2025    ALB 4.4 04/29/2025    GLOBULIN 3.1 04/29/2025    AGRATIO 1.4 10/08/2015     04/29/2025    K 4.4 04/29/2025     04/29/2025    CO2 27.0 04/29/2025     Imaging:    N/A    65 year old  With gastric adenocarcinoma diagnosed 12/18/23 in the setting of iron def anemia. Her positive (3+)  MSI-S PDL1 10%    1.) Gastric cancer, stage IV  -- Widespread kevin disease reviewed at tumor conference by Dr. Amaro with surgical oncology will plan for upfront systemic therapy with 5-FU oxaliplatin based regimen potentially with trastuzumab and pembrolizumab  -Initiated FOLFOX plus trastuzumab on 1/18/23   -Delayed Cycle #5 x 1 week d/t worsening LFT elevation   -Cycle #6 with dose reduction of Oxaliplatin d/t CIPN worsening and LFTs as below.    -CT imaging after C4 3/2024 with favorable treatment response.   -PDL1 10% 1/2024. Added Pembrolizumab  as >1% per guidelines. q6week scheduling-initiated with C3 above.   -DELAY Cycle 8 d/t TCP <75K.    - reviewed the pt's current CT scan from above early June; favorable treatment response, continue current systemic treatment    -surveillance CT scan from 8/24 w/ stable findings; c/w favorable tx response, continue current management    -cycle 16 FOLFOX plus trastuzumab plus Pembrolizumab was deferred for 2 wks due to side effects from systemic tx, referred to palliative care to help. Now feels improved to continue with same dosing     --pt is working with  to determine his work limitations     --explained that we will nee to modified his treatments so that they're sustainable; possibly dose reduced oxaliplatin to 50, 5-FU to 2200 and dec trastuzumab to 3mg/kg    --surveillance CT scan from 11/2024 shows stable disease  --Pembrolizumab+oxaliplatin were being held in light of elevated LFT's which are improving off tx    --2/27/25 DEFER cycle 24 by 1 wk due to neutropenia; FOLFOX with trastuzumab +Pembrolizumab(every 6 wks) last pembro 2/6/25;     --reviewed with him that his surveillance CT scan shows stable post operative changes; no signs of progression. The new splenomegaly may be from cell turnover.     -tx will be held if platelets <75K. Dr. Díaz discontinued the 5-FU bolus as this is likely contributing to the chemotherapy induced  thrombocytopenia    -defer C25 by 1 week due to neutropenia; 5-FU+leucovorin with trastuzumab +Pembrolizumab(every 6 wks). As the pt had a second oxaliplatin reaction with last cycle of chemotherapy, this med was eliminated from his tx plan.    - ECHO 2/18/25 stable, EF 60-65%    -following w/ ECHO and CT scans (every 3mo) scheduled for 5/2025    -proceed with C27 5-FU+leucovorin+ trastuzumab and Pembrolizumab(every 6 wks), continues premeds diphenhydramine and ondansetron      2.)Iron deficiency anemia    --s/p Feraheme x 2, last dose on 2/1/24, anemia resolved  -HGB stable    3.) Chemotherapy induced thrombocytopenia  --Thrombocytopenia secondary to chemo, ok to treat if plts > 75k.    --PLT stable    4.)Transaminitis   --possibly from recent pembrolizumab or from the oxaliplatin;   -LFT's are improving, may have to consider steroid tx if these rise again for CPI hepatitis  -AST and ALT continue to improve, t bili is stable  --no longer to get oxaliplatin due to reaction    5.)CIPN  -- gr 1-2 after C8, Dose reduction of oxaliplatin to 65 mg/m2 with C6.   --Delay of C9 d/t Tcp with imrpovement to gr1 CIPN to fingers without ADL limitations.   --Improving given omission of Oxaliplatin.   --1/7/25 started trial of gabapentin trial at 100 mg at night; explained to pt that this will cause drowsiness and we can titrate up to 300 mg at night, continue this same dose for now  -continues current dose gabapentin reports continued PN not worse since last visit  --no longer to get oxaliplatin last dose 3/6/25    6.)risk of cardiotoxicity  --Normal EF/echo prior to treatment 1/2024. 5/2024 ECHO repeat stable/improved.    --H/o fleeting chest discomfort reported, currently resolved, without any other Aes.   -Monitor w/ ECHO every 3 mo  -2/2025 Echo stable EF 60-65%, scheduled for 5/2025    7.) BANDAR  --gr. 1, tolerable.  Prn antiemetics encouraged  --reports same, cont omeprazole and encouraged prn prochlorperazine and  ondansetron.    9.)H/o skin rash  --Resolved. Supportive care with topical creams    10.)Prostate Enlargement  --noted on imaging; likely BPH, pt denies nocturia or dribbling symptoms        [facility]    Gustavo Díaz MD  [facility]  [facility] [place]  [address]  [phone]

## 2025-05-01 ENCOUNTER — OFFICE VISIT (OUTPATIENT)
Age: 65
End: 2025-05-01
Attending: INTERNAL MEDICINE
Payer: COMMERCIAL

## 2025-05-01 VITALS
DIASTOLIC BLOOD PRESSURE: 72 MMHG | OXYGEN SATURATION: 96 % | RESPIRATION RATE: 16 BRPM | HEART RATE: 79 BPM | TEMPERATURE: 98 F | SYSTOLIC BLOOD PRESSURE: 133 MMHG

## 2025-05-01 DIAGNOSIS — C16.9 MALIGNANT NEOPLASM OF STOMACH, UNSPECIFIED LOCATION (HCC): Primary | ICD-10-CM

## 2025-05-01 RX ORDER — DIPHENHYDRAMINE HYDROCHLORIDE 50 MG/ML
INJECTION, SOLUTION INTRAMUSCULAR; INTRAVENOUS
Status: COMPLETED
Start: 2025-05-01 | End: 2025-05-01

## 2025-05-01 RX ORDER — FLUOROURACIL 50 MG/ML
2400 INJECTION, SOLUTION INTRAVENOUS CONTINUOUS
Status: DISCONTINUED | OUTPATIENT
Start: 2025-05-01 | End: 2025-05-01

## 2025-05-01 RX ORDER — DIPHENHYDRAMINE HYDROCHLORIDE 50 MG/ML
25 INJECTION, SOLUTION INTRAMUSCULAR; INTRAVENOUS ONCE
Status: COMPLETED | OUTPATIENT
Start: 2025-05-01 | End: 2025-05-01

## 2025-05-01 RX ADMIN — DIPHENHYDRAMINE HYDROCHLORIDE 25 MG: 50 INJECTION, SOLUTION INTRAMUSCULAR; INTRAVENOUS at 08:43:00

## 2025-05-01 RX ADMIN — FLUOROURACIL 4600 MG: 50 INJECTION, SOLUTION INTRAVENOUS at 11:09:00

## 2025-05-01 NOTE — PROGRESS NOTES
Pt here for C27D1 Trastuzmab, Leucovorin and 5FU CADD connect.  Arrives Ambulating independently, accompanied by Self and Spouse . Pt requesting Leucovorin be infused at slowed rate. MD and pharmacy notified. Received approval to run at slower rate, (45 min infusion) tolerated without difficulty.      Patient was evaluated today by Treatment Nurse.    Oral medications included in this regimen:  no    Patient confirms comprehension of cancer treatment schedule:  yes    Pregnancy screening:  Not applicable    Modifications in dose or schedule:  No    Medications appearance and physical integrity checked by RN: yes.    Chemotherapy IV pump settings verified by 2 RNs:  Yes.  Frequency of blood return and site check throughout administration: Prior to administration, Prior to each drug, and At completion of therapy     Infusion/treatment outcome:  Tolerated without difficulty    CADD pump connected and running. All connections reinforced with tape. Port access is clean and dry, secured with steri strips and tegaderm dressing. Patient verbalized understanding of CADD pump instructions, including troubleshooting.         Education Record    Learner:  Patient and Spouse  Barriers / Limitations:  None  Method:  Discussion  Education / instructions given:  Plan of care  Outcome:  Shows understanding    Discharged Home, Ambulating independently, accompanied by:Spouse    Patient/family verbalized understanding of future appointments: by printed AVS

## 2025-05-03 ENCOUNTER — NURSE ONLY (OUTPATIENT)
Age: 65
End: 2025-05-03
Attending: INTERNAL MEDICINE
Payer: COMMERCIAL

## 2025-05-03 VITALS
TEMPERATURE: 98 F | OXYGEN SATURATION: 98 % | DIASTOLIC BLOOD PRESSURE: 62 MMHG | HEART RATE: 71 BPM | RESPIRATION RATE: 16 BRPM | SYSTOLIC BLOOD PRESSURE: 117 MMHG

## 2025-05-13 ENCOUNTER — OFFICE VISIT (OUTPATIENT)
Age: 65
End: 2025-05-13
Attending: INTERNAL MEDICINE
Payer: COMMERCIAL

## 2025-05-13 ENCOUNTER — NURSE ONLY (OUTPATIENT)
Age: 65
End: 2025-05-13
Attending: INTERNAL MEDICINE
Payer: COMMERCIAL

## 2025-05-13 VITALS
HEART RATE: 71 BPM | WEIGHT: 173.19 LBS | DIASTOLIC BLOOD PRESSURE: 67 MMHG | TEMPERATURE: 98 F | BODY MASS INDEX: 25.65 KG/M2 | SYSTOLIC BLOOD PRESSURE: 126 MMHG | RESPIRATION RATE: 18 BRPM | OXYGEN SATURATION: 98 % | HEIGHT: 69 IN

## 2025-05-13 DIAGNOSIS — Z51.11 CHEMOTHERAPY MANAGEMENT, ENCOUNTER FOR: Primary | ICD-10-CM

## 2025-05-13 DIAGNOSIS — C16.9 MALIGNANT NEOPLASM OF STOMACH, UNSPECIFIED LOCATION (HCC): ICD-10-CM

## 2025-05-13 DIAGNOSIS — C16.9 MALIGNANT NEOPLASM OF STOMACH, UNSPECIFIED LOCATION (HCC): Primary | ICD-10-CM

## 2025-05-13 DIAGNOSIS — Z51.11 CHEMOTHERAPY MANAGEMENT, ENCOUNTER FOR: ICD-10-CM

## 2025-05-13 DIAGNOSIS — R79.89 ELEVATED LFTS: ICD-10-CM

## 2025-05-13 LAB
ALBUMIN SERPL-MCNC: 4.6 G/DL (ref 3.2–4.8)
ALBUMIN/GLOB SERPL: 1.6 {RATIO} (ref 1–2)
ALP LIVER SERPL-CCNC: 132 U/L (ref 45–117)
ALT SERPL-CCNC: 50 U/L (ref 10–49)
ANION GAP SERPL CALC-SCNC: 6 MMOL/L (ref 0–18)
AST SERPL-CCNC: 43 U/L (ref ?–34)
BASOPHILS # BLD AUTO: 0.03 X10(3) UL (ref 0–0.2)
BASOPHILS NFR BLD AUTO: 0.6 %
BILIRUB SERPL-MCNC: 1.9 MG/DL (ref 0.2–1.1)
BUN BLD-MCNC: 13 MG/DL (ref 9–23)
BUN/CREAT SERPL: 13.1 (ref 10–20)
CALCIUM BLD-MCNC: 9.5 MG/DL (ref 8.7–10.4)
CHLORIDE SERPL-SCNC: 105 MMOL/L (ref 98–112)
CO2 SERPL-SCNC: 28 MMOL/L (ref 21–32)
CREAT BLD-MCNC: 0.99 MG/DL (ref 0.7–1.3)
DEPRECATED RDW RBC AUTO: 47.8 FL (ref 35.1–46.3)
EGFRCR SERPLBLD CKD-EPI 2021: 85 ML/MIN/1.73M2 (ref 60–?)
EOSINOPHIL # BLD AUTO: 0.09 X10(3) UL (ref 0–0.7)
EOSINOPHIL NFR BLD AUTO: 1.7 %
ERYTHROCYTE [DISTWIDTH] IN BLOOD BY AUTOMATED COUNT: 14.5 % (ref 11–15)
GLOBULIN PLAS-MCNC: 2.9 G/DL (ref 2–3.5)
GLUCOSE BLD-MCNC: 107 MG/DL (ref 70–99)
HCT VFR BLD AUTO: 39 % (ref 39–53)
HGB BLD-MCNC: 13 G/DL (ref 13–17.5)
IMM GRANULOCYTES # BLD AUTO: 0.01 X10(3) UL (ref 0–1)
IMM GRANULOCYTES NFR BLD: 0.2 %
LYMPHOCYTES # BLD AUTO: 1.47 X10(3) UL (ref 1–4)
LYMPHOCYTES NFR BLD AUTO: 28.3 %
MCH RBC QN AUTO: 30.2 PG (ref 26–34)
MCHC RBC AUTO-ENTMCNC: 33.3 G/DL (ref 31–37)
MCV RBC AUTO: 90.7 FL (ref 80–100)
MONOCYTES # BLD AUTO: 0.69 X10(3) UL (ref 0.1–1)
MONOCYTES NFR BLD AUTO: 13.3 %
NEUTROPHILS # BLD AUTO: 2.91 X10 (3) UL (ref 1.5–7.7)
NEUTROPHILS # BLD AUTO: 2.91 X10(3) UL (ref 1.5–7.7)
NEUTROPHILS NFR BLD AUTO: 55.9 %
OSMOLALITY SERPL CALC.SUM OF ELEC: 289 MOSM/KG (ref 275–295)
PLATELET # BLD AUTO: 116 10(3)UL (ref 150–450)
PLATELETS.RETICULATED NFR BLD AUTO: 2.2 % (ref 0–7)
POTASSIUM SERPL-SCNC: 4.6 MMOL/L (ref 3.5–5.1)
PROT SERPL-MCNC: 7.5 G/DL (ref 5.7–8.2)
RBC # BLD AUTO: 4.3 X10(6)UL (ref 4.3–5.7)
SODIUM SERPL-SCNC: 139 MMOL/L (ref 136–145)
TSI SER-ACNC: 1.03 UIU/ML (ref 0.55–4.78)
WBC # BLD AUTO: 5.2 X10(3) UL (ref 4–11)

## 2025-05-13 RX ORDER — DIPHENHYDRAMINE HYDROCHLORIDE 50 MG/ML
25 INJECTION, SOLUTION INTRAMUSCULAR; INTRAVENOUS ONCE
Status: CANCELLED
Start: 2025-05-15 | End: 2025-05-15

## 2025-05-13 RX ORDER — FLUOROURACIL 50 MG/ML
2400 INJECTION, SOLUTION INTRAVENOUS CONTINUOUS
Status: CANCELLED | OUTPATIENT
Start: 2025-05-15

## 2025-05-13 NOTE — PROGRESS NOTES
Oncology Progress Note    Name: Wallace Garzon  : 1960  CSN: [id number]  Consulting Physician: Dr. GIULIANA Díaz  Requesting: Dr. Higgins  Date: 25    Chief Complaint: Metastatic gastric cancer    HPI:  65 year old With gastric adenocarcinoma diagnosed 23 in the setting of iron def anemia.    See below for staging workup  Initiated FOLFOX +Trastuzumab 24. Added Pembrolizumab with C3 (PDL1 10%).  Oxaliplatin dose reduced to 65 mg/m2 with C6 due to CIPN    Last treatment 3/6/25, folfox (no5fu bolus)+trastuzumab and had another reaction to oxaliplatin, oxaliplatin removed for future cycles.     Interval History:    Returns for consideration of next Cycle 28, 5FU +Trastuzumab+ Pembrolizumab.      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  He mentions the last two cycles of chemotherapy were more tolerable for him w/o the Oxaliplatin.     No new concerns for fever, infection, bleeding, chest pain, dyspnea, abdominal pain or new symptoms on ROS.    Pmh: Arthritis      Social History     Socioeconomic History    Marital status:     Number of children: 2   Occupational History    Occupation: Food service     Employer: [employer]   Tobacco Use    Smoking status: Never    Smokeless tobacco: Never   Vaping Use    Vaping status: Never Used   Substance and Sexual Activity    Alcohol use: No     Alcohol/week: 0.0 standard drinks of alcohol    Drug use: No    Sexual activity: Yes   Other Topics Concern    Caffeine Concern Yes     Comment: coffee, 1 cup daily     Family History   Problem Relation Age of Onset    Prostate Cancer Other      Ros [id number]    Nka  Current Outpatient Medications on File Prior to Visit   Medication Sig Dispense Refill    Omeprazole 40 MG Oral Capsule Delayed Release Take 1 capsule (40 mg total) by mouth before breakfast. 90 capsule 1    gabapentin 100 MG Oral Cap Take 3 capsules (300 mg total) by mouth nightly. 30 capsule 1     prochlorperazine (COMPAZINE) 10 mg tablet Take 1 tablet (10 mg total) by mouth every 6 (six) hours as needed for Nausea. 30 tablet 3    ondansetron (ZOFRAN) 8 MG tablet Take 1 tablet (8 mg total) by mouth every 8 (eight) hours as needed for Nausea. 30 tablet 3    zolpidem (AMBIEN) 10 MG Oral Tab Take 1 tablet (10 mg total) by mouth nightly as needed for Sleep. (Patient not taking: Reported on 5/13/2025) 30 tablet 0    acetaminophen-codeine 300-30 MG Oral Tab Take 1 tablet by mouth every 6 (six) hours as needed for Pain. Medication may causes sedation, constipation. Not to operate heavy machinery or drive on medication. (Patient not taking: Reported on 5/13/2025) 60 tablet 0     Current Facility-Administered Medications on File Prior to Visit   Medication Dose Route Frequency Provider Last Rate Last Admin    [COMPLETED] ondansetron (Zofran) 16 mg in sodium chloride 0.9% 108 mL IVPB  16 mg Intravenous Once Gustavo Díaz MD   Stopped at 05/01/25 0841    [COMPLETED] diphenhydrAMINE (Benadryl) 50 mg/mL  injection 25 mg  25 mg Intravenous Once Gustavo Díaz MD   25 mg at 05/01/25 0843    [COMPLETED] trastuzumab-qyyp (Trazimera) 315 mg in sodium chloride 0.9% 265 mL infusion  4 mg/kg (Treatment Plan Recorded) Intravenous Once Gustavo Díaz MD   Stopped at 05/01/25 0942    [COMPLETED] leucovorin 750 mg in dextrose 5% 250 mL infusion  400 mg/m2 (Treatment Plan Recorded) Intravenous Once Gustavo Díaz MD   Stopped at 05/01/25 1054    [COMPLETED] ondansetron (Zofran) 16 mg in sodium chloride 0.9% 108 mL IVPB  16 mg Intravenous Once Gustavo Díaz MD   Stopped at 04/17/25 0817    [COMPLETED] diphenhydrAMINE (Benadryl) 50 mg/mL  injection 25 mg  25 mg Intravenous Once Gustavo Díaz MD   25 mg at 04/17/25 0756    [COMPLETED] trastuzumab-qyyp (Trazimera) 315 mg in sodium chloride 0.9% 265 mL infusion  4 mg/kg (Treatment Plan Recorded) Intravenous Once Gustavo Díaz MD   Stopped at 04/17/25 0906    [COMPLETED]  leucovorin 750 mg in dextrose 5% 250 mL infusion  400 mg/m2 (Treatment Plan Recorded) Intravenous Once Gustavo Díaz MD   Stopped at 04/17/25 0944     Vitals:    05/13/25 1454   BP: 126/67   Pulse: 71   Resp: 18   Temp: 98.1 °F (36.7 °C)       Wt Readings from Last 6 Encounters:   05/13/25 78.6 kg (173 lb 3.2 oz)   04/29/25 76.7 kg (169 lb)   04/15/25 76.7 kg (169 lb)   04/02/25 76.2 kg (168 lb)   03/27/25 77.1 kg (170 lb)   03/18/25 76.7 kg (169 lb)       Physical exam:  General:  Awake, alert, oriented in no acute distress   HEENT - EOMsi, anicteric, MMM  Neck - supple, no LAD, normal ROM  Lungs -  CTA bilaterally  Cor - S1/S2 w/o murmur noted  Abd - soft, non tender non distended, bs+  BLE - no edema  Neuro - Grossly intact    ECOG 2: Ambulatory and capable of all selfcare but unable to carry out any work activities. Up and about more than 50% of waking hours    Lab Results   Component Value Date    WBC 5.2 05/13/2025    RBC 4.30 05/13/2025    HGB 13.0 05/13/2025    HCT 39.0 05/13/2025    MCV 90.7 05/13/2025    MCH 30.2 05/13/2025    MCHC 33.3 05/13/2025    RDW 14.5 05/13/2025    .0 (L) 05/13/2025    MPV 8.9 10/31/2018     Lab Results   Component Value Date     (H) 04/29/2025    BUN 13 04/29/2025    BUNCREA 11.2 04/29/2025    CREATSERUM 1.16 04/29/2025    ANIONGAP 6 04/29/2025    GFRNAA 86 11/10/2021    GFRAA 99 11/10/2021    CA 9.4 04/29/2025    OSMOCALC 289 04/29/2025    ALKPHO 123 (H) 04/29/2025    AST 49 (H) 04/29/2025    ALT 58 (H) 04/29/2025    ALKPHOS 82 10/08/2015    BILT 1.8 (H) 04/29/2025    TP 7.5 04/29/2025    ALB 4.4 04/29/2025    GLOBULIN 3.1 04/29/2025    AGRATIO 1.4 10/08/2015     04/29/2025    K 4.4 04/29/2025     04/29/2025    CO2 27.0 04/29/2025     Imaging:    N/A    65 year old  With gastric adenocarcinoma diagnosed 12/18/23 in the setting of iron def anemia. Her positive (3+) MSI-S PDL1 10%    1.) Gastric cancer, stage IV  -- Widespread kevin disease reviewed at  tumor conference by Dr. Amaro with surgical oncology will plan for upfront systemic therapy with 5-FU oxaliplatin based regimen potentially with trastuzumab and pembrolizumab  -Initiated FOLFOX plus trastuzumab on 1/18/23   -Delayed Cycle #5 x 1 week d/t worsening LFT elevation   -Cycle #6 with dose reduction of Oxaliplatin d/t CIPN worsening and LFTs as below.    -CT imaging after C4 3/2024 with favorable treatment response.   -PDL1 10% 1/2024. Added Pembrolizumab  as >1% per guidelines. q6week scheduling-initiated with C3 above.   -DELAY Cycle 8 d/t TCP <75K.    - reviewed the pt's current CT scan from above early June; favorable treatment response, continue current systemic treatment    -surveillance CT scan from 8/24 w/ stable findings; c/w favorable tx response, continue current management    -cycle 16 FOLFOX plus trastuzumab plus Pembrolizumab was deferred for 2 wks due to side effects from systemic tx, referred to palliative care to help. Now feels improved to continue with same dosing     --pt is working with  to determine his work limitations     --explained that we will nee to modified his treatments so that they're sustainable; possibly dose reduced oxaliplatin to 50, 5-FU to 2200 and dec trastuzumab to 3mg/kg    --surveillance CT scan from 11/2024 shows stable disease  --Pembrolizumab+oxaliplatin were being held in light of elevated LFT's which are improving off tx    --2/27/25 DEFER cycle 24 by 1 wk due to neutropenia; FOLFOX with trastuzumab +Pembrolizumab(every 6 wks) last pembro 2/6/25;     --reviewed with him that his surveillance CT scan shows stable post operative changes; no signs of progression. The new splenomegaly may be from cell turnover.     -tx will be held if platelets <75K. Dr. Díaz discontinued the 5-FU bolus as this is likely contributing to the chemotherapy induced thrombocytopenia    -defer C25 by 1 week due to neutropenia; 5-FU+leucovorin with trastuzumab  +Pembrolizumab(every 6 wks). As the pt had a second oxaliplatin reaction with last cycle of chemotherapy, this med was eliminated from his tx plan.    - ECHO 2/18/25 stable, EF 60-65%    -following w/ ECHO and CT scans (every 3mo) scheduled for 5/2025    -proceed with C28 of 5-FU+leucovorin (given over 45 min for better tolerance)+ trastuzumab  -HOLDING Pembrolizumab this cycle due to elevated tbili (tx is every 6 wks), continues premeds diphenhydramine and ondansetron      2.)Iron deficiency anemia    --s/p Feraheme x 2, last dose on 2/1/24, anemia resolved  -HGB stable    3.) Chemotherapy induced thrombocytopenia  --Thrombocytopenia secondary to chemo, ok to treat if plts > 75k.    --PLT stable    4.)Transaminitis   --possibly from recent pembrolizumab or from the oxaliplatin;   -LFT's are improving, may have to consider steroid tx if these rise again for CPI hepatitis  -AST and ALT continue to improve, t bili is still a little elevated, so should hold pembrolizumab this cycle  --no longer to get oxaliplatin due to reaction    5.)CIPN  -- gr 1-2 after C8, Dose reduction of oxaliplatin to 65 mg/m2 with C6.   --Delay of C9 d/t Tcp with imrpovement to gr1 CIPN to fingers without ADL limitations.   --Improving given omission of Oxaliplatin.   --1/7/25 started trial of gabapentin trial at 100 mg at night; explained to pt that this will cause drowsiness and we can titrate up to 300 mg at night, continue this same dose for now  -continues current dose gabapentin reports continued PN not worse since last visit  --no longer to get oxaliplatin last dose 3/6/25    6.)risk of cardiotoxicity  --Normal EF/echo prior to treatment 1/2024. 5/2024 ECHO repeat stable/improved.    --H/o fleeting chest discomfort reported, currently resolved, without any other Aes.   -Monitor w/ ECHO every 3 mo  -2/2025 Echo stable EF 60-65%, scheduled for 5/2025    7.) BANDAR  --gr. 1, tolerable.  Prn antiemetics encouraged  --reports same, cont  omeprazole and encouraged prn prochlorperazine and ondansetron.    9.)H/o skin rash  --Resolved. Supportive care with topical creams    10.)Prostate Enlargement  --noted on imaging; likely BPH, pt denies nocturia or dribbling symptoms        [facility]    Gustavo Díaz MD  [facility]  Franca Goodman [facility] [place]  [address]  [phone]

## 2025-05-15 ENCOUNTER — OFFICE VISIT (OUTPATIENT)
Age: 65
End: 2025-05-15
Attending: INTERNAL MEDICINE
Payer: COMMERCIAL

## 2025-05-15 VITALS
HEART RATE: 81 BPM | RESPIRATION RATE: 18 BRPM | DIASTOLIC BLOOD PRESSURE: 70 MMHG | SYSTOLIC BLOOD PRESSURE: 139 MMHG | TEMPERATURE: 98 F | OXYGEN SATURATION: 99 %

## 2025-05-15 DIAGNOSIS — C16.9 MALIGNANT NEOPLASM OF STOMACH, UNSPECIFIED LOCATION (HCC): Primary | ICD-10-CM

## 2025-05-15 RX ORDER — DIPHENHYDRAMINE HYDROCHLORIDE 50 MG/ML
INJECTION, SOLUTION INTRAMUSCULAR; INTRAVENOUS
Status: COMPLETED
Start: 2025-05-15 | End: 2025-05-15

## 2025-05-15 RX ORDER — FLUOROURACIL 50 MG/ML
2400 INJECTION, SOLUTION INTRAVENOUS CONTINUOUS
Status: DISCONTINUED | OUTPATIENT
Start: 2025-05-15 | End: 2025-05-15

## 2025-05-15 RX ORDER — DIPHENHYDRAMINE HYDROCHLORIDE 50 MG/ML
25 INJECTION, SOLUTION INTRAMUSCULAR; INTRAVENOUS ONCE
Status: COMPLETED | OUTPATIENT
Start: 2025-05-15 | End: 2025-05-15

## 2025-05-15 RX ADMIN — FLUOROURACIL 4650 MG: 50 INJECTION, SOLUTION INTRAVENOUS at 10:17:00

## 2025-05-15 RX ADMIN — DIPHENHYDRAMINE HYDROCHLORIDE 25 MG: 50 INJECTION, SOLUTION INTRAMUSCULAR; INTRAVENOUS at 08:08:00

## 2025-05-15 NOTE — PROGRESS NOTES
Pt here for C28D1 Drug(s)FOLFOX-TRASTUZUMAB.  Arrives Ambulating independently, accompanied by Spouse     Patient was evaluated today by Treatment Nurse.    Oral medications included in this regimen:  no    Patient confirms comprehension of cancer treatment schedule:  yes    Pregnancy screening:  Not applicable    Modifications in dose or schedule:  No    Medications appearance and physical integrity checked by RN: yes.    Chemotherapy IV pump settings verified by 2 RNs:  Yes.  Frequency of blood return and site check throughout administration: Prior to administration, Prior to each drug, and At completion of therapy     Infusion/treatment outcome:  patient tolerated treatment without incident    Education Record    Learner:  Patient and Spouse  Barriers / Limitations:  None  Method:  Reinforcement  Education / instructions given:  Plan of care  Outcome:  Shows understanding    Discharged Other stable from infusion, Ambulating independently, accompanied by:Spouse    Patient/family verbalized understanding of future appointments: by MyChart messaging

## 2025-05-17 ENCOUNTER — NURSE ONLY (OUTPATIENT)
Age: 65
End: 2025-05-17
Attending: INTERNAL MEDICINE
Payer: COMMERCIAL

## 2025-05-17 VITALS
OXYGEN SATURATION: 96 % | DIASTOLIC BLOOD PRESSURE: 74 MMHG | SYSTOLIC BLOOD PRESSURE: 147 MMHG | TEMPERATURE: 98 F | HEART RATE: 70 BPM | RESPIRATION RATE: 16 BRPM

## 2025-05-17 NOTE — PROGRESS NOTES
Patient presented with CADD pump connected. Reservoir 1ml.. Disconnected CADD pump, flushed port with 0.9% Normal Saline and gifford needle removed intact.  Discharged ambulatory and stable.

## 2025-05-19 ENCOUNTER — HOSPITAL ENCOUNTER (OUTPATIENT)
Dept: CV DIAGNOSTICS | Facility: HOSPITAL | Age: 65
Discharge: HOME | End: 2025-05-19
Payer: COMMERCIAL

## 2025-05-19 DIAGNOSIS — C16.9 MALIGNANT NEOPLASM OF STOMACH, UNSPECIFIED LOCATION (HCC): ICD-10-CM

## 2025-05-19 DIAGNOSIS — Z51.11 CHEMOTHERAPY MANAGEMENT, ENCOUNTER FOR: ICD-10-CM

## 2025-05-19 DIAGNOSIS — Z51.81 ENCOUNTER FOR MONITORING CARDIOTOXIC DRUG THERAPY: ICD-10-CM

## 2025-05-19 DIAGNOSIS — Z79.899 ENCOUNTER FOR MONITORING CARDIOTOXIC DRUG THERAPY: ICD-10-CM

## 2025-05-19 PROCEDURE — 93306 TTE W/DOPPLER COMPLETE: CPT

## 2025-05-20 ENCOUNTER — HOSPITAL ENCOUNTER (OUTPATIENT)
Dept: CT IMAGING | Facility: HOSPITAL | Age: 65
Discharge: HOME | End: 2025-05-20
Payer: COMMERCIAL

## 2025-05-20 DIAGNOSIS — Z51.11 CHEMOTHERAPY MANAGEMENT, ENCOUNTER FOR: ICD-10-CM

## 2025-05-20 DIAGNOSIS — C16.9 MALIGNANT NEOPLASM OF STOMACH, UNSPECIFIED LOCATION (HCC): ICD-10-CM

## 2025-05-20 PROCEDURE — 74177 CT ABD & PELVIS W/CONTRAST: CPT

## 2025-05-20 PROCEDURE — 71260 CT THORAX DX C+: CPT

## 2025-05-27 ENCOUNTER — NURSE ONLY (OUTPATIENT)
Age: 65
End: 2025-05-27
Attending: INTERNAL MEDICINE
Payer: COMMERCIAL

## 2025-05-27 ENCOUNTER — OFFICE VISIT (OUTPATIENT)
Age: 65
End: 2025-05-27
Attending: INTERNAL MEDICINE
Payer: COMMERCIAL

## 2025-05-27 VITALS
BODY MASS INDEX: 25.87 KG/M2 | TEMPERATURE: 98 F | SYSTOLIC BLOOD PRESSURE: 128 MMHG | OXYGEN SATURATION: 98 % | DIASTOLIC BLOOD PRESSURE: 72 MMHG | HEART RATE: 76 BPM | RESPIRATION RATE: 18 BRPM | HEIGHT: 69 IN | WEIGHT: 174.69 LBS

## 2025-05-27 DIAGNOSIS — Z51.11 CHEMOTHERAPY MANAGEMENT, ENCOUNTER FOR: Primary | ICD-10-CM

## 2025-05-27 DIAGNOSIS — R79.89 ELEVATED LFTS: ICD-10-CM

## 2025-05-27 DIAGNOSIS — C16.9 MALIGNANT NEOPLASM OF STOMACH, UNSPECIFIED LOCATION (HCC): Primary | ICD-10-CM

## 2025-05-27 DIAGNOSIS — Z51.11 CHEMOTHERAPY MANAGEMENT, ENCOUNTER FOR: ICD-10-CM

## 2025-05-27 DIAGNOSIS — C16.9 MALIGNANT NEOPLASM OF STOMACH, UNSPECIFIED LOCATION (HCC): ICD-10-CM

## 2025-05-27 LAB
ALBUMIN SERPL-MCNC: 4.6 G/DL (ref 3.2–4.8)
ALBUMIN/GLOB SERPL: 1.6 {RATIO} (ref 1–2)
ALP LIVER SERPL-CCNC: 126 U/L (ref 45–117)
ALT SERPL-CCNC: 44 U/L (ref 10–49)
ANION GAP SERPL CALC-SCNC: 5 MMOL/L (ref 0–18)
AST SERPL-CCNC: 40 U/L (ref ?–34)
BASOPHILS # BLD AUTO: 0.03 X10(3) UL (ref 0–0.2)
BASOPHILS NFR BLD AUTO: 0.6 %
BILIRUB SERPL-MCNC: 2.1 MG/DL (ref 0.2–1.1)
BUN BLD-MCNC: 18 MG/DL (ref 9–23)
BUN/CREAT SERPL: 18.2 (ref 10–20)
CALCIUM BLD-MCNC: 9.7 MG/DL (ref 8.7–10.4)
CHLORIDE SERPL-SCNC: 106 MMOL/L (ref 98–112)
CO2 SERPL-SCNC: 29 MMOL/L (ref 21–32)
CREAT BLD-MCNC: 0.99 MG/DL (ref 0.7–1.3)
DEPRECATED RDW RBC AUTO: 46.1 FL (ref 35.1–46.3)
EGFRCR SERPLBLD CKD-EPI 2021: 85 ML/MIN/1.73M2 (ref 60–?)
EOSINOPHIL # BLD AUTO: 0.07 X10(3) UL (ref 0–0.7)
EOSINOPHIL NFR BLD AUTO: 1.3 %
ERYTHROCYTE [DISTWIDTH] IN BLOOD BY AUTOMATED COUNT: 14.2 % (ref 11–15)
GLOBULIN PLAS-MCNC: 2.8 G/DL (ref 2–3.5)
GLUCOSE BLD-MCNC: 94 MG/DL (ref 70–99)
HCT VFR BLD AUTO: 38.6 % (ref 39–53)
HGB BLD-MCNC: 13.3 G/DL (ref 13–17.5)
IMM GRANULOCYTES # BLD AUTO: 0.01 X10(3) UL (ref 0–1)
IMM GRANULOCYTES NFR BLD: 0.2 %
LYMPHOCYTES # BLD AUTO: 1.46 X10(3) UL (ref 1–4)
LYMPHOCYTES NFR BLD AUTO: 27.7 %
MCH RBC QN AUTO: 30.9 PG (ref 26–34)
MCHC RBC AUTO-ENTMCNC: 34.5 G/DL (ref 31–37)
MCV RBC AUTO: 89.6 FL (ref 80–100)
MONOCYTES # BLD AUTO: 0.67 X10(3) UL (ref 0.1–1)
MONOCYTES NFR BLD AUTO: 12.7 %
NEUTROPHILS # BLD AUTO: 3.03 X10 (3) UL (ref 1.5–7.7)
NEUTROPHILS # BLD AUTO: 3.03 X10(3) UL (ref 1.5–7.7)
NEUTROPHILS NFR BLD AUTO: 57.5 %
OSMOLALITY SERPL CALC.SUM OF ELEC: 292 MOSM/KG (ref 275–295)
PLATELET # BLD AUTO: 119 10(3)UL (ref 150–450)
PLATELETS.RETICULATED NFR BLD AUTO: 2.6 % (ref 0–7)
POTASSIUM SERPL-SCNC: 4.9 MMOL/L (ref 3.5–5.1)
PROT SERPL-MCNC: 7.4 G/DL (ref 5.7–8.2)
RBC # BLD AUTO: 4.31 X10(6)UL (ref 4.3–5.7)
SODIUM SERPL-SCNC: 140 MMOL/L (ref 136–145)
TSI SER-ACNC: 1.03 UIU/ML (ref 0.55–4.78)
WBC # BLD AUTO: 5.3 X10(3) UL (ref 4–11)

## 2025-05-27 NOTE — PROGRESS NOTES
Oncology Progress Note    Name: Wallace Garzon  : 1960  CSN: [id number]  Consulting Physician: Dr. GIULIANA Díaz  Requesting: Dr. Higgins  Date: 25    Chief Complaint: Metastatic gastric cancer    HPI:  65 year old With gastric adenocarcinoma diagnosed 23 in the setting of iron def anemia.    See below for staging workup  Initiated FOLFOX +Trastuzumab 24. Added Pembrolizumab with C3 (PDL1 10%).  Oxaliplatin dose reduced to 65 mg/m2 with C6 due to CIPN    Last treatment 3/6/25, folfox (no5fu bolus)+trastuzumab and had another reaction to oxaliplatin, oxaliplatin removed for future cycles.     Interval History:    Returns for consideration of next Cycle 29, 5FU +Trastuzumab+ Pembrolizumab.      Continues to endorse bilateral neuropathy in hands and feet, thinks it may have improved some. He remains on gabapentin. Overall he feels has been tolerating chemotherapy much better since oxaliplatin was dropped; fatigue is still present but more manageable. He otherwise denies acute complaint or concern; appetite has overall improved.    Denies concerns for fever, infection, bleeding, chest pain, dyspnea, abdominal pain or new symptoms on ROS.    Pmh: Arthritis      Social History     Socioeconomic History    Marital status:     Number of children: 2   Occupational History    Occupation:      Employer: [employer]   Tobacco Use    Smoking status: Never    Smokeless tobacco: Never   Vaping Use    Vaping status: Never Used   Substance and Sexual Activity    Alcohol use: No     Alcohol/week: 0.0 standard drinks of alcohol    Drug use: No    Sexual activity: Yes   Other Topics Concern    Caffeine Concern Yes     Comment: coffee, 1 cup daily     Family History   Problem Relation Age of Onset    Prostate Cancer Other      Ros negx12    Nka  Current Outpatient Medications on File Prior to Visit   Medication Sig Dispense Refill    Omeprazole 40 MG Oral Capsule Delayed Release  Take 1 capsule (40 mg total) by mouth before breakfast. 90 capsule 1    gabapentin 100 MG Oral Cap Take 3 capsules (300 mg total) by mouth nightly. 30 capsule 1    zolpidem (AMBIEN) 10 MG Oral Tab Take 1 tablet (10 mg total) by mouth nightly as needed for Sleep. (Patient not taking: Reported on 5/13/2025) 30 tablet 0    acetaminophen-codeine 300-30 MG Oral Tab Take 1 tablet by mouth every 6 (six) hours as needed for Pain. Medication may causes sedation, constipation. Not to operate heavy machinery or drive on medication. (Patient not taking: Reported on 5/13/2025) 60 tablet 0    prochlorperazine (COMPAZINE) 10 mg tablet Take 1 tablet (10 mg total) by mouth every 6 (six) hours as needed for Nausea. 30 tablet 3    ondansetron (ZOFRAN) 8 MG tablet Take 1 tablet (8 mg total) by mouth every 8 (eight) hours as needed for Nausea. 30 tablet 3     Current Facility-Administered Medications on File Prior to Visit   Medication Dose Route Frequency Provider Last Rate Last Admin    [COMPLETED] iopamidol 76% (ISOVUE-370) injection for power injector  80 mL Intravenous ONCE PRN Jodi Wilkerson APRN   90 mL at 05/20/25 1027    [COMPLETED] ondansetron (Zofran) 16 mg in sodium chloride 0.9% 108 mL IVPB  16 mg Intravenous Once Gustavo Díaz MD   Stopped at 05/15/25 0826    [COMPLETED] diphenhydrAMINE (Benadryl) 50 mg/mL  injection 25 mg  25 mg Intravenous Once Gustavo Díaz MD   25 mg at 05/15/25 0808    [COMPLETED] trastuzumab-qyyp (Trazimera) 315 mg in sodium chloride 0.9% 265 mL infusion  4 mg/kg (Treatment Plan Recorded) Intravenous Once Gustavo Díaz MD   Stopped at 05/15/25 0917    [COMPLETED] leucovorin 800 mg in dextrose 5% 250 mL infusion  400 mg/m2 (Treatment Plan Recorded) Intravenous Once Gustavo Díaz MD   Stopped at 05/15/25 1012    [COMPLETED] ondansetron (Zofran) 16 mg in sodium chloride 0.9% 108 mL IVPB  16 mg Intravenous Once Gustavo Díaz MD   Stopped at 05/01/25 0841    [COMPLETED] diphenhydrAMINE  (Benadryl) 50 mg/mL  injection 25 mg  25 mg Intravenous Once Gustavo Díaz MD   25 mg at 05/01/25 0843    [COMPLETED] trastuzumab-qyyp (Trazimera) 315 mg in sodium chloride 0.9% 265 mL infusion  4 mg/kg (Treatment Plan Recorded) Intravenous Once Gustavo Díaz MD   Stopped at 05/01/25 0942    [COMPLETED] leucovorin 750 mg in dextrose 5% 250 mL infusion  400 mg/m2 (Treatment Plan Recorded) Intravenous Once Gustavo Díaz MD   Stopped at 05/01/25 1054     There were no vitals filed for this visit.      Wt Readings from Last 6 Encounters:   05/13/25 78.6 kg (173 lb 3.2 oz)   04/29/25 76.7 kg (169 lb)   04/15/25 76.7 kg (169 lb)   04/02/25 76.2 kg (168 lb)   03/27/25 77.1 kg (170 lb)   03/18/25 76.7 kg (169 lb)       Physical exam:  General:  Awake, alert, oriented in no acute distress   HEENT - EOMsi, anicteric, MMM  Neck - supple, no LAD, normal ROM  Lungs -  CTA bilaterally  Cor - S1/S2 w/o murmur noted  Abd - soft, non tender non distended, bs+  BLE - no edema  Neuro - Grossly intact    ECOG 2: Ambulatory and capable of all selfcare but unable to carry out any work activities. Up and about more than 50% of waking hours    Lab Results   Component Value Date    WBC 5.3 05/27/2025    RBC 4.31 05/27/2025    HGB 13.3 05/27/2025    HCT 38.6 (L) 05/27/2025    MCV 89.6 05/27/2025    MCH 30.9 05/27/2025    MCHC 34.5 05/27/2025    RDW 14.2 05/27/2025    .0 (L) 05/27/2025    MPV 8.9 10/31/2018     Lab Results   Component Value Date     (H) 05/13/2025    BUN 13 05/13/2025    BUNCREA 13.1 05/13/2025    CREATSERUM 0.99 05/13/2025    ANIONGAP 6 05/13/2025    GFRNAA 86 11/10/2021    GFRAA 99 11/10/2021    CA 9.5 05/13/2025    OSMOCALC 289 05/13/2025    ALKPHO 132 (H) 05/13/2025    AST 43 (H) 05/13/2025    ALT 50 (H) 05/13/2025    ALKPHOS 82 10/08/2015    BILT 1.9 (H) 05/13/2025    TP 7.5 05/13/2025    ALB 4.6 05/13/2025    GLOBULIN 2.9 05/13/2025    AGRATIO 1.4 10/08/2015     05/13/2025    K 4.6 05/13/2025      05/13/2025    CO2 28.0 05/13/2025     Imaging:    N/A    65 year old  With gastric adenocarcinoma diagnosed 12/18/23 in the setting of iron def anemia. Her positive (3+) MSI-S PDL1 10%    1.) Gastric cancer, stage IV  -- Widespread kevin disease reviewed at tumor conference by Dr. Amaro with surgical oncology will plan for upfront systemic therapy with 5-FU oxaliplatin based regimen potentially with trastuzumab and pembrolizumab  -Initiated FOLFOX plus trastuzumab on 1/18/23   -Delayed Cycle #5 x 1 week d/t worsening LFT elevation   -Cycle #6 with dose reduction of Oxaliplatin d/t CIPN worsening and LFTs as below.    -CT imaging after C4 3/2024 with favorable treatment response.   -PDL1 10% 1/2024. Added Pembrolizumab  as >1% per guidelines. q6week scheduling-initiated with C3 above.   -DELAY Cycle 8 d/t TCP <75K.    - reviewed the pt's current CT scan from above early June; favorable treatment response, continue current systemic treatment    -surveillance CT scan from 8/24 w/ stable findings; c/w favorable tx response, continue current management    -cycle 16 FOLFOX plus trastuzumab plus Pembrolizumab was deferred for 2 wks due to side effects from systemic tx, referred to palliative care to help. Now feels improved to continue with same dosing     --pt is working with  to determine his work limitations     --explained that we will nee to modified his treatments so that they're sustainable; possibly dose reduced oxaliplatin to 50, 5-FU to 2200 and dec trastuzumab to 3mg/kg    --surveillance CT scan from 11/2024 shows stable disease  --Pembrolizumab+oxaliplatin were being held in light of elevated LFT's which are improving off tx    --2/27/25 DEFER cycle 24 by 1 wk due to neutropenia; FOLFOX with trastuzumab +Pembrolizumab(every 6 wks) last pembro 2/6/25;     --reviewed with him that his surveillance CT scan shows stable post operative changes; no signs of progression. The new splenomegaly may  be from cell turnover.     -tx will be held if platelets <75K. Dr. Díaz discontinued the 5-FU bolus as this is likely contributing to the chemotherapy induced thrombocytopenia    -defer C25 by 1 week due to neutropenia; 5-FU+leucovorin with trastuzumab +Pembrolizumab(every 6 wks). As the pt had a second oxaliplatin reaction with last cycle of chemotherapy, this med was eliminated from his tx plan.    - ECHO 2/18/25 stable, EF 60-65%    -following w/ ECHO and CT scans (every 3mo), most recently completed 5/20/25 which we reviewed today is stable. Next due Aug 2025    -proceed with C29 of 5-FU+leucovorin (given over 45 min for better tolerance)+ trastuzumab  -continue HOLDING Pembrolizumab this cycle due to elevated tbili (tx is every 6 wks), continues premeds diphenhydramine and ondansetron      2.)Iron deficiency anemia    --s/p Feraheme x 2, last dose on 2/1/24, anemia resolved  -HGB stable    3.) Chemotherapy induced thrombocytopenia  --Thrombocytopenia secondary to chemo, ok to treat if plts > 75k.    --PLT stable    4.)Transaminitis   --possibly from recent pembrolizumab or from the oxaliplatin;   -LFT's are improving, may have to consider steroid tx if these rise again for CPI hepatitis  -AST and ALT continue to improve, t bili is still a little elevated, so we will hold pembrolizumab  --no longer to get oxaliplatin due to reaction    5.)CIPN  -- gr 1-2 after C8, Dose reduction of oxaliplatin to 65 mg/m2 with C6.   --Delay of C9 d/t Tcp with imrpovement to gr1 CIPN to fingers without ADL limitations.   --Improving given omission of Oxaliplatin.   --1/7/25 started trial of gabapentin trial at 100 mg at night; explained to pt that this will cause drowsiness and we can titrate up to 300 mg at night, continue this same dose for now  -continues current dose gabapentin reports continued PN not worse since last visit  --no longer to get oxaliplatin last dose 3/6/25    6.)risk of cardiotoxicity  --Normal EF/echo  prior to treatment 1/2024. 5/2024 ECHO repeat stable/improved.    --H/o fleeting chest discomfort reported, currently resolved, without any other Aes.   -Monitor w/ ECHO every 3 mo  -5/2025 Echo stable EF 60-65%. Next due aug 2025    7.) BANDAR  --gr. 1, tolerable.  Prn antiemetics encouraged  --reports same, cont omeprazole and encouraged prn prochlorperazine and ondansetron.    9.)H/o skin rash  --Resolved. Supportive care with topical creams    10.)Prostate Enlargement  --noted on imaging; likely BPH, pt denies nocturia or dribbling symptoms        [facility]    Marc Potter, FNP-C  [facility]  Franca Goodman [facility] [place]  [address]  [phone]

## 2025-05-28 RX ORDER — DIPHENHYDRAMINE HYDROCHLORIDE 50 MG/ML
25 INJECTION, SOLUTION INTRAMUSCULAR; INTRAVENOUS ONCE
Status: CANCELLED
Start: 2025-05-29 | End: 2025-05-29

## 2025-05-28 RX ORDER — FLUOROURACIL 50 MG/ML
2400 INJECTION, SOLUTION INTRAVENOUS CONTINUOUS
Status: CANCELLED | OUTPATIENT
Start: 2025-05-29

## 2025-05-29 ENCOUNTER — OFFICE VISIT (OUTPATIENT)
Age: 65
End: 2025-05-29
Attending: INTERNAL MEDICINE
Payer: COMMERCIAL

## 2025-05-29 VITALS
SYSTOLIC BLOOD PRESSURE: 129 MMHG | RESPIRATION RATE: 18 BRPM | HEART RATE: 79 BPM | OXYGEN SATURATION: 96 % | TEMPERATURE: 98 F | DIASTOLIC BLOOD PRESSURE: 69 MMHG

## 2025-05-29 DIAGNOSIS — C16.9 MALIGNANT NEOPLASM OF STOMACH, UNSPECIFIED LOCATION (HCC): Primary | ICD-10-CM

## 2025-05-29 RX ORDER — FLUOROURACIL 50 MG/ML
2400 INJECTION, SOLUTION INTRAVENOUS CONTINUOUS
Status: DISCONTINUED | OUTPATIENT
Start: 2025-05-29 | End: 2025-05-29

## 2025-05-29 RX ORDER — DIPHENHYDRAMINE HYDROCHLORIDE 50 MG/ML
INJECTION, SOLUTION INTRAMUSCULAR; INTRAVENOUS
Status: COMPLETED
Start: 2025-05-29 | End: 2025-05-29

## 2025-05-29 RX ORDER — DIPHENHYDRAMINE HYDROCHLORIDE 50 MG/ML
25 INJECTION, SOLUTION INTRAMUSCULAR; INTRAVENOUS ONCE
Status: COMPLETED | OUTPATIENT
Start: 2025-05-29 | End: 2025-05-29

## 2025-05-29 RX ADMIN — FLUOROURACIL 4650 MG: 50 INJECTION, SOLUTION INTRAVENOUS at 10:05:00

## 2025-05-29 RX ADMIN — DIPHENHYDRAMINE HYDROCHLORIDE 25 MG: 50 INJECTION, SOLUTION INTRAMUSCULAR; INTRAVENOUS at 08:01:00

## 2025-05-29 NOTE — PROGRESS NOTES
Patient arrives to infusion center for C29D1 Trastuzumab, Leucovorin, and 5FU CADD pump connect. Arrives Ambulating independently, accompanied by Self     Patient was evaluated today by Treatment Nurse. Seen by WILDER Thomas 5/27. Labs reviewed.     Oral medications included in this regimen:  no    Patient confirms comprehension of cancer treatment schedule:  yes    Pregnancy screening:  Not applicable    Modifications in dose or schedule:  No    Medications appearance and physical integrity checked by RN: yes.    Chemotherapy IV pump settings verified by 2 RNs:  No due to targeted therapy IV administration. CADD pump verified with 2 RNs.     Frequency of blood return and site check throughout administration: Prior to administration and Prior to each drug     Infusion/treatment outcome:  patient tolerated treatment without incident    CADD pump connected and running. All connections reinforced with tape. Port access is clean and dry, secured with steri strips and tegaderm dressing.      Education Record    Learner:  Patient  Barriers / Limitations:  None  Method:  Reinforcement  Education / instructions given: Reviewed infusion process and plan of care. Reinforced estimated time of CADD ump completion on Sat 5/31.  Outcome:  Shows understanding    Discharged Home, Ambulating independently, accompanied by:Self    Patient/family verbalized understanding of future appointments: by [employer] messaging

## 2025-05-31 ENCOUNTER — NURSE ONLY (OUTPATIENT)
Age: 65
End: 2025-05-31
Attending: INTERNAL MEDICINE
Payer: COMMERCIAL

## 2025-05-31 VITALS
OXYGEN SATURATION: 96 % | TEMPERATURE: 97 F | RESPIRATION RATE: 18 BRPM | SYSTOLIC BLOOD PRESSURE: 135 MMHG | HEART RATE: 74 BPM | DIASTOLIC BLOOD PRESSURE: 78 MMHG

## 2025-06-06 ENCOUNTER — NURSE TRIAGE (OUTPATIENT)
Dept: FAMILY MEDICINE CLINIC | Facility: CLINIC | Age: 65
End: 2025-06-06

## 2025-06-09 ENCOUNTER — LAB ENCOUNTER (OUTPATIENT)
Dept: LAB | Age: 65
End: 2025-06-09
Payer: COMMERCIAL

## 2025-06-09 DIAGNOSIS — J34.89 RHINORRHEA: Primary | ICD-10-CM

## 2025-06-09 DIAGNOSIS — J34.89 RHINORRHEA: ICD-10-CM

## 2025-06-09 PROCEDURE — 87637 SARSCOV2&INF A&B&RSV AMP PRB: CPT

## 2025-06-10 ENCOUNTER — TELEPHONE (OUTPATIENT)
Age: 65
End: 2025-06-10

## 2025-06-10 ENCOUNTER — APPOINTMENT (OUTPATIENT)
Age: 65
End: 2025-06-10
Attending: INTERNAL MEDICINE
Payer: COMMERCIAL

## 2025-06-10 DIAGNOSIS — C16.9 MALIGNANT NEOPLASM OF STOMACH, UNSPECIFIED LOCATION (HCC): ICD-10-CM

## 2025-06-10 DIAGNOSIS — Z51.11 CHEMOTHERAPY MANAGEMENT, ENCOUNTER FOR: ICD-10-CM

## 2025-06-10 DIAGNOSIS — R05.1 ACUTE COUGH: Primary | ICD-10-CM

## 2025-06-10 DIAGNOSIS — U07.1 COVID: Primary | ICD-10-CM

## 2025-06-10 LAB
FLUAV + FLUBV RNA SPEC NAA+PROBE: NEGATIVE
FLUAV + FLUBV RNA SPEC NAA+PROBE: NEGATIVE
RSV RNA SPEC NAA+PROBE: NEGATIVE
SARS-COV-2 RNA RESP QL NAA+PROBE: DETECTED

## 2025-06-10 RX ORDER — BENZONATATE 100 MG/1
100 CAPSULE ORAL 3 TIMES DAILY PRN
Qty: 30 CAPSULE | Refills: 1 | Status: SHIPPED | OUTPATIENT
Start: 2025-06-10

## 2025-06-10 NOTE — PROGRESS NOTES
Oncology Telephone Note    Situation: Patient with cold and cough symptoms, called and notified them Covid PCR detected, both influenza and RSV not detected. Discussed below with patient and wife and questions answered. Denies fever, weakness, runny nose, difficulty swallowing, malaise or bleeding.    Background:  65 year old With gastric adenocarcinoma diagnosed 12/18/23 in the setting of iron def anemia.    See below for staging workup  Initiated FOLFOX +Trastuzumab 1/18/24. Added Pembrolizumab with C3 (PDL1 10%).  Oxaliplatin dose reduced to 65 mg/m2 with C6 due to CIPN  Last treatment 5/29/25, 5fu pump (no5fu bolus)+trastuzumab.    Assessment:  Covid detected 6/10/25: Patient reports he had cold symptoms and low grade fever for 5 days. Denies sick contacts. Continues with cough, congestion, difficulty and sleeping. Since it has been 5 days, discussed paxlovid not recommended at this time, usually given within 5 days of symptoms.      Recommendation/Plan: He continues to use prn acetaminophen for body aches, and prn mucinex for cough.  Ordered tessalon perles for painful cough.  Encouraged 50oz clear, non caffeinated liquids, and to take walking breaks and deep breath exercises.  Recommend to get assessed at urgent care for vital sign check and to complete cbc.  has a bp and oxygen saturation at home and prefers to check herself. Advised goal oxygen saturation is 92% and higher. BP goal /50-90.  If patient has a fever 100.4 F or higher to call and will arrange for him to get blood work (cbc, cmp and will consider if blood cultures needed)  To call PRN if sob, cp, dizziness, weakness,  fevers or worsening symptoms  Has follow up and treatment scheduled next week 6/17/25, to call the day prior if still have cold symptoms    XOCHITL Saucedo

## 2025-06-12 ENCOUNTER — APPOINTMENT (OUTPATIENT)
Age: 65
End: 2025-06-12
Attending: INTERNAL MEDICINE
Payer: COMMERCIAL

## 2025-06-14 ENCOUNTER — APPOINTMENT (OUTPATIENT)
Age: 65
End: 2025-06-14
Attending: INTERNAL MEDICINE
Payer: COMMERCIAL

## 2025-06-17 ENCOUNTER — OFFICE VISIT (OUTPATIENT)
Age: 65
End: 2025-06-17
Attending: INTERNAL MEDICINE
Payer: COMMERCIAL

## 2025-06-17 ENCOUNTER — NURSE ONLY (OUTPATIENT)
Age: 65
End: 2025-06-17
Attending: INTERNAL MEDICINE
Payer: COMMERCIAL

## 2025-06-17 VITALS
RESPIRATION RATE: 16 BRPM | SYSTOLIC BLOOD PRESSURE: 121 MMHG | TEMPERATURE: 98 F | OXYGEN SATURATION: 96 % | WEIGHT: 173 LBS | HEIGHT: 69 IN | BODY MASS INDEX: 25.62 KG/M2 | DIASTOLIC BLOOD PRESSURE: 65 MMHG | HEART RATE: 75 BPM

## 2025-06-17 DIAGNOSIS — C16.2 MALIGNANT NEOPLASM OF BODY OF STOMACH (HCC): ICD-10-CM

## 2025-06-17 DIAGNOSIS — Z51.11 CHEMOTHERAPY MANAGEMENT, ENCOUNTER FOR: Primary | ICD-10-CM

## 2025-06-17 DIAGNOSIS — C16.9 MALIGNANT NEOPLASM OF STOMACH, UNSPECIFIED LOCATION (HCC): ICD-10-CM

## 2025-06-17 LAB
ALBUMIN SERPL-MCNC: 4.5 G/DL (ref 3.2–4.8)
ALBUMIN/GLOB SERPL: 1.8 {RATIO} (ref 1–2)
ALP LIVER SERPL-CCNC: 107 U/L (ref 45–117)
ALT SERPL-CCNC: 33 U/L (ref 10–49)
ANION GAP SERPL CALC-SCNC: 5 MMOL/L (ref 0–18)
AST SERPL-CCNC: 34 U/L (ref ?–34)
BASOPHILS # BLD AUTO: 0.02 X10(3) UL (ref 0–0.2)
BASOPHILS NFR BLD AUTO: 0.4 %
BILIRUB SERPL-MCNC: 1.8 MG/DL (ref 0.2–1.1)
BUN BLD-MCNC: 14 MG/DL (ref 9–23)
BUN/CREAT SERPL: 14 (ref 10–20)
CALCIUM BLD-MCNC: 9.5 MG/DL (ref 8.7–10.4)
CHLORIDE SERPL-SCNC: 107 MMOL/L (ref 98–112)
CO2 SERPL-SCNC: 27 MMOL/L (ref 21–32)
CREAT BLD-MCNC: 1 MG/DL (ref 0.7–1.3)
DEPRECATED RDW RBC AUTO: 47.2 FL (ref 35.1–46.3)
EGFRCR SERPLBLD CKD-EPI 2021: 84 ML/MIN/1.73M2 (ref 60–?)
EOSINOPHIL # BLD AUTO: 0.08 X10(3) UL (ref 0–0.7)
EOSINOPHIL NFR BLD AUTO: 1.5 %
ERYTHROCYTE [DISTWIDTH] IN BLOOD BY AUTOMATED COUNT: 14.2 % (ref 11–15)
GLOBULIN PLAS-MCNC: 2.5 G/DL (ref 2–3.5)
GLUCOSE BLD-MCNC: 149 MG/DL (ref 70–99)
HCT VFR BLD AUTO: 38.4 % (ref 39–53)
HGB BLD-MCNC: 12.9 G/DL (ref 13–17.5)
IMM GRANULOCYTES # BLD AUTO: 0.02 X10(3) UL (ref 0–1)
IMM GRANULOCYTES NFR BLD: 0.4 %
LYMPHOCYTES # BLD AUTO: 1.48 X10(3) UL (ref 1–4)
LYMPHOCYTES NFR BLD AUTO: 27.6 %
MCH RBC QN AUTO: 30.5 PG (ref 26–34)
MCHC RBC AUTO-ENTMCNC: 33.6 G/DL (ref 31–37)
MCV RBC AUTO: 90.8 FL (ref 80–100)
MONOCYTES # BLD AUTO: 0.36 X10(3) UL (ref 0.1–1)
MONOCYTES NFR BLD AUTO: 6.7 %
NEUTROPHILS # BLD AUTO: 3.4 X10 (3) UL (ref 1.5–7.7)
NEUTROPHILS # BLD AUTO: 3.4 X10(3) UL (ref 1.5–7.7)
NEUTROPHILS NFR BLD AUTO: 63.4 %
OSMOLALITY SERPL CALC.SUM OF ELEC: 291 MOSM/KG (ref 275–295)
PLATELET # BLD AUTO: 132 10(3)UL (ref 150–450)
POTASSIUM SERPL-SCNC: 4.4 MMOL/L (ref 3.5–5.1)
PROT SERPL-MCNC: 7 G/DL (ref 5.7–8.2)
RBC # BLD AUTO: 4.23 X10(6)UL (ref 4.3–5.7)
SODIUM SERPL-SCNC: 139 MMOL/L (ref 136–145)
TSI SER-ACNC: 0.89 UIU/ML (ref 0.55–4.78)
WBC # BLD AUTO: 5.4 X10(3) UL (ref 4–11)

## 2025-06-17 RX ORDER — DIPHENHYDRAMINE HYDROCHLORIDE 50 MG/ML
25 INJECTION, SOLUTION INTRAMUSCULAR; INTRAVENOUS ONCE
Status: CANCELLED
Start: 2025-06-19 | End: 2025-06-19

## 2025-06-17 RX ORDER — FLUOROURACIL 50 MG/ML
2400 INJECTION, SOLUTION INTRAVENOUS CONTINUOUS
Status: CANCELLED | OUTPATIENT
Start: 2025-06-19

## 2025-06-17 NOTE — PROGRESS NOTES
Oncology Progress Note    Name: Wallace Garzon  : 1960  CSN: [id number]  Consulting Physician: Dr. GIULIANA Díaz  Requesting: Dr. Higgins  Date: 25    Chief Complaint: Follow up post covid positive, prior to resuming chemotherapy for Metastatic gastric cancer    HPI:  65 year old With gastric adenocarcinoma diagnosed 23 in the setting of iron def anemia.    See below for staging workup  Initiated FOLFOX +Trastuzumab 24. Added Pembrolizumab with C3 (PDL1 10%).  Oxaliplatin dose reduced to 65 mg/m2 with C6 due to CIPN    Last treatment 3/6/25, folfox (no5fu bolus)+trastuzumab and had another reaction to oxaliplatin, oxaliplatin removed for future cycles.     Interval History:    Returns for consideration of next Cycle 30, 5FU +Trastuzumab+ Pembrolizumab.   25 patient notified PCP with cold symptoms for 3 days, then 25 patient notified hem/onc triage RN he has cold symptoms, no fever, runny nose, sore throat, dry cough. Requested pt to go get PCR testing and agreed to delay chemo 1 week. Covid detected 25, influenza and rsv both negative.     Today patient mostly recovered, cont intermittent scarce dry cough. Denies fevers, runny nose, sore throat, or malaise.  Continues to endorse bilateral neuropathy feet intermittent, he reports takes gabapentin sometimes when needed. Overall he feels has been tolerating chemotherapy much better since oxaliplatin was dropped; fatigue is still present but more manageable. He otherwise denies acute complaint or concern; appetite has overall improved.    Denies concerns for fever, infection, bleeding, chest pain, dyspnea, abdominal pain, N/V/D/C or new symptoms on ROS.    Pmh: Arthritis      Social History     Socioeconomic History    Marital status:     Number of children: 2   Occupational History    Occupation:      Employer: [employer]   Tobacco Use    Smoking status: Never    Smokeless tobacco: Never   Vaping Use     Vaping status: Never Used   Substance and Sexual Activity    Alcohol use: No     Alcohol/week: 0.0 standard drinks of alcohol    Drug use: No    Sexual activity: Yes   Other Topics Concern    Caffeine Concern Yes     Comment: coffee, 1 cup daily     Family History   Problem Relation Age of Onset    Prostate Cancer Other      Ros [id number]    Nka  Current Outpatient Medications on File Prior to Visit   Medication Sig Dispense Refill    benzonatate (TESSALON PERLES) 100 MG Oral Cap Take 1 capsule (100 mg total) by mouth 3 (three) times daily as needed for cough. 30 capsule 1    Omeprazole 40 MG Oral Capsule Delayed Release Take 1 capsule (40 mg total) by mouth before breakfast. 90 capsule 1    gabapentin 100 MG Oral Cap Take 3 capsules (300 mg total) by mouth nightly. 30 capsule 1    zolpidem (AMBIEN) 10 MG Oral Tab Take 1 tablet (10 mg total) by mouth nightly as needed for Sleep. 30 tablet 0    acetaminophen-codeine 300-30 MG Oral Tab Take 1 tablet by mouth every 6 (six) hours as needed for Pain. Medication may causes sedation, constipation. Not to operate heavy machinery or drive on medication. 60 tablet 0    prochlorperazine (COMPAZINE) 10 mg tablet Take 1 tablet (10 mg total) by mouth every 6 (six) hours as needed for Nausea. 30 tablet 3    ondansetron (ZOFRAN) 8 MG tablet Take 1 tablet (8 mg total) by mouth every 8 (eight) hours as needed for Nausea. 30 tablet 3     Current Facility-Administered Medications on File Prior to Visit   Medication Dose Route Frequency Provider Last Rate Last Admin    [COMPLETED] ondansetron (Zofran) 16 mg in sodium chloride 0.9% 108 mL IVPB  16 mg Intravenous Once Marc Potter FNP-C   Stopped at 05/29/25 0829    [COMPLETED] diphenhydrAMINE (Benadryl) 50 mg/mL  injection 25 mg  25 mg Intravenous Once Marc Potter FNP-C   25 mg at 05/29/25 0801    [COMPLETED] trastuzumab-qyyp (Trazimera) 315 mg in sodium chloride 0.9% 265 mL infusion  4 mg/kg (Treatment Plan Recorded)  Intravenous Once Marc Potter FNP-C   Stopped at 05/29/25 0906    [COMPLETED] leucovorin 800 mg in dextrose 5% 250 mL infusion  400 mg/m2 (Treatment Plan Recorded) Intravenous Once Marc Potter FNP-C   Stopped at 05/29/25 0959    [COMPLETED] iopamidol 76% (ISOVUE-370) injection for power injector  80 mL Intravenous ONCE PRN Jodi Wilkerson APRN   90 mL at 05/20/25 1027     There were no vitals filed for this visit.      Wt Readings from Last 6 Encounters:   05/27/25 79.2 kg (174 lb 11.2 oz)   05/13/25 78.6 kg (173 lb 3.2 oz)   04/29/25 76.7 kg (169 lb)   04/15/25 76.7 kg (169 lb)   04/02/25 76.2 kg (168 lb)   03/27/25 77.1 kg (170 lb)       Physical exam:  General:  Awake, alert, oriented in no acute distress   HEENT - EOMsi, anicteric, MMM, oral mucosa pink and moist  Neck - supple, no LAD, normal ROM  Lungs -  CTA bilaterally, no wob,   Cor - S1/S2 w/o murmur noted, +2 radial pulses josafat  Abd - soft, non tender non distended, bs+  BLE - no edema, no ecchymoses, no calf tenderness  Neuro - Grossly intact, steady gait    ECOG 2: Ambulatory and capable of all selfcare but unable to carry out any work activities. Up and about more than 50% of waking hours    Lab Results   Component Value Date    WBC 5.4 06/17/2025    RBC 4.23 (L) 06/17/2025    HGB 12.9 (L) 06/17/2025    HCT 38.4 (L) 06/17/2025    MCV 90.8 06/17/2025    MCH 30.5 06/17/2025    MCHC 33.6 06/17/2025    RDW 14.2 06/17/2025    .0 (L) 06/17/2025    MPV 8.9 10/31/2018     Lab Results   Component Value Date     (H) 06/17/2025    BUN 14 06/17/2025    BUNCREA 14.0 06/17/2025    CREATSERUM 1.00 06/17/2025    ANIONGAP 5 06/17/2025    GFRNAA 86 11/10/2021    GFRAA 99 11/10/2021    CA 9.5 06/17/2025    OSMOCALC 291 06/17/2025    ALKPHO 107 06/17/2025    AST 34 (H) 06/17/2025    ALT 33 06/17/2025    ALKPHOS 82 10/08/2015    BILT 1.8 (H) 06/17/2025    TP 7.0 06/17/2025    ALB 4.5 06/17/2025    GLOBULIN 2.5 06/17/2025    AGRATIO 1.4 10/08/2015      06/17/2025    K 4.4 06/17/2025     06/17/2025    CO2 27.0 06/17/2025     Imaging:    N/A    65 year old  With gastric adenocarcinoma diagnosed 12/18/23 in the setting of iron def anemia. Her positive (3+) MSI-S PDL1 10%    1.) Gastric cancer, stage IV  -- Widespread kevin disease reviewed at tumor conference by Dr. Amaro with surgical oncology will plan for upfront systemic therapy with 5-FU oxaliplatin based regimen potentially with trastuzumab and pembrolizumab  -Initiated FOLFOX plus trastuzumab on 1/18/23   -Delayed Cycle #5 x 1 week d/t worsening LFT elevation   -Cycle #6 with dose reduction of Oxaliplatin d/t CIPN worsening and LFTs as below.    -CT imaging after C4 3/2024 with favorable treatment response.   -PDL1 10% 1/2024. Added Pembrolizumab  as >1% per guidelines. q6week scheduling-initiated with C3 above.   -DELAY Cycle 8 d/t TCP <75K.    - reviewed the pt's current CT scan from above early June; favorable treatment response, continue current systemic treatment    -surveillance CT scan from 8/24 w/ stable findings; c/w favorable tx response, continue current management    -cycle 16 FOLFOX plus trastuzumab plus Pembrolizumab was deferred for 2 wks due to side effects from systemic tx, referred to palliative care to help. Now feels improved to continue with same dosing     --pt is working with  to determine his work limitations     --explained that we will nee to modified his treatments so that they're sustainable; possibly dose reduced oxaliplatin to 50, 5-FU to 2200 and dec trastuzumab to 3mg/kg    --surveillance CT scan from 11/2024 shows stable disease  --Pembrolizumab+oxaliplatin were being held in light of elevated LFT's which are improving off tx  --2/27/25 DEFER cycle 24 by 1 wk due to neutropenia; FOLFOX with trastuzumab +Pembrolizumab(every 6 wks) last pembro 2/6/25;     --reviewed with him that his surveillance CT scan shows stable post operative changes; no signs of  progression. The new splenomegaly may be from cell turnover.     -tx will be held if platelets <75K. Dr. Díaz discontinued the 5-FU bolus as this is likely contributing to the chemotherapy induced thrombocytopenia    -defer C25 by 1 week due to neutropenia; 5-FU+leucovorin with trastuzumab +Pembrolizumab(every 6 wks). As the pt had a second oxaliplatin reaction with last cycle of chemotherapy, this med was eliminated from his tx plan.    - ECHO 2/18/25 stable, EF 60-65%  -following w/ ECHO and CT scans (every 3mo), most recently completed 5/20/25 which we reviewed today is stable. Next due Aug 2025    -5/29/25 completed C29 of 5-FU+leucovorin (given over 45 min for better tolerance)+ trastuzumab, Held Pembrolizumab this cycle due to elevated tbili (tx is every 6 wks)    -6/9/25 pt reporting cold symptoms sore throat, runny nose, afebrile, was covid positive, negative influenza and rsv, delayed C30 by 1 week and pt recovered with mild intermittent dry cough, no fevers  -Cleared for 6/19/25 thurs C30 5fu+leucovorin+trastuzumab, LFT's have improved, next pembrolizumab due C31 on 7/3/25  -RTC 2 weeks Dr. Díaz, labs prior  -Echo and CT due 8/2025    2.)Iron deficiency anemia    --s/p Feraheme x 2, last dose on 2/1/24, anemia resolved  -HGB stable    3.) Chemotherapy induced thrombocytopenia  --Thrombocytopenia secondary to chemo, ok to treat if plts > 75k.    --PLT stable    4.)Transaminitis   --possibly from recent pembrolizumab or from the oxaliplatin;   -LFT's are improving, may have to consider steroid tx if these rise again for CPI hepatitis  --no longer to get oxaliplatin due to reaction  -AST and ALT continue to improve, t bili  little elevated, held pembrolizumab on 5/29/25,   -AST and ALT WNL, t bili improving to 1.8, pembrolizumab due next due 7/3/25    5.)CIPN  -- gr 1-2 after C8, Dose reduction of oxaliplatin to 65 mg/m2 with C6.   --Delay of C9 d/t Tcp with imrpovement to gr1 CIPN to fingers without ADL  limitations.   --Improving given omission of Oxaliplatin.   --1/7/25 started trial of gabapentin trial at 100 mg at night; explained to pt that this will cause drowsiness and we can titrate up to 300 mg at night, continue this same dose for now  -continues current dose gabapentin reports continued PN not worse since last visit  --no longer to get oxaliplatin last dose 3/6/25  -con mild int josafat feet neuropathy, reports josafat hand neuropathy minimal  -pt reports taking gabapentin prn, discussed recommend continuous use of gabapentin for maximum effect for neuropathy, discussed if he starts taking it regularly and then wants to stop to call us first     6.)risk of cardiotoxicity  --Normal EF/echo prior to treatment 1/2024. 5/2024 ECHO repeat stable/improved.    --H/o fleeting chest discomfort reported, currently resolved, without any other Aes.   -Monitor w/ ECHO every 3 mo  -5/2025 Echo stable EF 60-65%.   -Next due aug 2025    7.) BANDAR  --gr. 1, tolerable.  Prn antiemetics encouraged  -- cont omeprazole and encouraged prn prochlorperazine and ondansetron.  -stable    9.)H/o skin rash  --Resolved. Supportive care with topical creams    10.)Prostate Enlargement  --noted on imaging; likely BPH, pt denies nocturia or dribbling symptoms        MDM - XOCHITL Redd    [facility]  [address] [facility] [place]  [address]  [phone]

## 2025-06-19 ENCOUNTER — OFFICE VISIT (OUTPATIENT)
Age: 65
End: 2025-06-19
Attending: INTERNAL MEDICINE
Payer: COMMERCIAL

## 2025-06-19 ENCOUNTER — TELEPHONE (OUTPATIENT)
Age: 65
End: 2025-06-19

## 2025-06-19 VITALS
DIASTOLIC BLOOD PRESSURE: 53 MMHG | OXYGEN SATURATION: 95 % | HEART RATE: 107 BPM | RESPIRATION RATE: 16 BRPM | TEMPERATURE: 99 F | SYSTOLIC BLOOD PRESSURE: 118 MMHG

## 2025-06-19 VITALS
HEART RATE: 106 BPM | DIASTOLIC BLOOD PRESSURE: 57 MMHG | RESPIRATION RATE: 17 BRPM | TEMPERATURE: 99 F | SYSTOLIC BLOOD PRESSURE: 127 MMHG | OXYGEN SATURATION: 95 %

## 2025-06-19 DIAGNOSIS — T80.90XA INFUSION REACTION, INITIAL ENCOUNTER: Primary | ICD-10-CM

## 2025-06-19 DIAGNOSIS — C16.9 MALIGNANT NEOPLASM OF STOMACH, UNSPECIFIED LOCATION (HCC): ICD-10-CM

## 2025-06-19 DIAGNOSIS — C16.9 MALIGNANT NEOPLASM OF STOMACH, UNSPECIFIED LOCATION (HCC): Primary | ICD-10-CM

## 2025-06-19 RX ORDER — DIPHENHYDRAMINE HYDROCHLORIDE 50 MG/ML
INJECTION, SOLUTION INTRAMUSCULAR; INTRAVENOUS
Status: COMPLETED
Start: 2025-06-19 | End: 2025-06-19

## 2025-06-19 RX ORDER — FLUOROURACIL 50 MG/ML
2400 INJECTION, SOLUTION INTRAVENOUS ONCE
Status: DISCONTINUED | OUTPATIENT
Start: 2025-06-19 | End: 2025-06-19

## 2025-06-19 RX ORDER — PROCHLORPERAZINE EDISYLATE 5 MG/ML
INJECTION INTRAMUSCULAR; INTRAVENOUS
Status: COMPLETED
Start: 2025-06-19 | End: 2025-06-19

## 2025-06-19 RX ORDER — METHYLPREDNISOLONE SODIUM SUCCINATE 125 MG/2ML
INJECTION INTRAMUSCULAR; INTRAVENOUS
Status: COMPLETED
Start: 2025-06-19 | End: 2025-06-19

## 2025-06-19 RX ORDER — DIPHENHYDRAMINE HYDROCHLORIDE 50 MG/ML
25 INJECTION, SOLUTION INTRAMUSCULAR; INTRAVENOUS ONCE
Status: COMPLETED | OUTPATIENT
Start: 2025-06-19 | End: 2025-06-19

## 2025-06-19 RX ORDER — PROCHLORPERAZINE EDISYLATE 5 MG/ML
10 INJECTION INTRAMUSCULAR; INTRAVENOUS ONCE
Status: COMPLETED | OUTPATIENT
Start: 2025-06-19 | End: 2025-06-19

## 2025-06-19 RX ORDER — METHYLPREDNISOLONE SODIUM SUCCINATE 125 MG/2ML
125 INJECTION INTRAMUSCULAR; INTRAVENOUS ONCE
Status: COMPLETED | OUTPATIENT
Start: 2025-06-19 | End: 2025-06-19

## 2025-06-19 RX ADMIN — METHYLPREDNISOLONE SODIUM SUCCINATE 125 MG: 125 INJECTION INTRAMUSCULAR; INTRAVENOUS at 10:23:00

## 2025-06-19 RX ADMIN — FLUOROURACIL 4650 MG: 50 INJECTION, SOLUTION INTRAVENOUS at 11:05:00

## 2025-06-19 RX ADMIN — PROCHLORPERAZINE EDISYLATE 10 MG: 5 INJECTION INTRAMUSCULAR; INTRAVENOUS at 09:26:00

## 2025-06-19 RX ADMIN — DIPHENHYDRAMINE HYDROCHLORIDE 25 MG: 50 INJECTION, SOLUTION INTRAMUSCULAR; INTRAVENOUS at 07:58:00

## 2025-06-19 NOTE — PROGRESS NOTES
Nasi to infusion today for C30 D1 trastuzumab / leuco / 5fu.  Arrives Ambulating independently, accompanied by Spouse     Patient was evaluated today by Treatment Nurse. Had labs and pre-chemo visit w/ Jodi DODGE on 6/17. Labs reviewed and WDL for tx today.    Oral medications included in this regimen:  no    Patient confirms comprehension of cancer treatment schedule:  yes    Pregnancy screening:  Not applicable    Modifications in dose or schedule:  No    Medications appearance and physical integrity checked by RN: yes.    Chemotherapy IV pump settings verified by 2 RNs:  Yes for CADD pump.  Frequency of blood return and site check throughout administration: Prior to administration, Prior to each drug, and At completion of therapy     Infusion/treatment outcome:  hypersensitivity protocol initiated - see documentation    Pt called during leucovorin infusion as he became suddenly very nauseous. Infusion stopped, IV fluids started, vitals taken. See flowsheets for vitals documentation. Notified J Denae PAC via reaction phone. Eh evaluated pt at chairside. Given 10mg compazine IVP per order. Symptoms fully resolved. Re-started infusion at 200 ml/hr for 15 mins and then at 333 ml/hr for remainder of bag. Pt was able to complete infusion but upon last few mins of infusion, pt developed chills and rigors. Given 125 mg solumedrol IVP per order. Symptoms slowly resolved but took about 30 mins for chills/rigors to fully go away. Pt stated he felt no chills or rigors at time of discharge. VSS. OK to discharge per J Denae PAC. Instructed pt to please call with any issues.    CADD pump connected and running. All connections reinforced with tape. Port access is clean and dry, secured with steri strips and tegaderm dressing. Patient verbalized understanding of CADD pump instructions, including troubleshooting.       Education Record    Learner:  Patient and Spouse  Barriers / Limitations:  None  Method:   Reinforcement  Education / instructions given:  Medications, plan of care, reaction, when to call MD  Outcome:  Shows understanding    Discharged Home, Ambulating independently, accompanied by:Spouse    Patient/family verbalized understanding of future appointments: by cafegive messaging

## 2025-06-19 NOTE — PROGRESS NOTES
S:  65 year old male is being evaluated in the Acute Care Clinic after experiencing sweating and an episode of nausea while receiving LV.  Upon my arrival, the patient felt better and symptoms abated.  Ate breakfast as he normally does every morning.  Did not take any oral antiemetics yet today.  Received appropriate IV premedications - Ondansetron 16 mg and Diphenhydramine 25 mg.      At approximately 1030, patient started experiencing rigors.  Denies any other concerns - GI distress and sweating did not return.    Denies dyspnea, chest/abdominal/back pain, rash and pruritus.    O:  WDWN, mild distress with rigors  HEENT - no edema or erythema  Neck - normal AROM, no edema  Lungs - non labored breathing, CTA  Cor - RRR  Abd - soft, non tender, bs+    A:  1.  Stage IV gastric cancer  2.  Emesis x 1 followed by Infusion reaction    P:  1.  Currently on 5FU/LV and Trastuzumab.  Completed outpatient infusion and will be discharged on 5FU CADD pump  2.  Initially, Prochlorperazine 10 mg IVP given.  Then, as far as the infusion reaction, Solumedrol 125 mg IVP administered.  Mod hypertensive 160s, mildly tachycardic < 110.  Symptoms abated and VS normalized.  Revised IV pre-medications for future cycles:  Ondansetron 16 mg/Dex 20 mg and continue Diphenhydramine 25 mg IV.    3.  Call prn  4.  Keep originally scheduled follow-up appointments.

## 2025-06-20 ENCOUNTER — TELEPHONE (OUTPATIENT)
Age: 65
End: 2025-06-20

## 2025-06-20 NOTE — TELEPHONE ENCOUNTER
Toxicities: C30 D1 Fluorouracil/Leucovorin/Trastuzumab-qyyp on 6/19/2025    Condition Update: Leucovorin infusion reaction - nausea, chills, rigors    Bhumika reports Wallace's symptoms resolved before they left the infusion center. He felt well yesterday and again this morning. She thanked me for checking on him.

## 2025-06-21 ENCOUNTER — NURSE ONLY (OUTPATIENT)
Age: 65
End: 2025-06-21
Attending: INTERNAL MEDICINE
Payer: COMMERCIAL

## 2025-06-21 VITALS
SYSTOLIC BLOOD PRESSURE: 127 MMHG | DIASTOLIC BLOOD PRESSURE: 71 MMHG | TEMPERATURE: 98 F | HEART RATE: 83 BPM | RESPIRATION RATE: 16 BRPM | OXYGEN SATURATION: 95 %

## 2025-06-24 ENCOUNTER — APPOINTMENT (OUTPATIENT)
Age: 65
End: 2025-06-24
Attending: INTERNAL MEDICINE
Payer: COMMERCIAL

## 2025-06-26 ENCOUNTER — APPOINTMENT (OUTPATIENT)
Age: 65
End: 2025-06-26
Attending: INTERNAL MEDICINE
Payer: COMMERCIAL

## 2025-06-28 ENCOUNTER — APPOINTMENT (OUTPATIENT)
Age: 65
End: 2025-06-28
Attending: INTERNAL MEDICINE
Payer: COMMERCIAL

## 2025-07-01 ENCOUNTER — NURSE ONLY (OUTPATIENT)
Age: 65
End: 2025-07-01
Attending: INTERNAL MEDICINE
Payer: COMMERCIAL

## 2025-07-01 ENCOUNTER — OFFICE VISIT (OUTPATIENT)
Age: 65
End: 2025-07-01
Attending: INTERNAL MEDICINE
Payer: COMMERCIAL

## 2025-07-01 VITALS
BODY MASS INDEX: 25.62 KG/M2 | WEIGHT: 173 LBS | TEMPERATURE: 98 F | DIASTOLIC BLOOD PRESSURE: 75 MMHG | HEART RATE: 74 BPM | SYSTOLIC BLOOD PRESSURE: 133 MMHG | HEIGHT: 69 IN | RESPIRATION RATE: 16 BRPM | OXYGEN SATURATION: 96 %

## 2025-07-01 DIAGNOSIS — Z51.11 CHEMOTHERAPY MANAGEMENT, ENCOUNTER FOR: Primary | ICD-10-CM

## 2025-07-01 DIAGNOSIS — C16.9 MALIGNANT NEOPLASM OF STOMACH, UNSPECIFIED LOCATION (HCC): ICD-10-CM

## 2025-07-01 DIAGNOSIS — C16.9 MALIGNANT NEOPLASM OF STOMACH, UNSPECIFIED LOCATION (HCC): Primary | ICD-10-CM

## 2025-07-01 DIAGNOSIS — Z51.11 CHEMOTHERAPY MANAGEMENT, ENCOUNTER FOR: ICD-10-CM

## 2025-07-01 LAB
ALBUMIN SERPL-MCNC: 4.6 G/DL (ref 3.2–4.8)
ALBUMIN/GLOB SERPL: 1.8 {RATIO} (ref 1–2)
ALP LIVER SERPL-CCNC: 119 U/L (ref 45–117)
ALT SERPL-CCNC: 36 U/L (ref 10–49)
ANION GAP SERPL CALC-SCNC: 8 MMOL/L (ref 0–18)
AST SERPL-CCNC: 31 U/L (ref ?–34)
BASOPHILS # BLD AUTO: 0.02 X10(3) UL (ref 0–0.2)
BASOPHILS NFR BLD AUTO: 0.4 %
BILIRUB SERPL-MCNC: 2.4 MG/DL (ref 0.2–1.1)
BUN BLD-MCNC: 16 MG/DL (ref 9–23)
BUN/CREAT SERPL: 15.1 (ref 10–20)
CALCIUM BLD-MCNC: 9.3 MG/DL (ref 8.7–10.4)
CHLORIDE SERPL-SCNC: 105 MMOL/L (ref 98–112)
CO2 SERPL-SCNC: 27 MMOL/L (ref 21–32)
CREAT BLD-MCNC: 1.06 MG/DL (ref 0.7–1.3)
DEPRECATED RDW RBC AUTO: 44.6 FL (ref 35.1–46.3)
EGFRCR SERPLBLD CKD-EPI 2021: 78 ML/MIN/1.73M2 (ref 60–?)
EOSINOPHIL # BLD AUTO: 0.08 X10(3) UL (ref 0–0.7)
EOSINOPHIL NFR BLD AUTO: 1.6 %
ERYTHROCYTE [DISTWIDTH] IN BLOOD BY AUTOMATED COUNT: 13.8 % (ref 11–15)
GLOBULIN PLAS-MCNC: 2.6 G/DL (ref 2–3.5)
GLUCOSE BLD-MCNC: 101 MG/DL (ref 70–99)
HCT VFR BLD AUTO: 35.9 % (ref 39–53)
HGB BLD-MCNC: 12.9 G/DL (ref 13–17.5)
IMM GRANULOCYTES # BLD AUTO: 0.02 X10(3) UL (ref 0–1)
IMM GRANULOCYTES NFR BLD: 0.4 %
LYMPHOCYTES # BLD AUTO: 1.64 X10(3) UL (ref 1–4)
LYMPHOCYTES NFR BLD AUTO: 33.3 %
MCH RBC QN AUTO: 31.9 PG (ref 26–34)
MCHC RBC AUTO-ENTMCNC: 35.9 G/DL (ref 31–37)
MCV RBC AUTO: 88.6 FL (ref 80–100)
MONOCYTES # BLD AUTO: 0.41 X10(3) UL (ref 0.1–1)
MONOCYTES NFR BLD AUTO: 8.3 %
NEUTROPHILS # BLD AUTO: 2.76 X10 (3) UL (ref 1.5–7.7)
NEUTROPHILS # BLD AUTO: 2.76 X10(3) UL (ref 1.5–7.7)
NEUTROPHILS NFR BLD AUTO: 56 %
OSMOLALITY SERPL CALC.SUM OF ELEC: 291 MOSM/KG (ref 275–295)
PLATELET # BLD AUTO: 112 10(3)UL (ref 150–450)
PLATELETS.RETICULATED NFR BLD AUTO: 2.2 % (ref 0–7)
POTASSIUM SERPL-SCNC: 4.3 MMOL/L (ref 3.5–5.1)
PROT SERPL-MCNC: 7.2 G/DL (ref 5.7–8.2)
RBC # BLD AUTO: 4.05 X10(6)UL (ref 4.3–5.7)
SODIUM SERPL-SCNC: 140 MMOL/L (ref 136–145)
TSI SER-ACNC: 1 UIU/ML (ref 0.55–4.78)
WBC # BLD AUTO: 4.9 X10(3) UL (ref 4–11)

## 2025-07-01 RX ORDER — DIPHENHYDRAMINE HYDROCHLORIDE 50 MG/ML
25 INJECTION, SOLUTION INTRAMUSCULAR; INTRAVENOUS ONCE
Status: CANCELLED
Start: 2025-07-03 | End: 2025-07-03

## 2025-07-01 RX ORDER — FLUOROURACIL 50 MG/ML
2400 INJECTION, SOLUTION INTRAVENOUS CONTINUOUS
Status: CANCELLED | OUTPATIENT
Start: 2025-07-03

## 2025-07-01 NOTE — PROGRESS NOTES
Oncology Progress Note    Name: Wallace Garzon  : 1960  CSN: [id number]  Referring Physician: Dr. Higgins  Date: 25    Chief Complaint: Metastatic gastric cancer    HPI:  65 year old With gastric adenocarcinoma diagnosed 23 in the setting of iron def anemia.    See below for staging workup  Initiated FOLFOX +Trastuzumab 24. Added Pembrolizumab with C3 (PDL1 10%).  Oxaliplatin dose reduced to 65 mg/m2 with C6 due to CIPN    Last treatment 3/6/25, folfox (no5fu bolus)+trastuzumab and had another reaction to oxaliplatin, oxaliplatin removed for future cycles.     Interval History:    Returns for consideration of next Cycle 31, 5FU +Trastuzumab+ Pembrolizumab.   Covid detected 25, influenza and rsv both negative.     Pt had another infusion reaction s/p last cycle of systemic chemotherapy. He denies fevers, infection, bleeding, chest pain, dyspnea, abdominal pain,or new concerns on ROS. Continues to endorse bilateral neuropathy feet intermittent, he reports takes gabapentin sometimes when needed.        Review of Systems:  Ros negx12, save for pertinent positives in HPI above.    Allergies: No known drug allergies     benzonatate (TESSALON PERLES) 100 MG Oral Cap Take 1 capsule (100 mg total) by mouth 3 (three) times daily as needed for cough. 30 capsule 1    Omeprazole 40 MG Oral Capsule Delayed Release Take 1 capsule (40 mg total) by mouth before breakfast. 90 capsule 1    gabapentin 100 MG Oral Cap Take 3 capsules (300 mg total) by mouth nightly. 30 capsule 1    zolpidem (AMBIEN) 10 MG Oral Tab Take 1 tablet (10 mg total) by mouth nightly as needed for Sleep. 30 tablet 0    acetaminophen-codeine 300-30 MG Oral Tab Take 1 tablet by mouth every 6 (six) hours as needed for Pain. Medication may causes sedation, constipation. Not to operate heavy machinery or drive on medication. 60 tablet 0    prochlorperazine (COMPAZINE) 10 mg tablet Take 1 tablet (10 mg total) by mouth every 6 (six) hours as needed  for Nausea. 30 tablet 3    ondansetron (ZOFRAN) 8 MG tablet Take 1 tablet (8 mg total) by mouth every 8 (eight) hours as needed for Nausea. 30 tablet 3       Vitals:    07/01/25 1525   BP: 133/75   Pulse: 74   Resp: 16   Temp: 98 °F (36.7 °C)         Wt Readings from Last 6 Encounters:   07/01/25 78.5 kg (173 lb)   06/17/25 78.5 kg (173 lb)   05/27/25 79.2 kg (174 lb 11.2 oz)   05/13/25 78.6 kg (173 lb 3.2 oz)   04/29/25 76.7 kg (169 lb)   04/15/25 76.7 kg (169 lb)       Physical exam:  General:  Awake, alert, oriented in no acute distress   HEENT - EOMsi, anicteric, MMM, oral mucosa is clear  Neck - supple, no LAD, normal ROM  Lungs -  CTA bilaterally, no wob,   Cor - S1/S2 w/o murmur noted,   Abd - soft, non tender non distended, bs+  BLE - no edema, no ecchymoses, no calf tenderness  Neuro - Grossly intact, steady gait    ECOG 2: Ambulatory and capable of all selfcare but unable to carry out any work activities. Up and about more than 50% of waking hours    Lab Results   Component Value Date    WBC 4.9 07/01/2025    RBC 4.05 (L) 07/01/2025    HGB 12.9 (L) 07/01/2025    HCT 35.9 (L) 07/01/2025    MCV 88.6 07/01/2025    MCH 31.9 07/01/2025    MCHC 35.9 07/01/2025    RDW 13.8 07/01/2025    .0 (L) 07/01/2025    MPV 8.9 10/31/2018     Lab Results   Component Value Date     (H) 07/01/2025    BUN 16 07/01/2025    BUNCREA 15.1 07/01/2025    CREATSERUM 1.06 07/01/2025    ANIONGAP 8 07/01/2025    GFRNAA 86 11/10/2021    GFRAA 99 11/10/2021    CA 9.3 07/01/2025    OSMOCALC 291 07/01/2025    ALKPHO 119 (H) 07/01/2025    AST 31 07/01/2025    ALT 36 07/01/2025    ALKPHOS 82 10/08/2015    BILT 2.4 (H) 07/01/2025    TP 7.2 07/01/2025    ALB 4.6 07/01/2025    GLOBULIN 2.6 07/01/2025    AGRATIO 1.4 10/08/2015     07/01/2025    K 4.3 07/01/2025     07/01/2025    CO2 27.0 07/01/2025     Imaging:    N/A    65 year old  With gastric adenocarcinoma diagnosed 12/18/23 in the setting of iron def anemia. Her  positive (3+) MSI-S PDL1 10%    1.) Gastric cancer, stage IV  -- Widespread kevin disease reviewed at tumor conference by Dr. Amaro with surgical oncology will plan for upfront systemic therapy with 5-FU oxaliplatin based regimen potentially with trastuzumab and pembrolizumab  -Initiated FOLFOX plus trastuzumab on 1/18/23   -Delayed Cycle #5 x 1 week d/t worsening LFT elevation   -Cycle #6 with dose reduction of Oxaliplatin d/t CIPN worsening and LFTs as below.    -CT imaging after C4 3/2024 with favorable treatment response.   -PDL1 10% 1/2024. Added Pembrolizumab  as >1% per guidelines. q6week scheduling-initiated with C3 above.   -DELAY Cycle 8 d/t TCP <75K.    - reviewed the pt's current CT scan from above early June; favorable treatment response, continue current systemic treatment    -surveillance CT scan from 8/24 w/ stable findings; c/w favorable tx response, continue current management    -cycle 16 FOLFOX plus trastuzumab plus Pembrolizumab was deferred for 2 wks due to side effects from systemic tx, referred to palliative care to help. Now feels improved to continue with same dosing     --pt is working with  to determine his work limitations     --explained that we will nee to modified his treatments so that they're sustainable; possibly dose reduced oxaliplatin to 50, 5-FU to 2200 and dec trastuzumab to 3mg/kg    --surveillance CT scan from 11/2024 shows stable disease  --Pembrolizumab+oxaliplatin were being held in light of elevated LFT's which are improving off tx  --2/27/25 DEFER cycle 24 by 1 wk due to neutropenia; FOLFOX with trastuzumab +Pembrolizumab(every 6 wks) last pembro 2/6/25;     --reviewed with him that his surveillance CT scan shows stable post operative changes; no signs of progression. The new splenomegaly may be from cell turnover.     -tx will be held if platelets <75K. Dr. Díaz discontinued the 5-FU bolus as this is likely contributing to the chemotherapy induced  thrombocytopenia    -defer C25 by 1 week due to neutropenia; 5-FU+leucovorin with trastuzumab +Pembrolizumab(every 6 wks). As the pt had a second oxaliplatin reaction with last cycle of chemotherapy, this med was eliminated from his tx plan.    - ECHO 2/18/25 stable, EF 60-65%  -following w/ ECHO and CT scans (every 3mo), most recently completed 5/20/25 which we reviewed today is stable. Next due Aug 2025    -5/29/25 completed C29 of 5-FU+leucovorin (given over 45 min for better tolerance)+ trastuzumab, Held Pembrolizumab this cycle due to elevated tbili (tx is every 6 wks)    -6/9/25 pt reporting cold symptoms sore throat, runny nose, afebrile, was covid positive, negative influenza and rsv, delayed C30 by 1 week and pt recovered with mild intermittent dry cough, no fevers  -proceed on Thurs w/ C31 5fu+leucovorin+trastuzumab, LFT's have improved, but tbili is too high for pembrolizumab; might be able to give at cycle 31 if improved  -RTC 2 weeks   -Echo and CT due 8/2025    2.)Iron deficiency anemia    --s/p Feraheme x 2, last dose on 2/1/24, anemia resolved  -HGB stable    3.) Chemotherapy induced thrombocytopenia  --Thrombocytopenia secondary to chemo, ok to treat if plts > 75k.    --PLT stable    4.)Transaminitis   --possibly from recent pembrolizumab or from the oxaliplatin;   -LFT's are improving, may have to consider steroid tx if these rise again for CPI hepatitis  --no longer to get oxaliplatin due to reaction  -AST and ALT continue to improve, t bili  little elevated, held pembrolizumab on 5/29/25,   -AST and ALT WNL, t bili is too elevated for pembrolizumab today; hopefully improves and can be given at cycle 32    5.)CIPN  -- gr 1-2 after C8, Dose reduction of oxaliplatin to 65 mg/m2 with C6.   --Delay of C9 d/t Tcp with imrpovement to gr1 CIPN to fingers without ADL limitations.   --Improving given omission of Oxaliplatin.   --1/7/25 started trial of gabapentin trial at 100 mg at night; explained to pt  that this will cause drowsiness and we can titrate up to 300 mg at night, continue this same dose for now  -continues current dose gabapentin reports continued PN not worse since last visit  --no longer to get oxaliplatin last dose 3/6/25  -con mild int josafat feet neuropathy, reports josafat hand neuropathy minimal  -pt reports taking gabapentin prn, discussed recommend continuous use of gabapentin for maximum effect for neuropathy, discussed if he starts taking it regularly and then wants to stop to call us first     6.)risk of cardiotoxicity  --Normal EF/echo prior to treatment 1/2024. 5/2024 ECHO repeat stable/improved.    --H/o fleeting chest discomfort reported, currently resolved, without any other Aes.   -Monitor w/ ECHO every 3 mo  -5/2025 Echo stable EF 60-65%.   -Next due aug 2025    7.) BANDAR  --gr. 1, tolerable.  Prn antiemetics encouraged  -- cont omeprazole and encouraged prn prochlorperazine and ondansetron.  -stable    9.)H/o skin rash  --Resolved. Supportive care with topical creams    10.)Prostate Enlargement  --noted on imaging; likely BPH, pt denies nocturia or dribbling symptoms        MDM - High  : Ongoing continuity of complex care    Gustavo Díaz MD  [facility]  [facility] [place]  [address]  [phone]

## 2025-07-03 ENCOUNTER — OFFICE VISIT (OUTPATIENT)
Age: 65
End: 2025-07-03
Attending: INTERNAL MEDICINE
Payer: COMMERCIAL

## 2025-07-03 VITALS
TEMPERATURE: 99 F | OXYGEN SATURATION: 97 % | SYSTOLIC BLOOD PRESSURE: 144 MMHG | DIASTOLIC BLOOD PRESSURE: 75 MMHG | WEIGHT: 174 LBS | RESPIRATION RATE: 16 BRPM | HEART RATE: 80 BPM | BODY MASS INDEX: 26 KG/M2

## 2025-07-03 DIAGNOSIS — C16.9 MALIGNANT NEOPLASM OF STOMACH, UNSPECIFIED LOCATION (HCC): Primary | ICD-10-CM

## 2025-07-03 RX ORDER — DIPHENHYDRAMINE HYDROCHLORIDE 50 MG/ML
INJECTION, SOLUTION INTRAMUSCULAR; INTRAVENOUS
Status: DISCONTINUED
Start: 2025-07-03 | End: 2025-07-03 | Stop reason: WASHOUT

## 2025-07-03 RX ORDER — FLUOROURACIL 50 MG/ML
2400 INJECTION, SOLUTION INTRAVENOUS CONTINUOUS
Status: DISCONTINUED | OUTPATIENT
Start: 2025-07-03 | End: 2025-07-03

## 2025-07-03 RX ORDER — DIPHENHYDRAMINE HYDROCHLORIDE 50 MG/ML
INJECTION, SOLUTION INTRAMUSCULAR; INTRAVENOUS
Status: DISCONTINUED
Start: 2025-07-03 | End: 2025-07-03

## 2025-07-03 RX ORDER — DIPHENHYDRAMINE HYDROCHLORIDE 50 MG/ML
25 INJECTION, SOLUTION INTRAMUSCULAR; INTRAVENOUS ONCE
Status: COMPLETED | OUTPATIENT
Start: 2025-07-03 | End: 2025-07-03

## 2025-07-03 RX ADMIN — FLUOROURACIL 4650 MG: 50 INJECTION, SOLUTION INTRAVENOUS at 11:15:00

## 2025-07-03 RX ADMIN — DIPHENHYDRAMINE HYDROCHLORIDE 25 MG: 50 INJECTION, SOLUTION INTRAMUSCULAR; INTRAVENOUS at 09:32:00

## 2025-07-03 NOTE — PROGRESS NOTES
Pt here for C31D1 Drug(s)FOLFOX TRASTUZUMAB.  Arrives Ambulating independently, accompanied by Spouse     Patient was evaluated today by Treatment Nurse.    Oral medications included in this regimen:  no    Patient confirms comprehension of cancer treatment schedule:  yes    Pregnancy screening:  Not applicable    Modifications in dose or schedule:  yes.  Dexamethasone added to zofran premed and benadryl IVP given slowly.  Leucovorin infused over 1 hour.  Pt tolerated tx without incident.    Medications appearance and physical integrity checked by RN: yes.    Chemotherapy IV pump settings verified by 2 RNs:  No due to targeted therapy IV administration.  Frequency of blood return and site check throughout administration: Prior to administration, Prior to each drug, and At completion of therapy     Infusion/treatment outcome:  patient tolerated treatment without incident    CADD pump connected and running. All connections reinforced with tape. Port access is clean and dry, secured with steri strips and tegaderm dressing.      AVS given to pt.  Pt will be here on Saturday around 8:30, no later.      Education Record    Learner:  Patient and Spouse  Barriers / Limitations:  None  Method:  Discussion  Education / instructions given:  Plan of care  Outcome:  Shows understanding    Discharged Home, Ambulating independently, accompanied by:Spouse    Patient/family verbalized understanding of future appointments: by printed AVS

## 2025-07-05 ENCOUNTER — NURSE ONLY (OUTPATIENT)
Age: 65
End: 2025-07-05
Attending: INTERNAL MEDICINE
Payer: COMMERCIAL

## 2025-07-05 VITALS
RESPIRATION RATE: 18 BRPM | HEART RATE: 78 BPM | OXYGEN SATURATION: 96 % | SYSTOLIC BLOOD PRESSURE: 101 MMHG | TEMPERATURE: 98 F | DIASTOLIC BLOOD PRESSURE: 66 MMHG

## 2025-07-05 NOTE — PROGRESS NOTES
Patient presented with CADD pump connected. Reservoir empty. Disconnected CADD pump, flushed port with 0.9% Normal Saline with positive blood return. Deaccessed port per protocol and covered with gauze and non-occlusive dressing.      Patient discharged in stable condition and cartridge disposed in chemo container.

## 2025-07-15 ENCOUNTER — NURSE ONLY (OUTPATIENT)
Age: 65
End: 2025-07-15
Attending: INTERNAL MEDICINE
Payer: COMMERCIAL

## 2025-07-15 ENCOUNTER — OFFICE VISIT (OUTPATIENT)
Age: 65
End: 2025-07-15
Attending: INTERNAL MEDICINE
Payer: COMMERCIAL

## 2025-07-15 VITALS
OXYGEN SATURATION: 96 % | WEIGHT: 176 LBS | RESPIRATION RATE: 16 BRPM | BODY MASS INDEX: 26.07 KG/M2 | DIASTOLIC BLOOD PRESSURE: 72 MMHG | SYSTOLIC BLOOD PRESSURE: 113 MMHG | HEART RATE: 74 BPM | HEIGHT: 69 IN | TEMPERATURE: 97 F

## 2025-07-15 DIAGNOSIS — Z51.11 CHEMOTHERAPY MANAGEMENT, ENCOUNTER FOR: Primary | ICD-10-CM

## 2025-07-15 DIAGNOSIS — C16.9 MALIGNANT NEOPLASM OF STOMACH, UNSPECIFIED LOCATION (HCC): ICD-10-CM

## 2025-07-15 DIAGNOSIS — C16.9 MALIGNANT NEOPLASM OF STOMACH, UNSPECIFIED LOCATION (HCC): Primary | ICD-10-CM

## 2025-07-15 DIAGNOSIS — Z51.11 CHEMOTHERAPY MANAGEMENT, ENCOUNTER FOR: ICD-10-CM

## 2025-07-15 LAB
ALBUMIN SERPL-MCNC: 4.5 G/DL (ref 3.2–4.8)
ALBUMIN/GLOB SERPL: 1.7 {RATIO} (ref 1–2)
ALP LIVER SERPL-CCNC: 115 U/L (ref 45–117)
ALT SERPL-CCNC: 29 U/L (ref 10–49)
ANION GAP SERPL CALC-SCNC: 4 MMOL/L (ref 0–18)
AST SERPL-CCNC: 27 U/L (ref ?–34)
BASOPHILS # BLD AUTO: 0.01 X10(3) UL (ref 0–0.2)
BASOPHILS NFR BLD AUTO: 0.2 %
BILIRUB SERPL-MCNC: 2.3 MG/DL (ref 0.2–1.1)
BUN BLD-MCNC: 14 MG/DL (ref 9–23)
BUN/CREAT SERPL: 14.4 (ref 10–20)
CALCIUM BLD-MCNC: 9.6 MG/DL (ref 8.7–10.4)
CHLORIDE SERPL-SCNC: 106 MMOL/L (ref 98–112)
CO2 SERPL-SCNC: 27 MMOL/L (ref 21–32)
CREAT BLD-MCNC: 0.97 MG/DL (ref 0.7–1.3)
DEPRECATED RDW RBC AUTO: 46.3 FL (ref 35.1–46.3)
EGFRCR SERPLBLD CKD-EPI 2021: 87 ML/MIN/1.73M2 (ref 60–?)
EOSINOPHIL # BLD AUTO: 0.04 X10(3) UL (ref 0–0.7)
EOSINOPHIL NFR BLD AUTO: 0.8 %
ERYTHROCYTE [DISTWIDTH] IN BLOOD BY AUTOMATED COUNT: 13.8 % (ref 11–15)
GLOBULIN PLAS-MCNC: 2.7 G/DL (ref 2–3.5)
GLUCOSE BLD-MCNC: 124 MG/DL (ref 70–99)
HCT VFR BLD AUTO: 38.5 % (ref 39–53)
HGB BLD-MCNC: 13.1 G/DL (ref 13–17.5)
IMM GRANULOCYTES # BLD AUTO: 0.02 X10(3) UL (ref 0–1)
IMM GRANULOCYTES NFR BLD: 0.4 %
LYMPHOCYTES # BLD AUTO: 1.64 X10(3) UL (ref 1–4)
LYMPHOCYTES NFR BLD AUTO: 31.5 %
MCH RBC QN AUTO: 31.2 PG (ref 26–34)
MCHC RBC AUTO-ENTMCNC: 34 G/DL (ref 31–37)
MCV RBC AUTO: 91.7 FL (ref 80–100)
MONOCYTES # BLD AUTO: 0.6 X10(3) UL (ref 0.1–1)
MONOCYTES NFR BLD AUTO: 11.5 %
NEUTROPHILS # BLD AUTO: 2.89 X10 (3) UL (ref 1.5–7.7)
NEUTROPHILS # BLD AUTO: 2.89 X10(3) UL (ref 1.5–7.7)
NEUTROPHILS NFR BLD AUTO: 55.6 %
OSMOLALITY SERPL CALC.SUM OF ELEC: 286 MOSM/KG (ref 275–295)
PLATELET # BLD AUTO: 103 10(3)UL (ref 150–450)
POTASSIUM SERPL-SCNC: 4.3 MMOL/L (ref 3.5–5.1)
PROT SERPL-MCNC: 7.2 G/DL (ref 5.7–8.2)
RBC # BLD AUTO: 4.2 X10(6)UL (ref 4.3–5.7)
SODIUM SERPL-SCNC: 137 MMOL/L (ref 136–145)
TSI SER-ACNC: 1.09 UIU/ML (ref 0.55–4.78)
WBC # BLD AUTO: 5.2 X10(3) UL (ref 4–11)

## 2025-07-15 NOTE — PROGRESS NOTES
Oncology Progress Note    Name: Wallace Garzon  : 1960  CSN: [id number]  Referring Physician: Dr. Higgins  Date: 7/15/25    Chief Complaint: Metastatic gastric cancer    HPI:  65 year old With gastric adenocarcinoma diagnosed 23 in the setting of iron def anemia.    See below for staging workup  Initiated FOLFOX +Trastuzumab 24. Added Pembrolizumab with C3 (PDL1 10%).  Oxaliplatin dose reduced to 65 mg/m2 with C6 due to CIPN    Last treatment 3/6/25, folfox (no5fu bolus)+trastuzumab and had another reaction to oxaliplatin, oxaliplatin removed for future cycles.     Interval History:    Returns for consideration of next Cycle 32, 5FU +Trastuzumab+ Pembrolizumab.     Pt tolerated the last cycle of systemic chemotherapy w/o infusion reaction. He denies fevers, infection, bleeding, chest pain, dyspnea, abdominal pain,or new concerns on ROS. Continues to endorse bilateral neuropathy feet intermittent, he reports takes gabapentin sometimes when needed.      Review of Systems:  Ros negx12, save for pertinent positives in HPI above.    Allergies: No known drug allergies     benzonatate (TESSALON PERLES) 100 MG Oral Cap Take 1 capsule (100 mg total) by mouth 3 (three) times daily as needed for cough. 30 capsule 1    Omeprazole 40 MG Oral Capsule Delayed Release Take 1 capsule (40 mg total) by mouth before breakfast. 90 capsule 1    gabapentin 100 MG Oral Cap Take 3 capsules (300 mg total) by mouth nightly. 30 capsule 1    zolpidem (AMBIEN) 10 MG Oral Tab Take 1 tablet (10 mg total) by mouth nightly as needed for Sleep. 30 tablet 0    acetaminophen-codeine 300-30 MG Oral Tab Take 1 tablet by mouth every 6 (six) hours as needed for Pain. Medication may causes sedation, constipation. Not to operate heavy machinery or drive on medication. 60 tablet 0    prochlorperazine (COMPAZINE) 10 mg tablet Take 1 tablet (10 mg total) by mouth every 6 (six) hours as needed for Nausea. 30 tablet 3    ondansetron (ZOFRAN) 8 MG  tablet Take 1 tablet (8 mg total) by mouth every 8 (eight) hours as needed for Nausea. 30 tablet 3       Vitals:    07/15/25 1316   BP: 113/72   Pulse: 74   Resp: 16   Temp: 97.1 °F (36.2 °C)         Wt Readings from Last 6 Encounters:   07/15/25 79.8 kg (176 lb)   07/03/25 78.9 kg (174 lb)   07/01/25 78.5 kg (173 lb)   06/17/25 78.5 kg (173 lb)   05/27/25 79.2 kg (174 lb 11.2 oz)   05/13/25 78.6 kg (173 lb 3.2 oz)       Physical exam:  General:  Awake, alert, oriented in no acute distress   HEENT - EOMsi, anicteric, MMM, oral mucosa is clear  Neck - supple, no LAD, normal ROM  Lungs -  CTA bilaterally, no wob,   Cor - S1/S2 w/o murmur noted,   Abd - soft, non tender non distended, bs+  BLE - no edema, no ecchymoses, no calf tenderness  Neuro - Grossly intact, steady gait    ECOG 2: Ambulatory and capable of all selfcare but unable to carry out any work activities. Up and about more than 50% of waking hours    Lab Results   Component Value Date    WBC 5.2 07/15/2025    RBC 4.20 (L) 07/15/2025    HGB 13.1 07/15/2025    HCT 38.5 (L) 07/15/2025    MCV 91.7 07/15/2025    MCH 31.2 07/15/2025    MCHC 34.0 07/15/2025    RDW 13.8 07/15/2025    .0 (L) 07/15/2025    MPV 8.9 10/31/2018     Lab Results   Component Value Date     (H) 07/15/2025    BUN 14 07/15/2025    BUNCREA 14.4 07/15/2025    CREATSERUM 0.97 07/15/2025    ANIONGAP 4 07/15/2025    GFRNAA 86 11/10/2021    GFRAA 99 11/10/2021    CA 9.6 07/15/2025    OSMOCALC 286 07/15/2025    ALKPHO 115 07/15/2025    AST 27 07/15/2025    ALT 29 07/15/2025    ALKPHOS 82 10/08/2015    BILT 2.3 (H) 07/15/2025    TP 7.2 07/15/2025    ALB 4.5 07/15/2025    GLOBULIN 2.7 07/15/2025    AGRATIO 1.4 10/08/2015     07/15/2025    K 4.3 07/15/2025     07/15/2025    CO2 27.0 07/15/2025     Imaging:    N/A    65 year old  With gastric adenocarcinoma diagnosed 12/18/23 in the setting of iron def anemia. Her positive (3+) MSI-S PDL1 10%    1.) Gastric cancer, stage  IV  -- Widespread kevin disease reviewed at tumor conference by Dr. Amaro with surgical oncology will plan for upfront systemic therapy with 5-FU oxaliplatin based regimen potentially with trastuzumab and pembrolizumab  -Initiated FOLFOX plus trastuzumab on 1/18/23   -Delayed Cycle #5 x 1 week d/t worsening LFT elevation   -Cycle #6 with dose reduction of Oxaliplatin d/t CIPN worsening and LFTs as below.    -CT imaging after C4 3/2024 with favorable treatment response.   -PDL1 10% 1/2024. Added Pembrolizumab  as >1% per guidelines. q6week scheduling-initiated with C3 above.   -DELAY Cycle 8 d/t TCP <75K.    - reviewed the pt's current CT scan from above early June; favorable treatment response, continue current systemic treatment    -surveillance CT scan from 8/24 w/ stable findings; c/w favorable tx response, continue current management    -cycle 16 FOLFOX plus trastuzumab plus Pembrolizumab was deferred for 2 wks due to side effects from systemic tx, referred to palliative care to help. Now feels improved to continue with same dosing     --pt is working with  to determine his work limitations     --explained that we will nee to modified his treatments so that they're sustainable; possibly dose reduced oxaliplatin to 50, 5-FU to 2200 and dec trastuzumab to 3mg/kg    --surveillance CT scan from 11/2024 shows stable disease  --Pembrolizumab+oxaliplatin were being held in light of elevated LFT's which are improving off tx  --2/27/25 DEFER cycle 24 by 1 wk due to neutropenia; FOLFOX with trastuzumab +Pembrolizumab(every 6 wks) last pembro 2/6/25;     --reviewed with him that his surveillance CT scan shows stable post operative changes; no signs of progression. The new splenomegaly may be from cell turnover.     -tx will be held if platelets <75K. Dr. Díaz discontinued the 5-FU bolus as this is likely contributing to the chemotherapy induced thrombocytopenia    -defer C25 by 1 week due to neutropenia;  5-FU+leucovorin with trastuzumab +Pembrolizumab(every 6 wks). As the pt had a second oxaliplatin reaction with recent chemotherapy, this med was eliminated from his tx plan.    - ECHO 2/18/25 stable, EF 60-65%  -following w/ ECHO and CT scans (every 3mo), most recently completed 5/20/25 which we reviewed today is stable. Next due Aug 2025    -5/29/25 completed C29 of 5-FU+leucovorin (given over 45 min for better tolerance)+ trastuzumab, Held Pembrolizumab this cycle due to elevated tbili (tx is every 6 wks)    -6/9/25 pt reporting cold symptoms sore throat, runny nose, afebrile, was covid positive, negative influenza and rsv, delayed C30 by 1 week and pt recovered with mild intermittent dry cough, no fevers  -proceed on Thurs w/ C32 5fu+leucovorin+trastuzumab, LFT's have improved, but tbili is too high for pembrolizumab; might be able to give at cycle 333 if improved  -RTC 2 weeks with APN [place]  -Echo and CT due 8/2025    2.)Iron deficiency anemia    --s/p Feraheme x 2, last dose on 2/1/24, anemia resolved  -HGB stable    3.) Chemotherapy induced thrombocytopenia  --Thrombocytopenia secondary to chemo, ok to treat if plts > 75k.    --PLT stable    4.)Transaminitis   --possibly from recent pembrolizumab or from the oxaliplatin;   -LFT's are improving, may have to consider steroid tx if these rise again for CPI hepatitis  --no longer to get oxaliplatin due to reaction  -AST and ALT continue to improve, t bili  little elevated, held pembrolizumab on 5/29/25,   -AST and ALT WNL, t bili is too elevated for pembrolizumab today; hopefully improves and can be given at cycle 33    5.)CIPN  -- gr 1-2 after C8, Dose reduction of oxaliplatin to 65 mg/m2 with C6.   --Delay of C9 d/t Tcp with imrpovement to gr1 CIPN to fingers without ADL limitations.   --Improving given omission of Oxaliplatin.   --1/7/25 started trial of gabapentin trial at 100 mg at night; explained to pt that this will cause drowsiness and we can  titrate up to 300 mg at night, continue this same dose for now  -continues current dose gabapentin reports continued PN not worse since last visit  --no longer to get oxaliplatin last dose 3/6/25  -con mild int josafat feet neuropathy, reports josafat hand neuropathy minimal  -pt reports taking gabapentin prn, discussed recommend continuous use of gabapentin for maximum effect for neuropathy, discussed if he starts taking it regularly and then wants to stop to call us first     6.)risk of cardiotoxicity  --Normal EF/echo prior to treatment 1/2024. 5/2024 ECHO repeat stable/improved.    --H/o fleeting chest discomfort reported, currently resolved, without any other Aes.   -Monitor w/ ECHO every 3 mo  -5/2025 Echo stable EF 60-65%.   -Next due aug 2025    7.) BANDAR  --gr. 1, tolerable.  Prn antiemetics encouraged  -- cont omeprazole and encouraged prn prochlorperazine and ondansetron.  -stable    9.)H/o skin rash  --Resolved. Supportive care with topical creams    10.)Prostate Enlargement  --noted on imaging; likely BPH, pt denies nocturia or dribbling symptoms        [facility] - High  : Ongoing continuity of complex care    Gustavo Díaz MD  [facility]  [facility] [place]  [address]  [phone]

## 2025-07-16 RX ORDER — DIPHENHYDRAMINE HYDROCHLORIDE 50 MG/ML
25 INJECTION, SOLUTION INTRAMUSCULAR; INTRAVENOUS ONCE
Status: CANCELLED
Start: 2025-07-17 | End: 2025-07-17

## 2025-07-16 RX ORDER — FLUOROURACIL 50 MG/ML
2400 INJECTION, SOLUTION INTRAVENOUS CONTINUOUS
Status: CANCELLED | OUTPATIENT
Start: 2025-07-17

## 2025-07-17 ENCOUNTER — OFFICE VISIT (OUTPATIENT)
Age: 65
End: 2025-07-17
Attending: INTERNAL MEDICINE
Payer: COMMERCIAL

## 2025-07-17 VITALS
TEMPERATURE: 98 F | HEART RATE: 62 BPM | SYSTOLIC BLOOD PRESSURE: 130 MMHG | RESPIRATION RATE: 16 BRPM | DIASTOLIC BLOOD PRESSURE: 65 MMHG

## 2025-07-17 DIAGNOSIS — C16.9 MALIGNANT NEOPLASM OF STOMACH, UNSPECIFIED LOCATION (HCC): Primary | ICD-10-CM

## 2025-07-17 RX ORDER — DIPHENHYDRAMINE HYDROCHLORIDE 50 MG/ML
INJECTION, SOLUTION INTRAMUSCULAR; INTRAVENOUS
Status: COMPLETED
Start: 2025-07-17 | End: 2025-07-17

## 2025-07-17 RX ORDER — DIPHENHYDRAMINE HYDROCHLORIDE 50 MG/ML
25 INJECTION, SOLUTION INTRAMUSCULAR; INTRAVENOUS ONCE
Status: COMPLETED | OUTPATIENT
Start: 2025-07-17 | End: 2025-07-17

## 2025-07-17 RX ORDER — FLUOROURACIL 50 MG/ML
2400 INJECTION, SOLUTION INTRAVENOUS CONTINUOUS
Status: DISCONTINUED | OUTPATIENT
Start: 2025-07-17 | End: 2025-07-17

## 2025-07-17 RX ADMIN — DIPHENHYDRAMINE HYDROCHLORIDE 25 MG: 50 INJECTION, SOLUTION INTRAMUSCULAR; INTRAVENOUS at 08:02:00

## 2025-07-17 RX ADMIN — FLUOROURACIL 4700 MG: 50 INJECTION, SOLUTION INTRAVENOUS at 10:20:00

## 2025-07-17 NOTE — PROGRESS NOTES
Pt here for C32D1 Drug(s)FOLFOX TRASTUZUMAB.  Arrives Ambulating independently, accompanied by Spouse     Patient was evaluated today by Treatment Nurse.    Oral medications included in this regimen:  no    Patient confirms comprehension of cancer treatment schedule:  yes    Pregnancy screening:  Not applicable    Modifications in dose or schedule:  Yes  Leucovorin given over 45 min per pt preference      Medications appearance and physical integrity checked by RN: yes.    Chemotherapy IV pump settings verified by 2 RNs:  No due to targeted therapy IV administration.  Frequency of blood return and site check throughout administration: Prior to administration, Prior to each drug, and At completion of therapy     Infusion/treatment outcome:  patient tolerated treatment without incident    CADD pump connected and running. All connections reinforced with tape. Port access is clean and dry, secured with steri strips and tegaderm dressing.      Education Record    Learner:  Patient and Spouse  Barriers / Limitations:  None  Method:  Discussion  Education / instructions given:  Plan of care  Outcome:  Shows understanding    Discharged Home, Ambulating independently, accompanied by:Spouse    Patient/family verbalized understanding of future appointments: by printed AVS

## 2025-07-19 ENCOUNTER — NURSE ONLY (OUTPATIENT)
Age: 65
End: 2025-07-19
Attending: INTERNAL MEDICINE
Payer: COMMERCIAL

## 2025-07-19 VITALS
RESPIRATION RATE: 18 BRPM | TEMPERATURE: 98 F | HEART RATE: 75 BPM | OXYGEN SATURATION: 96 % | DIASTOLIC BLOOD PRESSURE: 62 MMHG | SYSTOLIC BLOOD PRESSURE: 121 MMHG

## 2025-07-19 NOTE — PROGRESS NOTES
Patient presented with CADD pump connected. [place] with 2.8 remaining. Disconnected CADD pump, port flushed and needle deaccessed.  Nasi discharged stable.     
25-Sep-2020 01:46

## 2025-07-29 ENCOUNTER — OFFICE VISIT (OUTPATIENT)
Facility: LOCATION | Age: 65
End: 2025-07-29
Attending: INTERNAL MEDICINE
Payer: COMMERCIAL

## 2025-07-29 ENCOUNTER — NURSE ONLY (OUTPATIENT)
Facility: LOCATION | Age: 65
End: 2025-07-29
Attending: INTERNAL MEDICINE
Payer: COMMERCIAL

## 2025-07-29 VITALS
TEMPERATURE: 98 F | HEART RATE: 72 BPM | DIASTOLIC BLOOD PRESSURE: 75 MMHG | SYSTOLIC BLOOD PRESSURE: 122 MMHG | BODY MASS INDEX: 26.07 KG/M2 | WEIGHT: 176 LBS | HEIGHT: 69 IN | RESPIRATION RATE: 18 BRPM | OXYGEN SATURATION: 97 %

## 2025-07-29 DIAGNOSIS — C16.9 MALIGNANT NEOPLASM OF STOMACH, UNSPECIFIED LOCATION (HCC): Primary | ICD-10-CM

## 2025-07-29 DIAGNOSIS — Z51.11 CHEMOTHERAPY MANAGEMENT, ENCOUNTER FOR: ICD-10-CM

## 2025-07-29 DIAGNOSIS — Z51.11 CHEMOTHERAPY MANAGEMENT, ENCOUNTER FOR: Primary | ICD-10-CM

## 2025-07-29 DIAGNOSIS — C16.9 MALIGNANT NEOPLASM OF STOMACH, UNSPECIFIED LOCATION (HCC): ICD-10-CM

## 2025-07-29 LAB
ALBUMIN SERPL-MCNC: 4.7 G/DL (ref 3.2–4.8)
ALBUMIN/GLOB SERPL: 1.8 (ref 1–2)
ALP LIVER SERPL-CCNC: 111 U/L (ref 45–117)
ALT SERPL-CCNC: 32 U/L (ref 10–49)
ANION GAP SERPL CALC-SCNC: 10 MMOL/L (ref 0–18)
AST SERPL-CCNC: 33 U/L (ref ?–34)
BASOPHILS # BLD AUTO: 0.02 X10(3) UL (ref 0–0.2)
BASOPHILS NFR BLD AUTO: 0.3 %
BILIRUB SERPL-MCNC: 2.8 MG/DL (ref 0.2–1.1)
BUN BLD-MCNC: 16 MG/DL (ref 9–23)
BUN/CREAT SERPL: 16 (ref 10–20)
CALCIUM BLD-MCNC: 9.5 MG/DL (ref 8.7–10.4)
CHLORIDE SERPL-SCNC: 104 MMOL/L (ref 98–112)
CO2 SERPL-SCNC: 26 MMOL/L (ref 21–32)
CREAT BLD-MCNC: 1 MG/DL (ref 0.7–1.3)
DEPRECATED RDW RBC AUTO: 45.1 FL (ref 35.1–46.3)
EGFRCR SERPLBLD CKD-EPI 2021: 84 ML/MIN/1.73M2 (ref 60–?)
EOSINOPHIL # BLD AUTO: 0.04 X10(3) UL (ref 0–0.7)
EOSINOPHIL NFR BLD AUTO: 0.6 %
ERYTHROCYTE [DISTWIDTH] IN BLOOD BY AUTOMATED COUNT: 13.7 % (ref 11–15)
GLOBULIN PLAS-MCNC: 2.6 G/DL (ref 2–3.5)
GLUCOSE BLD-MCNC: 115 MG/DL (ref 70–99)
HCT VFR BLD AUTO: 37.8 % (ref 39–53)
HGB BLD-MCNC: 13.3 G/DL (ref 13–17.5)
IMM GRANULOCYTES # BLD AUTO: 0.01 X10(3) UL (ref 0–1)
IMM GRANULOCYTES NFR BLD: 0.2 %
LYMPHOCYTES # BLD AUTO: 1.83 X10(3) UL (ref 1–4)
LYMPHOCYTES NFR BLD AUTO: 28.5 %
MCH RBC QN AUTO: 32 PG (ref 26–34)
MCHC RBC AUTO-ENTMCNC: 35.2 G/DL (ref 31–37)
MCV RBC AUTO: 91.1 FL (ref 80–100)
MONOCYTES # BLD AUTO: 0.59 X10(3) UL (ref 0.1–1)
MONOCYTES NFR BLD AUTO: 9.2 %
NEUTROPHILS # BLD AUTO: 3.92 X10 (3) UL (ref 1.5–7.7)
NEUTROPHILS # BLD AUTO: 3.92 X10(3) UL (ref 1.5–7.7)
NEUTROPHILS NFR BLD AUTO: 61.2 %
OSMOLALITY SERPL CALC.SUM OF ELEC: 292 MOSM/KG (ref 275–295)
PLATELET # BLD AUTO: 120 10(3)UL (ref 150–450)
PLATELETS.RETICULATED NFR BLD AUTO: 2.2 % (ref 0–7)
POTASSIUM SERPL-SCNC: 4.4 MMOL/L (ref 3.5–5.1)
PROT SERPL-MCNC: 7.3 G/DL (ref 5.7–8.2)
RBC # BLD AUTO: 4.15 X10(6)UL (ref 4.3–5.7)
SODIUM SERPL-SCNC: 140 MMOL/L (ref 136–145)
TSI SER-ACNC: 1.52 UIU/ML (ref 0.55–4.78)
WBC # BLD AUTO: 6.4 X10(3) UL (ref 4–11)

## 2025-07-29 RX ORDER — DIPHENHYDRAMINE HYDROCHLORIDE 50 MG/ML
25 INJECTION, SOLUTION INTRAMUSCULAR; INTRAVENOUS ONCE
Status: CANCELLED
Start: 2025-07-31 | End: 2025-07-31

## 2025-07-29 RX ORDER — FLUOROURACIL 50 MG/ML
2400 INJECTION, SOLUTION INTRAVENOUS CONTINUOUS
Status: CANCELLED | OUTPATIENT
Start: 2025-07-31

## 2025-07-31 ENCOUNTER — OFFICE VISIT (OUTPATIENT)
Facility: LOCATION | Age: 65
End: 2025-07-31
Attending: INTERNAL MEDICINE
Payer: COMMERCIAL

## 2025-07-31 VITALS
WEIGHT: 176 LBS | SYSTOLIC BLOOD PRESSURE: 127 MMHG | HEART RATE: 77 BPM | DIASTOLIC BLOOD PRESSURE: 60 MMHG | BODY MASS INDEX: 26 KG/M2 | RESPIRATION RATE: 16 BRPM | TEMPERATURE: 99 F

## 2025-07-31 DIAGNOSIS — C16.9 MALIGNANT NEOPLASM OF STOMACH, UNSPECIFIED LOCATION (HCC): Primary | ICD-10-CM

## 2025-07-31 RX ORDER — FLUOROURACIL 50 MG/ML
2400 INJECTION, SOLUTION INTRAVENOUS ONCE
Status: DISCONTINUED | OUTPATIENT
Start: 2025-07-31 | End: 2025-07-31

## 2025-07-31 RX ORDER — DIPHENHYDRAMINE HYDROCHLORIDE 50 MG/ML
25 INJECTION, SOLUTION INTRAMUSCULAR; INTRAVENOUS ONCE
Status: COMPLETED | OUTPATIENT
Start: 2025-07-31 | End: 2025-07-31

## 2025-07-31 RX ORDER — DIPHENHYDRAMINE HYDROCHLORIDE 50 MG/ML
INJECTION, SOLUTION INTRAMUSCULAR; INTRAVENOUS
Status: COMPLETED
Start: 2025-07-31 | End: 2025-07-31

## 2025-07-31 RX ADMIN — FLUOROURACIL 4700 MG: 50 INJECTION, SOLUTION INTRAVENOUS at 10:04:00

## 2025-07-31 RX ADMIN — DIPHENHYDRAMINE HYDROCHLORIDE 25 MG: 50 INJECTION, SOLUTION INTRAMUSCULAR; INTRAVENOUS at 08:08:00

## 2025-08-02 ENCOUNTER — NURSE ONLY (OUTPATIENT)
Facility: LOCATION | Age: 65
End: 2025-08-02
Attending: INTERNAL MEDICINE

## 2025-08-02 VITALS
HEART RATE: 87 BPM | SYSTOLIC BLOOD PRESSURE: 126 MMHG | DIASTOLIC BLOOD PRESSURE: 69 MMHG | OXYGEN SATURATION: 96 % | TEMPERATURE: 98 F | RESPIRATION RATE: 18 BRPM

## 2025-08-12 ENCOUNTER — NURSE ONLY (OUTPATIENT)
Facility: LOCATION | Age: 65
End: 2025-08-12
Attending: INTERNAL MEDICINE

## 2025-08-12 ENCOUNTER — OFFICE VISIT (OUTPATIENT)
Facility: LOCATION | Age: 65
End: 2025-08-12
Attending: INTERNAL MEDICINE

## 2025-08-12 VITALS
HEIGHT: 69 IN | HEART RATE: 79 BPM | DIASTOLIC BLOOD PRESSURE: 83 MMHG | BODY MASS INDEX: 26.83 KG/M2 | WEIGHT: 181.13 LBS | TEMPERATURE: 98 F | OXYGEN SATURATION: 98 % | SYSTOLIC BLOOD PRESSURE: 129 MMHG | RESPIRATION RATE: 18 BRPM

## 2025-08-12 DIAGNOSIS — C16.9 MALIGNANT NEOPLASM OF STOMACH, UNSPECIFIED LOCATION (HCC): ICD-10-CM

## 2025-08-12 DIAGNOSIS — Z51.81 ENCOUNTER FOR MONITORING CARDIOTOXIC DRUG THERAPY: ICD-10-CM

## 2025-08-12 DIAGNOSIS — C16.2 MALIGNANT NEOPLASM OF BODY OF STOMACH (HCC): Primary | ICD-10-CM

## 2025-08-12 DIAGNOSIS — Z51.11 CHEMOTHERAPY MANAGEMENT, ENCOUNTER FOR: Primary | ICD-10-CM

## 2025-08-12 DIAGNOSIS — Z79.899 ENCOUNTER FOR MONITORING CARDIOTOXIC DRUG THERAPY: ICD-10-CM

## 2025-08-12 DIAGNOSIS — G62.0 PERIPHERAL NEUROPATHY DUE TO CHEMOTHERAPY (HCC): ICD-10-CM

## 2025-08-12 DIAGNOSIS — R53.83 OTHER FATIGUE: ICD-10-CM

## 2025-08-12 DIAGNOSIS — Z51.11 CHEMOTHERAPY MANAGEMENT, ENCOUNTER FOR: ICD-10-CM

## 2025-08-12 DIAGNOSIS — Z13.29 SCREENING FOR ENDOCRINE DISORDER: ICD-10-CM

## 2025-08-12 DIAGNOSIS — Z51.12 ENCOUNTER FOR ANTINEOPLASTIC IMMUNOTHERAPY: ICD-10-CM

## 2025-08-12 DIAGNOSIS — T45.1X5A PERIPHERAL NEUROPATHY DUE TO CHEMOTHERAPY (HCC): ICD-10-CM

## 2025-08-12 LAB
ALBUMIN SERPL-MCNC: 4.6 G/DL (ref 3.2–4.8)
ALBUMIN/GLOB SERPL: 1.8 (ref 1–2)
ALP LIVER SERPL-CCNC: 127 U/L (ref 45–117)
ALT SERPL-CCNC: 26 U/L (ref 10–49)
ANION GAP SERPL CALC-SCNC: 9 MMOL/L (ref 0–18)
AST SERPL-CCNC: 26 U/L (ref ?–34)
BASOPHILS # BLD AUTO: 0.01 X10(3) UL (ref 0–0.2)
BASOPHILS NFR BLD AUTO: 0.2 %
BILIRUB SERPL-MCNC: 2.1 MG/DL (ref 0.2–1.1)
BUN BLD-MCNC: 16 MG/DL (ref 9–23)
BUN/CREAT SERPL: 17.2 (ref 10–20)
CALCIUM BLD-MCNC: 9.8 MG/DL (ref 8.7–10.4)
CHLORIDE SERPL-SCNC: 106 MMOL/L (ref 98–112)
CO2 SERPL-SCNC: 26 MMOL/L (ref 21–32)
CREAT BLD-MCNC: 0.93 MG/DL (ref 0.7–1.3)
DEPRECATED RDW RBC AUTO: 44 FL (ref 35.1–46.3)
EGFRCR SERPLBLD CKD-EPI 2021: 92 ML/MIN/1.73M2 (ref 60–?)
EOSINOPHIL # BLD AUTO: 0.07 X10(3) UL (ref 0–0.7)
EOSINOPHIL NFR BLD AUTO: 1.2 %
ERYTHROCYTE [DISTWIDTH] IN BLOOD BY AUTOMATED COUNT: 13.6 % (ref 11–15)
GLOBULIN PLAS-MCNC: 2.6 G/DL (ref 2–3.5)
GLUCOSE BLD-MCNC: 117 MG/DL (ref 70–99)
HCT VFR BLD AUTO: 35.6 % (ref 39–53)
HGB BLD-MCNC: 12.6 G/DL (ref 13–17.5)
IMM GRANULOCYTES # BLD AUTO: 0.01 X10(3) UL (ref 0–1)
IMM GRANULOCYTES NFR BLD: 0.2 %
LYMPHOCYTES # BLD AUTO: 1.7 X10(3) UL (ref 1–4)
LYMPHOCYTES NFR BLD AUTO: 29.9 %
MCH RBC QN AUTO: 31.7 PG (ref 26–34)
MCHC RBC AUTO-ENTMCNC: 35.4 G/DL (ref 31–37)
MCV RBC AUTO: 89.4 FL (ref 80–100)
MONOCYTES # BLD AUTO: 0.52 X10(3) UL (ref 0.1–1)
MONOCYTES NFR BLD AUTO: 9.1 %
NEUTROPHILS # BLD AUTO: 3.38 X10 (3) UL (ref 1.5–7.7)
NEUTROPHILS # BLD AUTO: 3.38 X10(3) UL (ref 1.5–7.7)
NEUTROPHILS NFR BLD AUTO: 59.4 %
OSMOLALITY SERPL CALC.SUM OF ELEC: 294 MOSM/KG (ref 275–295)
PLATELET # BLD AUTO: 104 10(3)UL (ref 150–450)
POTASSIUM SERPL-SCNC: 4.7 MMOL/L (ref 3.5–5.1)
PROT SERPL-MCNC: 7.2 G/DL (ref 5.7–8.2)
RBC # BLD AUTO: 3.98 X10(6)UL (ref 4.3–5.7)
SODIUM SERPL-SCNC: 141 MMOL/L (ref 136–145)
TSI SER-ACNC: 1.43 UIU/ML (ref 0.55–4.78)
WBC # BLD AUTO: 5.7 X10(3) UL (ref 4–11)

## 2025-08-12 RX ORDER — FLUOROURACIL 50 MG/ML
2400 INJECTION, SOLUTION INTRAVENOUS CONTINUOUS
Status: CANCELLED | OUTPATIENT
Start: 2025-08-14

## 2025-08-12 RX ORDER — DIPHENHYDRAMINE HYDROCHLORIDE 50 MG/ML
25 INJECTION, SOLUTION INTRAMUSCULAR; INTRAVENOUS ONCE
Status: CANCELLED
Start: 2025-08-14 | End: 2025-08-14

## 2025-08-14 ENCOUNTER — OFFICE VISIT (OUTPATIENT)
Facility: LOCATION | Age: 65
End: 2025-08-14
Attending: INTERNAL MEDICINE

## 2025-08-14 VITALS
SYSTOLIC BLOOD PRESSURE: 126 MMHG | DIASTOLIC BLOOD PRESSURE: 59 MMHG | HEART RATE: 85 BPM | TEMPERATURE: 98 F | OXYGEN SATURATION: 98 % | RESPIRATION RATE: 18 BRPM

## 2025-08-14 DIAGNOSIS — C16.2 MALIGNANT NEOPLASM OF BODY OF STOMACH (HCC): ICD-10-CM

## 2025-08-14 DIAGNOSIS — C16.9 MALIGNANT NEOPLASM OF STOMACH, UNSPECIFIED LOCATION (HCC): Primary | ICD-10-CM

## 2025-08-14 DIAGNOSIS — Z51.12 ENCOUNTER FOR ANTINEOPLASTIC IMMUNOTHERAPY: ICD-10-CM

## 2025-08-14 DIAGNOSIS — Z13.29 SCREENING FOR ENDOCRINE DISORDER: ICD-10-CM

## 2025-08-14 DIAGNOSIS — R53.83 OTHER FATIGUE: ICD-10-CM

## 2025-08-14 LAB — CORTIS SERPL-MCNC: 10.1 UG/DL

## 2025-08-14 RX ORDER — FLUOROURACIL 50 MG/ML
2400 INJECTION, SOLUTION INTRAVENOUS CONTINUOUS
Status: DISCONTINUED | OUTPATIENT
Start: 2025-08-14 | End: 2025-08-14

## 2025-08-14 RX ADMIN — FLUOROURACIL 4750 MG: 50 INJECTION, SOLUTION INTRAVENOUS at 11:21:00

## 2025-08-16 ENCOUNTER — NURSE ONLY (OUTPATIENT)
Facility: LOCATION | Age: 65
End: 2025-08-16
Attending: INTERNAL MEDICINE

## 2025-08-16 VITALS
OXYGEN SATURATION: 96 % | TEMPERATURE: 98 F | HEART RATE: 80 BPM | RESPIRATION RATE: 18 BRPM | DIASTOLIC BLOOD PRESSURE: 73 MMHG | SYSTOLIC BLOOD PRESSURE: 129 MMHG

## 2025-08-20 ENCOUNTER — HOSPITAL ENCOUNTER (OUTPATIENT)
Dept: CT IMAGING | Facility: HOSPITAL | Age: 65
Discharge: HOME | End: 2025-08-20

## 2025-08-20 DIAGNOSIS — C16.9 MALIGNANT NEOPLASM OF STOMACH, UNSPECIFIED LOCATION (HCC): ICD-10-CM

## 2025-08-20 DIAGNOSIS — Z51.11 CHEMOTHERAPY MANAGEMENT, ENCOUNTER FOR: ICD-10-CM

## 2025-08-20 PROCEDURE — 71260 CT THORAX DX C+: CPT

## 2025-08-20 PROCEDURE — 74177 CT ABD & PELVIS W/CONTRAST: CPT

## 2025-08-21 ENCOUNTER — HOSPITAL ENCOUNTER (OUTPATIENT)
Dept: CV DIAGNOSTICS | Facility: HOSPITAL | Age: 65
Discharge: HOME | End: 2025-08-21

## 2025-08-21 DIAGNOSIS — Z51.81 ENCOUNTER FOR MONITORING CARDIOTOXIC DRUG THERAPY: ICD-10-CM

## 2025-08-21 DIAGNOSIS — Z79.899 ENCOUNTER FOR MONITORING CARDIOTOXIC DRUG THERAPY: ICD-10-CM

## 2025-08-21 DIAGNOSIS — C16.9 MALIGNANT NEOPLASM OF STOMACH, UNSPECIFIED LOCATION (HCC): ICD-10-CM

## 2025-08-21 DIAGNOSIS — Z51.11 CHEMOTHERAPY MANAGEMENT, ENCOUNTER FOR: ICD-10-CM

## 2025-08-21 PROCEDURE — 93306 TTE W/DOPPLER COMPLETE: CPT

## 2025-08-21 PROCEDURE — 76376 3D RENDER W/INTRP POSTPROCES: CPT

## 2025-08-25 PROBLEM — Z45.2 ENCOUNTER FOR CARE RELATED TO VASCULAR ACCESS PORT: Status: ACTIVE | Noted: 2025-08-25

## 2025-08-25 RX ORDER — SODIUM CHLORIDE 9 MG/ML
10 INJECTION, SOLUTION INTRAMUSCULAR; INTRAVENOUS; SUBCUTANEOUS ONCE
OUTPATIENT
Start: 2025-08-25

## 2025-08-26 ENCOUNTER — OFFICE VISIT (OUTPATIENT)
Facility: LOCATION | Age: 65
End: 2025-08-26
Attending: INTERNAL MEDICINE

## 2025-08-26 ENCOUNTER — NURSE ONLY (OUTPATIENT)
Facility: LOCATION | Age: 65
End: 2025-08-26
Attending: INTERNAL MEDICINE

## 2025-08-26 VITALS
RESPIRATION RATE: 18 BRPM | DIASTOLIC BLOOD PRESSURE: 81 MMHG | SYSTOLIC BLOOD PRESSURE: 126 MMHG | WEIGHT: 182.63 LBS | BODY MASS INDEX: 27.05 KG/M2 | TEMPERATURE: 99 F | HEART RATE: 84 BPM | OXYGEN SATURATION: 97 % | HEIGHT: 69 IN

## 2025-08-26 DIAGNOSIS — Z51.11 CHEMOTHERAPY MANAGEMENT, ENCOUNTER FOR: Primary | ICD-10-CM

## 2025-08-26 DIAGNOSIS — C16.9 MALIGNANT NEOPLASM OF STOMACH, UNSPECIFIED LOCATION (HCC): ICD-10-CM

## 2025-08-26 DIAGNOSIS — Z51.11 CHEMOTHERAPY MANAGEMENT, ENCOUNTER FOR: ICD-10-CM

## 2025-08-26 DIAGNOSIS — C16.2 MALIGNANT NEOPLASM OF BODY OF STOMACH (HCC): Primary | ICD-10-CM

## 2025-08-26 LAB
ALBUMIN SERPL-MCNC: 4.6 G/DL (ref 3.2–4.8)
ALBUMIN/GLOB SERPL: 1.7 (ref 1–2)
ALP LIVER SERPL-CCNC: 121 U/L (ref 45–117)
ALT SERPL-CCNC: 25 U/L (ref 10–49)
ANION GAP SERPL CALC-SCNC: 6 MMOL/L (ref 0–18)
AST SERPL-CCNC: 31 U/L (ref ?–34)
BASOPHILS # BLD AUTO: 0.02 X10(3) UL (ref 0–0.2)
BASOPHILS NFR BLD AUTO: 0.3 %
BILIRUB SERPL-MCNC: 1.9 MG/DL (ref 0.2–1.1)
BUN BLD-MCNC: 15 MG/DL (ref 9–23)
BUN/CREAT SERPL: 12.8 (ref 10–20)
CALCIUM BLD-MCNC: 9.6 MG/DL (ref 8.7–10.4)
CHLORIDE SERPL-SCNC: 106 MMOL/L (ref 98–112)
CO2 SERPL-SCNC: 28 MMOL/L (ref 21–32)
CREAT BLD-MCNC: 1.17 MG/DL (ref 0.7–1.3)
DEPRECATED RDW RBC AUTO: 45.9 FL (ref 35.1–46.3)
EGFRCR SERPLBLD CKD-EPI 2021: 70 ML/MIN/1.73M2 (ref 60–?)
EOSINOPHIL # BLD AUTO: 0.07 X10(3) UL (ref 0–0.7)
EOSINOPHIL NFR BLD AUTO: 1.1 %
ERYTHROCYTE [DISTWIDTH] IN BLOOD BY AUTOMATED COUNT: 13.7 % (ref 11–15)
GLOBULIN PLAS-MCNC: 2.7 G/DL (ref 2–3.5)
GLUCOSE BLD-MCNC: 113 MG/DL (ref 70–99)
HCT VFR BLD AUTO: 37 % (ref 39–53)
HGB BLD-MCNC: 13.1 G/DL (ref 13–17.5)
IMM GRANULOCYTES # BLD AUTO: 0.02 X10(3) UL (ref 0–1)
IMM GRANULOCYTES NFR BLD: 0.3 %
LYMPHOCYTES # BLD AUTO: 1.67 X10(3) UL (ref 1–4)
LYMPHOCYTES NFR BLD AUTO: 26.4 %
MCH RBC QN AUTO: 32.5 PG (ref 26–34)
MCHC RBC AUTO-ENTMCNC: 35.4 G/DL (ref 31–37)
MCV RBC AUTO: 91.8 FL (ref 80–100)
MONOCYTES # BLD AUTO: 0.65 X10(3) UL (ref 0.1–1)
MONOCYTES NFR BLD AUTO: 10.3 %
NEUTROPHILS # BLD AUTO: 3.89 X10 (3) UL (ref 1.5–7.7)
NEUTROPHILS # BLD AUTO: 3.89 X10(3) UL (ref 1.5–7.7)
NEUTROPHILS NFR BLD AUTO: 61.6 %
OSMOLALITY SERPL CALC.SUM OF ELEC: 292 MOSM/KG (ref 275–295)
PLATELET # BLD AUTO: 123 10(3)UL (ref 150–450)
PLATELETS.RETICULATED NFR BLD AUTO: 2.1 % (ref 0–7)
POTASSIUM SERPL-SCNC: 4.5 MMOL/L (ref 3.5–5.1)
PROT SERPL-MCNC: 7.3 G/DL (ref 5.7–8.2)
RBC # BLD AUTO: 4.03 X10(6)UL (ref 4.3–5.7)
SODIUM SERPL-SCNC: 140 MMOL/L (ref 136–145)
TSI SER-ACNC: 1.14 UIU/ML (ref 0.55–4.78)
WBC # BLD AUTO: 6.3 X10(3) UL (ref 4–11)

## 2025-08-26 RX ORDER — FLUOROURACIL 50 MG/ML
2400 INJECTION, SOLUTION INTRAVENOUS CONTINUOUS
Status: CANCELLED | OUTPATIENT
Start: 2025-08-28

## 2025-08-28 ENCOUNTER — OFFICE VISIT (OUTPATIENT)
Facility: LOCATION | Age: 65
End: 2025-08-28
Attending: INTERNAL MEDICINE

## 2025-08-28 VITALS
TEMPERATURE: 98 F | SYSTOLIC BLOOD PRESSURE: 130 MMHG | DIASTOLIC BLOOD PRESSURE: 63 MMHG | RESPIRATION RATE: 18 BRPM | HEART RATE: 79 BPM

## 2025-08-28 DIAGNOSIS — C16.9 MALIGNANT NEOPLASM OF STOMACH, UNSPECIFIED LOCATION (HCC): Primary | ICD-10-CM

## 2025-08-28 RX ORDER — FLUOROURACIL 50 MG/ML
2400 INJECTION, SOLUTION INTRAVENOUS CONTINUOUS
Status: DISCONTINUED | OUTPATIENT
Start: 2025-08-28 | End: 2025-08-28

## 2025-08-28 RX ADMIN — FLUOROURACIL 4800 MG: 50 INJECTION, SOLUTION INTRAVENOUS at 10:25:00

## 2025-08-30 ENCOUNTER — NURSE ONLY (OUTPATIENT)
Facility: LOCATION | Age: 65
End: 2025-08-30
Attending: INTERNAL MEDICINE

## 2025-08-30 VITALS
HEART RATE: 75 BPM | OXYGEN SATURATION: 96 % | RESPIRATION RATE: 18 BRPM | SYSTOLIC BLOOD PRESSURE: 122 MMHG | TEMPERATURE: 98 F | DIASTOLIC BLOOD PRESSURE: 75 MMHG

## (undated) DEVICE — DRAPE SHEET TRANSVERSE LAP

## (undated) DEVICE — KIT ENDO ORCAPOD 160/180/190

## (undated) DEVICE — STERILE LATEX POWDER-FREE SURGICAL GLOVESWITH NITRILE COATING: Brand: PROTEXIS

## (undated) DEVICE — DRAIN INCS 3/8IN 12IN PNRS RBR

## (undated) DEVICE — SPONGE: SPECIALTY PEANUT XR 100/CS: Brand: MEDICAL ACTION INDUSTRIES

## (undated) DEVICE — KIT CLEAN ENDOKIT 1.1OZ GOWNX2

## (undated) DEVICE — MINOR GENERAL: Brand: MEDLINE INDUSTRIES, INC.

## (undated) DEVICE — VIOLET BRAIDED (POLYGLACTIN 910), SYNTHETIC ABSORBABLE SUTURE: Brand: COATED VICRYL

## (undated) DEVICE — SINGLE-USE SNARE: Brand: CAPTIVATOR™ COLD

## (undated) DEVICE — CAUTERY BLADE 2IN INS E1455

## (undated) DEVICE — SOL  .9 1000ML BTL

## (undated) DEVICE — UNDYED BRAIDED (POLYGLACTIN 910), SYNTHETIC ABSORBABLE SUTURE: Brand: COATED VICRYL

## (undated) DEVICE — MEDI-VAC NON-CONDUCTIVE SUCTION TUBING 6MM X 1.8M (6FT.) L: Brand: CARDINAL HEALTH

## (undated) DEVICE — LINE MNTR ADLT SET O2 INTMD

## (undated) DEVICE — 60 ML SYRINGE REGULAR TIP: Brand: MONOJECT

## (undated) DEVICE — DERMABOND LIQUID ADHESIVE

## (undated) DEVICE — GIJAW SINGLE-USE BIOPSY FORCEPS WITH NEEDLE: Brand: GIJAW

## (undated) DEVICE — CONMED SCOPE SAVER BITE BLOCK, 20X27 MM: Brand: SCOPE SAVER

## (undated) DEVICE — CLIPPER BLADE 3M

## (undated) NOTE — LETTER
No referring provider defined for this encounter. 02/08/18        Patient: Westley Swartz   YOB: 1960   Date of Visit: 2/8/2018       Dear  Dr. Radha Wilder MD,      Thank you for referring Westley Swartz to my practice.   Please find my ass

## (undated) NOTE — LETTER
No referring provider defined for this encounter. 11/20/17        Patient: Alejandro Hill   YOB: 1960   Date of Visit: 11/20/2017       Dear  Dr. Yady Sewell MD,      Thank you for referring Alejandro Hill to my practice.   Please find my a

## (undated) NOTE — LETTER
8/10/2021          To Whom It May Concern:    Leena Jarvis is currently under my medical care and may not return to work at this time. Please excuse Nasi for 3 days. From Wednesday August 11th to Friday August 13th.    Activity is restricted as follows:

## (undated) NOTE — MR AVS SNAPSHOT
After Visit Summary   1/11/2024    Wallace Garzon   MRN: [id number]           Visit Information     Date & Time  1/11/2024  2:00 PM Provider  EM  INFRN 7 Department  [facility] - Infusion Dept. Phone  [phone]      Your Vitals Were  Most recent update: 1/11/2024  2:44 PM    BP   129/67          Pulse   91          Temp   98.6 °F (37 °C) (Oral)          Resp   20          Wt   83.7 kg (184 lb 9.6 oz)             SpO2   95%    BMI   27.26 kg/m²         Allergies as of 1/11/2024  Review status set to In Progress on 1/10/2024   No Known Allergies     Your Current Medications        Dosage    prochlorperazine (COMPAZINE) 10 mg tablet Take 1 tablet (10 mg total) by mouth every 6 (six) hours as needed for Nausea.    ondansetron (ZOFRAN) 8 MG tablet Take 1 tablet (8 mg total) by mouth every 8 (eight) hours as needed for Nausea.    Omeprazole 40 MG Oral Capsule Delayed Release Take 1 capsule (40 mg total) by mouth every morning before breakfast.    ferrous sulfate 325 (65 FE) MG Oral Tab EC Take 1 tablet (325 mg total) by mouth daily with breakfast.      Diagnoses for This Visit    Iron deficiency anemia   [100564]  -  Primary  Gastric cancer   [287331]    Iron deficiency anemia, unspecified iron deficiency anemia type   [2015038]             We Ordered the Following     Normal Orders This Visit    Nursing communication [ROL295 CUSTOM]       Future Appointments        Provider Department    1/12/2024 3:30 PM [facility] CARD [facility]                Did you know that [facility] primary care physicians now offer Video Visits through [employer] for adult patients for a variety of conditions such as allergies, back pain and cold symptoms? Skip the drive and waiting room and online chat with a doctor face-to-face using your web-cam enabled computer or mobile device wherever you are. Video Visits cost $50 and can be paid hassle-free using a credit, debit, or health savings card.   Not active on [employer]? Ask us how to get signed up today!          If you receive a survey from [employer], please take a few minutes to complete it and provide feedback. We strive to deliver the best patient experience and are looking for ways to make improvements. Your feedback will help us do so. For more information on [employer], please visit [email or web address]           No text in SmartText           No text in SmartText

## (undated) NOTE — LETTER
2/8/2018          To Whom It May Concern:    Mis Nelson is currently under my medical care and may not return to work at this time. He may return to work on 03/07/2018 full duty, with no activity restrictions.     If you require additional information

## (undated) NOTE — LETTER
[address] [facility] [address] [place], [place]  Authorization for Surgical Operation and Procedure                                                                                           I hereby authorize Rema Gutierrez MD, my physician and his/her assistants (if applicable), which may include medical students, residents, and/or fellows, to perform the following surgical operation/ procedure and administer such anesthesia as may be determined necessary by my physician: Operation/Procedure name (s) COLONOSCOPY/ESOPHAGOGASTRODUODENOSCOPY on [address]   2. I recognize that during the surgical operation/procedure, unforeseen conditions may necessitate additional or different procedures than those listed above. I, therefore, further authorize and request that the above-named surgeon, assistants, or designees perform such procedures as are, in their judgment, necessary and desirable. 3.   My surgeon/physician has discussed prior to my surgery the potential benefits, risks and side effects of this procedure; the likelihood of achieving goals; and potential problems that might occur during recuperation. They also discussed reasonable alternatives to the procedure, including risks, benefits, and side effects related to the alternatives and risks related to not receiving this procedure. I have had all my questions answered and I acknowledge that no guarantee has been made as to the result that may be obtained. 4.   Should the need arise during my operation/procedure, which includes change of level of care prior to discharge, I also consent to the administration of blood and/or blood products. Further, I understand that despite careful testing and screening of blood or blood products by collecting agencies, I may still be subject to ill effects as a result of receiving a blood transfusion and/or blood products.   The following are some, but not all, of the potential risks that can occur: fever and allergic reactions, hemolytic reactions, transmission of diseases such as Hepatitis, AIDS and Cytomegalovirus (CMV) and fluid overload. In the event that I wish to have an autologous transfusion of my own blood, or a directed donor transfusion, I will discuss this with my physician. Check only if Refusing Blood or Blood Products  I understand refusal of blood or blood products as deemed necessary by my physician may have serious consequences to my condition to include possible death. I hereby assume responsibility for my refusal and release the hospital, its personnel, and my physicians from any responsibility for the consequences of my refusal.    o  Refuse   5. I authorize the use of any specimen, organs, tissues, body parts or foreign objects that may be removed from my body during the operation/procedure for diagnosis, research or teaching purposes and their subsequent disposal by hospital authorities. I also authorize the release of specimen test results and/or written reports to my treating physician on the hospital medical staff or other referring or consulting physicians involved in my care, at the discretion of the Pathologist or my treating physician. 6.   I consent to the photographing or videotaping of the operations or procedures to be performed, including appropriate portions of my body for medical, scientific, or educational purposes, provided my identity is not revealed by the pictures or by descriptive texts accompanying them. If the procedure has been photographed/videotaped, the surgeon will obtain the original picture, image, videotape or CD. The hospital will not be responsible for storage, release or maintenance of the picture, image, tape or CD.    7.   I consent to the presence of a  or observers in the operating room as deemed necessary by my physician or their designees.     8.   I recognize that in the event my procedure results in extended X-Ray/fluoroscopy time, I may develop a skin reaction. 9. If I have a Do Not Attempt Resuscitation (DNAR) order in place, that status will be suspended while in the operating room, procedural suite, and during the recovery period unless otherwise explicitly stated by me (or a person authorized to consent on my behalf). The surgeon or my attending physician will determine when the applicable recovery period ends for purposes of reinstating the DNAR order. 10. Patients having a sterilization procedure: I understand that if the procedure is successful the results will be permanent and it will therefore be impossible for me to inseminate, conceive, or bear children. I also understand that the procedure is intended to result in sterility, although the result has not been guaranteed. 11. I acknowledge that my physician has explained sedation/analgesia administration to me including the risk and benefits I consent to the administration of sedation/analgesia as may be necessary or desirable in the judgment of my physician. I CERTIFY THAT I HAVE READ AND FULLY UNDERSTAND THE ABOVE CONSENT TO OPERATION and/or OTHER PROCEDURE.     _________________________________________ _________________________________     ___________________________________  Signature of Patient     Signature of Responsible Person                   Printed Name of Responsible Person                              _________________________________________ ______________________________        ___________________________________  Signature of Witness         Date  Time         Relationship to Patient    STATEMENT OF PHYSICIAN My signature below affirms that prior to the time of the procedure; I have explained to the patient and/or his/her legal representative, the risks and benefits involved in the proposed treatment and any reasonable alternative to the proposed treatment.  I have also explained the risks and benefits involved in refusal of the proposed treatment and alternatives to the proposed treatment and have answered the patient's questions.  If I have a significant financial interest in a co-management agreement or a significant financial interest in any product or implant, or other significant relationship used in this procedure/surgery, I have disclosed this and had a discussion with my patient.     _______________________________________________________________ _____________________________  Jeraline Starch of Physician)                                                                                         (Date)                                   (Time)  Patient Name: Jacqueline Meyer    : 1960   Printed: 12/15/2023      Medical Record #: [id number]                                              Page 1 of 1

## (undated) NOTE — LETTER
10/11/2022          To Whom It May Concern:    Mariann Back is currently under my medical care and may not return to work at this time. Please excuse Nasi for 2 days. He may return to work on 10/13/22 pending he remains fever free and his swab results taken at the visit on 10/11 are negative. .    If you require additional information please contact our office.         Sincerely,    Ke Wilkins DO          Document generated by:  Ke Wilkins DO